# Patient Record
Sex: FEMALE | Race: WHITE
[De-identification: names, ages, dates, MRNs, and addresses within clinical notes are randomized per-mention and may not be internally consistent; named-entity substitution may affect disease eponyms.]

---

## 2016-12-29 NOTE — RADRPT
EXAM DATE/TIME:  12/29/2016 18:06 

 

[facility] COMPARISON:     

CHEST SINGLE AP, October 30, 2016, 9:58.

 

                     

INDICATIONS :     

General Weakness and Confusion.

                     

 

MEDICAL HISTORY :            

Cerebrovascular disease. Seizures. Cardiovascular disease.  Kidney cancer.   

 

SURGICAL HISTORY :        

Appendectomy. Cholecystectomy. Hysterectomy. right nephrectomy. gastrectomy

 

ENCOUNTER:     

Initial                                        

 

ACUITY:     

1 day      

 

PAIN SCORE:     

0/10

 

LOCATION:     

Bilateral chest 

 

FINDINGS:     

PA and lateral views of the chest demonstrate the lungs to be symmetrically aerated without evidence 
of mass, infiltrate or effusion.  The cardiomediastinal contours are unremarkable.  Osseous structure
s are intact.

 

CONCLUSION:     

No acute cardiopulmonary disease demonstrated.

 

 

 

 Prince Justice MD on December 29, 2016 at 18:22           

Board Certified Radiologist.

 This report was verified electronically.

## 2016-12-29 NOTE — PD
HPI


Chief Complaint:  GI Complaint


Time Seen by Provider:  17:37


Travel History


International Travel<30 days:  No


Contact w/Intl Traveler<30days:  No


Traveled to known affect area:  No





History of Present Illness


HPI


Patient is a 56-year-old female who has been here multiple times for abdominal 

pain who comes in saying she is confused.  Patient was here , , 

 for abdominal pain, asking for Dilaudid.  She was discharged home each time 

without a prescription for Dilaudid.  She had a CT scan performed on  that showed no acute abnormalities.  She had labs done on each visit that 

showed no acute abnormalities.  Patient keeps saying she is confused and does 

not provide any other history.  She does say Dilaudid a few times.





PFSH


Past Medical History


Hx Anticoagulant Therapy:  No


Asthma:  No


Blood Disorders:  No


Anxiety:  Yes


Heart Rhythm Problems:  No


Cancer:  Yes (kidney)


Cardiovascular Problems:  Yes (MI )


High Cholesterol:  No


Chemotherapy:  No


Chest Pain:  No


Congestive Heart Failure:  No


COPD:  No


Cerebrovascular Accident:  Yes


Diabetes:  No


Diminished Hearing:  No


Endocrine:  No


Gastrointestinal Disorders:  Yes


GERD:  Yes


Genitourinary:  Yes (RIGHT NEPHRECTOMY)


Headaches:  Yes


Hypertension:  No


Immune Disorder:  No


Implanted Vascular Access Dvce:  No


Musculoskeletal:  No


Neurologic:  No


Psychiatric:  No


Reproductive:  No


Respiratory:  No


Immunizations Current:  Yes


Myocardial Infarction:  Yes (2006)


Pneumonia:  Yes


Radiation Therapy:  No


Seizures:  Yes


Sleep Apnea:  No


Thyroid Disease:  No


Tetanus Vaccination:  < 5 Years


Influenza Vaccination:  Yes


Menopausal:  Yes


:  1


Para:  1


Miscarriage:  0


:  0


Ectopic Pregnancy:  No


Ovarian Cysts:  No


Dilation and Curettage (D&C):  Yes


Tubal Ligation:  Yes





Past Surgical History


Abdominal Surgery:  Yes (total gastrectomy w/pouch , hernia repair)


Appendectomy:  Yes


Cardiac Surgery:  Yes (pt states an occulator was placed in her heart )


Cholecystectomy:  Yes


Gynecologic Surgery:  Yes (hysterectomy)


Hysterectomy:  Yes


Tonsillectomy:  Yes


Other Surgery:  Yes (right kidney removed)





Social History


Alcohol Use:  No


Tobacco Use:  No


Substance Use:  No





Allergies-Medications


(Allergen,Severity, Reaction):  


Coded Allergies:  


     Compazine (Verified  Allergy, Severe, SOB, 16)


     Gadolinium Derivatives (Verified  Allergy, Severe, RESPIRATORY DISTRESS, )


     Lobster (Verified  Allergy, Severe, Anaphylaxis, 16)


     Morphine (Verified  Allergy, Severe, Hives, 16)


 PT HAVING HIVES AND ITCHING TO ARM ABOVE IV SITE AFTER


 ADMINISTRATION OF MORPHINE


     Phenergan (Verified  Allergy, Severe, SOB, 16)


     Reglan (Verified  Allergy, Severe, SOB, 16)


     Toradol (Verified  Allergy, Severe, Shortness of Breath, 16)


     Zofran (Verified  Allergy, Severe, SOB, 16)


     Penicillin (Verified  Allergy, Mild, RASH, 16)


     Sulfa (Verified  Allergy, Mild, RASH, 16)


     *MDRO Multi-Drug Resistant Organism (Verified  Adverse Reaction, Unknown, 

16)


 MDR-E.Coli (urine-16)


Reported Meds & Prescriptions





Reported Meds & Active Scripts


Active


Macrobid (Nitrofurantoin Monoh/Nitrofur Macro) 100 Mg Cap 100 Mg PO BID 7 Days


Reported


Buspirone (Buspirone HCl) 7.5 Mg Tab 7.5 Mg PO BID


Xifaxan (Rifaximin) 550 Mg Tab 550 Mg PO Q8HR


Valium (Diazepam) 10 Mg Tab 10 Mg PO TID


Fioricet (Butalbital-Acetaminophen-Caffeine) -40 Mg Cap 1 Cap PO Q4H PRN


Cipro (Ciprofloxacin HCl) 500 Mg Tab 500 Mg PO BID


Dilaudid (Hydromorphone HCl) 4 Mg Tab 4 Mg PO 5 TIMES A DAY


Fentanyl Patch 72 HR (Fentanyl) 25 Mcg/Hr Patch 25 Mcg T-DERMAL Q72H


Rizatriptan Odt (Rizatriptan Benzoate) 10 Mg Tab 10 Mg SL ONCE PRN


Zyprexa (Olanzapine) 5 Mg Tab 5 Mg PO BID


Cyanocobalamin Inj (Cyanocobalamin) 1,000 Mcg/Ml Inj 1,000 Mcg IM Q30D


Zolpidem (Zolpidem Tartrate) 10 Mg Tab 10 Mg PO HS PRN


Hydroxyzine HCl 50 Mg Tab 50 Mg PO Q8HR PRN


Bentyl (Dicyclomine HCl) 10 Mg Cap 10 Mg PO TID PRN


Imipramine HCL (Imipramine HCl) 25 Mg Tab 25 Mg PO HS








Review of Systems


ROS Limitations:  Clinical Condition, Altered Mental Status





Physical Exam


Narrative


GENERAL: Awake and alert, answers some questions.


SKIN: Warm and dry.


HEAD: Atraumatic. Normocephalic. 


EYES: Pupils equal and round. No scleral icterus.  Extraocular movements intact.


ENT: Mucous membranes pink and moist.


NECK: Trachea midline. No JVD. 


CARDIOVASCULAR: Regular rate and rhythm.  No murmur appreciated.


RESPIRATORY: No accessory muscle use. Clear to auscultation. Breath sounds 

equal bilaterally. 


GASTROINTESTINAL: Abdomen soft, nondistended.  Diffuse tenderness to palpation.


MUSCULOSKELETAL: No obvious deformities. No clubbing.  No cyanosis.  No edema. 


NEUROLOGICAL: Awake and alert. No obvious cranial nerve deficits.  Motor 

grossly within normal limits.  Speaking slowly, does not respond appropriately 

to questions.


PSYCHIATRIC: Appropriate mood and affect; insight and judgment normal.





Data


Data


Last Documented VS





Vital Signs








  Date Time  Temp Pulse Resp B/P Pulse Ox O2 Delivery O2 Flow Rate FiO2


 


16 19:41  110 16 159/103 94 Room Air  


 


16 17:14 98.8       








Orders





 Complete Blood Count With Diff (16 17:51)


Comprehensive Metabolic Panel (16 17:51)


Urinalysis - C+S If Indicated (16 17:51)


Lactic Acid (16 17:51)


Ammonia (16 17:51)


Electrocardiogram (16 )


Troponin I (16 17:51)


Cath For Specimen (16 17:51)


Ct Brain W/O Iv Contrast(Rout) (16 )


Chest, Pa & Lat (16 )


Sodium Chlor 0.9% 1000 Ml Inj (Ns 1000 M (16 18:00)


Aspirin Chew (Aspirin Chew) (16 19:45)


Heparin Infusion NATALIA.Q1H (16 19:41)


Heparin Inj (Heparin Inj) (16 19:45)


Heparin Inj (Heparin Inj) (16 01:45)


Heparin-D5w Inj (Heparin-D5w Inj) (16 19:45)


Act Partial Throm Time (Ptt) (16 19:41)


Prothrombin Time / Inr (Pt) (16 19:41)


Cbc No Diff, Includes Plts (17 06:00)


Act Partial Throm Time (Ptt) (16 02:41)


Occult Blood (Hemoccult) Stool (16 19:41)


Sodium Chlor 0.9% 1000 Ml Inj (Ns 1000 M (16 19:45)


Heparin Inj (Heparin Inj) (16 01:45)





Labs





 Laboratory Tests








Test 16





 19:00


 


White Blood Count 12.4 TH/MM3


 


Red Blood Count 4.82 MIL/MM3


 


Hemoglobin 13.7 GM/DL


 


Hematocrit 43.3 %


 


Mean Corpuscular Volume 89.8 FL


 


Mean Corpuscular Hemoglobin 28.4 PG


 


Mean Corpuscular Hemoglobin 31.6 %





Concent 


 


Red Cell Distribution Width 18.9 %


 


Platelet Count 164 TH/MM3


 


Mean Platelet Volume 9.2 FL


 


Neutrophils (%) (Auto) 85.2 %


 


Lymphocytes (%) (Auto) 8.1 %


 


Monocytes (%) (Auto) 6.6 %


 


Eosinophils (%) (Auto) 0.0 %


 


Basophils (%) (Auto) 0.1 %


 


Neutrophils # (Auto) 10.6 TH/MM3


 


Lymphocytes # (Auto) 1.0 TH/MM3


 


Monocytes # (Auto) 0.8 TH/MM3


 


Eosinophils # (Auto) 0.0 TH/MM3


 


Basophils # (Auto) 0.0 TH/MM3


 


CBC Comment DIFF FINAL 


 


Differential Comment  


 


Sodium Level 136 MEQ/L


 


Potassium Level 4.6 MEQ/L


 


Chloride Level 100 MEQ/L


 


Carbon Dioxide Level 26.5 MEQ/L


 


Anion Gap 10 MEQ/L


 


Blood Urea Nitrogen 15 MG/DL


 


Creatinine 1.21 MG/DL


 


Estimat Glomerular Filtration 46 ML/MIN





Rate 


 


Random Glucose 131 MG/DL


 


Lactic Acid Level 2.4 mmol/L


 


Calcium Level 8.8 MG/DL


 


Total Bilirubin 0.2 MG/DL


 


Aspartate Amino Transf 951 U/L





(AST/SGOT) 


 


Alanine Aminotransferase 507 U/L





(ALT/SGPT) 


 


Alkaline Phosphatase 120 U/L


 


Ammonia 16 MCMOL/L


 


Troponin I 0.91 NG/ML


 


Total Protein 7.5 GM/DL


 


Albumin 3.8 GM/DL











[facility]


Medical Decision Making


Medical Screen Exam Complete:  Yes


Emergency Medical Condition:  Yes


Medical Record Reviewed:  Yes


Interpretation(s)


ECG shows sinus tach at 109, left bundle branch block.


Differential Diagnosis


Sepsis versus ACS versus ICH versus drug-seeking behavior


Narrative Course


Patient is a 56-year-old female who says she is very confused.  Exam shows no 

gross motor abnormalities, she is speaking slowly.  IV established, labs sent.  

CT of the head performed shows no acute abnormalities.  Chest x-ray shows no 

acute abnormalities.  Patient given IV fluids.





Troponin is 0.91.  AST and ALT are elevated to 951 and 507.  These are very 

different then 5 days ago.  





Patient started on Aspirin and Heparin.  LBBB is old from October. 





Patient to be admitted for further management of NSTEMI and shock liver.





Diagnosis





 Primary Impression:  


 NSTEMI (non-ST elevated myocardial infarction)


 Additional Impression:  


 Shock liver





Admitting Information


Admitting Physician Requests:  Admit


Condition:  Stable








Christina Durham MD Dec 29, 2016 19:22

## 2016-12-29 NOTE — RADRPT
EXAM DATE/TIME:  12/29/2016 18:17 

 

[facility] COMPARISON:     

No previous studies available for comparison.

 

 

INDICATIONS :     

Generalized weakness with altered mental status today.

                      

 

RADIATION DOSE:     

32.39 CTDIvol (mGy) 

 

 

 

MEDICAL HISTORY :     

Cerebrovascular disease. Seizures. 

 

SURGICAL HISTORY :      

None. 

 

ENCOUNTER:      

Initial

 

ACUITY:      

1 day

 

PAIN SCALE:      

0/10

 

LOCATION:        

cranial 

 

TECHNIQUE:     

Multiple contiguous axial images were obtained of the head.  Using automated exposure control and adj
ustment of the mA and/or kV according to patient size, radiation dose was kept as low as reasonably a
chievable to obtain optimal diagnostic quality images. 

 

FINDINGS:     

 

CEREBRUM:     

The ventricles are normal for age.  No evidence of midline shift, mass lesion, hemorrhage or acute in
farction.  No extra-axial fluid collections are seen.

 

POSTERIOR FOSSA:     

The cerebellum and brainstem are intact.  The 4th ventricle is midline.  The cerebellopontine angle i
s unremarkable.

 

EXTRACRANIAL:     

The visualized portion of the orbits is intact.

 

SKULL:     

The calvaria is intact.  No evidence of skull fracture.

 

CONCLUSION:     

Negative noncontrast head CT.

 

 

 

 Prince Justice MD on December 29, 2016 at 18:28           

Board Certified Radiologist.

 This report was verified electronically.

## 2016-12-29 NOTE — PD
Data


Data


Last Documented VS





Vital Signs








  Date Time  Temp Pulse Resp B/P Pulse Ox O2 Delivery O2 Flow Rate FiO2


 


12/29/16 19:41  110 16 159/103 94 Room Air  


 


12/29/16 17:14 98.8       








Orders





 Complete Blood Count With Diff (12/29/16 17:51)


Comprehensive Metabolic Panel (12/29/16 17:51)


Urinalysis - C+S If Indicated (12/29/16 17:51)


Lactic Acid (12/29/16 17:51)


Ammonia (12/29/16 17:51)


Electrocardiogram (12/29/16 )


Troponin I (12/29/16 17:51)


Cath For Specimen (12/29/16 17:51)


Ct Brain W/O Iv Contrast(Rout) (12/29/16 )


Chest, Pa & Lat (12/29/16 )


Sodium Chlor 0.9% 1000 Ml Inj (Ns 1000 M (12/29/16 18:00)


Aspirin Chew (Aspirin Chew) (12/29/16 19:45)


Heparin Infusion NATALIA.Q1H (12/29/16 19:41)


Heparin Inj (Heparin Inj) (12/29/16 19:45)


Heparin Inj (Heparin Inj) (12/30/16 01:45)


Heparin-D5w Inj (Heparin-D5w Inj) (12/29/16 19:45)


Act Partial Throm Time (Ptt) (12/29/16 19:41)


Prothrombin Time / Inr (Pt) (12/29/16 19:41)


Cbc No Diff, Includes Plts (1/1/17 06:00)


Act Partial Throm Time (Ptt) (12/30/16 02:41)


Occult Blood (Hemoccult) Stool (12/29/16 19:41)


Sodium Chlor 0.9% 1000 Ml Inj (Ns 1000 M (12/29/16 19:45)


Heparin Inj (Heparin Inj) (12/30/16 01:45)


Ct Abd/Pel W Iv Contrast(Rout) (12/29/16 20:14)


Vancomycin Inj (Vancomycin Inj) (12/29/16 20:31)


Metronidazole 500 Mg Inj (Flagyl 500 Mg (12/29/16 20:31)


Aztreonam Inj (Azactam Inj) (12/29/16 20:45)


Iohexol 350 Inj (Omnipaque 350 Inj) (12/29/16 21:05)


Sodium Chlor 0.9% 1000 Ml Inj (Ns 1000 M (12/29/16 22:30)


Vital Signs (Adult) Q4H (12/29/16 22:35)


Activity Bed Rest With Brp (12/29/16 22:35)


^ Cardiac Monitor / Telemetry .CONTINUOUS (12/29/16 22:35)


Sodium Chlor 0.9% 1000 Ml Inj (Ns 1000 M (12/29/16 22:35)


Sodium Chloride 0.9% Flush (Ns Flush) (12/29/16 22:45)


Sodium Chloride 0.9% Flush (Ns Flush) (12/30/16 09:00)


Basic Metabolic Panel (Bmp) (12/30/16 06:00)


Complete Blood Count With Diff (12/30/16 06:00)


Creatine Kinase (Cpk) (12/30/16 01:00)


Creatine Kinase (Cpk) (12/30/16 07:00)


Troponin I (12/30/16 01:00)


Troponin I (12/30/16 07:00)


Hepatic Functional Panel (12/30/16 06:00)


Electrocardiogram (12/30/16 01:00)


Electrocardiogram (12/30/16 07:00)


Naloxone Inj (Narcan Inj) (12/29/16 22:45)


Diet Clear Liquid (12/30/16 Breakfast)


Aztreonam Inj (Azactam Inj) (12/30/16 10:00)


Consult Gastroenterology (12/29/16 )


Lipid Profile (12/30/16 06:00)


Hepatitis Profile (12/30/16 01:00)


Aspirin Ec (Ecotrin Ec) (12/30/16 09:00)


Pantoprazole Inj (Protonix Inj) (12/29/16 23:00)


Rifaximin (Xifaxan) (12/30/16 06:00)


Lactic Acid (12/30/16 06:00)


Metronidazole 500 Mg Inj (Flagyl 500 Mg (12/30/16 06:00)


Tylenol (Acetaminophen) (12/29/16 22:54)


Alcohol (Ethanol) (12/29/16 22:54)


Drug Screen, Random Urine (12/29/16 22:54)


(Hub Use Only)Inp Phy Cons/Ref (12/29/16 )


Admit Order (Ed Use Only) (12/29/16 23:00)





Labs





 Laboratory Tests








Test 12/29/16 12/29/16 12/29/16





 19:00 19:50 21:30


 


White Blood Count 12.4 TH/MM3  


 


Red Blood Count 4.82 MIL/MM3  


 


Hemoglobin 13.7 GM/DL  


 


Hematocrit 43.3 %  


 


Mean Corpuscular Volume 89.8 FL  


 


Mean Corpuscular Hemoglobin 28.4 PG  


 


Mean Corpuscular Hemoglobin 31.6 %  





Concent   


 


Red Cell Distribution Width 18.9 %  


 


Platelet Count 164 TH/MM3  


 


Mean Platelet Volume 9.2 FL  


 


Neutrophils (%) (Auto) 85.2 %  


 


Lymphocytes (%) (Auto) 8.1 %  


 


Monocytes (%) (Auto) 6.6 %  


 


Eosinophils (%) (Auto) 0.0 %  


 


Basophils (%) (Auto) 0.1 %  


 


Neutrophils # (Auto) 10.6 TH/MM3  


 


Lymphocytes # (Auto) 1.0 TH/MM3  


 


Monocytes # (Auto) 0.8 TH/MM3  


 


Eosinophils # (Auto) 0.0 TH/MM3  


 


Basophils # (Auto) 0.0 TH/MM3  


 


CBC Comment DIFF FINAL   


 


Differential Comment    


 


Sodium Level 136 MEQ/L  


 


Potassium Level 4.6 MEQ/L  


 


Chloride Level 100 MEQ/L  


 


Carbon Dioxide Level 26.5 MEQ/L  


 


Anion Gap 10 MEQ/L  


 


Blood Urea Nitrogen 15 MG/DL  


 


Creatinine 1.21 MG/DL  


 


Estimat Glomerular Filtration 46 ML/MIN  





Rate   


 


Random Glucose 131 MG/DL  


 


Lactic Acid Level 2.4 mmol/L  


 


Calcium Level 8.8 MG/DL  


 


Total Bilirubin 0.2 MG/DL  


 


Aspartate Amino Transf 951 U/L  





(AST/SGOT)   


 


Alanine Aminotransferase 507 U/L  





(ALT/SGPT)   


 


Alkaline Phosphatase 120 U/L  


 


Ammonia 16 MCMOL/L  


 


Troponin I 0.91 NG/ML  


 


Total Protein 7.5 GM/DL  


 


Albumin 3.8 GM/DL  


 


Prothrombin Time  11.8 SEC 


 


Prothromb Time International  1.1 RATIO 





Ratio   


 


Activated Partial  25.2 SEC 





Thromboplast Time   


 


Urine Color   YELLOW 


 


Urine Turbidity   CLEAR 


 


Urine pH   6.0 


 


Urine Specific Gravity   1.033 


 


Urine Protein   30 mg/dL


 


Urine Glucose (UA)   NEG mg/dL


 


Urine Ketones   40 mg/dL


 


Urine Occult Blood   MOD 


 


Urine Nitrite   NEG 


 


Urine Bilirubin   NEG 


 


Urine Urobilinogen   LESS THAN 2.0





   MG/DL


 


Urine Leukocyte Esterase   NEG 


 


Urine RBC   10 /hpf


 


Urine WBC   1 /hpf


 


Urine Squamous Epithelial   1 /hpf





Cells   


 


Urine Mucus   FEW /lpf


 


Microscopic Urinalysis Comment   CATH-CULT NOT





   IND


 


Urine Opiates Screen   POS 


 


Urine Barbiturates Screen   POS 


 


Urine Amphetamines Screen   NEG 


 


Urine Benzodiazepines Screen   POS 


 


Urine Cocaine Screen   NEG 


 


Urine Cannabinoids Screen   NEG 











MDM


Medical Record Reviewed:  Yes


Supervised Visit with DELLA:  No


Narrative Course


CBC & BMP Diagram


12/29/16 19:00








Neutrophils 85%


Toxicology positive for opiates barbiturates and benzodiazepines





ALT of 507


Alkaline phosphatase 120


Lactic acid 2.4


Troponin 0.19


Ammonia 16


EKG reveals a sinus tachycardia with Left bundle branch block pattern similar 

to prior with a rate of 109





Last 24 hours Impressions








Abdomen/Pelvis CT 12/29/16 2014 Signed





Impressions: 





 Service Date/Time:  Thursday, December 29, 2016 21:03 - CONCLUSION:  1. No 





 obstruction or acute inflammatory changes demonstrated. 2. Liver is diffusely 





 fatty infiltrated. 3. Previous right nephrectomy. No recurrent mass. No 





 lymphadenopathy. 4. Thickened gluteus minimus muscle belly, new. This is 





 nonspecific but appearance and short term interim development would support 





 likelihood of a strain. 5. Patchy consolidation of both lung bases worsening, 





 especially on the left.     Prince Justice MD 


 


Head CT 12/29/16 0000 Signed





Impressions: 





 Service Date/Time:  Thursday, December 29, 2016 18:17 - CONCLUSION:  Negative 





 noncontrast head CT.     Prince Justice MD 


 


Chest X-Ray 12/29/16 0000 Signed





Impressions: 





 Service Date/Time:  Thursday, December 29, 2016 18:06 - CONCLUSION:  No acute 





 cardiopulmonary disease demonstrated.     Prince Justice MD 








The case was discussed with the GI Dr Neri at regarding markedly elevated 

liver enzymes.  Consideration is given to shock liver.  The patient does have 

infiltrates at the bases on CT. Flagyl Vanco and aztreonam given in setting of 

liver disease with leukocytosis and abdominal pain.  Case discussed with 

Chelsie Simental of [facility].


Diagnosis





 Primary Impression:  


 NSTEMI (non-ST elevated myocardial infarction)


 Additional Impression:  


 Shock liver





Admitting Information


Admitting Physician Requests:  Admit


Condition:  Stable








Jefferson Rojas MD Dec 29, 2016 22:16

## 2016-12-29 NOTE — RADRPT
EXAM DATE/TIME:  12/29/2016 21:03 

 

[facility] COMPARISON:     

CT ABDOMEN & PELVIS W/O CONTRAST, December 24, 2016, 13:06.  CT ABDOMEN & PELVIS W CONTRAST, October 23, 2016, 11:14.

 

 

INDICATIONS :     

General weakness and abdominal pain. 

                      

 

IV CONTRAST:     

80 cc Omnipaque 300 (iohexol) IV 

 

 

ORAL CONTRAST:      

No oral contrast ingested.

                      

 

RADIATION DOSE:     

7.02 CTDIvol (mGy) 

 

 

MEDICAL HISTORY :       

Kidney cancer.

 

SURGICAL HISTORY :      

Appendectomy. Cholecystectomy.Hysterectomy.Gastrectomy with pouch. 

 

ENCOUNTER:      

Initial

 

ACUITY:      

3 weeks

 

PAIN SCALE:      

5/10

 

LOCATION:       

Bilateral lower quadrant 

 

TECHNIQUE:     

Volumetric scanning of the abdomen and pelvis was performed.  Using automated exposure control and ad
justment of the mA and/or kV according to patient size, radiation dose was kept as low as reasonably 
achievable to obtain optimal diagnostic quality images. 

 

FINDINGS:     

The liver is diffusely fatty infiltrated. Spleen, pancreas, adrenal glands and left kidney are within
 normal limits. Previous right nephrectomy. No mass demonstrated. No lymphadenopathy.

 

No obstruction or acute inflammatory changes are seen of the bowel loops. There is been previous volodymyr
l surgery. At least 2 anastomotic staple lines are seen in the left midabdomen.

 

Mildly heterogeneous but mainly low attenuation thickening has developed of the right gluteus medius 
muscle, for example series 2 image 66 and series 601 image 67. This is entirely new over the past 5 d
ays and presumably would be posttraumatic, such as a strain the visualized osseous structures are int
act.

 

Patchy consolidation seen of the lung bases, left more so than right and both sides modestly worse in
 the interim.

 

CONCLUSION:     

1. No obstruction or acute inflammatory changes demonstrated.

2. Liver is diffusely fatty infiltrated.

3. Previous right nephrectomy. No recurrent mass. No lymphadenopathy.

4. Thickened gluteus minimus muscle belly, new. This is nonspecific but appearance and short term int
erim development would support likelihood of a strain.

5. Patchy consolidation of both lung bases worsening, especially on the left.

 

 

 

 Prince Justice MD on December 29, 2016 at 21:21           

Board Certified Radiologist.

 This report was verified electronically.

## 2016-12-30 NOTE — MH
cc:

AMANDEEP CACERES M.D., AMMA

****

 

DATE OF ADMISSION:  12/29/2016

 

Primary care doctor, Dr. Melchor martini.

 

PRESENTING COMPLAINT

Abdominal pain.

 

HISTORY OF PRESENT ILLNESS

The patient is a 56-year-old female who has visited the emergency room multiple

times earlier in the week.  The patient was in the emergency department on

December 24th, 25th and 26th for abdominal pain, asking for Dilaudid.  She was

discharged each time without a prescription for Dilaudid.  Lab work was done on

her initial visit on December 24th and was found to have a normal WBC count,

LFTs were normal, only evidence of urinary tract infection was seen for which

the patient was prescribed Bactrim and was discharged to home.  Cultures later

grew Klebsiella which was resistant to nitrofurantoin.

 

Subsequently, the patient came into the ER on the 25th and 26th of December but

no lab work was done.  The patient came back to the emergency room on December 29, 2016 with worsening abdominal pain and complaining of disorientation and

confusion.  Work-up showed at this point abnormal LFTs, elevated troponin and

elevated creatinine.  Also, with evidence of some leukocytosis.  The patient

was admitted for further evaluation.

 

At this point the patient denies any vomiting or diarrhea.  She does report

having had dry heaves.  She is still complaining of abdominal pain,

periumbilical, is not able to differentiate or give any more details about the

nature or character of the pain.  The patient also says her chest feels

unusual, however, she cannot say it is pain.  This has been going on for the

last 24 hours.

 

She denies any documented fever but complains of feeling cold and chilly.  No

cough.  No burning in the urine.  No diarrhea.  The review of systems is

otherwise completely negative.

 

PAST MEDICAL HISTORY

1. Kidney cancer.

2. History of CVA, not on any anticoagulation at this point.

3. History of GI disorder, severe gastroesophageal reflux disease.

4. Chronic headaches.

5. History of myocardial infarction in 2006.

6. Pneumonias.

 

PAST SURGICAL HISTORY

1. Total gastrectomy with pouch in 2003.

2. Hernia repair.

3. Appendectomy.

4. Tubal ligation.

5. Tonsillectomy.

6. Right kidney removed, reports renal cell cancer.

7. The patient also had some kind of heart surgery in 2006, however, she is

   unable to specify any more details.

 

ALLERGIES

THE PATIENT HAS A LONG ALLERGY LIST INCLUDING BUT NOT LIMITED TO:

1. COMPAZINE.

2. GADOLINIUM.

3. LOBSTER.

4. MORPHINE.

5. PHENERGAN.

6. REGLAN.

7. TORADOL.

8. ZOFRAN.

9. PENICILLIN.

10. SULFA.

 

MEDICATIONS

Current medications at home include:

1. Valium 10 mg p.o. t.i.d.

2. Fioricet 300/40, one capsule p.o. q.4h. p.r.n.

3. Cipro 500 mg p.o. b.i.d.

4. Dilaudid 4 mg tablet p.o. five times a day.

5. Fentanyl patch 25 mcg per hour q.72 hours.

6. Rizatriptan 10 mg tablet sublingual once p.r.n.

7. Olanzapine 5 mg p.o. b.i.d.

8. Cyanocobalamin injections 1000 mcg every month.

9. Ambien 10 mg tablet p.o. q.h.s. p.r.n.

10. Hydroxyzine 50 mg p.o. q.8h. p.r.n. for nausea.

11. Bentyl 10 mg capsule p.o. t.i.d. p.r.n.

12. Imipramine 25 mg tablet p.o. q.h.s.

13. __________________, BuSpar and rifaximin, but the patient is not sure she

   takes those medications.

 

REVIEW OF SYSTEMS

The patient states she is disoriented, however, she is awake, alert and

oriented, was able to give complete details and specifics of her history.  14

systems were reviewed and otherwise negative except as mentioned in the HPI.

 

PHYSICAL EXAMINATION

VITAL SIGNS:  Temperature 98.5, pulse 91, respiratory rate 16, systolic blood

pressure 140, diastolic 81.  She is satting 95% on room air.

GENERAL:  The patient is a petite female lying in bed, does appear

malnourished.  She is awake, alert, oriented, answering all the questions.

HEENT:  Atraumatic, normocephalic.  Pupils are round and reactive.  No scleral

icterus.  No conjunctival injection.  Oral mucosa is moist.

NECK:  Supple, nontender.  No JVD.

HEART:  S1, S2, regular.  No gallop, murmur or rub.

LUNGS:  Bilateral air entry.  Clear to auscultation.  No rales, wheezes or

rhonchi.

ABDOMEN:  Positive bowel sounds.  Soft.  The patient has diffuse tenderness to

palpation, however, there is no rebound.

EXTREMITIES:  No obvious deformity.  No clubbing, cyanosis or edema.

NEUROLOGIC:  She is awake, alert, oriented x3.  No cranial deficits are seen.

No focal neurological deficits.  Speaking slowly but able to answer all

questions.

PSYCHIATRIC:  Appropriate.

 

LABORATORY DATA

Pertinent lab investigations show a WBC count of 11.6, hemoglobin 10.9,

hematocrit 33.8, platelet count 129.

 

Chemistries show sodium 139, potassium 3.6, chloride 103, bicarb 25.2,

creatinine 2.73, BUN 10, glucose 121, calcium 8.0.

 

AST 1088 which is elevated from admission of 951.  Admission , now up to

625.  Admission alk phos 120, now down to 98.

 

Total CK on admission 2017, now down to 1974.

 

Troponin 0.78 on admission down to 0.58.

 

Albumin is low at 3.1.

 

Triglycerides 62, cholesterol 97, LDL 33, HDL 51.8.

 

Toxicology is positive for urinary opioids, barbiturate and benzodiazepines.

 

Urine has some hematuria but otherwise negative.

 

Hepatitis serologies are pending.

 

IMAGING STUDIES

CT scan of the abdomen and pelvis done in the emergency room showed no acute

obstruction or inflammatory changes.  The liver is diffusely infiltrated with

fat.  Previous right nephrectomy, no recurrent mass.  No lymphadenopathy.

Possible strain of the gluteus minimus.

 

Chest x-ray was negative for any acute disease.

 

Head CT scan was negative.

 

DIAGNOSTIC IMPRESSION

1. Abdominal pain, etiology unknown.

2. Abnormal LFTs/acute hepatitis, question etiology.

3. Non-ST-elevation MI, question chest pain.

4. Chronic pain.

5. Leukocytosis with recent UTI.

6. History of gastroesophageal reflux disease.

7. Questionable history of CVA and coronary artery disease; the patient is not

   on any medications for that.

 

PLAN

1. The patient is admitted to the hospital under the care of Dr. Mendoza.

2. GI is consulted and has seen the patient.

3. Cardiology has also seen the patient.

4. Echocardiogram is pending.  The patient is currently on IV heparin.  We will

   continue to check the troponins.

5. The patient is asking for more Dilaudid.  Will give 0.5 mg IV x1, and then

   will verify her home medications, from the pain management clinic and her

   .

6. Monitor LFTs.

7. Hepatitis serologies are pending.

8. The patient is on broad-spectrum antibiotics including Azactam and Flagyl.

   Can possibly discontinue Azactam and switch to Levaquin or Cipro in a.m.

9. DVT prophylaxis, GI prophylaxis.

 

The plan of care has been discussed in detail with the patient and with the

nursing staff.

 

 

 

 

 

                               __________________________________

                           MD RONNA Reyes/SAGE

D:  12/30/2016/10:04 AM

T:  12/30/2016/10:23 AM

Visit #:  [id number]

Job #:  [id number]

## 2016-12-30 NOTE — PD.CONS
HPI


History of Present Illness


This is a 56 year old female  with past medical history of chronic use of 

Dilaudid, chronic abdominal pain, multiple abdominal surgeries, CAD, MI, who 

came to the ER for evaluation of confusion and was admitted for NSTEMI and 

possible shocked liver.   Patient had multiple visits to  the ER during this 

month and each time, she came with complaints of confusion and abdominal pain 

asking for Dilaudid, and each time she was discharged with out prescription for 

Dilaudid. She also had multiple ER visits/hospitalizations during this year and 

was evaluated by GI services for chronic abdominal pain.  She under went EGD/

Colonoscopy (6/17/16) and this revealed s/p gastric bypass, biopsy of gastric 

polyp; diverticulosis in sigmoid and descending colon, retroflexed views 

revealed small internal hemorrhoids, external hemorrhoids.  Pathology revealed 

mild chronic gastritis, negative for Helicobacter pylori.  She continue to have 

abdominal discomfort, but it was much improved.  She was seen  by Dr. Davila as 

outpatient and she was started on dicyclomine and a SBFT was ordered, but that 

wasn't done,  recommended surgical eval for lysis of adhesion and  

Tertiary referral.  GI services this admission consulted for possible shocked 

liver, she denies alcohol intake, toxicology was (+) for Opiates, barbiturates, 

and Benzo. AST 1088, ALt 625, ALP 98, bili normal. Hepatitis panel negative, no 

reported hypotension. Cardiology consulted. Patient is currently lethargic but 

answers all questions appropriately, continue to have diffused abdominal and 

back pain.  Denies nausea, vomiting, diarrhea, constipation or bleeding. She 

does report dry heaves. She  had a bladder infection 4 days ago and was placed 

on Bactrim  for this. Patient states she had a fall recently. Labs revealed 

mild leukocytosis and mild anemia, elevated troponin, elevated creat. kinase. 

CT failed to reveal acute findings, CT of the head negative, ammonia normal. 





 (Ian Angulo)





PFSH


Past Medical History


Pancreatitis once about 2 years ago


Kidney cancer


MI/CAD, undetected PFO


Double embolic CVA, coma 7 days


GERD


Aspiration pneumonia


Ileus


Past Surgical History


Fundoplication x 2


Subtotal gastrectomy


G tube placement


Incisional hernia repair x 2


EGD/Colonoscopy


Cardiac catheterization


Tonsillectomy


Appendectomy


Cholecystectomy


Tubal ligation  (Ian Angulo)


Coded Allergies:  


     Compazine (Verified  Allergy, Severe, SOB, 12/29/16)


     Gadolinium Derivatives (Verified  Allergy, Severe, RESPIRATORY DISTRESS, 12 /29/16)


     Lobster (Verified  Allergy, Severe, Anaphylaxis, 12/29/16)


     Morphine (Verified  Allergy, Severe, Hives, 12/29/16)


 PT HAVING HIVES AND ITCHING TO ARM ABOVE IV SITE AFTER


 ADMINISTRATION OF MORPHINE


     Phenergan (Verified  Allergy, Severe, SOB, 12/29/16)


     Reglan (Verified  Allergy, Severe, SOB, 12/29/16)


     Toradol (Verified  Allergy, Severe, Shortness of Breath, 12/29/16)


     Zofran (Verified  Allergy, Severe, SOB, 12/29/16)


     Penicillin (Verified  Allergy, Mild, RASH, 12/29/16)


     Sulfa (Verified  Allergy, Mild, RASH, 12/29/16)


     *MDRO Multi-Drug Resistant Organism (Verified  Adverse Reaction, Unknown, 

12/29/16)


 MDR-E.Coli (urine-11/27/16)


Medications





 Current Medications








 Medications


  (Trade)  Dose


 Ordered  Sig/Jermaine


 Route  Start Time


 Stop Time Status Last Admin


 


  (Heparin Inj)  5,000 units  UNSCH  PRN


 IV  12/30/16 01:45


     


 


 


 Heparin Sodium


  (Porcine) 2500


 units  2,500 units  UNSCH  PRN


 IV  12/30/16 01:45


     


 


 


 Heparin Sodium/


 Dextrose  250 ml @ 0


 mls/hr  TITRATE


 IV  12/29/16 19:45


    12/29/16 21:42


 


 


  (NS 1000 ml Inj)  1,000 ml @ 


 100 mls/hr  Q10H


 IV  12/29/16 22:35


    12/30/16 09:27


 


 


  (NS Flush)  2 ml  UNSCH  PRN


 FLUSH  12/29/16 22:45


     


 


 


  (NS Flush)  2 ml  BID


 FLUSH  12/30/16 09:00


     


 


 


 Naloxone HCl 0.4


 mg  0.4 mg  UNSCH  PRN


 IV  12/29/16 22:45


     


 


 


  (Azactam Inj/NS


 Inj)  100 ml @ 


 200 mls/hr  Q12H


 IV  12/30/16 10:00


    12/30/16 09:26


 


 


  (Ecotrin Ec)  325 mg  DAILY


 PO  12/30/16 09:00


     


 


 


  (Protonix Inj)  40 mg  Q24H


 IV PUSH  12/29/16 23:00


    12/30/16 00:11


 


 


 Rifaximin 550 mg  550 mg  Q8HR


 PO  12/30/16 06:00


    12/30/16 05:23


 


 


  (Flagyl 500 Mg


 Inj)  100 ml @ 


 100 mls/hr  Q8H


 IV  12/30/16 06:00


    12/30/16 05:23


 








Family History


Both parents had COPD, Mother also had MS.


Social History


No tobacco.


No ETOH


No illicit drug use. (Ian Angulo)





Review of Systems


Constitutional:  COMPLAINS OF: Fatigue, Chills,  DENIES: Fever


Endocrine:  DENIES: Polyuria


Eyes:  DENIES: Double Vision


Ears, nose, mouth, throat:  DENIES: Hoarseness


Respiratory:  DENIES: Shortness of breath


Cardiovascular:  DENIES: Lower Extremity Edema


Gastrointestinal:  COMPLAINS OF: Abdominal pain,  DENIES: Black stools, Bloody 

stools, Constipation, Diarrhea, Nausea, Vomiting, Difficulty Swallowing, 

Anorexia, Odynophagia, Swelling of Abdomen, Heartburn, Hematemesis


Genitourinary:  DENIES: Hematuria


Musculoskeletal:  COMPLAINS OF: Back pain


Integumentary:  DENIES: Jaundice


Hematologic/lymphatic:  DENIES: Bruising


Immunologic/allergic:  DENIES: Eczema


Neurologic:  DENIES: Abnormal gait


Psychiatric:  COMPLAINS OF: Confusion,  DENIES: Anxiety (Ian Angulo)





GI Exam


Vitals I&O





 Vital Signs








  Date Time  Temp Pulse Resp B/P Pulse Ox O2 Delivery O2 Flow Rate FiO2


 


12/30/16 08:00 98.5 91 16 140/87 95   


 


12/30/16 05:08 99.4 93 16 144/87 99   


 


12/30/16 04:19      Room Air  


 


12/30/16 02:20  93      


 


12/30/16 01:20 97.8 96 16 147/85 97   


 


12/30/16 00:59  96 18 156/95 97   


 


12/29/16 23:16  104 18 153/93 97 Room Air  


 


12/29/16 21:40  100 18 158/99 97 Room Air  


 


12/29/16 20:30  110 18 147/87 96 Room Air  


 


12/29/16 19:41  110 16 159/103 94 Room Air  


 


12/29/16 17:14 98.8 118 14 154/104 91   








 I/O








 12/29/16 12/29/16 12/29/16 12/30/16 12/30/16 12/30/16





 06:59 14:59 22:59 06:59 14:59 22:59


 


Intake Total    299 ml  


 


Output Total    300 ml  


 


Balance    -1 ml  


 


      


 


Intake Oral    240 ml  


 


IV Total    59 ml  


 


Output Urine Total    300 ml  


 


# Bowel Movements    0  








Imaging





Last Impressions








Abdomen/Pelvis CT 12/29/16 2014 Signed





Impressions: 





 Service Date/Time:  Thursday, December 29, 2016 21:03 - CONCLUSION:  1. No 





 obstruction or acute inflammatory changes demonstrated. 2. Liver is diffusely 





 fatty infiltrated. 3. Previous right nephrectomy. No recurrent mass. No 





 lymphadenopathy. 4. Thickened gluteus minimus muscle belly, new. This is 





 nonspecific but appearance and short term interim development would support 





 likelihood of a strain. 5. Patchy consolidation of both lung bases worsening, 





 especially on the left.     Prince Justice MD 


 


Head CT 12/29/16 0000 Signed





Impressions: 





 Service Date/Time:  Thursday, December 29, 2016 18:17 - CONCLUSION:  Negative 





 noncontrast head CT.     Prince Justice MD 


 


Chest X-Ray 12/29/16 0000 Signed





Impressions: 





 Service Date/Time:  Thursday, December 29, 2016 18:06 - CONCLUSION:  No acute 





 cardiopulmonary disease demonstrated.     Prince Justice MD 








Laboratory











Test 12/29/16 12/29/16 12/29/16 12/30/16





 19:00 19:50 21:30 02:12


 


White Blood Count 12.4 TH/MM3   


 


Red Blood Count 4.82 MIL/MM3   


 


Hemoglobin 13.7 GM/DL   


 


Hematocrit 43.3 %   


 


Mean Corpuscular Volume 89.8 FL   


 


Mean Corpuscular Hemoglobin 28.4 PG   


 


Mean Corpuscular Hemoglobin 31.6 %   





Concent    


 


Red Cell Distribution Width 18.9 %   


 


Platelet Count 164 TH/MM3   


 


Mean Platelet Volume 9.2 FL   


 


Neutrophils (%) (Auto) 85.2 %   


 


Lymphocytes (%) (Auto) 8.1 %   


 


Monocytes (%) (Auto) 6.6 %   


 


Eosinophils (%) (Auto) 0.0 %   


 


Basophils (%) (Auto) 0.1 %   


 


Neutrophils # (Auto) 10.6 TH/MM3   


 


Lymphocytes # (Auto) 1.0 TH/MM3   


 


Monocytes # (Auto) 0.8 TH/MM3   


 


Eosinophils # (Auto) 0.0 TH/MM3   


 


Basophils # (Auto) 0.0 TH/MM3   


 


CBC Comment DIFF FINAL    


 


Differential Comment     


 


Sodium Level 136 MEQ/L   


 


Potassium Level 4.6 MEQ/L   


 


Chloride Level 100 MEQ/L   


 


Carbon Dioxide Level 26.5 MEQ/L   


 


Anion Gap 10 MEQ/L   


 


Blood Urea Nitrogen 15 MG/DL   


 


Creatinine 1.21 MG/DL   


 


Estimat Glomerular Filtration 46 ML/MIN   





Rate    


 


Random Glucose 131 MG/DL   


 


Lactic Acid Level 2.4 mmol/L   


 


Calcium Level 8.8 MG/DL   


 


Total Bilirubin 0.2 MG/DL   


 


Aspartate Amino Transf 951 U/L   





(AST/SGOT)    


 


Alanine Aminotransferase 507 U/L   





(ALT/SGPT)    


 


Alkaline Phosphatase 120 U/L   


 


Ammonia 16 MCMOL/L   


 


Troponin I 0.91 NG/ML   


 


Total Protein 7.5 GM/DL   


 


Albumin 3.8 GM/DL   


 


Prothrombin Time  11.8 SEC  


 


Prothromb Time International  1.1 RATIO  





Ratio    


 


Activated Partial  25.2 SEC  42.5 SEC





Thromboplast Time    


 


Urine Color   YELLOW  


 


Urine Turbidity   CLEAR  


 


Urine pH   6.0  


 


Urine Specific Gravity   1.033  


 


Urine Protein   30 mg/dL 


 


Urine Glucose (UA)   NEG mg/dL 


 


Urine Ketones   40 mg/dL 


 


Urine Occult Blood   MOD  


 


Urine Nitrite   NEG  


 


Urine Bilirubin   NEG  


 


Urine Urobilinogen   LESS THAN 2.0 





   MG/DL 


 


Urine Leukocyte Esterase   NEG  


 


Urine RBC   10 /hpf 


 


Urine WBC   1 /hpf 


 


Urine Squamous Epithelial   1 /hpf 





Cells    


 


Urine Mucus   FEW /lpf 


 


Microscopic Urinalysis Comment   CATH-CULT NOT 





   IND 


 


Urine Opiates Screen   POS  


 


Urine Barbiturates Screen   POS  


 


Urine Amphetamines Screen   NEG  


 


Urine Benzodiazepines Screen   POS  


 


Urine Cocaine Screen   NEG  


 


Urine Cannabinoids Screen   NEG  


 


    





Test 12/30/16 12/30/16 12/30/16 





 03:17 05:32 07:03 


 


Total Creatine Kinase 2017 U/L  1974 U/L 


 


Creatine Kinase MB 5.7 NG/ML  5.3 NG/ML 


 


Creatine Kinase MB % 0.3 %  0.3 % 


 


Troponin I 0.78 NG/ML  0.58 NG/ML 


 


White Blood Count  11.6 TH/MM3  


 


Red Blood Count  3.84 MIL/MM3  


 


Hemoglobin  10.9 GM/DL  


 


Hematocrit  33.8 %  


 


Mean Corpuscular Volume  88.0 FL  


 


Mean Corpuscular Hemoglobin  28.5 PG  


 


Mean Corpuscular Hemoglobin  32.4 %  





Concent    


 


Red Cell Distribution Width  18.8 %  


 


Platelet Count  129 TH/MM3  


 


Mean Platelet Volume  9.7 FL  


 


Neutrophils (%) (Auto)  77.9 %  


 


Lymphocytes (%) (Auto)  16.5 %  


 


Monocytes (%) (Auto)  5.1 %  


 


Eosinophils (%) (Auto)  0.1 %  


 


Basophils (%) (Auto)  0.4 %  


 


Neutrophils # (Auto)  9.1 TH/MM3  


 


Lymphocytes # (Auto)  1.9 TH/MM3  


 


Monocytes # (Auto)  0.6 TH/MM3  


 


Eosinophils # (Auto)  0.0 TH/MM3  


 


Basophils # (Auto)  0.1 TH/MM3  


 


CBC Comment  DIFF FINAL   


 


Differential Comment     


 


Sodium Level  139 MEQ/L  


 


Potassium Level  3.6 MEQ/L  


 


Chloride Level  103 MEQ/L  


 


Carbon Dioxide Level  25.2 MEQ/L  


 


Anion Gap  11 MEQ/L  


 


Blood Urea Nitrogen  10 MG/DL  


 


Creatinine  0.73 MG/DL  


 


Estimat Glomerular Filtration  82 ML/MIN  





Rate    


 


Random Glucose  121 MG/DL  


 


Lactic Acid Level  1.1 mmol/L  


 


Calcium Level  8.0 MG/DL  


 


Total Bilirubin  0.3 MG/DL  


 


Direct Bilirubin  0.1 MG/DL  


 


Indirect Bilirubin  0.2 MG/DL  


 


Aspartate Amino Transf  1088 U/L  





(AST/SGOT)    


 


Alanine Aminotransferase  625 U/L  





(ALT/SGPT)    


 


Alkaline Phosphatase  98 U/L  


 


Total Protein  6.4 GM/DL  


 


Albumin  3.1 GM/DL  


 


Triglycerides Level   62 MG/DL 


 


Cholesterol Level   97 MG/DL 


 


LDL Cholesterol   33 MG/DL 


 


HDL Cholesterol   51.8 MG/DL 


 


Cholesterol/HDL Ratio   1.87 RATIO 


 


Acetaminophen Level   LESS THAN 2.0 





   MCG/ML 


 


Ethyl Alcohol Level   LESS THAN 3 





   MG/DL 








Physical Examination


HEENT: normocephalic; atraumatic; no jaundice.  Throat is clear.


NECK: Neck is supple, no JVD, no lymphadenopathy.


CHEST:  Chest is clear to auscultation and percussion.


CARDIAC:  Regular rate and rhythm with no murmur gallop or rubs.


ABDOMEN:  Soft, nondistended, diffused tenderness; no hepatosplenomegaly; bowel 

sounds are present in all four quadrants.


EXTREMITIES: No clubbing, cyanosis, or edema.


SKIN:  Normal; no rash; no jaundice.


CNS:  Lethargic ; alert and oriented times three. (Ian Angulo)





Assessment and Plan


Plan


- Shocked liver- patient was admitted for NSTEMI and confusion, cardiology 

consulted. 


  she denies alcohol intake, toxicology was (+) for Opiates, barbiturates, and 

Benzo. AST 1088, ALt 625, ALP 98, bili normal. 


  Hepatitis panel negative, no reported hypotension. Cardiology consulted. 

Patient is currently lethargic but answers   


  all questions appropriately, continue to have diffused abdominal and back 

pain.  Denies nausea, vomiting, diarrhea, 


  constipation or bleeding. States she had a bladder infection 4 days ago and 

was placed on Bactrim for this. 


  Patient states she had a fall recently. Labs revealed mild leukocytosis and 

mild anemia, elevated troponin. CT failed to reveal acute findings, CT of the 

head negative


  No hypotensive episodes reported, this could be due to rhabdomyolysis, 

shocked liver, or medication induced  


- Chronic abdominal pain- During her last hospitalization, she was evaluated 

with EGD/Colonoscopy (6/17/16) and this revealed s/p gastric bypass, 


  biopsy of gastric polyp; diverticulosis in sigmoid and descending colon, 

retroflexed views revealed small internal hemorrhoids, external hemorrhoids.  


  Pathology revealed mild chronic gastritis, negative for Helicobacter pylori.  

She continue to have abdominal discomfort, but it was much improved.  


  She was seen  by Dr. Davila as outpatient and she was started on dicyclomine 

and a SBFT was ordered, but that wasn't done,  


  recommended surgical eval for lysis of adhesion and  Tertiary referral.  


- ? rhabdomyolysis- creat kinase 2017, recent fall


- Confusion- ammonia normal, Ct of the head negative


- NSTEMI- cardiology on the case


- History of CVA 





Plan:


- clear liquids


- FER, ASMA, AMA, ceruloplasmin, alpha 1 antitrypsin deficiency, celiac, iron 

and ferritin 


- LFTs in the am


- Avoid hepatotoxic meds


- Supportive care


- Patient seen and examined by Dr. Neri and myself and this note is written 

on his behalf.  (Ian Angulo)


Physician Comments


Seen and examined, plan as above, likely shocked liver, will role out other 

causes for acute increase in liver enzymes ( transaminitis) (Niurka Neri MD)








Ian Angulo Dec 30, 2016 10:34


Niurka Neri MD Dec 30, 2016 15:19

## 2016-12-30 NOTE — EKG
Date Performed: 2016       Time Performed: 07:24:58

 

PTAGE:      56 years

 

EKG:      Sinus rhythm 

 

 POSSIBLE LEFT ATRIAL ENLARGEMENT MARKED LEFT AXIS DEVIATION LEFT BUNDLE BRANCH BLOCK ABNORMAL ECG Si
nce 

 

 PREVIOUS TRACING            , no significant change noted PREVIOUS TRACIN2016 02.12

 

DOCTOR:   Benito Serrato  Interpretating Date/Time  2016 13:34:31

## 2016-12-30 NOTE — EC
Study

 

Study Date:12/30/2016

 

 

 

STUDY CONCLUSIONS

 

SUMMARY

 

- Left ventricle: The cavity size was normal. Wall thickness was

normal. Systolic function was normal. The estimated ejection

fraction was in the range of 50% to 55%. Wall motion was normal;

there were no regional wall motion abnormalities.

- Aortic valve: Valve area: 2.84cm^2 (Vmax).

- Right atrium: Stable PFO closure

 

-------------------------------------------------------------------

If LV function is below 40, please consider prescribing an ACEI or

ARB or document rationale for non-use.

 

-------------------------------------------------------------------

PROCEDURE DATA

 

STUDY STATUS:

Elective. Procedure: Transthoracic echocardiography.

Image quality was good. Scanning was performed from the

parasternal, apical, and subcostal acoustic windows. Study

completion: The patient tolerated the procedure well.

Transthoracic echocardiography. M-mode, complete 2D, complete

spectral Doppler, and color Doppler. Height: Height: 62in. Weight:

Weight: 112.8lb. Body mass index: BMI: 20.7kg/m^2. Body surface

area:   BSA: 1.5m^2. Patient status: Inpatient.

 

-------------------------------------------------------------------

CARDIAC ANATOMY

 

LEFT VENTRICLE:

The cavity size was normal. Wall thickness was

normal. Systolic function was normal. The estimated ejection

fraction was in the range of 50% to 55%. Wall motion was normal;

there were no regional wall motion abnormalities.

 

AORTIC VALVE:

Trileaflet; normal thickness leaflets. Doppler:

Transvalvular velocity was within the normal range. There was no

stenosis. No regurgitation.  Valve area: 2.84cm^2 (Vmax). Indexed

valve area: 1.89cm^2/m^2 (Vmax).

 

AORTA:

Aortic root: The aortic root was normal in size.

 

MITRAL VALVE:

Structurally normal valve. Doppler: Transvalvular

velocity was within the normal range. There was no evidence for

stenosis. Trace regurgitation.

 

LEFT ATRIUM:

The atrium was normal in size.

 

RIGHT VENTRICLE:

The cavity size was normal. Wall thickness was

normal.

 

PULMONIC VALVE:

Doppler: Transvalvular velocity was within the

normal range. There was no evidence for stenosis. No regurgitation.

 

TRICUSPID VALVE:

Structurally normal valve. Doppler: Transvalvular

velocity was within the normal range. No regurgitation.

 

PULMONARY ARTERY:

The main pulmonary artery was normal-sized.

Systolic pressure was within the normal range.

 

RIGHT ATRIUM:

Stable PFO closure The atrium was normal in size.

 

PERICARDIUM:

There was no pericardial effusion.

 

SYSTEMIC VEINS:

Inferior vena cava: The vessel was normal in size.

 

-------------------------------------------------------------------

 

Patient weight: 112.8lb

_Ejection fraction:_ 65-75%

_Fractional shortening:_ 32%

up to 5Kg 5-11.5Kg 11.6-22.9Kg 23-45Kg 45-57Kg

Aortic Root 7-13      <17      13-22       17-27   17-27

LA diam     6-13      <23      24-38       33-47   37-40

RVID        10-17     7-15     7-15        7-18    8-17

LVIDd       12-22     <32      24-38       33-47   37-40

LVPW        2-4       3-6      5-7         6-8     7-8

IVS         2-4       3-6      5-7         6-8     7-8

 

-------------------------------------------------------------------

 

BASIC MEASUREMENTS                                     ADULT NORMAL

Left ventricle

LV internal dimension, ED, chordal      *40.9 mm       43-52

level, PLAX

LV internal dimension, ES, chordal       34.7 mm       23-38

level, PLAX

Fractional shortening, chordal level,     *15 %        >29

PLAX

LV posterior wall thickness, ED          6.72 mm       ------------

IVS/LVPW ratio, ED                      *1.58          <1.3

Ventricular septum

Septal thickness, ED                     10.6 mm       ------------

Aortic valve

Leaflet separation                         18 mm       15-26

Right ventricle

RV internal dimension, ED, PLAX          22.4 mm       19-38

 

BASIC MEASUREMENTS                                     ADULT NORMAL

Aortic valve

Leaflet separation                         18 mm       15-26

Aorta

Root diameter, ED                          26 mm       20-37

Left atrium

Anterior-posterior dimension, ES           24 mm       19-40

Anterior-posterior dimension index, ES    1.6 cm/m^2   <2.2

LA/aortic root ratio                     0.92          ------------

 

DOPPLER MEASUREMENTS                                   ADULT NORMAL

Main pulmonary artery

Pressure, S                                26 mm Hg    =30

Aortic valve

Peak velocity, S                          137 cm/s     ------------

Valve area, Vmax                         2.84 cm^2     ------------

Valve area index, Vmax                   1.89 cm^2/m^2 ------------

Mitral valve

Peak E-wave velocity                     44.9 cm/s     ------------

Peak A-wave velocity                     99.2 cm/s     ------------

Deceleration time                        *109 ms       150-230

Peak E/A ratio                            0.5          ------------

Maximal regurgitant velocity              279 cm/s     ------------

Tricuspid valve

Regurgitant peak velocity                 231 cm/s     ------------

Peak RV-RA gradient, S                     21 mm Hg    ------------

Maximal regurgitant velocity              231 cm/s     ------------

Systemic veins

Estimated CVP                              10 mm Hg    ------------

Right ventricle

RV pressure, S                            *31 mm Hg    <30

Pulmonic valve

Peak velocity, S                          117 cm/s     ------------

 

LEGEND:

Mean values are shown as u=mean value.

Asterisk (*) marks values outside specified normal range.

Prepared and signed by

 

Maliha Marte

[id number]

## 2016-12-30 NOTE — EKG
Date Performed: 12/30/2016       Time Performed: 02:12:32

 

PTAGE:      56 years

 

EKG:      Sinus rhythm 

 

 Left bundle branch block Since previous tracing, no significant change noted Abnormal ECG

 

PREVIOUS TRACING       : 12/29/2016 18.29

 

DOCTOR:   Benito Serrato  Interpretating Date/Time  12/30/2016 13:34:23

## 2016-12-30 NOTE — MB
cc:

JANET LIAO MD

****

 

 

DATE OF CONSULTATION:

12/30/2016

 

REASON FOR CONSULTATION:

Elevated troponin.

 

HISTORY OF PRESENT ILLNESS

Ms. Stout is a 56-year-old female who gives a reported history of multiple

embolic strokes with subsequent PFO closure.  She presented with abdominal pain

and confusion.  The patient reports that she is quite sick and has severe

epigastric pain.  She notes that one point at home she fell to her knees.

 

PAST MEDICAL HISTORY:

1. Reportedly includes;

2. Anxiety

3. Renal cancer

4. Myocardial infarction - she indicates this was at the same time as her

   stroke and she is not aware of any further work up.

5. She also has a history of a cerebrovascular accident, right nephrectomy,

   gastroesophageal reflux disease.

 

PAST SURGICAL HISTORY:

1. Past surgical history includes a gastrectomy.

2. hernia repair.

3. PFO closure

4. Hysterectomy

   Nephrectomy.

 

SOCIAL HISTORY

The patient does not drink or smoke.

 

ALLERGIES

COMPAZINE

GADOLINIUM

LOBSTER

MORPHINE

PHENERGAN

REGLAN

TORADOL

ZOFRAN

PENICILLIN

SULFA

 

OUTPATIENT MEDICATIONS

Reportedly included

1.   Buspirone

2.   Rifaximin

3.   Diazepam.

4. Fioricet.

5. Cipro.

6. Dilaudid

7. Fentanyl

8. Zyprexa

9. Rizatriptan

10. Zolpidem

11. Bentyl

12. Imipramine

 

REVIEW OF SYSTEMS

Except what is mentioned in the history of present illness all systems are

negative.

 

PHYSICAL EXAMINATION:

VITAL SIGNS: On physical examination vital signs 98.5, 91, 16 Blood pressure

140/87.

IN GENERAL:  She is a thin female who is in no apparent distress. NECK: Her

neck is free from jugular venous distention.

LUNGS:  The lungs are bilaterally clear to auscultation.

CARDIOVASCULAR SYSTEM: She has a normal S1, S2, There are no murmurs, rubs or

gallops.

ABDOMEN:  The abdomen is soft.

EXTREMITIES:  The extremities are free from edema.  Her right knee does have

some ecchymosis.

 

LABORATORY FINDINGS

Significant for a 99.21, her systems:  Serial troponin was 0.91 / 0.78 / 0.57.

The CPK is 2017 with an MB of 5.7 MB percent of 0.3.  Her creatinine 0.73.  The

AST is 1088 and .

 

IMPRESSION

1. Elevated troponin - this essentially indeterminate troponin is in the

   setting of a negative CK-MB percent with what appears to be mild rabdo,

   might be secondary to her fall. At this point it does appear to be most

   consistent with mild rhabdomyolysis.  I would not pursue any further cardiac

   workup beyond the echocardiogram to verify her ejection fraction and prior

   PFO closure.  The left bundle branch block on ECG is old.

2. History of CVA with PFO closure - the patient will follow up

   echocardiogram.

3. Elevated Liver function tests - this will be managed by the primary team and

   presumably Gastroenterology.

4. I will be available p.r.n. if echocardiogram is essentially normal.

 

 

 

 

 

                              _________________________________

                              Janet Liao M.D.

 

 

 

DON/berry

D:  12/30/2016/9:00 AM

T:  12/30/2016/9:12 AM

Visit #:  [id number]

Job #:  [id number]

## 2016-12-30 NOTE — EKG
Date Performed: 12/29/2016       Time Performed: 18:29:22

 

PTAGE:      56 years

 

EKG:      SINUS TACHYCARDIA LEFT BUNDLE BRANCH BLOCK ABNORMAL ECG Since 

 

 PREVIOUS TRACING            , no significant change noted PREVIOUS TRACING: 10/30/2016 11.45

 

DOCTOR:   Benito Serrato  Interpretating Date/Time  12/30/2016 13:34:16

## 2016-12-31 NOTE — HHI.GIFU
Subjective


Remarks


C/O RUQ and epigastric pain and generalized muscle aches, having US abdomen 

later today (Jailene Webster)





Objective


Vitals I&O





 Vital Signs








  Date Time  Temp Pulse Resp B/P Pulse Ox O2 Delivery O2 Flow Rate FiO2


 


12/31/16 12:00 98.4 95 18 154/96 98   


 


12/31/16 09:30   16     


 


12/31/16 08:05      Room Air  


 


12/31/16 08:05  82      


 


12/31/16 08:00 98.2 81 18 155/87 93   


 


12/31/16 04:00 98.6 86 16 162/88 94   


 


12/31/16 00:11 98.8 91 18 162/96 98   


 


12/30/16 20:00      Room Air  


 


12/30/16 20:00 98.3 89 18 150/92 96   


 


12/30/16 16:06     93 Room Air  


 


12/30/16 16:00 98.6 97 18 158/95 99   








 I/O








 12/30/16 12/30/16 12/30/16 12/31/16 12/31/16 12/31/16





 07:00 15:00 23:00 07:00 15:00 23:00


 


Intake Total 299 ml 480 ml 1720 ml 2732 ml  


 


Output Total 300 ml 700 ml 150 ml 200 ml  


 


Balance -1 ml -220 ml 1570 ml 2532 ml  


 


      


 


Intake Oral 240 ml 480 ml 720 ml 480 ml  


 


IV Total 59 ml  1000 ml 2252 ml  


 


Output Urine Total 300 ml 700 ml 150 ml 200 ml  


 


# Bowel Movements 0 0    








Laboratory





Laboratory Tests








Test 12/31/16





 06:15


 


White Blood Count 3.5 


 


Red Blood Count 3.55 


 


Hemoglobin 10.1 


 


Hematocrit 30.5 


 


Mean Corpuscular Volume 86.1 


 


Mean Corpuscular Hemoglobin 28.6 


 


Mean Corpuscular Hemoglobin 33.2 





Concent 


 


Red Cell Distribution Width 18.3 


 


Platelet Count 75 


 


Mean Platelet Volume 9.3 


 


Neutrophils (%) (Auto) 74.7 


 


Lymphocytes (%) (Auto) 18.4 


 


Monocytes (%) (Auto) 6.5 


 


Eosinophils (%) (Auto) 0.3 


 


Basophils (%) (Auto) 0.1 


 


Neutrophils # (Auto) 2.6 


 


Lymphocytes # (Auto) 0.6 


 


Monocytes # (Auto) 0.2 


 


Eosinophils # (Auto) 0.0 


 


Basophils # (Auto) 0.0 


 


CBC Comment AUTO DIFF 


 


Differential Comment AUTO DIFF





 CONFIRMED


 


Platelet Estimate LOW 


 


Platelet Morphology Comment NORMAL 


 


Ovalocytes 1+ 


 


Activated Partial 38.5 





Thromboplast Time 


 


Sodium Level 141 


 


Potassium Level 2.8 


 


Chloride Level 104 


 


Carbon Dioxide Level 26.0 


 


Anion Gap 11 


 


Blood Urea Nitrogen 6 


 


Creatinine 0.50 


 


Estimat Glomerular Filtration 128 





Rate 


 


Random Glucose 98 


 


Calcium Level 8.0 


 


Total Bilirubin 0.3 


 


Direct Bilirubin 0.1 


 


Indirect Bilirubin 0.2 


 


Aspartate Amino Transf 1329 





(AST/SGOT) 


 


Alanine Aminotransferase 1146 





(ALT/SGPT) 


 


Alkaline Phosphatase 77 


 


Troponin I 0.53 


 


Total Protein 5.2 


 


Albumin 2.6 








Imaging





Last 72 hours Impressions








Abdomen/Pelvis CT 12/29/16 2014 Signed





Impressions: 





 Service Date/Time:  Thursday, December 29, 2016 21:03 - CONCLUSION:  1. No 





 obstruction or acute inflammatory changes demonstrated. 2. Liver is diffusely 





 fatty infiltrated. 3. Previous right nephrectomy. No recurrent mass. No 





 lymphadenopathy. 4. Thickened gluteus minimus muscle belly, new. This is 





 nonspecific but appearance and short term interim development would support 





 likelihood of a strain. 5. Patchy consolidation of both lung bases worsening, 





 especially on the left.     Prince Justice MD 


 


Head CT 12/29/16 0000 Signed





Impressions: 





 Service Date/Time:  Thursday, December 29, 2016 18:17 - CONCLUSION:  Negative 





 noncontrast head CT.     Prince Justice MD 


 


Chest X-Ray 12/29/16 0000 Signed





Impressions: 





 Service Date/Time:  Thursday, December 29, 2016 18:06 - CONCLUSION:  No acute 





 cardiopulmonary disease demonstrated.     Prince Justice MD 








Physical Exam


HEENT: Pupils round and reactive to light; normocephalic; atraumatic; no 

jaundice.  Throat is clear.


NECK: Neck is supple, no JVD, no lymphadenopathy.


CHEST:  Chest is clear to auscultation and percussion.


CARDIAC:  Regular rate and rhythm with no murmur gallop or rubs.


ABDOMEN:  Soft, tender epigastric area; no hepatosplenomegaly; bowel sounds are 

present in all four quadrants.


EXTREMITIES: No clubbing, cyanosis, or edema.


SKIN:  Normal; no rash; no jaundice.


CNS:  No focal deficits; alert and oriented times three. (Jailene Webster)





Assessment and Plan


Plan


- Shocked liver- patient was admitted for NSTEMI and confusion, cardiology 

consulted. 


  she denies alcohol intake, toxicology was (+) for Opiates, barbiturates, and 

Benzo. AST 1088,  ALt 625, ALP 98, bili normal. 


  Hepatitis panel negative, no reported hypotension. Cardiology consulted. 

Patient is currently lethargic but answers   


  all questions appropriately, continue to have diffused abdominal and back 

pain.  Denies nausea, vomiting, diarrhea, 


  constipation or bleeding. States she had a bladder infection 4 days ago and 

was placed on Bactrim for this. 


  Patient states she had a fall recently. Labs revealed mild leukocytosis and 

mild anemia, elevated troponin. CT failed to reveal acute findings, CT of the 

head negative


  No hypotensive episodes reported, this could be due to rhabdomyolysis, 

shocked liver, or medication induced  


- Chronic abdominal pain- During her last hospitalization, she was evaluated 

with EGD/Colonoscopy (6/17/16) and this revealed s/p gastric bypass, 


  biopsy of gastric polyp; diverticulosis in sigmoid and descending colon, 

retroflexed views revealed small internal hemorrhoids, external hemorrhoids.  


  Pathology revealed mild chronic gastritis, negative for Helicobacter pylori.  

She continue to have abdominal discomfort, but it was much improved.  


  She was seen  by Dr. Davila as outpatient and she was started on dicyclomine 

and a SBFT was ordered, but that wasn't done,  


  recommended surgical eval for lysis of adhesion and  Tertiary referral.  


- ? Rhabdomyolysis- creat kinase 2017, recent fall


- Confusion- ammonia normal, Ct of the head negative


- NSTEMI- cardiology on the case


- History of CVA 





12-31-16- Liver enzymes are higher today with AST of 1329 up from 1088, ALT 

1146 up from 625, alk phos is 77. Still has generalized muscle aches, 

autoimmune work up is pending, continues to have epigastric pain and back pain, 

will have US abdomen today.





Plan:


- Continue clear liquids


- Check results of FER, ASMA, AMA, ceruloplasmin, alpha 1 antitrypsin deficiency

, celiac, iron and ferritin 


- LFTs in the am


- IV fluids


- CK level


- US abdomen today


- Avoid hepatotoxic meds


- Supportive care


- Patient seen and examined by Dr. Neri and myself and this note is written 

on his behalf.  (Jailene Webster)


Physician Comments


Seen and examined, plan as above, will follow up LFT and CLD workup. (Niurka Neri MD)








Jailene Webster Dec 31, 2016 14:32


Niurka Neri MD Jan 1, 2017 09:45

## 2016-12-31 NOTE — HHI.PR
Subjective


History of Present Illness


more alert


less confused 


I am miserable 


"I want my pain pills" I can't live without them"


No N/V 


ch abd pain


drinking fluids / eating little better 


No fever or chills '


No CP or SOB


offers no other c/o





Vitals/Results


Intake & Output











 12/30/16 12/30/16 12/31/16





 15:00 23:00 07:00


 


Intake Total 480 ml 1720 ml 2732 ml


 


Output Total 700 ml 150 ml 200 ml


 


Balance -220 ml 1570 ml 2532 ml


 


   


 


Intake Oral 480 ml 720 ml 480 ml


 


IV Total  1000 ml 2252 ml


 


Output Urine Total 700 ml 150 ml 200 ml


 


# Bowel Movements 0  








Vital Signs





 Vital Signs








  Date Time  Temp Pulse Resp B/P Pulse Ox O2 Delivery O2 Flow Rate FiO2


 


12/31/16 09:30   16     


 


12/31/16 08:05      Room Air  


 


12/31/16 08:05  82      


 


12/31/16 08:00 98.2 81 18 155/87 93   


 


12/31/16 04:00 98.6 86 16 162/88 94   


 


12/31/16 00:11 98.8 91 18 162/96 98   


 


12/30/16 20:00      Room Air  


 


12/30/16 20:00 98.3 89 18 150/92 96   


 


12/30/16 16:06     93 Room Air  


 


12/30/16 16:00 98.6 97 18 158/95 99   








CBC/BMP:  


12/31/16 0615                                                                  

              12/31/16 0615





Lab Results





Laboratory Tests








Test 12/31/16





 06:15


 


White Blood Count 3.5 TH/MM3


 


Red Blood Count 3.55 MIL/MM3


 


Hemoglobin 10.1 GM/DL


 


Hematocrit 30.5 %


 


Mean Corpuscular Volume 86.1 FL


 


Mean Corpuscular Hemoglobin 28.6 PG


 


Mean Corpuscular Hemoglobin 33.2 %





Concent 


 


Red Cell Distribution Width 18.3 %


 


Platelet Count 75 TH/MM3


 


Mean Platelet Volume 9.3 FL


 


Neutrophils (%) (Auto) 74.7 %


 


Lymphocytes (%) (Auto) 18.4 %


 


Monocytes (%) (Auto) 6.5 %


 


Eosinophils (%) (Auto) 0.3 %


 


Basophils (%) (Auto) 0.1 %


 


Neutrophils # (Auto) 2.6 TH/MM3


 


Lymphocytes # (Auto) 0.6 TH/MM3


 


Monocytes # (Auto) 0.2 TH/MM3


 


Eosinophils # (Auto) 0.0 TH/MM3


 


Basophils # (Auto) 0.0 TH/MM3


 


CBC Comment AUTO DIFF 


 


Differential Comment AUTO DIFF





 CONFIRMED


 


Platelet Estimate LOW 


 


Platelet Morphology Comment NORMAL 


 


Ovalocytes 1+ 


 


Activated Partial 38.5 SEC





Thromboplast Time 


 


Sodium Level 141 MEQ/L


 


Potassium Level 2.8 MEQ/L


 


Chloride Level 104 MEQ/L


 


Carbon Dioxide Level 26.0 MEQ/L


 


Anion Gap 11 MEQ/L


 


Blood Urea Nitrogen 6 MG/DL


 


Creatinine 0.50 MG/DL


 


Estimat Glomerular Filtration 128 ML/MIN





Rate 


 


Random Glucose 98 MG/DL


 


Calcium Level 8.0 MG/DL


 


Total Bilirubin 0.3 MG/DL


 


Direct Bilirubin 0.1 MG/DL


 


Indirect Bilirubin 0.2 MG/DL


 


Aspartate Amino Transf 1329 U/L





(AST/SGOT) 


 


Alanine Aminotransferase 1146 U/L





(ALT/SGPT) 


 


Alkaline Phosphatase 77 U/L


 


Troponin I 0.53 NG/ML


 


Total Protein 5.2 GM/DL


 


Albumin 2.6 GM/DL











Physical Exam


General


General Appearance:  No Acute Distress, Anxious





Eyes


Eye Exam:  Pupils Equal, Sclera White





Ears & Nose


Ears & Nose Exam:  Nasal Mucosa Pink





Throat


Throat Exam:  Oral Mucosa Pink & Moist





Neck


Neck Exam:  Neck Supple, Trachea Midline





Pulmonary


Resp Exam:  Clear Bilaterally, Breath Sounds Equal





Cardiology


CV Exam:  Regular, Normal Sinus Rhythm





Gastrointestinal/Abdomen


GI Exam:  Soft, Bowel Sounds Present


GI Remarks


+ve tenderness . +ve voluntary guarding





Integumentary


Skin Exam:  Warm, Dry





Extremeties


Extremities Exam:  No Edema, Pedal Pulses Palpable





Neurologic


Neuro Exam:  Alert, Awake, Oriented, Speech Clear, Moving All Extremities





Psychiatric


Psych Exam:  Appropriate Responses





PUD Prophylasis


PUD Prophylaxis:  Protonix





Assessment/Plan


Assessment/Plan











DIAGNOSTIC IMPRESSION


. ch recurring Abdominal pain, etiology unknown hx multiple abd surgeries 


. Acute hepatitis, question etiology.


. ?? Non-ST-elevation MI, 


. s/p fall / Rhabdomyalysis.


. Chronic pain.high dose narcotic depndence


. Leukocytosis with recent UTI.


. History of gastroesophageal reflux disease.


. Questionable history of CVA and coronary artery disease; 








PLAN





Acute hepatiitis  ?? Liver failure , could be due to medication toxicity  vs 

rhabdomyolysis,??  shocked liver, 


 denies alcohol intake,


 Urine  toxicology was (+) for Opiates, barbiturates, and Benzo. 


LFTs rising ,  bili normal. 


 Hepatitis panel negative,


 no reported hypotension. 


 reportedly treated for  bladder infection 4 days ago w Bactrim. 


 CT Abd  Neg for  acute findings, 


 serology for auto immune disorders [p]


  Minimize narcotics





- Chronic abdominal pain- 


  s/p  EGD/Colonoscopy (6/17/16) and this revealed s/p gastric bypass, 


  biopsy of gastric polyp; diverticulosis in sigmoid and descending colon,  

small internal hemorrhoids, external hemorrhoids.  


  Pathology revealed mild chronic gastritis, negative for Helicobacter pylori. 


 was started on dicyclomine and a SBFT was recommended but not done yet


may need Surgical eval for lysis of adhesion  ???  Tertiary referral.  


 Minimize narcotics





- possible rhabdomyolysis d/t fall - 


   creat kinase 2017,  


   IV hydrate , f/u CPK 





- Confusion-


  metabolic encephalopathy 


  ammonia normal,


  Ct of the head negative


  minimize narcotics





-Elevated trop 


 card input appreciated , Dr jc is not impressed 


 2DEcho [p]


 No further intervention rec 





HYpokalemia 


replace kcl


IVF 


f/u CPK 


dw PT in detail /


will f/u








Paulina Mendoza MD Dec 31, 2016 12:43

## 2017-01-01 NOTE — HHI.PR
Subjective


History of Present Illness





asking for valium 


I have lot of anxiety 


still having abd  pain / pain med are helping some


No N/V 


ch abd pain


eating better  


No fever or chills '


No CP or SOB


offers no other c/o





Vitals/Results


Intake & Output











 12/31/16 12/31/16 1/1/17





 15:00 23:00 07:00


 


Intake Total 240 ml 220 ml 294 ml


 


Balance 240 ml 220 ml 294 ml


 


   


 


Intake Oral 240 ml 220 ml 0 ml


 


IV Total   294 ml


 


# Voids 6 2 3


 


# Bowel Movements  0 1








Vital Signs





 Vital Signs








  Date Time  Temp Pulse Resp B/P Pulse Ox O2 Delivery O2 Flow Rate FiO2


 


1/1/17 12:04 99.1 82 18 158/102 100   


 


1/1/17 08:04 98.6 69 18 148/95 97   


 


1/1/17 04:00 98.3 89 17 159/94 96   


 


1/1/17 00:00 98.0 68 17 150/86 96   


 


12/31/16 21:00 98.5 107 19 134/92 94   


 


12/31/16 20:00      Room Air  


 


12/31/16 20:00  71      








CBC/BMP:  


1/1/17 0408                                                                    

            1/1/17 0408





Lab Results





Laboratory Tests








Test 12/31/16 1/1/17





 16:58 04:08


 


Activated Partial 29.0 SEC 





Thromboplast Time  


 


White Blood Count  3.7 TH/MM3


 


Red Blood Count  3.65 MIL/MM3


 


Hemoglobin  10.4 GM/DL


 


Hematocrit  31.2 %


 


Mean Corpuscular Volume  85.4 FL


 


Mean Corpuscular Hemoglobin  28.4 PG


 


Mean Corpuscular Hemoglobin  33.3 %





Concent  


 


Red Cell Distribution Width  17.9 %


 


Platelet Count  103 TH/MM3


 


Mean Platelet Volume  9.7 FL


 


Potassium Level  3.4 MEQ/L


 


Total Bilirubin  0.4 MG/DL


 


Direct Bilirubin  0.1 MG/DL


 


Indirect Bilirubin  0.3 MG/DL


 


Aspartate Amino Transf  644 U/L





(AST/SGOT)  


 


Alanine Aminotransferase  979 U/L





(ALT/SGPT)  


 


Alkaline Phosphatase  74 U/L


 


Total Creatine Kinase  630 U/L


 


Creatine Kinase MB  1.3 NG/ML


 


Creatine Kinase MB %  0.2 %


 


Total Protein  5.1 GM/DL


 


Albumin  2.5 GM/DL











Physical Exam


General


General Appearance:  No Acute Distress, Anxious





Eyes


Eye Exam:  Pupils Equal, Sclera White





Ears & Nose


Ears & Nose Exam:  Nasal Mucosa Pink





Throat


Throat Exam:  Oral Mucosa Pink & Moist





Neck


Neck Exam:  Neck Supple, Trachea Midline





Pulmonary


Resp Exam:  Clear Bilaterally, Breath Sounds Equal





Cardiology


CV Exam:  Regular, Normal Sinus Rhythm





Gastrointestinal/Abdomen


GI Exam:  Soft, Bowel Sounds Present


GI Remarks


+ve tenderness . +ve voluntary guarding





Integumentary


Skin Exam:  Warm, Dry





Extremeties


Extremities Exam:  No Edema, Pedal Pulses Palpable





Neurologic


Neuro Exam:  Alert, Awake, Oriented, Speech Clear, Moving All Extremities





Psychiatric


Psych Exam:  Appropriate Responses





PUD Prophylasis


PUD Prophylaxis:  Protonix





Assessment/Plan


Assessment/Plan











DIAGNOSTIC IMPRESSION


. ch recurring Abdominal pain, etiology unknown hx multiple abd surgeries 


. Acute hepatitis, question etiology.


. ?? Non-ST-elevation MI, 


. s/p fall / Rhabdomyalysis.


. Chronic pain.high dose narcotic depndence


. Leukocytosis with recent UTI.


. History of gastroesophageal reflux disease.


. Questionable history of CVA and coronary artery disease; 








PLAN





Acute hepatiitis  ?? Liver failure , could be due to medication toxicity  vs 

rhabdomyolysis,??  shocked liver, 


 denies alcohol intake,


 Urine  toxicology was (+) for Opiates, barbiturates, and Benzo. 


LFTs IMPROVING


 Hepatitis panel negative,


 no reported hypotension. 


 reportedly treated for  bladder infection 4 days ago w Bactrim. 


 CT Abd  Neg for  acute findings, 


 serology for auto immune disorders [p]


 US doppler LIVER  NOTED 


  Minimize narcotics





- Chronic abdominal pain- 


  s/p  EGD/Colonoscopy (6/17/16) and this revealed s/p gastric bypass, 


  biopsy of gastric polyp; diverticulosis in sigmoid and descending colon,  

small internal hemorrhoids, external hemorrhoids.  


  Pathology revealed mild chronic gastritis, negative for Helicobacter pylori. 


 was started on dicyclomine and a SBFT was recommended but not done yet


 may need Surgical eval for lysis of adhesion  ???  Tertiary referral.  


 Minimize narcotics





- possible rhabdomyolysis d/t fall - 


   creat kinase BETTER TODAY


   IV hydrate , f/u CPK 





- Confusion- RESOLVED


  metabolic encephalopathy 


  ammonia normal,


  Ct of the head negative


  minimize narcotics





-Elevated trop 


 card input appreciated , Dr jc is not impressed 


 2DEcho 


 PRESERVED LF EG 50 TO 55%


 No further intervention rec 





HYpokalemia 


replace kcl


IVF 


f/u CPK 


dw PT in detail /


will f/u








Paulina Mendoza MD Jan 1, 2017 16:21

## 2017-01-01 NOTE — HHI.GIFU
Subjective


Remarks


Feeling better, feels that she is mentally more clear today. Still having 

epigastric pain, muscle aches improving, CK trending down. LF's have are 

trending down.Wants to try a regular diet (Jailene Webster)





Objective


Vitals I&O





 Vital Signs








  Date Time  Temp Pulse Resp B/P Pulse Ox O2 Delivery O2 Flow Rate FiO2


 


1/1/17 12:04 99.1 82 18 158/102 100   


 


1/1/17 08:04 98.6 69 18 148/95 97   


 


1/1/17 04:00 98.3 89 17 159/94 96   


 


1/1/17 00:00 98.0 68 17 150/86 96   


 


12/31/16 21:00 98.5 107 19 134/92 94   


 


12/31/16 20:00      Room Air  


 


12/31/16 20:00  71      


 


12/31/16 16:08 98.9 87 18 163/98 97   








 I/O








 12/31/16 12/31/16 12/31/16 1/1/17 1/1/17 1/1/17





 07:00 15:00 23:00 07:00 15:00 23:00


 


Intake Total 2732 ml 240 ml 220 ml 294 ml  


 


Output Total 200 ml     


 


Balance 2532 ml 240 ml 220 ml 294 ml  


 


      


 


Intake Oral 480 ml 240 ml 220 ml 0 ml  


 


IV Total 2252 ml   294 ml  


 


Output Urine Total 200 ml     


 


# Voids  6 2 3  


 


# Bowel Movements   0 1  








Laboratory





Laboratory Tests








Test 12/31/16 1/1/17





 16:58 04:08


 


Activated Partial 29.0  





Thromboplast Time  


 


White Blood Count  3.7 


 


Red Blood Count  3.65 


 


Hemoglobin  10.4 


 


Hematocrit  31.2 


 


Mean Corpuscular Volume  85.4 


 


Mean Corpuscular Hemoglobin  28.4 


 


Mean Corpuscular Hemoglobin  33.3 





Concent  


 


Red Cell Distribution Width  17.9 


 


Platelet Count  103 


 


Mean Platelet Volume  9.7 


 


Potassium Level  3.4 


 


Total Bilirubin  0.4 


 


Direct Bilirubin  0.1 


 


Indirect Bilirubin  0.3 


 


Aspartate Amino Transf  644 





(AST/SGOT)  


 


Alanine Aminotransferase  979 





(ALT/SGPT)  


 


Alkaline Phosphatase  74 


 


Total Creatine Kinase  630 


 


Creatine Kinase MB  1.3 


 


Creatine Kinase MB %  0.2 


 


Total Protein  5.1 


 


Albumin  2.5 








Imaging





Last 48 hours Impressions








Liver Ultrasound 1/1/17 0000 Signed





Impressions: 





 Service Date/Time:  Sunday, January 1, 2017 10:26 - CONCLUSION:  1. The main 





 portal vein is patent with hepatopedal flow. 2. Left pleural effusion. 3. Mild 





 echogenic liver parenchyma.     Avni Brewer MD 








Physical Exam


HEENT: Pupils round and reactive to light; normocephalic; atraumatic; no 

jaundice.  Throat is clear.


NECK: Neck is supple, no JVD, no lymphadenopathy.


CHEST:  Chest is clear to auscultation and percussion.


CARDIAC:  Regular rate and rhythm with no murmur gallop or rubs.


ABDOMEN:  Soft, tender epigastric area; no hepatosplenomegaly; bowel sounds are 

present in all four quadrants.


EXTREMITIES: No clubbing, cyanosis, or edema.


SKIN:  Normal; no rash; no jaundice.


CNS:  No focal deficits; alert and oriented times three. (Jailene Webster)





Assessment and Plan


Plan


- Shocked liver- patient was admitted for NSTEMI and confusion, cardiology 

consulted. 


  she denies alcohol intake, toxicology was (+) for Opiates, barbiturates, and 

Benzo. AST 1088,  ALt 625, ALP 98, bili normal. 


  Hepatitis panel negative, no reported hypotension. Cardiology consulted. 

Patient is currently lethargic but answers   


  all questions appropriately, continue to have diffused abdominal and back 

pain.  Denies nausea, vomiting, diarrhea, 


  constipation or bleeding. States she had a bladder infection 4 days ago and 

was placed on Bactrim for this. 


  Patient states she had a fall recently. Labs revealed mild leukocytosis and 

mild anemia, elevated troponin. CT failed to reveal acute findings, CT of the 

head negative


  No hypotensive episodes reported, this could be due to rhabdomyolysis, 

shocked liver, or medication induced  


- Chronic abdominal pain- During her last hospitalization, she was evaluated 

with EGD/Colonoscopy (6/17/16) and this revealed s/p gastric bypass, 


  biopsy of gastric polyp; diverticulosis in sigmoid and descending colon, 

retroflexed views revealed small internal hemorrhoids, external hemorrhoids.  


  Pathology revealed mild chronic gastritis, negative for Helicobacter pylori.  

She continue to have abdominal discomfort, but it was much improved.  


  She was seen  by Dr. Davila as outpatient and she was started on dicyclomine 

and a SBFT was ordered, but that wasn't done,  


  recommended surgical eval for lysis of adhesion and  Tertiary referral.  


- ? Rhabdomyolysis- creat kinase 2017, recent fall


- Confusion- ammonia normal, Ct of the head negative


- NSTEMI- cardiology on the case


- History of CVA 





Liver Ultrasound 1/1/17 >- CONCLUSION:  1. The main  portal vein is patent with 

hepatopedal flow. 2. Left pleural effusion. 3. Mild  echogenic liver parenchyma.





12-31-16- Liver enzymes are higher today with AST of 1329 up from 1088, ALT 

1146 up from 625, alk phos is 77. Still has generalized muscle aches, 

autoimmune work up is pending, continues to have epigastric pain and back pain, 

will have US abdomen today.





1/1/17-- Liver enzymes and liver trending down, still has epigastric pain, 

wants to advance diet. AST is 664, , CK down to 630. Ceruloplasmin  and 

Alpha 1 antitrypsin  levels are normal.





Plan:


- Advance to low fat diet


- Check results of FER, ASMA, AMA, celiac, iron and ferritin 


- LFTs in the am


- IV fluids


- CK level


- Avoid hepatotoxic meds


- Supportive care


- Patient seen and examined by Dr. Neri and myself and this note is written 

on his behalf.  (Jailene Webster)


Physician Comments


Seen and examined, plan as above, LFT's peaked yesterday and coming down now, 

will check workup labs for etiology otherwise as above. (Niurka Neri MD)








Jailene Webster Jan 1, 2017 15:19


Niurka Neri MD Jan 1, 2017 16:18

## 2017-01-02 NOTE — HHI.PR
Subjective


History of Present Illness





not happy w Po dilaudid , wants IV dilaudid 


still in lot of pain/ pain med are helping some


No N/V 


eating better


eating better  


No fever or chills '


No CP or SOB


offers no other c/o





Vitals/Results


Intake & Output











 1/1/17 1/1/17 1/2/17





 15:00 23:00 07:00


 


Intake Total 0 ml  1010 ml


 


Balance 0 ml  1010 ml


 


   


 


Intake Oral 0 ml  


 


IV Total   1010 ml


 


# Voids 3 3 


 


# Bowel Movements 6 0 








Vital Signs





 Vital Signs








  Date Time  Temp Pulse Resp B/P Pulse Ox O2 Delivery O2 Flow Rate FiO2


 


1/2/17 05:06 98.3 70 18 156/89 99   


 


1/2/17 00:31 97.0 64 18 150/81 97   


 


1/1/17 20:49 98.6 81 18 147/88 97   


 


1/1/17 20:05  81      


 


1/1/17 16:04 98.8 64 18 164/99 98   


 


1/1/17 12:04 99.1 82 18 158/102 100   








CBC/BMP:  


1/1/17 0408                                                                    

            1/1/17 0408





Lab Results





Laboratory Tests








Test 1/2/17





 08:53


 


Total Bilirubin 0.4 MG/DL


 


Direct Bilirubin 0.2 MG/DL


 


Indirect Bilirubin 0.2 MG/DL


 


Aspartate Amino Transf 176 U/L





(AST/SGOT) 


 


Alanine Aminotransferase 647 U/L





(ALT/SGPT) 


 


Alkaline Phosphatase 86 U/L


 


Total Creatine Kinase 337 U/L


 


Creatine Kinase MB 1.5 NG/ML


 


Creatine Kinase MB % 0.4 %


 


Total Protein 5.2 GM/DL


 


Albumin 2.7 GM/DL











Physical Exam


General


General Appearance:  No Acute Distress, Anxious





Eyes


Eye Exam:  Pupils Equal, Sclera White





Ears & Nose


Ears & Nose Exam:  Nasal Mucosa Pink





Throat


Throat Exam:  Oral Mucosa Pink & Moist





Neck


Neck Exam:  Neck Supple, Trachea Midline





Pulmonary


Resp Exam:  Clear Bilaterally, Breath Sounds Equal





Cardiology


CV Exam:  Regular, Normal Sinus Rhythm





Gastrointestinal/Abdomen


GI Exam:  Soft, Bowel Sounds Present


GI Remarks


+ve tenderness . +ve voluntary guarding





Integumentary


Skin Exam:  Warm, Dry





Extremeties


Extremities Exam:  No Edema, Pedal Pulses Palpable





Neurologic


Neuro Exam:  Alert, Awake, Oriented, Speech Clear, Moving All Extremities





Psychiatric


Psych Exam:  Appropriate Responses





PUD Prophylasis


PUD Prophylaxis:  Protonix





Assessment/Plan


Assessment/Plan











DIAGNOSTIC IMPRESSION


. ch recurring Abdominal pain, etiology unknown hx multiple abd surgeries 


. Acute hepatitis, question etiology.


. ?? Non-ST-elevation MI, 


. s/p fall / Rhabdomyalysis.


. Chronic pain.high dose narcotic depndence


. Leukocytosis with recent UTI.


. History of gastroesophageal reflux disease.


. Questionable history of CVA and coronary artery disease; 








PLAN





Acute hepatiitis  ?? Liver failure , could be due to medication toxicity  vs 

rhabdomyolysis,??  shocked liver, 


 denies alcohol intake,


 Urine  toxicology was (+) for Opiates, barbiturates, and Benzo. 


LFTs  much better


 Hepatitis panel negative,


 no reported hypotension. 


 reportedly treated for  bladder infection 4 days ago w Bactrim. 


 CT Abd  Neg for  acute findings, 


 serology for auto immune disorders is still [p]


 US doppler LIVER  NOTED 


  Minimize narcotics





- Chronic abdominal pain- 


  s/p  EGD/Colonoscopy (6/17/16) and this revealed s/p gastric bypass, 


  biopsy of gastric polyp; diverticulosis in sigmoid and descending colon,  

small internal hemorrhoids, external hemorrhoids.  


  Pathology revealed mild chronic gastritis, negative for Helicobacter pylori. 


  may need Surgical eval for lysis of adhesion  ???  Tertiary referral.  


  cont po dilaudid 2 mg q 6 hr


  will  give 1 dose IV dilaudid


  start lidocaine patch


  


 Minimize narcotics





- possible rhabdomyolysis d/t fall - 


   creat kinase BETTER TODAY


   IV hydrate , f/u CPK  better





- Confusion- RESOLVED


  metabolic encephalopathy 


  ammonia normal,


  Ct of the head negative


  minimize narcotics





-Elevated trop 


 card input appreciated , Dr jc is not impressed 


 2DEcho 


 PRESERVED LF EG 50 TO 55%


 No further intervention rec 





HYpokalemia 


replace kcl


d/c IVF 


f/u CPK  better


dw PT in detail //possible d/c home in am


 PT eval 


Dr Estrella will f/u








Paulina Mendoza MD Jan 2, 2017 11:10

## 2017-01-03 NOTE — HHI.DCPOC
Discharge Care Plan


Diagnosis:  


(1) NSTEMI (non-ST elevated myocardial infarction)


(2) Shock liver


(3) Abdominal pain


Your Health Problems Are:     Anxiety


         Chest Pain


         Shortness of Breath


Goals to Promote Your Health


* To prevent worsening of your condition and complications


* To maintain your health at the optimal level


Directions to Meet Your Goals


*** Take your medications as prescribed


*** Follow your dietary instruction


*** Follow activity as directed








*** Keep your appointments as scheduled


*** Take your immunizations and boosters as scheduled


*** If your symptoms worsen call your PCP, if no PCP go to Urgent Care Center 

or Emergency Room***


*** Smoking is Dangerous to Your Health. Avoid second hand smoke***


***Call the 24-hour hour crisis hotline for domestic abuse at 1-999.678.7789***








Nikia Simental Elfego 3, 2017 17:49

## 2017-01-03 NOTE — HHI.PR
Subjective


Subjective Remarks


abd. tenderness


having diarrhea "multiple episodes" not watery


per nursing, formed stools noted


no fever


pt. states back pain better with Lidocaine patch


c/o dysuria


no cp


no sob


overall improved but stills feels "terrible"





Review of Systems


Constitutional


Constitutional Remarks


12 point ROS completed, negative except as noted above





Vitals/Results


Intake & Output











 1/2/17 1/2/17 1/3/17





 15:00 23:00 07:00


 


Intake Total 480 ml 1584 ml 240 ml


 


Output Total 700 ml  750 ml


 


Balance -220 ml 1584 ml -510 ml


 


   


 


Intake Oral 480 ml 720 ml 240 ml


 


IV Total  864 ml 


 


Output Urine Total 700 ml  750 ml


 


# Voids  10 


 


# Bowel Movements 1 3 0








Vital Signs





 Vital Signs








  Date Time  Temp Pulse Resp B/P Pulse Ox O2 Delivery O2 Flow Rate FiO2


 


1/3/17 11:00     99 Room Air  


 


1/3/17 08:00 98.5 62 16 159/77 99   


 


1/3/17 07:45   18     


 


1/3/17 05:38 97.9 65 16 156/82 97   


 


1/3/17 01:23 98.0 68 16 140/80 100   


 


1/2/17 21:07 98.3 71 16 141/89 98   


 


1/2/17 20:22  72      


 


1/2/17 20:22      Room Air  


 


1/2/17 16:00 98.5 60 20 146/84 99   


 


1/2/17 13:30   20     


 


1/2/17 12:00 98.1 57 20 174/84 99   








CBC/BMP:  


1/1/17 0408                                                                    

            1/1/17 0408





Lab Results





Laboratory Tests








Test 1/3/17





 07:14


 


Total Bilirubin 0.3 MG/DL


 


Direct Bilirubin 0.1 MG/DL


 


Indirect Bilirubin 0.2 MG/DL


 


Aspartate Amino Transf 113 U/L





(AST/SGOT) 


 


Alanine Aminotransferase 447 U/L





(ALT/SGPT) 


 


Alkaline Phosphatase 82 U/L


 


Total Protein 5.4 GM/DL


 


Albumin 2.5 GM/DL











Physical Exam


General


General Appearance:  Well Developed, No Acute Distress, Anxious





Eyes


Eye Exam:  Pupils Equal, Pupils Reactive





Ears & Nose


Ears & Nose Exam:  Nasal Mucosa Pink





Throat


Throat Exam:  Oral Mucosa Pink & Moist





Neck


Neck Exam:  Neck Supple, Trachea Midline





Pulmonary


Resp Exam:  Clear Bilaterally, Breath Sounds Equal





Cardiology


CV Exam:  Regular, Normal Sinus Rhythm





Gastrointestinal/Abdomen


GI Exam:  Soft, Bowel Sounds Present, Positive Bowel Movement


GI Remarks


diffuse tenderness





Musculoskeletal


MS Exam:  Joints Intact





Integumentary


Skin Exam:  Warm, Dry





Extremeties


Extremities Exam:  No Edema, Pedal Pulses Palpable





Neurologic


Neuro Exam:  Alert, Awake, Oriented, Speech Clear, Moving All Extremities





Psychiatric


Psych Exam:  Appropriate Responses





VTE Prophylaxis


VTE Prophylaxis Device:  SCDs





PUD Prophylasis


PUD Prophylaxis:  Protonix





Assessment/Plan


Assessment/Plan


DIAGNOSTIC IMPRESSION


. ch recurring Abdominal pain, etiology unknown hx multiple abd surgeries 


. Acute hepatitis, question etiology.


. ?? Non-ST-elevation MI, 


. s/p fall / Rhabdomyalysis.


. Chronic pain.high dose narcotic depndence


. Leukocytosis with recent UTI.


. History of gastroesophageal reflux disease.


. Questionable history of CVA and coronary artery disease; 


.  Thrombocytopenia, sec. shocked liver 








PLAN


-Acute hepatiitis  ?? Liver failure , could be due to medication toxicity  vs 

rhabdomyolysis,??  shocked liver. Denies alcohol intake,


Urine  toxicology was (+) for Opiates, barbiturates, and Benzo. 


reportedly treated for  bladder infection 4 days ago w Bactrim. 


CT Abd  Neg for  acute findings, 


LFTs  improving, trending down


Hepatitis panel negative,


serology for auto immune disorders is still [p]


appreciate GI input, work up pending


no reported hypotension. 


Liver US done, results noted 





- Chronic abdominal pain- 


  s/p  EGD/Colonoscopy (6/17/16) and this revealed s/p gastric bypass, 


  biopsy of gastric polyp; diverticulosis in sigmoid and descending colon,  

small internal hemorrhoids, external hemorrhoids.  


  Pathology revealed mild chronic gastritis, negative for Helicobacter pylori. 


  may need Surgical eval for lysis of adhesion  ???  Tertiary referral.  


  cont po dilaudid 2 mg q 6 hr


Continue  lidocaine patch


  Minimize narcotics





- possible rhabdomyolysis d/t fall - 


   creat kinase BETTER TODAY


   IV hydrate , f/u CPK  better





- Confusion- RESOLVED


  metabolic encephalopathy 


  ammonia normal,


  Ct of the head negative


  minimize narcotics





-Elevated trop 


 card input appreciated , Dr jc is not impressed 


 2DEcho - PRESERVED LF EG 50 TO 55%


 No further intervention rec 


continue ASA 





C/O diarrhea, stools formed


C/O dysuria, will check UA/CS


poss. stop abx today


Repeat LFTs in am


Plan to discharge tomorrow





D/W RN


D/W Dr. Estrella


D/W pt.


This patient was seen by myself and Dr. Estrella, this note is written on his 

behalf.








Nikia Simental Elfego 3, 2017 11:17

## 2017-01-04 NOTE — HHI.FF
Face to Face Verification


Diagnosis:  


(1) Debility


(2) Abdominal pain


(3) Shock liver


(4) Nausea & vomiting


Physical Therapy


Order:  Evaluate and Treat





Home Health Nursing


Order:     Medical education


      Medication education-adverse effect


      Nursing assessment with vital signs








Order: To Evaluate:  Support services


Order: To Provide:  Community services








I have seen patient Bridget Stout on 1/4/17. My clinical findings support the 

need for the requested home health care services because:


Deconditioned w/ increased weakness


Need for psychosocial assistance


High risk of falls








I certify that my clinical findings support that this patient is homebound 

because:


Unsafe to leave home unassisted


Need for psychosocial assistance








Nikia Simental [facility] Jan 4, 2017 12:59

## 2017-01-04 NOTE — HHI.PR
Subjective


Subjective Remarks


abd. tenderness, improved


no diarrhea


no dysuria, UA negative for infection


"I don't feel safe to go home"


feels poorly but doesn't want to get up and move around much


states  works and doesn't get out until 6pm, he works every day 


Doesn't want rehab but wants to stay in hospital to do PT


explained that we can set up HHC with PT and SNF





Review of Systems


Constitutional


Constitutional Remarks


12 point ROS completed, negative except as noted above





Vitals/Results


Intake & Output











 1/3/17 1/3/17 1/4/17





 14:59 22:59 06:59


 


Intake Total 480 ml 360 ml 350 ml


 


Balance 480 ml 360 ml 350 ml


 


   


 


Intake Oral 480 ml 360 ml 


 


IV Total   350 ml


 


# Voids 2 2 3


 


# Bowel Movements 0 0 0








Vital Signs





 Vital Signs








  Date Time  Temp Pulse Resp B/P Pulse Ox O2 Delivery O2 Flow Rate FiO2


 


1/4/17 08:10      Room Air  


 


1/4/17 08:00 98.2 57 16 158/86 96   


 


1/4/17 04:00 97.5 68 16 153/80 99   


 


1/4/17 00:00 97.9 60 16 149/76 100   


 


1/3/17 20:00  70      


 


1/3/17 20:00      Room Air  


 


1/3/17 20:00 98.5 57 16 137/77 98   


 


1/3/17 16:00 98.4 56 16 162/79 100   


 


1/3/17 14:28   20     


 


1/3/17 12:00 98.4 59 16 164/86 98   


 


1/3/17 11:00     99 Room Air  








CBC/BMP:  


1/4/17 0536                                                                    

            1/1/17 0408





Lab Results





Laboratory Tests








Test 1/3/17 1/4/17





 11:30 05:36


 


Urine Color YELLOW  


 


Urine Turbidity CLEAR  


 


Urine pH 7.5  


 


Urine Specific Gravity 1.009  


 


Urine Protein NEG mg/dL 


 


Urine Glucose (UA) NEG mg/dL 


 


Urine Ketones NEG mg/dL 


 


Urine Occult Blood NEG  


 


Urine Nitrite NEG  


 


Urine Bilirubin NEG  


 


Urine Urobilinogen LESS THAN 2.0 





 MG/DL 


 


Urine Leukocyte Esterase NEG  


 


Urine WBC LESS THAN 1 





 /hpf 


 


Urine Mucus FEW /lpf 


 


Microscopic Urinalysis Comment CULT NOT 





 INDICATED 


 


White Blood Count  3.6 TH/MM3


 


Red Blood Count  3.59 MIL/MM3


 


Hemoglobin  10.4 GM/DL


 


Hematocrit  30.7 %


 


Mean Corpuscular Volume  85.6 FL


 


Mean Corpuscular Hemoglobin  28.9 PG


 


Mean Corpuscular Hemoglobin  33.8 %





Concent  


 


Red Cell Distribution Width  17.9 %


 


Platelet Count  130 TH/MM3


 


Mean Platelet Volume  9.6 FL


 


Total Bilirubin  0.3 MG/DL


 


Direct Bilirubin  0.1 MG/DL


 


Indirect Bilirubin  0.2 MG/DL


 


Aspartate Amino Transf  62 U/L





(AST/SGOT)  


 


Alanine Aminotransferase  341 U/L





(ALT/SGPT)  


 


Alkaline Phosphatase  75 U/L


 


Total Protein  5.4 GM/DL


 


Albumin  2.6 GM/DL











Physical Exam


General


General Appearance:  Well Developed, No Acute Distress, Anxious





Eyes


Eye Exam:  Pupils Equal, Pupils Reactive





Ears & Nose


Ears & Nose Exam:  Nasal Mucosa Pink





Throat


Throat Exam:  Oral Mucosa Pink & Moist





Neck


Neck Exam:  Neck Supple, Trachea Midline





Pulmonary


Resp Exam:  Clear Bilaterally, Breath Sounds Equal





Cardiology


CV Exam:  Regular, Normal Sinus Rhythm





Gastrointestinal/Abdomen


GI Exam:  Soft, Bowel Sounds Present, Positive Bowel Movement


GI Remarks


diffuse tenderness





Musculoskeletal


MS Exam:  Joints Intact





Integumentary


Skin Exam:  Warm, Dry





Extremeties


Extremities Exam:  No Edema, Pedal Pulses Palpable





Neurologic


Neuro Exam:  Alert, Awake, Oriented, Speech Clear, Moving All Extremities





Psychiatric


Psych Exam:  Appropriate Responses





VTE Prophylaxis


VTE Prophylaxis Device:  SCDs





PUD Prophylasis


PUD Prophylaxis:  Protonix





Assessment/Plan


Assessment/Plan


DIAGNOSTIC IMPRESSION


. ch recurring Abdominal pain, etiology unknown hx multiple abd surgeries 


. Acute hepatitis, question etiology.


. ?? Non-ST-elevation MI, 


. s/p fall / Rhabdomyalysis.


. Chronic pain.high dose narcotic depndence


. Leukocytosis with recent UTI.


. History of gastroesophageal reflux disease.


. Questionable history of CVA and coronary artery disease; 


.  Thrombocytopenia, sec. shocked liver 








PLAN


-Acute hepatiitis  ?? Liver failure , could be due to medication toxicity  vs 

rhabdomyolysis,??  shocked liver. Denies alcohol intake,


Urine  toxicology was (+) for Opiates, barbiturates, and Benzo. 


reportedly treated for  bladder infection 4 days ago w Bactrim. 


CT Abd  Neg for  acute findings, 


LFTs  improving, trending down


Hepatitis panel negative,


serology for auto immune disorders is still [p]


appreciate GI input, work up pending


no reported hypotension. 


Liver US done, results noted 





- Chronic abdominal pain- 


  s/p  EGD/Colonoscopy (6/17/16) and this revealed s/p gastric bypass, 


  biopsy of gastric polyp; diverticulosis in sigmoid and descending colon,  

small internal hemorrhoids, external hemorrhoids.  


  Pathology revealed mild chronic gastritis, negative for Helicobacter pylori. 


  may need Surgical eval for lysis of adhesion  ???  Tertiary referral.  


  cont po dilaudid 2 mg q 6 hr


  Continue  lidocaine patch


  Minimize narcotics





- possible rhabdomyolysis d/t fall - 


   CK better


    resolved, off IVF 





- Confusion- RESOLVED


  metabolic encephalopathy 


  ammonia normal,


  Ct of the head negative


  minimize narcotics


   overall improved.





-Elevated trop 


 card input appreciated , Dr jc is not impressed 


 2DEcho - PRESERVED LF EG 50 TO 55%


 No further intervention rec 


continue ASA 





UA neg for UTI


labs reviewed, improved


pt. clinically improved


PT evaluated, rehab recommended, pt. doesn't want rehab but afraid of going home


CM to set up HHC/PT and SNF


Plan to discharge in am 





D/W RN


D/W Dr. Estrella


D/W pt.


D/W CM 


This patient was seen by myself and Dr. Estrella, this note is written on his 

behalf.








Nikia Simental Jan 4, 2017 10:18

## 2017-01-05 NOTE — HHI.DS
Discharge Summary


Admission Date


Dec 29, 2016 at 23:02


Discharge Date:  Jan 5, 2017


Admitting Diagnosis


NSTEMI, Shock Liver





(1) NSTEMI (non-ST elevated myocardial infarction)


(2) Shock liver


(3) Chronic narcotic dependence


(4) Chronic pain


(5) Abdominal pain


(6) Debility


(7) Nausea & vomiting


(8) History of fundoplication


(9) CAD (coronary artery disease)


(10) GERD (gastroesophageal reflux disease)


(11) poss. ileus vs bowel obstruction


(12) Epigastric pain


(13) History of gastrectomy


(14) History of CVA (cerebrovascular accident)


(15) Hx of hiatal hernia


(16) Hx of renal cell cancer


(17) hx right nephrectomy 


CBC/BMP:  


1/4/17 0536                                                                    

            1/1/17 0408





Significant Findings





Laboratory Tests








Test 1/3/17 1/3/17 1/4/17





 07:14 11:30 05:36


 


Aspartate Amino Transf 113 U/L (15-37)  62 U/L (15-37)





(AST/SGOT)   


 


Alanine Aminotransferase 447 U/L (10-53)  341 U/L (10-53)





(ALT/SGPT)   


 


Total Protein 5.4 GM/DL  5.4 GM/DL





 (6.4-8.2)  (6.4-8.2)


 


Albumin 2.5 GM/DL  2.6 GM/DL





 (3.4-5.0)  (3.4-5.0)


 


Urine Mucus  FEW /lpf (OCC) 


 


White Blood Count   3.6 TH/MM3





   (4.0-11.0)


 


Red Blood Count   3.59 MIL/MM3





   (4.00-5.30)


 


Hemoglobin   10.4 GM/DL





   (11.6-15.3)


 


Hematocrit   30.7 %





   (35.0-46.0)


 


Red Cell Distribution Width   17.9 %





   (11.6-17.2)


 


Platelet Count   130 TH/MM3





   (150-450)








Imaging





Last Impressions








Liver Ultrasound 1/1/17 0000 Signed





Impressions: 





 Service Date/Time:  Sunday, January 1, 2017 10:26 - CONCLUSION:  1. The main 





 portal vein is patent with hepatopedal flow. 2. Left pleural effusion. 3. Mild 





 echogenic liver parenchyma.     Avni Brewer MD 


 


Abdomen/Pelvis CT 12/29/16 2014 Signed





Impressions: 





 Service Date/Time:  Thursday, December 29, 2016 21:03 - CONCLUSION:  1. No 





 obstruction or acute inflammatory changes demonstrated. 2. Liver is diffusely 





 fatty infiltrated. 3. Previous right nephrectomy. No recurrent mass. No 





 lymphadenopathy. 4. Thickened gluteus minimus muscle belly, new. This is 





 nonspecific but appearance and short term interim development would support 





 likelihood of a strain. 5. Patchy consolidation of both lung bases worsening, 





 especially on the left.     Prince Justice MD 


 


Head CT 12/29/16 0000 Signed





Impressions: 





 Service Date/Time:  Thursday, December 29, 2016 18:17 - CONCLUSION:  Negative 





 noncontrast head CT.     Prince Justice MD 


 


Chest X-Ray 12/29/16 0000 Signed





Impressions: 





 Service Date/Time:  Thursday, December 29, 2016 18:06 - CONCLUSION:  No acute 





 cardiopulmonary disease demonstrated.     Prince Justice MD 








Pt Condition on Discharge:  Stable


Discharge Disposition:  Disch w/ Home Health Serv


Discharge Instructions


DIET: Follow Instructions for:  Heart Healthy Diet


Activities you can perform:  Weight Bearing as Nu


Follow up Referrals:  


Gastroenterology - 2 Weeks with Tameka Olivera MD





New Medications:  


Walker with Front Wheels (Walker with Front Wheels) 1 Mis Mis


1 EA .ROUTE AS DIRECTED #1 Ref 0 EA


Lidocaine Patch 12 HR (Lidoderm Patch 12 HR) 5% Patch


1 PATCH TD DAILY back pain #7 Ref 0 PATCH


 


Continued Medications:  


Buspirone (Buspirone) 7.5 Mg Tab


7.5 MG PO BID Anxiety Ref 0 TAB


Cyanocobalamin Inj (Cyanocobalamin Inj) 1,000 Mcg/Ml Inj


1000 MCG IM Q30D #1 Ref 0 VIAL


Diazepam (Valium) 10 Mg Tab


10 MG PO TID Ref 0 TAB


Dicyclomine (Bentyl) 10 Mg Cap


10 MG PO TID PRN CRAMPS Ref 0 CAP


Fentanyl Patch 72 HR (Fentanyl Patch 72 HR) 25 Mcg/Hr Patch


25 MCG T-DERMAL Q72H Pain Management #10 Ref 0 PATCH


Hydromorphone (Dilaudid) 4 Mg Tab


4 MG PO 5 TIMES A DAY Pain Management Ref 0 TAB


Hydroxyzine HCl (Hydroxyzine HCl) 50 Mg Tab


50 MG PO Q8HR PRN ITCHING Ref 0 TAB


Imipramine HCL (Imipramine HCL) 25 Mg Tab


25 MG PO HS Control Depression Ref 0 TAB


Olanzapine (Zyprexa) 5 Mg Tab


5 MG PO BID #60 Ref 0 TAB


Rifaximin (Xifaxan) 550 Mg Tab


550 MG PO Q8HR IBS w/Diarrhea #42 Ref 0 TAB


 


Discontinued Medications:  


Butalbital-Acetaminophen-Caffeine (Fioricet) -40 Mg Cap


1 CAP PO Q4H PRN HEADACHE Ref 0 CAP


Ciprofloxacin (Cipro) 500 Mg Tab


500 MG PO BID Infection Ref 0 TAB


Nitrofurantoin Monohydrate Macrocrystals (Macrobid) 100 Mg Cap


100 MG PO BID Infection Days 7 CAP


Rizatriptan Odt (Rizatriptan Odt) 10 Mg Tab


10 MG SL ONCE PRN MIGRAINE HEADACHE


Zolpidem (Zolpidem) 10 Mg Tab


10 MG PO HS PRN INSOMNIA Ref 0 TAB











Nikia Simental Jan 5, 2017 10:04

## 2017-01-05 NOTE — HHI.DS
Discharge Summary


Admission Date


Dec 29, 2016 at 23:02


Admitting Diagnosis


NSTEMI, Shock Liver





(1) NSTEMI (non-ST elevated myocardial infarction)


(2) Shock liver


Diagnosis:  Principal





(3) Chronic narcotic dependence


Diagnosis:  Principal





(4) Chronic pain


Diagnosis:  Principal





(5) Abdominal pain


Diagnosis:  Principal





(6) Debility


Diagnosis:  Principal





(7) Nausea & vomiting


Diagnosis:  Principal





(8) History of fundoplication


Diagnosis:  Principal





(9) CAD (coronary artery disease)


Diagnosis:  Principal





(10) GERD (gastroesophageal reflux disease)


Diagnosis:  Principal





(11) poss. ileus vs bowel obstruction


Diagnosis:  Principal





(12) Epigastric pain


Diagnosis:  Principal





(13) History of gastrectomy


Diagnosis:  Principal





(14) History of CVA (cerebrovascular accident)


Diagnosis:  Principal





(15) Hx of hiatal hernia


Diagnosis:  Principal





(16) Hx of renal cell cancer


Diagnosis:  Principal





(17) hx right nephrectomy 


Diagnosis:  Principal





Brief History


Patient was admitted because of the abdominal pain nausea vomiting.  Patient 

followed to have increasing LFTs.  Also has increase in troponin.  Patient was 

seen by cardiologist.  As per cardiology and is not MI.  It is secondary to 

mild rhabdomyolysis with increased troponin.  Patient also seen and followed by 

gastroenterologist.  Her LFTs were followed.  Now it is significantly improved.

  Additional pain is significantly improved.  She has no more nausea vomiting.  

She is overall stable.  Plan to discharge her home to be followed by primary 

care doctor as outpatient.  And also to follow GI as outpatient.  She 

understood very well.


CBC/BMP:  


1/4/17 0536                                                                    

            1/1/17 0408





Significant Findings





Laboratory Tests








Test 1/3/17 1/3/17 1/4/17





 07:14 11:30 05:36


 


Aspartate Amino Transf 113 U/L (15-37)  62 U/L (15-37)





(AST/SGOT)   


 


Alanine Aminotransferase 447 U/L (10-53)  341 U/L (10-53)





(ALT/SGPT)   


 


Total Protein 5.4 GM/DL  5.4 GM/DL





 (6.4-8.2)  (6.4-8.2)


 


Albumin 2.5 GM/DL  2.6 GM/DL





 (3.4-5.0)  (3.4-5.0)


 


Urine Mucus  FEW /lpf (OCC) 


 


White Blood Count   3.6 TH/MM3





   (4.0-11.0)


 


Red Blood Count   3.59 MIL/MM3





   (4.00-5.30)


 


Hemoglobin   10.4 GM/DL





   (11.6-15.3)


 


Hematocrit   30.7 %





   (35.0-46.0)


 


Red Cell Distribution Width   17.9 %





   (11.6-17.2)


 


Platelet Count   130 TH/MM3





   (150-450)








Pt Condition on Discharge:  Stable


Discharge Disposition:  Disch w/ Home Health Serv


Discharge Instructions


DIET: Follow Instructions for:  Heart Healthy Diet


Activities you can perform:  Weight Bearing as Nu


Follow up Referrals:  


Gastroenterology - 2 Weeks with Tameka Olivera MD





New Medications:  


Walker with Front Wheels (Walker with Front Wheels) 1 Mis Mis


1 EA .ROUTE AS DIRECTED #1 Ref 0 EA


Lidocaine Patch 12 HR (Lidoderm Patch 12 HR) 5% Patch


1 PATCH TD DAILY back pain #7 Ref 0 PATCH


 


Continued Medications:  


Buspirone (Buspirone) 7.5 Mg Tab


7.5 MG PO BID Anxiety Ref 0 TAB


Cyanocobalamin Inj (Cyanocobalamin Inj) 1,000 Mcg/Ml Inj


1000 MCG IM Q30D #1 Ref 0 VIAL


Diazepam (Valium) 10 Mg Tab


10 MG PO TID Ref 0 TAB


Dicyclomine (Bentyl) 10 Mg Cap


10 MG PO TID PRN CRAMPS Ref 0 CAP


Fentanyl Patch 72 HR (Fentanyl Patch 72 HR) 25 Mcg/Hr Patch


25 MCG T-DERMAL Q72H Pain Management #10 Ref 0 PATCH


Hydromorphone (Dilaudid) 4 Mg Tab


4 MG PO 5 TIMES A DAY Pain Management Ref 0 TAB


Hydroxyzine HCl (Hydroxyzine HCl) 50 Mg Tab


50 MG PO Q8HR PRN ITCHING Ref 0 TAB


Imipramine HCL (Imipramine HCL) 25 Mg Tab


25 MG PO HS Control Depression Ref 0 TAB


Olanzapine (Zyprexa) 5 Mg Tab


5 MG PO BID #60 Ref 0 TAB


Rifaximin (Xifaxan) 550 Mg Tab


550 MG PO Q8HR IBS w/Diarrhea #42 Ref 0 TAB


 


Discontinued Medications:  


Butalbital-Acetaminophen-Caffeine (Fioricet) -40 Mg Cap


1 CAP PO Q4H PRN HEADACHE Ref 0 CAP


Ciprofloxacin (Cipro) 500 Mg Tab


500 MG PO BID Infection Ref 0 TAB


Nitrofurantoin Monohydrate Macrocrystals (Macrobid) 100 Mg Cap


100 MG PO BID Infection Days 7 CAP


Rizatriptan Odt (Rizatriptan Odt) 10 Mg Tab


10 MG SL ONCE PRN MIGRAINE HEADACHE


Zolpidem (Zolpidem) 10 Mg Tab


10 MG PO HS PRN INSOMNIA Ref 0 TAB











Delisa Estrella MD Jan 5, 2017 13:46

## 2017-01-05 NOTE — HHI.PR
Subjective


Subjective Remarks


feels better


abd. less tender


no fever


no cp


no sob


back pain better with Lidocaine patch


wants to go home with [facility]





Review of Systems


Constitutional


Constitutional Remarks


12 point ROS completed, negative except as noted above





Vitals/Results


Intake & Output











 1/4/17 1/4/17 1/5/17





 15:00 23:00 07:00


 


Intake Total 722 ml 480 ml 240 ml


 


Output Total  250 ml 


 


Balance 722 ml 230 ml 240 ml


 


   


 


Intake Oral 720 ml 480 ml 240 ml


 


IV Total 2 ml  


 


Output Urine Total  250 ml 


 


# Voids 2  4


 


# Bowel Movements 1 0 0








Vital Signs





 Vital Signs








  Date Time  Temp Pulse Resp B/P Pulse Ox O2 Delivery O2 Flow Rate FiO2


 


1/5/17 08:00 98.3 63 18 129/71 99   


 


1/5/17 04:25 97.9 64 18 148/88 100   


 


1/4/17 23:48 97.9 66 18 157/81 99   


 


1/4/17 20:13 98.1 59 18 131/78 100   


 


1/4/17 20:00      Room Air  


 


1/4/17 20:00  59      


 


1/4/17 16:00 98.2 60 16 146/71 99   


 


1/4/17 16:00      Room Air  


 


1/4/17 12:00 98.9 89 16 139/75 97   


 


1/4/17 12:00      Room Air  








CBC/BMP:  


1/4/17 0536                                                                    

            1/1/17 0408








Physical Exam


General


General Appearance:  Well Developed, No Acute Distress, Comfortable





Eyes


Eye Exam:  Pupils Equal, Pupils Reactive





Ears & Nose


Ears & Nose Exam:  Nasal Mucosa Pink





Throat


Throat Exam:  Oral Mucosa Pink & Moist





Neck


Neck Exam:  Neck Supple, Trachea Midline





Pulmonary


Resp Exam:  Clear Bilaterally, Breath Sounds Equal





Cardiology


CV Exam:  Regular, Normal Sinus Rhythm





Gastrointestinal/Abdomen


GI Exam:  Soft, Bowel Sounds Present, Positive Bowel Movement


GI Remarks


diffuse tenderness





Musculoskeletal


MS Exam:  Joints Intact





Integumentary


Skin Exam:  Warm, Dry





Extremeties


Extremities Exam:  No Edema, Pedal Pulses Palpable





Neurologic


Neuro Exam:  Alert, Awake, Oriented, Speech Clear, Moving All Extremities





Psychiatric


Psych Exam:  Appropriate Responses





VTE Prophylaxis


VTE Prophylaxis Device:  SCDs





PUD Prophylasis


PUD Prophylaxis:  Protonix





Assessment/Plan


Assessment/Plan


DIAGNOSTIC IMPRESSION


. ch recurring Abdominal pain, etiology unknown hx multiple abd surgeries 


. Acute hepatitis, question etiology.


. ?? Non-ST-elevation MI, 


. s/p fall / Rhabdomyalysis.


. Chronic pain.high dose narcotic depndence


. Leukocytosis with recent UTI.


. History of gastroesophageal reflux disease.


. Questionable history of CVA and coronary artery disease; 


.  Thrombocytopenia, sec. shocked liver 








PLAN


-Acute hepatiitis  ?? Liver failure , could be due to medication toxicity  vs 

rhabdomyolysis,??  shocked liver. Denies alcohol intake,


Urine  toxicology was (+) for Opiates, barbiturates, and Benzo. 


reportedly treated for  bladder infection 4 days ago w Bactrim. 


CT Abd  Neg for  acute findings, 


LFTs  improving, trending down


Hepatitis panel negative,


serology for auto immune disorders is still [p]


appreciate GI input, work up pending


no reported hypotension. 


Liver US done, results noted 





- Chronic abdominal pain- 


  s/p  EGD/Colonoscopy (6/17/16) and this revealed s/p gastric bypass, 


  biopsy of gastric polyp; diverticulosis in sigmoid and descending colon,  

small internal hemorrhoids, external hemorrhoids.  


  Pathology revealed mild chronic gastritis, negative for Helicobacter pylori. 


  may need Surgical eval for lysis of adhesion  ???  Tertiary referral.  


  cont po dilaudid 2 mg q 6 hr


  Continue  lidocaine patch


  Minimize narcotics





- possible rhabdomyolysis d/t fall - 


   CK better


    resolved, off IVF 





- Confusion- RESOLVED


  metabolic encephalopathy 


  ammonia normal,


  Ct of the head negative


  minimize narcotics


   overall improved.





-Elevated trop 


 card input appreciated , Dr jc is not impressed 


 2DEcho - PRESERVED LF EG 50 TO 55%


 No further intervention rec 


continue ASA 








labs reviewed, improved


stable for discharge 


Discharge today with HHC/PT


F/U GI 7-10 days


F/U PCP


Avoid Tylenol products 


Diet-heart healthy


Activity as tolerated 





D/W RN


D/W Dr. Estrella


D/W pt.


D/W CM 


This patient was seen by myself and Dr. Estrella, this note is written on his 

behalf.


Discharge Minutes:  45








Nikia Simental Jan 5, 2017 10:03

## 2017-01-14 ENCOUNTER — HOSPITAL ENCOUNTER (EMERGENCY)
Dept: HOSPITAL 17 - NEPC | Age: 57
Discharge: HOME | End: 2017-01-14
Payer: COMMERCIAL

## 2017-01-14 VITALS
HEART RATE: 65 BPM | RESPIRATION RATE: 18 BRPM | OXYGEN SATURATION: 98 % | SYSTOLIC BLOOD PRESSURE: 160 MMHG | DIASTOLIC BLOOD PRESSURE: 90 MMHG

## 2017-01-14 VITALS
HEART RATE: 68 BPM | SYSTOLIC BLOOD PRESSURE: 158 MMHG | TEMPERATURE: 98.2 F | OXYGEN SATURATION: 100 % | RESPIRATION RATE: 14 BRPM | DIASTOLIC BLOOD PRESSURE: 97 MMHG

## 2017-01-14 VITALS — WEIGHT: 110.23 LBS | HEIGHT: 62 IN | BODY MASS INDEX: 20.28 KG/M2

## 2017-01-14 DIAGNOSIS — G89.29: ICD-10-CM

## 2017-01-14 DIAGNOSIS — R10.9: Primary | ICD-10-CM

## 2017-01-14 LAB
ALP SERPL-CCNC: 90 U/L (ref 45–117)
ALT SERPL-CCNC: 58 U/L (ref 10–53)
ANION GAP SERPL CALC-SCNC: 8 MEQ/L (ref 5–15)
AST SERPL-CCNC: 15 U/L (ref 15–37)
BACTERIA #/AREA URNS HPF: (no result) /HPF
BASOPHILS # BLD AUTO: 0 TH/MM3 (ref 0–0.2)
BASOPHILS NFR BLD: 0.1 % (ref 0–2)
BILIRUB SERPL-MCNC: 0.3 MG/DL (ref 0.2–1)
BUN SERPL-MCNC: 9 MG/DL (ref 7–18)
CHLORIDE SERPL-SCNC: 105 MEQ/L (ref 98–107)
COLOR UR: YELLOW
COMMENT (UR): (no result)
CULTURE IF INDICATED: (no result)
EOSINOPHIL # BLD: 0 TH/MM3 (ref 0–0.4)
EOSINOPHIL NFR BLD: 0.1 % (ref 0–4)
ERYTHROCYTE [DISTWIDTH] IN BLOOD BY AUTOMATED COUNT: 17.1 % (ref 11.6–17.2)
GFR SERPLBLD BASED ON 1.73 SQ M-ARVRAT: 93 ML/MIN (ref 89–?)
GLUCOSE UR STRIP-MCNC: (no result) MG/DL
HCO3 BLD-SCNC: 25.9 MEQ/L (ref 21–32)
HCT VFR BLD CALC: 36.9 % (ref 35–46)
HEMO FLAGS: (no result)
HGB UR QL STRIP: (no result)
KETONES UR STRIP-MCNC: 10 MG/DL
LYMPHOCYTES # BLD AUTO: 0.7 TH/MM3 (ref 1–4.8)
LYMPHOCYTES NFR BLD AUTO: 7.2 % (ref 9–44)
MCH RBC QN AUTO: 28.2 PG (ref 27–34)
MCHC RBC AUTO-ENTMCNC: 32.8 % (ref 32–36)
MCV RBC AUTO: 86 FL (ref 80–100)
MONOCYTES NFR BLD: 3.4 % (ref 0–8)
MUCOUS THREADS #/AREA URNS LPF: (no result) /LPF
NEUTROPHILS # BLD AUTO: 8.7 TH/MM3 (ref 1.8–7.7)
NEUTROPHILS NFR BLD AUTO: 89.2 % (ref 16–70)
NITRITE UR QL STRIP: (no result)
PLATELET # BLD: 215 TH/MM3 (ref 150–450)
POTASSIUM SERPL-SCNC: 3.6 MEQ/L (ref 3.5–5.1)
RBC # BLD AUTO: 4.29 MIL/MM3 (ref 4–5.3)
SODIUM SERPL-SCNC: 139 MEQ/L (ref 136–145)
SP GR UR STRIP: 1.02 (ref 1–1.03)
SQUAMOUS #/AREA URNS HPF: 2 /HPF (ref 0–5)
WBC # BLD AUTO: 9.7 TH/MM3 (ref 4–11)

## 2017-01-14 PROCEDURE — 80053 COMPREHEN METABOLIC PANEL: CPT

## 2017-01-14 PROCEDURE — 81001 URINALYSIS AUTO W/SCOPE: CPT

## 2017-01-14 PROCEDURE — 96360 HYDRATION IV INFUSION INIT: CPT

## 2017-01-14 PROCEDURE — 99284 EMERGENCY DEPT VISIT MOD MDM: CPT

## 2017-01-14 PROCEDURE — 85025 COMPLETE CBC W/AUTO DIFF WBC: CPT

## 2017-01-14 PROCEDURE — 83690 ASSAY OF LIPASE: CPT

## 2017-01-14 NOTE — PD
HPI


Chief Complaint:  Flank/Kidney Pain


Time Seen by Provider:  08:33


Travel History


International Travel<30 days:  No


Contact w/Intl Traveler<30days:  No


Traveled to known affect area:  No





History of Present Illness


HPI


56-year-old female came to the emergency room with history of left lower 

quadrant pain.  Patient has been in the ER multiple times for chronic abdominal 

pain.  She was admitted and discharged 10 days ago for abdominal pain.  She was 

told that her liver was the problem.  No history of vomiting or diarrhea.  She 

has history of UTI in the past and patient says that she's been having trouble 

urinating.





[facility]


Past Medical History


*** Narrative Medical


List of her past medical history is reviewed from the nursing note.


Hx Anticoagulant Therapy:  No


Asthma:  No


Blood Disorders:  No


Anxiety:  Yes


Heart Rhythm Problems:  No


Cancer:  Yes (kidney)


Cardiovascular Problems:  Yes (MI  )


High Cholesterol:  No


Chemotherapy:  No


Chest Pain:  No


Congestive Heart Failure:  No


COPD:  No


Cerebrovascular Accident:  Yes ()


Diabetes:  No


Diminished Hearing:  No


Endocrine:  No


Gastrointestinal Disorders:  Yes


GERD:  No


Genitourinary:  Yes (RIGHT NEPHRECTOMY)


Headaches:  Yes


Hiatal Hernia:  Yes


Hypertension:  No


Immune Disorder:  No


Implanted Vascular Access Dvce:  No


Kidney Stones:  No


Musculoskeletal:  No


Neurologic:  No


Psychiatric:  No


Reproductive:  No


Respiratory:  No


Immunizations Current:  Yes


Migraines:  Yes


Myocardial Infarction:  Yes ()


Pneumonia:  Yes


Radiation Therapy:  No


Renal Failure:  No


Seizures:  Yes


Sleep Apnea:  No


Thyroid Disease:  No


Ulcer:  No


Menopausal:  Yes


:  1


Para:  1


Miscarriage:  0


:  0


Ectopic Pregnancy:  No


Ovarian Cysts:  No


Dilation and Curettage (D&C):  Yes


Tubal Ligation:  Yes





Past Surgical History


Abdominal Surgery:  Yes (total gastrectomy w/pouch , hernia repair)


Appendectomy:  Yes


Cardiac Surgery:  Yes (pt states an occulator was placed in her heart )


Cholecystectomy:  Yes


Gynecologic Surgery:  Yes (hysterectomy)


Hysterectomy:  Yes


Thoracic Surgery:  No


Tonsillectomy:  Yes


Other Surgery:  Yes (right kidney removed)





Social History


Alcohol Use:  No


Tobacco Use:  No


Substance Use:  No





Allergies-Medications


(Allergen,Severity, Reaction):  


Coded Allergies:  


     Compazine (Verified  Allergy, Severe, SOB, 17)


     Gadolinium Derivatives (Verified  Allergy, Severe, RESPIRATORY DISTRESS, )


     Lobster (Verified  Allergy, Severe, Anaphylaxis, 17)


     Morphine (Verified  Allergy, Severe, Hives, 17)


 PT HAVING HIVES AND ITCHING TO ARM ABOVE IV SITE AFTER


 ADMINISTRATION OF MORPHINE


     Phenergan (Verified  Allergy, Severe, SOB, 17)


     Reglan (Verified  Allergy, Severe, SOB, 17)


     Toradol (Verified  Allergy, Severe, Shortness of Breath, 17)


     Zofran (Verified  Allergy, Severe, SOB, 17)


     Penicillin (Verified  Allergy, Mild, RASH, 17)


     Sulfa (Verified  Allergy, Mild, RASH, 17)


     *MDRO Multi-Drug Resistant Organism (Verified  Adverse Reaction, Unknown, )


 MDR-E.Coli (urine-16)


Comments


List of her allergies reviewed from the nursing note.


Reported Meds & Prescriptions





Reported Meds & Active Scripts


Active


Lidoderm Patch 12 HR (Lidocaine) 5% Patch 1 Patch TD DAILY


Walker with Front Wheels (Device) 1 Mis Mis 1 Ea .ROUTE AS DIRECTED


Reported


Buspirone (Buspirone HCl) 7.5 Mg Tab 7.5 Mg PO BID


Xifaxan (Rifaximin) 550 Mg Tab 550 Mg PO Q8HR


Valium (Diazepam) 10 Mg Tab 10 Mg PO TID


Dilaudid (Hydromorphone HCl) 4 Mg Tab 4 Mg PO 5 TIMES A DAY


Fentanyl Patch 72 HR (Fentanyl) 25 Mcg/Hr Patch 25 Mcg T-DERMAL Q72H


Zyprexa (Olanzapine) 5 Mg Tab 5 Mg PO BID


Cyanocobalamin Inj (Cyanocobalamin) 1,000 Mcg/Ml Inj 1,000 Mcg IM Q30D


Hydroxyzine HCl 50 Mg Tab 50 Mg PO Q8HR PRN


Bentyl (Dicyclomine HCl) 10 Mg Cap 10 Mg PO TID PRN


Imipramine HCL (Imipramine HCl) 25 Mg Tab 25 Mg PO HS





Narrative Medication


List of her home medications reviewed from the nursing note.





Review of Systems


Except as stated in HPI:  all other systems reviewed are Neg





Physical Exam


Narrative


GENERAL: Awake, alert, moderate distress, anxious


SKIN: Warm and dry.


HEAD: Atraumatic. Normocephalic. 


EYES: Pupils equal and round. No scleral icterus. No injection or drainage. 


ENT: No nasal bleeding or discharge.  Mucous membranes pink and moist.


NECK: Trachea midline. No JVD. 


CARDIOVASCULAR: Regular rate and rhythm.  No murmur appreciated.


RESPIRATORY: No accessory muscle use. Clear to auscultation. Breath sounds 

equal bilaterally. 


GASTROINTESTINAL: Abdomen soft, non-tender, nondistended. Hepatic and splenic 

margins not palpable. 


MUSCULOSKELETAL: No obvious deformities. No clubbing.  No cyanosis.  No edema. 


NEUROLOGICAL: Awake and alert. No obvious cranial nerve deficits.  Motor 

grossly within normal limits. Normal speech.


PSYCHIATRIC: Appropriate mood and affect; insight and judgment normal.





Data


Data


Last Documented VS





Vital Signs








  Date Time  Temp Pulse Resp B/P Pulse Ox O2 Delivery O2 Flow Rate FiO2


 


17 12:21  65 18 160/90 98   


 


17 08:20 98.2       








Orders





 Complete Blood Count With Diff (17 08:39)


Comprehensive Metabolic Panel (17 08:39)


Lipase (17 08:39)


Urinalysis - C+S If Indicated (17 08:39)


Iv Access Insert/Monitor (17 08:39)


Ecg Monitoring (17 08:39)


Oximetry (17 08:39)


Sodium Chlor 0.9% 1000 Ml Inj (Ns 1000 M (17 08:39)


Sodium Chloride 0.9% Flush (Ns Flush) (17 08:45)


Acetamin-Hydrocod 325-5 Mg (Norco  5-325 (17 08:45)





Labs





 Laboratory Tests








Test 17





 09:25 11:00


 


Urine Color YELLOW  


 


Urine Turbidity HAZY  


 


Urine pH 6.5  


 


Urine Specific Gravity 1.018  


 


Urine Protein TRACE mg/dL 


 


Urine Glucose (UA) NEG mg/dL 


 


Urine Ketones 10 mg/dL 


 


Urine Occult Blood TRACE  


 


Urine Nitrite NEG  


 


Urine Bilirubin NEG  


 


Urine Urobilinogen LESS THAN 2.0 





 MG/DL 


 


Urine Leukocyte Esterase NEG  


 


Urine RBC 7 /hpf 


 


Urine WBC 3 /hpf 


 


Urine Squamous Epithelial 2 /hpf 





Cells  


 


Urine Amorphous Sediment RARE  


 


Urine Bacteria RARE /hpf 


 


Urine Mucus FEW /lpf 


 


Microscopic Urinalysis Comment CULT NOT 





 INDICATED 


 


White Blood Count  9.7 TH/MM3


 


Red Blood Count  4.29 MIL/MM3


 


Hemoglobin  12.1 GM/DL


 


Hematocrit  36.9 %


 


Mean Corpuscular Volume  86.0 FL


 


Mean Corpuscular Hemoglobin  28.2 PG


 


Mean Corpuscular Hemoglobin  32.8 %





Concent  


 


Red Cell Distribution Width  17.1 %


 


Platelet Count  215 TH/MM3


 


Mean Platelet Volume  9.4 FL


 


Neutrophils (%) (Auto)  89.2 %


 


Lymphocytes (%) (Auto)  7.2 %


 


Monocytes (%) (Auto)  3.4 %


 


Eosinophils (%) (Auto)  0.1 %


 


Basophils (%) (Auto)  0.1 %


 


Neutrophils # (Auto)  8.7 TH/MM3


 


Lymphocytes # (Auto)  0.7 TH/MM3


 


Monocytes # (Auto)  0.3 TH/MM3


 


Eosinophils # (Auto)  0.0 TH/MM3


 


Basophils # (Auto)  0.0 TH/MM3


 


CBC Comment  DIFF FINAL 


 


Differential Comment   


 


Sodium Level  139 MEQ/L


 


Potassium Level  3.6 MEQ/L


 


Chloride Level  105 MEQ/L


 


Carbon Dioxide Level  25.9 MEQ/L


 


Anion Gap  8 MEQ/L


 


Blood Urea Nitrogen  9 MG/DL


 


Creatinine  0.66 MG/DL


 


Estimat Glomerular Filtration  93 ML/MIN





Rate  


 


Random Glucose  107 MG/DL


 


Calcium Level  8.9 MG/DL


 


Total Bilirubin  0.3 MG/DL


 


Aspartate Amino Transf  15 U/L





(AST/SGOT)  


 


Alanine Aminotransferase  58 U/L





(ALT/SGPT)  


 


Alkaline Phosphatase  90 U/L


 


Total Protein  7.1 GM/DL


 


Albumin  3.7 GM/DL


 


Lipase  491 U/L











[facility]


Medical Decision Making


Medical Screen Exam Complete:  Yes


Emergency Medical Condition:  Yes


Medical Record Reviewed:  Yes


Differential Diagnosis


Acute on chronic abdominal pain, UTI, diverticulitis


Narrative Course


8:44 AM patient had a CAT scan done 10 days ago which showed fatty liver but 

otherwise negative.  I've ordered blood test and UA.  If they're within normal 

limit patient will be discharged home.  I've ordered a liter of IV fluid bolus 

and by mouth pain meds.  Patient is on significant amount of pain medications 

at home including 4 mg of by mouth Dilaudid 5 times a day and fentanyl patch.





11:24 AM blood test results of back and within normal limit patient will be 

discharged home.





Procedures


EKG Prior to Arrival:  No





Diagnosis





 Primary Impression:  


 acute on chronic abdominal pain


Referrals:  


Primary Care Physician





***Additional Instructions:


Follow-up with your primary care.


***Med/Other Pt SpecificInfo:  No Change to Meds


Disposition:  01 DISCHARGE HOME


Condition:  Stable








Gauri Rosenbaum MD 2017 08:34

## 2017-01-16 ENCOUNTER — HOSPITAL ENCOUNTER (OUTPATIENT)
Dept: HOSPITAL 17 - NEPE | Age: 57
Setting detail: OBSERVATION
LOS: 1 days | Discharge: HOME | End: 2017-01-17
Attending: SPECIALIST | Admitting: SPECIALIST
Payer: COMMERCIAL

## 2017-01-16 VITALS — RESPIRATION RATE: 16 BRPM | OXYGEN SATURATION: 99 %

## 2017-01-16 VITALS
OXYGEN SATURATION: 100 % | DIASTOLIC BLOOD PRESSURE: 91 MMHG | HEART RATE: 63 BPM | TEMPERATURE: 97.6 F | SYSTOLIC BLOOD PRESSURE: 151 MMHG | RESPIRATION RATE: 18 BRPM

## 2017-01-16 VITALS — HEIGHT: 62 IN | BODY MASS INDEX: 20.28 KG/M2 | WEIGHT: 110.23 LBS

## 2017-01-16 DIAGNOSIS — Z87.74: ICD-10-CM

## 2017-01-16 DIAGNOSIS — Z86.73: ICD-10-CM

## 2017-01-16 DIAGNOSIS — B96.1: ICD-10-CM

## 2017-01-16 DIAGNOSIS — N39.0: Primary | ICD-10-CM

## 2017-01-16 DIAGNOSIS — R74.8: ICD-10-CM

## 2017-01-16 DIAGNOSIS — F41.9: ICD-10-CM

## 2017-01-16 DIAGNOSIS — I25.10: ICD-10-CM

## 2017-01-16 DIAGNOSIS — I25.2: ICD-10-CM

## 2017-01-16 DIAGNOSIS — Q21.1: ICD-10-CM

## 2017-01-16 DIAGNOSIS — Z98.84: ICD-10-CM

## 2017-01-16 DIAGNOSIS — D64.9: ICD-10-CM

## 2017-01-16 DIAGNOSIS — Z90.5: ICD-10-CM

## 2017-01-16 DIAGNOSIS — G89.29: ICD-10-CM

## 2017-01-16 DIAGNOSIS — Z85.528: ICD-10-CM

## 2017-01-16 LAB
BASOPHILS # BLD AUTO: 0 TH/MM3 (ref 0–0.2)
BASOPHILS NFR BLD: 0.1 % (ref 0–2)
EOSINOPHIL # BLD: 0 TH/MM3 (ref 0–0.4)
EOSINOPHIL NFR BLD: 1.4 % (ref 0–4)
ERYTHROCYTE [DISTWIDTH] IN BLOOD BY AUTOMATED COUNT: 16.9 % (ref 11.6–17.2)
HCT VFR BLD CALC: 34.8 % (ref 35–46)
HEMO FLAGS: (no result)
LYMPHOCYTES # BLD AUTO: 1.5 TH/MM3 (ref 1–4.8)
LYMPHOCYTES NFR BLD AUTO: 45.6 % (ref 9–44)
MCH RBC QN AUTO: 27.9 PG (ref 27–34)
MCHC RBC AUTO-ENTMCNC: 32.7 % (ref 32–36)
MCV RBC AUTO: 85.1 FL (ref 80–100)
MONOCYTES NFR BLD: 8.4 % (ref 0–8)
NEUTROPHILS # BLD AUTO: 1.5 TH/MM3 (ref 1.8–7.7)
NEUTROPHILS NFR BLD AUTO: 44.5 % (ref 16–70)
PLATELET # BLD: 193 TH/MM3 (ref 150–450)
RBC # BLD AUTO: 4.09 MIL/MM3 (ref 4–5.3)
WBC # BLD AUTO: 3.4 TH/MM3 (ref 4–11)

## 2017-01-16 PROCEDURE — 83690 ASSAY OF LIPASE: CPT

## 2017-01-16 PROCEDURE — 87186 SC STD MICRODIL/AGAR DIL: CPT

## 2017-01-16 PROCEDURE — 96375 TX/PRO/DX INJ NEW DRUG ADDON: CPT

## 2017-01-16 PROCEDURE — 87077 CULTURE AEROBIC IDENTIFY: CPT

## 2017-01-16 PROCEDURE — 96361 HYDRATE IV INFUSION ADD-ON: CPT

## 2017-01-16 PROCEDURE — G0378 HOSPITAL OBSERVATION PER HR: HCPCS

## 2017-01-16 PROCEDURE — 74181 MRI ABDOMEN W/O CONTRAST: CPT

## 2017-01-16 PROCEDURE — 85025 COMPLETE CBC W/AUTO DIFF WBC: CPT

## 2017-01-16 PROCEDURE — 93005 ELECTROCARDIOGRAM TRACING: CPT

## 2017-01-16 PROCEDURE — 81001 URINALYSIS AUTO W/SCOPE: CPT

## 2017-01-16 PROCEDURE — A9541 TC99M SULFUR COLLOID: HCPCS

## 2017-01-16 PROCEDURE — 78264 GASTRIC EMPTYING IMG STUDY: CPT

## 2017-01-16 PROCEDURE — 70450 CT HEAD/BRAIN W/O DYE: CPT

## 2017-01-16 PROCEDURE — 87086 URINE CULTURE/COLONY COUNT: CPT

## 2017-01-16 PROCEDURE — 99285 EMERGENCY DEPT VISIT HI MDM: CPT

## 2017-01-16 PROCEDURE — 96374 THER/PROPH/DIAG INJ IV PUSH: CPT

## 2017-01-16 PROCEDURE — 80053 COMPREHEN METABOLIC PANEL: CPT

## 2017-01-17 VITALS
DIASTOLIC BLOOD PRESSURE: 75 MMHG | HEART RATE: 83 BPM | OXYGEN SATURATION: 98 % | RESPIRATION RATE: 18 BRPM | SYSTOLIC BLOOD PRESSURE: 148 MMHG

## 2017-01-17 VITALS
RESPIRATION RATE: 16 BRPM | SYSTOLIC BLOOD PRESSURE: 162 MMHG | DIASTOLIC BLOOD PRESSURE: 85 MMHG | HEART RATE: 61 BPM | OXYGEN SATURATION: 100 %

## 2017-01-17 VITALS
OXYGEN SATURATION: 100 % | RESPIRATION RATE: 18 BRPM | DIASTOLIC BLOOD PRESSURE: 86 MMHG | TEMPERATURE: 98.1 F | SYSTOLIC BLOOD PRESSURE: 151 MMHG | HEART RATE: 63 BPM

## 2017-01-17 VITALS
OXYGEN SATURATION: 100 % | DIASTOLIC BLOOD PRESSURE: 92 MMHG | RESPIRATION RATE: 16 BRPM | TEMPERATURE: 98.1 F | HEART RATE: 65 BPM | SYSTOLIC BLOOD PRESSURE: 155 MMHG

## 2017-01-17 LAB
ALP SERPL-CCNC: 95 U/L (ref 45–117)
ALT SERPL-CCNC: 46 U/L (ref 10–53)
ANION GAP SERPL CALC-SCNC: 11 MEQ/L (ref 5–15)
AST SERPL-CCNC: 19 U/L (ref 15–37)
BACTERIA #/AREA URNS HPF: (no result) /HPF
BILIRUB SERPL-MCNC: 0.2 MG/DL (ref 0.2–1)
BUN SERPL-MCNC: 9 MG/DL (ref 7–18)
CHLORIDE SERPL-SCNC: 106 MEQ/L (ref 98–107)
COLOR UR: YELLOW
COMMENT (UR): (no result)
CULTURE IF INDICATED: (no result)
GFR SERPLBLD BASED ON 1.73 SQ M-ARVRAT: 59 ML/MIN (ref 89–?)
GLUCOSE UR STRIP-MCNC: (no result) MG/DL
HCO3 BLD-SCNC: 24.4 MEQ/L (ref 21–32)
HGB UR QL STRIP: (no result)
KETONES UR STRIP-MCNC: (no result) MG/DL
MUCOUS THREADS #/AREA URNS LPF: (no result) /LPF
NITRITE UR QL STRIP: (no result)
POTASSIUM SERPL-SCNC: 3 MEQ/L (ref 3.5–5.1)
SODIUM SERPL-SCNC: 141 MEQ/L (ref 136–145)
SP GR UR STRIP: 1.01 (ref 1–1.03)
SQUAMOUS #/AREA URNS HPF: 3 /HPF (ref 0–5)

## 2017-01-17 RX ADMIN — HYDROMORPHONE HYDROCHLORIDE PRN MG: 1 INJECTION, SOLUTION INTRAMUSCULAR; INTRAVENOUS; SUBCUTANEOUS at 03:19

## 2017-01-17 RX ADMIN — HYDROMORPHONE HYDROCHLORIDE PRN MG: 1 INJECTION, SOLUTION INTRAMUSCULAR; INTRAVENOUS; SUBCUTANEOUS at 09:10

## 2017-01-17 RX ADMIN — PHENYTOIN SODIUM SCH MLS/HR: 50 INJECTION INTRAMUSCULAR; INTRAVENOUS at 09:13

## 2017-01-17 RX ADMIN — PHENYTOIN SODIUM SCH MLS/HR: 50 INJECTION INTRAMUSCULAR; INTRAVENOUS at 04:39

## 2017-01-17 RX ADMIN — HYDROMORPHONE HYDROCHLORIDE PRN MG: 1 INJECTION, SOLUTION INTRAMUSCULAR; INTRAVENOUS; SUBCUTANEOUS at 06:12

## 2017-01-17 NOTE — RADRPT
EXAM DATE/TIME:  01/17/2017 01:07 

 

[facility] COMPARISON:     

CT BRAIN W/O CONTRAST, December 29, 2016, 18:17.

 

 

INDICATIONS :     

Dizzy with trouble walking. 

                      

 

RADIATION DOSE:     

56.35 CTDIvol (mGy) 

 

 

 

MEDICAL HISTORY :     

Cardiovascular disease. Seizures. renal cancer, cva

 

SURGICAL HISTORY :      

Hysterectomy. Tonsillectomy.

 

ENCOUNTER:      

Initial

 

ACUITY:      

1 day

 

PAIN SCALE:      

0/10

 

LOCATION:        

cranial 

 

TECHNIQUE:     

Multiple contiguous axial images were obtained of the head.  Using automated exposure control and adj
ustment of the mA and/or kV according to patient size, radiation dose was kept as low as reasonably a
chievable to obtain optimal diagnostic quality images. 

 

FINDINGS:     

 

CEREBRUM:     

The ventricles are normal for age.  No evidence of midline shift, mass lesion, hemorrhage or acute in
farction.  No extra-axial fluid collections are seen.

 

POSTERIOR FOSSA:     

The cerebellum and brainstem are intact.  The 4th ventricle is midline.  The cerebellopontine angle i
s unremarkable.

 

EXTRACRANIAL:     

The visualized portion of the orbits is intact.

 

SKULL:     

The calvaria is intact.  No evidence of skull fracture.

 

CONCLUSION:     

Stable brain appearance. No acute intracranial findings.

 

 

 

 Prince Rich MD on January 17, 2017 at 1:22           

Board Certified Radiologist.

 This report was verified electronically.

## 2017-01-17 NOTE — RADRPT
EXAM DATE/TIME:  2017 11:31 

 

[facility] COMPARISON:     

CT ABDOMEN & PELVIS W CONTRAST, 2016, 21:03.

 

INDICATIONS :     *** Abdominal pain with nausea, vomiting and diarrhea.

                       

DOSE:      1.0 mCi Tc99m Sulfur Colloid Labeled Whole egg PO 

                       

MEDICATONS:      125 mg Erythromycin IV at 90 minutes

                       

IMAGIN hr 35 min

                             

MEDICAL HISTORY :     Gastroesophageal reflux disease. Myocardial infarction. Pancreatitis. Coronary 
artery disease.

SURGICAL HISTORY :      Nephrectomy, right.  Tonsillectomy. Appendectomy. Cholecystectomy, tubal liga
tion, fundoplication and gastrectomy.

ENCOUNTER:     Initial

ACUITY:     1 day

PAIN SCALE:     6/10

LOCATION:      Abdomen.

 

TECHNIQUE:     Following the oral ingestion of radiotracer-labeled meal, dynamic sequential images in
 the [place] projection were acquired with simultaneous computer acquisition.  The data set was decay-cor
rected.

 

FINDINGS:     The patient was given sulfur colloid with an egg.  On the initial image there is isotop
e in what is believed to be the midline then there is a C-shaped segment of bowel extending towards t
he right.  On the patient's prior CT examination it appears that the patient is status post at least 
partial gastrectomy.  The configuration of the bowel appears relatively unchanged over the next hour.
  The patient was given 125 mg of intravenous erythromycin at 90 minutes. At 90 minutes there was pro
mpt emptying of this segment of bowel.   The patient refused to be imaged beyond 95 minutes.  

 

CONCLUSION:     Stasis of the isotope within the upper abdominal gastrointestinal tract.  The patient
 has had some postoperative change.  A normal appearing stomach is not present.  There is very little
 emptying of this segment of bowel until the intervention with erythromycin.  

 

 

 Prince Ellison MD on 2017 at 14:43                

Board Certified Radiologist.

 This report was verified electronically.

## 2017-01-17 NOTE — PD
HPI


Chief Complaint:  Dizziness


Time Seen by Provider:  23:02


Travel History


International Travel<30 days:  No


Contact w/Intl Traveler<30days:  No


Traveled to known affect area:  No





History of Present Illness


HPI


The patient's 56 years old.  She returns here for the fifth time in 30 days.  

She complains of diarrhea, nonbloody, clear, about 2 times per hour.  She 

complains of dry heaving and nausea.  Her chronic abdominal and back pain is 

unchanged with no significant relief with 4 mg oral Dilaudid 5 times daily in 

addition to Fentanyl patch 75mg. Valium 10mg TID has not conferred benefit 

either.  She denies any recent changes in medication or changes in dose. She 

also complains of dizziness and difficulty with ambulation. No LOC. No 

weakness. No CP/SOB.





[facility]


Past Medical History


Hx Anticoagulant Therapy:  No


Asthma:  No


Blood Disorders:  No


Anxiety:  Yes


Heart Rhythm Problems:  No


Cancer:  Yes (kidney)


Cardiovascular Problems:  Yes (MI )


High Cholesterol:  No


Chest Pain:  No


Congestive Heart Failure:  No


COPD:  No


Cerebrovascular Accident:  Yes ()


Diabetes:  No


Diminished Hearing:  No


Endocrine:  No


Gastrointestinal Disorders:  Yes


GERD:  No


Genitourinary:  Yes (RIGHT NEPHRECTOMY)


Headaches:  Yes


Hiatal Hernia:  Yes


Hypertension:  No


Immune Disorder:  No


Implanted Vascular Access Dvce:  No


Kidney Stones:  No


Musculoskeletal:  No


Neurologic:  No


Psychiatric:  No


Reproductive:  No


Respiratory:  No


Immunizations Current:  Yes


Migraines:  Yes


Myocardial Infarction:  Yes ()


Pneumonia:  Yes


Radiation Therapy:  No


Renal Failure:  No


Seizures:  Yes


Sleep Apnea:  No


Thyroid Disease:  No


Ulcer:  No


Tetanus Vaccination:  < 5 Years


Influenza Vaccination:  Yes


Pregnant?:  Not Pregnant


LMP:  TUBAL


Menopausal:  Yes


:  1


Para:  1


Miscarriage:  0


:  0


Ectopic Pregnancy:  No


Ovarian Cysts:  No


Dilation and Curettage (D&C):  Yes


Tubal Ligation:  Yes





Past Surgical History


Abdominal Surgery:  Yes (total gastrectomy w/pouch , hernia repair)


Appendectomy:  Yes


Cardiac Surgery:  Yes (pt states an occulator was placed in her heart )


Cholecystectomy:  Yes


Gynecologic Surgery:  Yes (hysterectomy)


Hysterectomy:  Yes


Thoracic Surgery:  No


Tonsillectomy:  Yes


Other Surgery:  Yes (right kidney removed)





Social History


Alcohol Use:  No


Tobacco Use:  No


Substance Use:  No





Allergies-Medications


(Allergen,Severity, Reaction):  


Coded Allergies:  


     Compazine (Verified  Allergy, Severe, SOB, 17)


     Gadolinium Derivatives (Verified  Allergy, Severe, RESPIRATORY DISTRESS, )


     Lobster (Verified  Allergy, Severe, Anaphylaxis, 17)


     Morphine (Verified  Allergy, Severe, Hives, 17)


 PT HAVING HIVES AND ITCHING TO ARM ABOVE IV SITE AFTER


 ADMINISTRATION OF MORPHINE


     Phenergan (Verified  Allergy, Severe, SOB, 17)


     Reglan (Verified  Allergy, Severe, SOB, 17)


     Toradol (Verified  Allergy, Severe, Shortness of Breath, 17)


     Zofran (Verified  Allergy, Severe, SOB, 17)


     Penicillin (Verified  Allergy, Mild, RASH, 17)


     Sulfa (Verified  Allergy, Mild, RASH, 17)


     *MDRO Multi-Drug Resistant Organism (Verified  Adverse Reaction, Unknown, )


 MDR-E.Coli (urine-16)


Reported Meds & Prescriptions





Reported Meds & Active Scripts


Active


Lidoderm Patch 12 HR (Lidocaine) 5% Patch 1 Patch TD DAILY


Walker with Front Wheels (Device) 1 Mis Mis 1 Ea .ROUTE AS DIRECTED


Reported


Buspirone (Buspirone HCl) 7.5 Mg Tab 7.5 Mg PO BID


Xifaxan (Rifaximin) 550 Mg Tab 550 Mg PO Q8HR


Valium (Diazepam) 10 Mg Tab 10 Mg PO TID


Dilaudid (Hydromorphone HCl) 4 Mg Tab 4 Mg PO 5 TIMES A DAY


Fentanyl Patch 72 HR (Fentanyl) 25 Mcg/Hr Patch 25 Mcg T-DERMAL Q72H


Zyprexa (Olanzapine) 5 Mg Tab 5 Mg PO BID


Cyanocobalamin Inj (Cyanocobalamin) 1,000 Mcg/Ml Inj 1,000 Mcg IM Q30D


Hydroxyzine HCl 50 Mg Tab 50 Mg PO Q8HR PRN


Bentyl (Dicyclomine HCl) 10 Mg Cap 10 Mg PO TID PRN


Imipramine HCL (Imipramine HCl) 25 Mg Tab 25 Mg PO HS








Review of Systems


Except as stated in HPI:  all other systems reviewed are Neg





Physical Exam


Narrative


GENERAL: 56-year-old female well-nourished well-developed


SKIN: Warm and dry.


HEAD: Atraumatic. Normocephalic. 


EYES: Pupils equal and round. No scleral icterus. No injection or drainage. 


ENT: No nasal bleeding or discharge.  Mucous membranes pink and moist.


NECK: Trachea midline. No JVD. 


CARDIOVASCULAR: Regular rate and rhythm.  No murmur appreciated.


RESPIRATORY: No accessory muscle use. Clear to auscultation. Breath sounds 

equal bilaterally. 


GASTROINTESTINAL: Soft.  Diffuse nonspecific tenderness.


MUSCULOSKELETAL: No obvious deformities. No clubbing.  No cyanosis.  No edema. 


NEUROLOGICAL: Awake and alert. No obvious cranial nerve deficits.  Motor 

grossly within normal limits. Normal speech.


PSYCHIATRIC: Appropriate mood and affect; insight and judgment normal.





Data


Data


Last Documented VS





Vital Signs








  Date Time  Temp Pulse Resp B/P Pulse Ox O2 Delivery O2 Flow Rate FiO2


 


17 23:56   16  98 Room Air  


 


17 22:42 97.6 63  151/91    





VS reviewed


Orders





 Complete Blood Count With Diff (17 23:14)


Comprehensive Metabolic Panel (17 23:14)


Lipase (17 23:14)


Urinalysis - C+S If Indicated (17 23:14)


Iv Access Insert/Monitor (17 23:14)


Ecg Monitoring (17 23:14)


Oximetry (17 23:14)


Sodium Chlor 0.9% 1000 Ml Inj (Ns 1000 M (17 23:14)


Sodium Chloride 0.9% Flush (Ns Flush) (17 23:15)


Electrocardiogram (17 23:14)


Potassium Chloride (Kcl) (17 00:15)


Potassium Chlor 20 Meq Premix (Kcl 20 Me (17 00:45)


Hydromorphone Pf Inj (Dilaudid Pf Inj) (17 00:45)


Ct Brain W/O Iv Contrast(Rout) (17 00:44)


Admit Order (Ed Use Only) (17 01:03)





Labs





 Laboratory Tests








Test 17





 23:45 00:40


 


White Blood Count 3.4 TH/MM3 


 


Red Blood Count 4.09 MIL/MM3 


 


Hemoglobin 11.4 GM/DL 


 


Hematocrit 34.8 % 


 


Mean Corpuscular Volume 85.1 FL 


 


Mean Corpuscular Hemoglobin 27.9 PG 


 


Mean Corpuscular Hemoglobin 32.7 % 





Concent  


 


Red Cell Distribution Width 16.9 % 


 


Platelet Count 193 TH/MM3 


 


Mean Platelet Volume 9.7 FL 


 


Neutrophils (%) (Auto) 44.5 % 


 


Lymphocytes (%) (Auto) 45.6 % 


 


Monocytes (%) (Auto) 8.4 % 


 


Eosinophils (%) (Auto) 1.4 % 


 


Basophils (%) (Auto) 0.1 % 


 


Neutrophils # (Auto) 1.5 TH/MM3 


 


Lymphocytes # (Auto) 1.5 TH/MM3 


 


Monocytes # (Auto) 0.3 TH/MM3 


 


Eosinophils # (Auto) 0.0 TH/MM3 


 


Basophils # (Auto) 0.0 TH/MM3 


 


CBC Comment DIFF FINAL  


 


Differential Comment   


 


Sodium Level 141 MEQ/L 


 


Potassium Level 3.0 MEQ/L 


 


Chloride Level 106 MEQ/L 


 


Carbon Dioxide Level 24.4 MEQ/L 


 


Anion Gap 11 MEQ/L 


 


Blood Urea Nitrogen 9 MG/DL 


 


Creatinine 0.98 MG/DL 


 


Estimat Glomerular Filtration 59 ML/MIN 





Rate  


 


Random Glucose 83 MG/DL 


 


Calcium Level 8.8 MG/DL 


 


Total Bilirubin 0.2 MG/DL 


 


Aspartate Amino Transf 19 U/L 





(AST/SGOT)  


 


Alanine Aminotransferase 46 U/L 





(ALT/SGPT)  


 


Alkaline Phosphatase 95 U/L 


 


Total Protein 7.1 GM/DL 


 


Albumin 3.6 GM/DL 


 


Lipase 694 U/L 


 


Urine Color  YELLOW 


 


Urine Turbidity  CLEAR 


 


Urine pH  6.5 


 


Urine Specific Gravity  1.010 


 


Urine Protein  NEG mg/dL


 


Urine Glucose (UA)  NEG mg/dL


 


Urine Ketones  NEG mg/dL


 


Urine Occult Blood  NEG 


 


Urine Nitrite  NEG 


 


Urine Bilirubin  NEG 


 


Urine Urobilinogen  LESS THAN 2.0





  MG/DL


 


Urine Leukocyte Esterase  MOD 


 


Urine RBC  LESS THAN 1





  /hpf


 


Urine WBC  3 /hpf


 


Urine Squamous Epithelial  3 /hpf





Cells  


 


Urine Bacteria  MOD /hpf


 


Urine Mucus  FEW /lpf


 


Microscopic Urinalysis Comment  CULTURE





  INDICATED











MDM


Medical Decision Making


Medical Screen Exam Complete:  Yes


Emergency Medical Condition:  Yes


Medical Record Reviewed:  Yes


Differential Diagnosis


Constipation, Gastritis, Acute Cholecystitis, Biliary Colic, Pancreatitis, PIZARRO

, Hepatitis, Bowel Obstruction, Cystitis, Mesenteric Ischemia, AAA, Appendicitis

, Renal Stone/Hydronephrosis, GERD, perforated viscous


Narrative Course


CBC & BMP Diagram


17 23:45








LFTs normal


Lipase 694


UA: UTI present





Pt has pancreatitis and will kept as obs for management of same. Prior GI 

documentation notes elevated lipase however was considered non-specific. Pt 

also has UTI with multiple U culture isolates sensitive to Rocephin on record. 

One dose Rocephin ordered here. IV KCl ordered. Pt had good response to 0.5mg 

IV hydromorphone. Case d/w YUNIEL Mcnair.





Diagnosis





 Primary Impression:  


 Pancreatitis


 Qualified Code:  K85.90 - Acute pancreatitis, unspecified complication status, 

unspecified pancreatitis type


 Additional Impression:  


 Cystitis








Jefferson Rojas MD 2017 00:15

## 2017-01-17 NOTE — MH
cc:

LAURA MENDOZA MD

****

 

DATE OF ADMISSION

1/17/2017

 

CHIEF COMPLAINT

Abdominal pain

 

HISTORY OF PRESENT ILLNESS

This is a 56-year-old female patient who has chronic abdominal pain from

multiple previous surgeries.  She is on a Fentanyl patch as well as oral

Dilaudid on a regular basis.  She has multiple allergies to most of the

antiemetics.  She also has chronic nausea and dry heaves.  Her doctors have

prescribed Ativan and Valium to help with this.  The Valium does seem to help

decrease some of the dry heaves, however over the past 24 hours or so, the dry

heaves have gotten a lot worse.  Her pain has become more excruciating.  She

has been able to take very little oral intake.  She has noticed that she has

had some chills, but no fever.  Her heart rate was higher than normal today.

Her urine output was down.  She became very weak and had difficulty ambulating.

Because of these symptoms, she ended up coming to the emergency room today.

She was found to have an elevated lipase and is being admitted for

pancreatitis.  She does not have a history of pancreatitis.  She denies any

recent alcohol intake or any other significant changes in her normal regimen.

She was admitted to hospital on December 29th with a shock liver and a

non-ST-elevation MI.

 

MEDICATIONS

On admission, please see the chart.

 

ALLERGIES

COMPAZINE, GADOLINIUM, LOBSTER, MORPHINE, PENICILLIN, PHENERGAN, REGLAN, SULFA,

TORADOL AND ZOFRAN.

 

PAST MEDICAL HISTORY

Significant for:

1.  An MI

2.  CVA

3.  Anxiety

4.  Migraines

5.  Pneumonia

6.  Seizures

7.  Fatty liver

8.  Non-ST elevation MI

9.  PFO which lead to the embolic strokes

10. Renal cancer

 

PAST SURGICAL HISTORY

1.  Right nephrectomy

2.  Tubal ligation

3.  Gastrectomy with pouch

4.  Hernia repair

5.  Appendectomy

6.  Cholecystectomy

7.  Hysterectomy

8.  Tonsillectomy

9.  Fundoplication

10. PFO closure with 

 

SOCIAL HISTORY

No tobacco, alcohol or drugs.

 

FAMILY HISTORY

Mother had MS and COPD.  Father had COPD.

 

REVIEW OF SYSTEMS

A 12-point review of systems gone over with the patient.  She states she has

lost over 15 pounds in the past three weeks.  She lives in chronic pain usually

in the area of where her stomach is.  The current pain is much more intense.

She states she has an appointment at the gastroenterologists office next week

to go over some of the tests that were ordered that were not resulted during

her last hospital stay.  No other pertinent findings.

 

PHYSICAL EXAMINATION

VITAL SIGNS:  Afebrile, heart rate 63, respirations 18, blood pressure 151/91.

O2 sats 100% on room air.

GENERAL:  This is a 56 year-old female sitting in bed.  She appears to be in

mild discomfort, but in no respiratory distress. HEAD, EYES, EARS, NOSE, AND

THROAT:  Mucous membranes are moist.

NECK:  Supple.

CARDIOVASCULAR:  Regular rate and rhythm.

RESPIRATORY: Lungs are clear.

GASTROINTESTINAL:  Bowel sounds are present.  Diffusely tender mainly in the

upper abdomen and epigastric area to even very light palpation.  Deep palpation

is not undertaken at the patient's request.  The abdomen does not appear to be

distended.

:  Mild bilateral CVA tenderness.

MUSCULOSKELETAL:  No edema.  William's is negative.  Distal pulses are palpable.

NEUROLOGIC:  The patient is awake, alert and oriented.  Speech is clear and

fluent.  Strength appears symmetrical in the upper and lower extremities.

Pupils are dilated and sluggish.  Tongue is midline.

 

INVESTIGATIONS

CT of the brain shows no acute findings.

 

White count is 3.4, hemoglobin 11.4, platelets are 193.

 

Sodium 141, potassium 3, LFT's are normal.  Creatinine is 0.98, lipase was

694.

 

Urinalysis shows moderate leukocyte esterase, moderate bacteria, culture is

pending.

 

EKG shows sinus rhythm, possible left atrial enlargement.

 

IMPRESSION

1.  Pancreatitis

2.  Urinary tract infection

3.  Chronic pain

4.  Recent non-ST elevation MI with shock liver.

5.  Anemia

6.  Leukopenia

7.  Hypokalemia on admission

 

DISCUSSION

The patient has been placed on Dr. Mendoza's service.  The patient is receiving

IV fluids and electrolytes.  Potassium has already been replaced.  Labs will be

followed up in the morning.  DVT prophylaxis will be provided. Antibiotics have

been started for the urinary tract infection.  GI consultation will be

requested for the pancreatitis.  We will follow up lipase level in the morning.

We will attempt symptom control with analgesics.  Cannot use traditional

antiemetics because of her allergies.  We will use a benzodiazepine to try to

alleviate some of the nausea and dry heaves and further recommendations will be

made as her case progresses.

     Dictated by: Prince Mcnair PA-C

 

 

                              __________________________________

                          KennyhamMD MADELINE Mortensen/JULES

D:  1/17/2017/2:34 AM

T:  1/17/2017/10:34 AM

Visit #:  [id number]

Job #:  [id number]









PT was seen & examined

d/w PT 

d/w Prince 

d/w Dr sexton 

etiology of pain not clear , doubt pancreatitis 

chronic pain / drug seeking attitude

supportive care

will f/u
MTDD

## 2017-01-17 NOTE — RADRPT
EXAM DATE/TIME:  01/17/2017 11:19 

 

[facility] COMPARISON:     

CT ABDOMEN & PELVIS W CONTRAST, December 29, 2016, 21:03.

       

 

 

INDICATIONS:     

***Pain.

                     

 

MEDICAL HISTORY:     

Renal ca

 

SURGICAL HISTORY:     

Cholecystectomy. Appendectomy. Tonsillectomy. Stomach/right kidney removed.

 

ENCOUNTER:     

Subsequent

 

ACUITY:     

3 day

 

PAIN SCORE:     

4/10

 

LOCATION:     

Abdomen.

 

TECHNIQUE:     

Multiplanar, multisequence magnetic resonance imaging of the abdomen was performed without contrast.

 

FINDINGS:     

The patient is status post right nephrectomy.  There is some susceptibility artifact in the right janina
al fossa.  There is also some susceptibility artifact from clips seen at the expected location of the
 GE junction.  Apparently the patient is status post gastrectomy.  There is fluid filled bowel seen i
n the right renal fossa region likely related to the duodenum.  Patient is status post cholecystectom
y.  Significant biliary dilatation is not clearly seen.  The liver and spleen appear grossly normal. 
 The left kidney is normal.  The pancreas and left adrenal gland are normal.  The susceptibility chivo
fact limits visualization of the right adrenal gland but this appeared normal on the prior CT examina
tion.  The aorta and IVC are normal.  There is some mild increased signal seen in the posterior lung 
bases bilaterally.  

 

CONCLUSION:     

1.  Status post right nephrectomy and gastrectomy and cholecystectomy.

2.  No acute intra-abdominal abnormalities seen.

3.  Increased signal seen at the posterior lung bases likely representing some degree of atelectasis 
or consolidation. 

 

 

 Prince Ellison MD on January 17, 2017 at 12:10                

Board Certified Radiologist.

 This report was verified electronically.

## 2017-01-17 NOTE — PD.CONS
HPI


History of Present Illness


This is a 56 year old female patient who came to the ER for evaluation of nausea

, vomiting, or abdominal pain.  She reports that she has chronic abdominal pain 

from multiple abdominal surgeries and sees pain management for this.  She is 

taking Fentanyl 25mcg q72 hours and Dilaudid 4mg po q4h for pain.  She has 

tried multiple pain meds and states that the dilaudid is the only thing that 

helps.  She states that she is usually able to control her pain with this.  She 

started having worsening of symptoms with dry heaving, abdominal pain, and 

diarrhea yesterday afternoon.  Her pain is a severe constant pain in her 

epigastric area that radiates to her back.  Her regular pain meds did not help 

with this.  She has associated chills, nausea, and dry heaving.  She started 

having diarrhea, consisting of about 20 clearish liquid bowel movements with no 

blood or mucous yesterday.  She did try Imodium  She has lost about 14 lbs over 

the past month secondary to decreased appetite.  Her symptoms are aggravated by 

po intake.  She denies any recent travel, suspicious food, or recent antibiotic 

use.  She denies any ETOH use or new medications.  She reports that she did 

have an episode of pancreatitis a few years ago.  She does not know the cause 

of this.  She does state that this pain is very similar to that episode.  Of 

note, she states that she takes Valium TID and this really helps with both her 

abdominal pain and nausea and she is requesting this now.  EGD/Colonoscopy (6/17 /16) and this revealed s/p gastric bypass, biopsy of gastric polyp; 

diverticulosis in sigmoid and descending colon, retroflexed views revealed 

small internal hemorrhoids, external hemorrhoids.  Pathology revealed mild 

chronic gastritis, negative for helicobacter pylori.  She was given a trial of 

dicyclomine, but stated that this made her symptoms worse.  It was also 

recommended that SBFT, but she has still not had this done and states that she 

cannot undergo this while she is sick and that she will have it done as 

outpatient.  She underwent 2 fundoplications (2001, 2001), but she reports that 

both of them failed and therefore she ended up having a subtotal gastrectomy. 

No hx of PUD.  She has had multiple bowel obstructions.  She states that she 

has never required surgeries for these.  Of note, she had celiac testing in the 

past which was negative.   (Abbi Walsh)





PFSH


Past Medical History


? Pancreatitis once about 2 years ago


Kidney cancer, S/P Right nephrectomy


MI/CAD, undetected PFO


Double embolic CVA, coma 7 days


GERD


Aspiration pneumonia


Ileus


Gastric polyp


Chronic abdominal pain


Past Surgical History


Fundoplication x 2


Subtotal gastrectomy


G tube placement


Incisional hernia repair x 2


EGD/Colonoscopy


Cardiac catheterization


Tonsillectomy


Appendectomy


Cholecystectomy


Tubal ligation (Abbi Walsh)


Coded Allergies:  


     Compazine (Verified  Allergy, Severe, SOB, 1/16/17)


     Gadolinium Derivatives (Verified  Allergy, Severe, RESPIRATORY DISTRESS, 1/ 16/17)


     Lobster (Verified  Allergy, Severe, Anaphylaxis, 1/16/17)


     Morphine (Verified  Allergy, Severe, Hives, 1/16/17)


 PT HAVING HIVES AND ITCHING TO ARM ABOVE IV SITE AFTER


 ADMINISTRATION OF MORPHINE


     Phenergan (Verified  Allergy, Severe, SOB, 1/16/17)


     Reglan (Verified  Allergy, Severe, SOB, 1/16/17)


     Toradol (Verified  Allergy, Severe, Shortness of Breath, 1/16/17)


     Zofran (Verified  Allergy, Severe, SOB, 1/16/17)


     Penicillin (Verified  Allergy, Mild, RASH, 1/16/17)


     Sulfa (Verified  Allergy, Mild, RASH, 1/16/17)


     *MDRO Multi-Drug Resistant Organism (Verified  Adverse Reaction, Unknown, 1 /16/17)


 MDR-E.Coli (urine-11/27/16)


Uncoded Allergies:  


     GADOLINIUM (Adverse Reaction, Severe, PT STOPPED BREATHING WITH GADO, 1/17/ 17)


 PT STATES SHE STOPPED BREATHING AFTER HAVING GADO AT ANOTHER


 FACILITY. 1/17/17


 [facility]


Medications





 Allergies








Coded Allergies Type Severity Reaction Last Updated Verified


 


  Compazine Allergy Severe SOB 1/16/17 Yes


 


  Gadolinium Derivatives Allergy Severe RESPIRATORY DISTRESS 1/16/17 Yes


 


  Lobster Allergy Severe Anaphylaxis 1/16/17 Yes


 


  Morphine Allergy Severe Hives 1/16/17 Yes


 


  Phenergan Allergy Severe SOB 1/16/17 Yes


 


  Reglan Allergy Severe SOB 1/16/17 Yes


 


  Toradol Allergy Severe Shortness of Breath 1/16/17 Yes


 


  Zofran Allergy Severe SOB 1/16/17 Yes


 


  Penicillin Allergy Mild RASH 1/16/17 Yes


 


  Sulfa Allergy Mild RASH 1/16/17 Yes


 


  *MDRO Multi-Drug Resistant Organism Adverse Reaction Unknown  1/16/17 Yes








 Active Scripts








 Medications  Dose


 Route/Sig Days Date Category


 


 Lidoderm Patch 12


 HR (Lidocaine) 5%


 Patch  1 Patch


 TD DAILY   1/5/17 Rx


 


 Walker with Front


 Wheels (Device) 1


 Mis Mis  1 Ea


 .ROUTE AS DIRECTED   1/4/17 Rx


 


 Buspirone


  (Buspirone HCl)


 7.5 Mg Tab  7.5 Mg


 PO BID   12/29/16 Reported


 


 Valium (Diazepam)


 10 Mg Tab  10 Mg


 PO TID   12/29/16 Reported


 


 Dilaudid


  (Hydromorphone


 HCl) 4 Mg Tab  4 Mg


 PO Q6HR   12/29/16 Reported


 


 Fentanyl Patch 72


 HR (Fentanyl) 25


 Mcg/Hr Patch  25 Mcg


 T-DERMAL Q72H   12/29/16 Reported


 


 Zyprexa


  (Olanzapine) 5 Mg


 Tab  5 Mg


 PO BID   10/30/16 Reported


 


 Cyanocobalamin


 Inj


  (Cyanocobalamin)


 1,000 Mcg/Ml Inj  1,000 Mcg


 IM Q30D   10/30/16 Reported


 


 Hydroxyzine HCl


 50 Mg Tab  50 Mg


 PO Q8HR PRN   10/30/16 Reported


 


 Imipramine HCL


  (Imipramine HCl)


 25 Mg Tab  25 Mg


 PO HS   10/30/16 Reported








Family History


Both parents had COPD, Mother also had MS.


Social History


No tobacco.


No ETOH


No illicit drug use (Abbi Walsh)





Review of Systems


Constitutional:  COMPLAINS OF: Fatigue, Weight loss, Chills,  DENIES: Fever


Respiratory:  DENIES: Cough, Shortness of breath


Cardiovascular:  COMPLAINS OF: Palpitations (anxiety),  DENIES: Chest pain


Gastrointestinal:  COMPLAINS OF: Abdominal pain, Diarrhea, Nausea, Anorexia,  

DENIES: Black stools, Bloody stools, Constipation, Swelling of Abdomen, 

Heartburn, Hematemesis


Genitourinary:  DENIES: Urgency, Hematuria


Musculoskeletal:  COMPLAINS OF: Back pain


Integumentary:  DENIES: Rash


Hematologic/lymphatic:  DENIES: Bruising


Neurologic:  COMPLAINS OF: Headache


Psychiatric:  COMPLAINS OF: Anxiety (Abbi Walsh)





GI Exam


Vitals I&O





 Vital Signs








  Date Time  Temp Pulse Resp B/P Pulse Ox O2 Delivery O2 Flow Rate FiO2


 


1/17/17 08:18  83 18 148/75 98 Room Air  


 


1/17/17 04:37   20     


 


1/17/17 01:29   20     


 


1/16/17 23:56   16  98 Room Air  


 


1/16/17 23:55   16  99 Room Air  


 


1/16/17 22:42 97.6 63 18 151/91 100   








 I/O








 1/16/17 1/16/17 1/16/17 1/17/17 1/17/17 1/17/17





 07:00 15:00 23:00 07:00 15:00 23:00


 


Output Total    500 ml  


 


Balance    -500 ml  


 


      


 


Output Urine Total    500 ml  


 


# Voids    1  








Imaging





Last Impressions








Head CT 1/17/17 0044 Signed





Impressions: 





 Service Date/Time:  Tuesday, January 17, 2017 01:07 - CONCLUSION:  Stable 

brain 





 appearance. No acute intracranial findings.     Prince Rich MD 








Laboratory











Test 1/16/17 1/17/17





 23:45 00:40


 


White Blood Count 3.4 TH/MM3 


 


Red Blood Count 4.09 MIL/MM3 


 


Hemoglobin 11.4 GM/DL 


 


Hematocrit 34.8 % 


 


Mean Corpuscular Volume 85.1 FL 


 


Mean Corpuscular Hemoglobin 27.9 PG 


 


Mean Corpuscular Hemoglobin 32.7 % 





Concent  


 


Red Cell Distribution Width 16.9 % 


 


Platelet Count 193 TH/MM3 


 


Mean Platelet Volume 9.7 FL 


 


Neutrophils (%) (Auto) 44.5 % 


 


Lymphocytes (%) (Auto) 45.6 % 


 


Monocytes (%) (Auto) 8.4 % 


 


Eosinophils (%) (Auto) 1.4 % 


 


Basophils (%) (Auto) 0.1 % 


 


Neutrophils # (Auto) 1.5 TH/MM3 


 


Lymphocytes # (Auto) 1.5 TH/MM3 


 


Monocytes # (Auto) 0.3 TH/MM3 


 


Eosinophils # (Auto) 0.0 TH/MM3 


 


Basophils # (Auto) 0.0 TH/MM3 


 


CBC Comment DIFF FINAL  


 


Differential Comment   


 


Sodium Level 141 MEQ/L 


 


Potassium Level 3.0 MEQ/L 


 


Chloride Level 106 MEQ/L 


 


Carbon Dioxide Level 24.4 MEQ/L 


 


Anion Gap 11 MEQ/L 


 


Blood Urea Nitrogen 9 MG/DL 


 


Creatinine 0.98 MG/DL 


 


Estimat Glomerular Filtration 59 ML/MIN 





Rate  


 


Random Glucose 83 MG/DL 


 


Calcium Level 8.8 MG/DL 


 


Total Bilirubin 0.2 MG/DL 


 


Aspartate Amino Transf 19 U/L 





(AST/SGOT)  


 


Alanine Aminotransferase 46 U/L 





(ALT/SGPT)  


 


Alkaline Phosphatase 95 U/L 


 


Total Protein 7.1 GM/DL 


 


Albumin 3.6 GM/DL 


 


Lipase 694 U/L 


 


Urine Color  YELLOW 


 


Urine Turbidity  CLEAR 


 


Urine pH  6.5 


 


Urine Specific Gravity  1.010 


 


Urine Protein  NEG mg/dL


 


Urine Glucose (UA)  NEG mg/dL


 


Urine Ketones  NEG mg/dL


 


Urine Occult Blood  NEG 


 


Urine Nitrite  NEG 


 


Urine Bilirubin  NEG 


 


Urine Urobilinogen  LESS THAN 2.0





  MG/DL


 


Urine Leukocyte Esterase  MOD 


 


Urine RBC  LESS THAN 1





  /hpf


 


Urine WBC  3 /hpf


 


Urine Squamous Epithelial  3 /hpf





Cells  


 


Urine Bacteria  MOD /hpf


 


Urine Mucus  FEW /lpf


 


Microscopic Urinalysis Comment  CULTURE





  INDICATED














 Date/Time Procedure Status





Source Growth 


 


 1/17/17 00:40 Urine Culture Received





Urine Random Urine Pending 








Physical Examination


HEENT: Normocephalic; atraumatic; no jaundice. 


CHEST:  CTA, diminished


CARDIAC:  RRR


ABDOMEN:  Soft, nondistended, mild diffuse tenderness; no hepatosplenomegaly; 

bowel sounds are present in all four quadrants.


EXTREMITIES: No clubbing, cyanosis, or edema.


SKIN:  Normal; no rash; no jaundice.


CNS:  No focal deficits; alert and oriented times three. (Abbi Walsh)





Assessment and Plan


Plan


ASSESSMENT:


- Acute on chronic abdominal pain with nausea, dry heaving.  No imaging during 

this hospitalization, did have CT 12/29.  She has a hx of multiple


   abdominal surgeries and states she has chronic pain from these.  She is 

followed by pain management and takes Fentanyl 25mcg q72 hours, 


   Dilaudid 4mg po q4h for pain, Valium po TID for this at home.  She states 

she had worsening symptoms yesterday with no relief and came to 


   the ER for further evaluation.  EGD/Colonoscopy (6/17/16) and this revealed s

/p gastric bypass, biopsy of gastric polyp; diverticulosis in sigmoid 


   and descending colon, retroflexed views revealed small internal hemorrhoids, 

external hemorrhoids.  Pathology revealed mild chronic gastritis, 


   negative for helicobacter pylori.  She was given a trial of dicyclomine, but 

stated that this made her symptoms worse.  It was also recommended


   that SBFT, but she has still not had this done and states that she cannot 

undergo this while she is sick and that she will have it done as outpatient. 


   WBC unremarkable.  Lipase nonspecifically elevated.  No imaging.  Will cont. 

clears, get MRI of the abdomen without contrast, GES, stool studies.


   PPI.  Suspect that her medications may be contributing to some of her 

symptoms.  


- Diarrhea.  No risk factors for infectious etiology. Previous celiac testing 

negative.  Refusing SBFT- states will have done as outpatient.  Will get stool 


   studies.


- Elevated lipase, unclear significance.  States she had one prior history of 

pancreatitis about 2 years ago and that the cause was not identified.  Her


   lipase is mildly elevated, but there is no imaging.  Triglycerides in 

December were 62.  No new meds.  S/P cholecystectomy.  


- Anemia.  11.4/34.8.





PLAN:


- Clear liquids


- MRI Abdomen without contrast


- GES


- Cont. PPI


- Zofran prn


- Stool studies 


- Supportive care


- Further recommendations to follow based on results of above


- Pt seen and examined by Dr. Olivera and myself and this note is written on 

his behalf (Abbi Walsh)


Physician Comments


Seen and examined with Ms. Jillian NUNEZ, MRI done today -ve for source of pain. 

GES-P. Wants to do sbft as outpt. Has appointment with surgery as outpt. 

REcommend evaluation at [facility] as outpt. Discussed with Dr. Fernandes.  Thank 

you (Tameka Olivera MD)








Abib Walsh Jan 17, 2017 08:42


Tameka Olivera MD Jan 17, 2017 15:08

## 2017-01-17 NOTE — EKG
Date Performed: 2017       Time Performed: 00:22:01

 

PTAGE:      56 years

 

EKG:      Sinus rhythm 

 

 POSSIBLE LEFT ATRIAL ENLARGEMENT BORDERLINE ECG Compared to the 

 

 PREVIOUS TRACING            ,  LBBB is no longer present PREVIOUS TRACIN2016 07.24

 

DOCTOR:   Zev Jaimes  Interpretating Date/Time  2017 10:16:32

## 2017-02-03 ENCOUNTER — HOSPITAL ENCOUNTER (EMERGENCY)
Dept: HOSPITAL 17 - NEPE | Age: 57
Discharge: HOME | End: 2017-02-03
Payer: COMMERCIAL

## 2017-02-03 VITALS
HEART RATE: 90 BPM | RESPIRATION RATE: 18 BRPM | SYSTOLIC BLOOD PRESSURE: 153 MMHG | OXYGEN SATURATION: 99 % | DIASTOLIC BLOOD PRESSURE: 91 MMHG

## 2017-02-03 VITALS — DIASTOLIC BLOOD PRESSURE: 86 MMHG | SYSTOLIC BLOOD PRESSURE: 153 MMHG

## 2017-02-03 VITALS
RESPIRATION RATE: 20 BRPM | HEART RATE: 110 BPM | DIASTOLIC BLOOD PRESSURE: 102 MMHG | SYSTOLIC BLOOD PRESSURE: 193 MMHG | OXYGEN SATURATION: 97 % | TEMPERATURE: 99 F

## 2017-02-03 VITALS — WEIGHT: 110.23 LBS | BODY MASS INDEX: 20.28 KG/M2 | HEIGHT: 62 IN

## 2017-02-03 VITALS — OXYGEN SATURATION: 97 %

## 2017-02-03 VITALS
RESPIRATION RATE: 18 BRPM | HEART RATE: 76 BPM | SYSTOLIC BLOOD PRESSURE: 171 MMHG | OXYGEN SATURATION: 97 % | DIASTOLIC BLOOD PRESSURE: 103 MMHG

## 2017-02-03 DIAGNOSIS — Z85.528: ICD-10-CM

## 2017-02-03 DIAGNOSIS — N30.90: ICD-10-CM

## 2017-02-03 DIAGNOSIS — Z87.01: ICD-10-CM

## 2017-02-03 DIAGNOSIS — Z86.73: ICD-10-CM

## 2017-02-03 DIAGNOSIS — I25.2: ICD-10-CM

## 2017-02-03 DIAGNOSIS — N39.0: ICD-10-CM

## 2017-02-03 DIAGNOSIS — R11.2: ICD-10-CM

## 2017-02-03 DIAGNOSIS — Z86.59: ICD-10-CM

## 2017-02-03 DIAGNOSIS — Z87.448: ICD-10-CM

## 2017-02-03 DIAGNOSIS — R10.30: Primary | ICD-10-CM

## 2017-02-03 DIAGNOSIS — Z87.19: ICD-10-CM

## 2017-02-03 DIAGNOSIS — Z86.69: ICD-10-CM

## 2017-02-03 DIAGNOSIS — Z86.79: ICD-10-CM

## 2017-02-03 LAB
ALP SERPL-CCNC: 83 U/L (ref 45–117)
ALT SERPL-CCNC: 19 U/L (ref 10–53)
ANION GAP SERPL CALC-SCNC: 6 MEQ/L (ref 5–15)
AST SERPL-CCNC: 14 U/L (ref 15–37)
BACTERIA #/AREA URNS HPF: (no result) /HPF
BASOPHILS # BLD AUTO: 0 TH/MM3 (ref 0–0.2)
BASOPHILS NFR BLD: 0 % (ref 0–2)
BILIRUB SERPL-MCNC: 0.4 MG/DL (ref 0.2–1)
BUN SERPL-MCNC: 14 MG/DL (ref 7–18)
CHLORIDE SERPL-SCNC: 107 MEQ/L (ref 98–107)
COLOR UR: YELLOW
COMMENT (UR): (no result)
CULTURE IF INDICATED: (no result)
EOSINOPHIL # BLD: 0 TH/MM3 (ref 0–0.4)
EOSINOPHIL NFR BLD: 0.1 % (ref 0–4)
ERYTHROCYTE [DISTWIDTH] IN BLOOD BY AUTOMATED COUNT: 17.1 % (ref 11.6–17.2)
GFR SERPLBLD BASED ON 1.73 SQ M-ARVRAT: 81 ML/MIN (ref 89–?)
GLUCOSE UR STRIP-MCNC: (no result) MG/DL
HCO3 BLD-SCNC: 26.9 MEQ/L (ref 21–32)
HCT VFR BLD CALC: 34.1 % (ref 35–46)
HEMO FLAGS: (no result)
HGB UR QL STRIP: (no result)
KETONES UR STRIP-MCNC: 10 MG/DL
LYMPHOCYTES # BLD AUTO: 0.8 TH/MM3 (ref 1–4.8)
LYMPHOCYTES NFR BLD AUTO: 17.5 % (ref 9–44)
MCH RBC QN AUTO: 27.6 PG (ref 27–34)
MCHC RBC AUTO-ENTMCNC: 32.6 % (ref 32–36)
MCV RBC AUTO: 84.6 FL (ref 80–100)
MONOCYTES NFR BLD: 4.9 % (ref 0–8)
MUCOUS THREADS #/AREA URNS LPF: (no result) /LPF
NEUTROPHILS # BLD AUTO: 3.5 TH/MM3 (ref 1.8–7.7)
NEUTROPHILS NFR BLD AUTO: 77.5 % (ref 16–70)
NITRITE UR QL STRIP: (no result)
PLATELET # BLD: 195 TH/MM3 (ref 150–450)
POTASSIUM SERPL-SCNC: 3.9 MEQ/L (ref 3.5–5.1)
RBC # BLD AUTO: 4.03 MIL/MM3 (ref 4–5.3)
SODIUM SERPL-SCNC: 140 MEQ/L (ref 136–145)
SP GR UR STRIP: 1.02 (ref 1–1.03)
SQUAMOUS #/AREA URNS HPF: 17 /HPF (ref 0–5)
WBC # BLD AUTO: 4.5 TH/MM3 (ref 4–11)

## 2017-02-03 PROCEDURE — 99284 EMERGENCY DEPT VISIT MOD MDM: CPT

## 2017-02-03 PROCEDURE — 81001 URINALYSIS AUTO W/SCOPE: CPT

## 2017-02-03 PROCEDURE — 87086 URINE CULTURE/COLONY COUNT: CPT

## 2017-02-03 PROCEDURE — 96376 TX/PRO/DX INJ SAME DRUG ADON: CPT

## 2017-02-03 PROCEDURE — 83690 ASSAY OF LIPASE: CPT

## 2017-02-03 PROCEDURE — 96361 HYDRATE IV INFUSION ADD-ON: CPT

## 2017-02-03 PROCEDURE — 96375 TX/PRO/DX INJ NEW DRUG ADDON: CPT

## 2017-02-03 PROCEDURE — 80053 COMPREHEN METABOLIC PANEL: CPT

## 2017-02-03 PROCEDURE — 96374 THER/PROPH/DIAG INJ IV PUSH: CPT

## 2017-02-03 PROCEDURE — 85025 COMPLETE CBC W/AUTO DIFF WBC: CPT

## 2017-02-03 NOTE — PD
HPI


Chief Complaint:   Complaint


Time Seen by Provider:  10:55


Travel History


International Travel<30 days:  No


Contact w/Intl Traveler<30days:  No


Traveled to known affect area:  No





History of Present Illness


HPI


The patient is a 56-year-old  female who presents emergency department 

for nausea, vomiting, dry heaves, and dysuria.  The patient has had multiple 

visits to the emergency department for chronic abdominal pain with a history of 

gastroparesis and multiple previous abdominal surgeries.  The patient states 

she is on chronic pain medications including fentanyl patch 25 isadora's every 72 

hours and Dilaudid orally.  The patient currently has a fentanyl patch on and 

took her last dose of Dilaudid this morning.  The patient states she developed 

dysuria, frequency, urgency, suprapubic discomfort last night.  She also 

complains of dry heaves this morning with nausea and vomiting.  The patient 

denies any fever, chills, or sweats.  The patient was followed by her primary 

physician, Dr. Ruvalcaba as well as a gastroenterologist.  The patient was recently 

admitted to the hospital in January for pancreatitis and underwent gastric 

emptying study as well as MRI of the abdomen.  Patient's symptoms are moderate, 

possibly exacerbated by history of chronic abdominal pain and possible 

underlying urinary tract infection.  She denies any alleviation of her symptoms 

with a fentanyl patch and oral Dilaudid.





PFSH


Past Medical History


Hx Anticoagulant Therapy:  No


Asthma:  No


Blood Disorders:  No


Anxiety:  Yes


Depression:  No


Heart Rhythm Problems:  No


Cancer:  Yes (kidney)


Cardiovascular Problems:  Yes ( MI )


High Cholesterol:  No


Chemotherapy:  Yes (KIDNEY CA)


Chest Pain:  No


Congestive Heart Failure:  No


COPD:  No


Cerebrovascular Accident:  Yes ()


Diabetes:  No


Diminished Hearing:  No


Endocrine:  No


Gastrointestinal Disorders:  Yes


GERD:  No


Genitourinary:  Yes (RIGHT NEPHRECTOMY)


Headaches:  Yes


Hiatal Hernia:  Yes


Hypertension:  No


Immune Disorder:  No


Implanted Vascular Access Dvce:  No


Kidney Stones:  No


Musculoskeletal:  No


Neurologic:  No


Psychiatric:  No


Reproductive:  No


Respiratory:  No


Immunizations Current:  Yes


Migraines:  Yes


Myocardial Infarction:  Yes ()


Pneumonia:  Yes


Radiation Therapy:  No


Renal Failure:  No


Seizures:  Yes


Sleep Apnea:  No


Thyroid Disease:  No


Ulcer:  No


Menopausal:  Yes


:  1


Para:  1


Miscarriage:  0


:  0


Ectopic Pregnancy:  No


Ovarian Cysts:  No


Dilation and Curettage (D&C):  Yes


Tubal Ligation:  Yes





Past Surgical History


Abdominal Surgery:  Yes (total gastrectomy w/pouch , hernia repair)


Appendectomy:  Yes


Cardiac Surgery:  Yes (pt states an occulator was placed in her heart )


Cholecystectomy:  Yes


Gynecologic Surgery:  Yes (hysterectomy)


Hysterectomy:  Yes


Thoracic Surgery:  No


Tonsillectomy:  Yes


Other Surgery:  Yes (right kidney removed)





Social History


Alcohol Use:  No


Tobacco Use:  No


Substance Use:  No





Allergies-Medications


(Allergen,Severity, Reaction):  


Coded Allergies:  


     Compazine (Verified  Allergy, Severe, SOB, 2/3/17)


     Gadolinium Derivatives (Verified  Allergy, Severe, RESPIRATORY DISTRESS, 2/

3/17)


     Lobster (Verified  Allergy, Severe, Anaphylaxis, 2/3/17)


     Morphine (Verified  Allergy, Severe, Hives, 2/3/17)


 PT HAVING HIVES AND ITCHING TO ARM ABOVE IV SITE AFTER


 ADMINISTRATION OF MORPHINE


     Phenergan (Verified  Allergy, Severe, SOB, 2/3/17)


     Reglan (Verified  Allergy, Severe, SOB, 2/3/17)


     Toradol (Verified  Allergy, Severe, Shortness of Breath, 2/3/17)


     Zofran (Verified  Allergy, Severe, SOB, 2/3/17)


     Penicillin (Verified  Allergy, Mild, RASH, 2/3/17)


     Sulfa (Verified  Allergy, Mild, RASH, 2/3/17)


     *MDRO Multi-Drug Resistant Organism (Verified  Adverse Reaction, Unknown, 2

/3/17)


 MDR-E.Coli (urine-16)


Uncoded Allergies:  


     GADOLINIUM (Adverse Reaction, Severe, PT STOPPED BREATHING WITH GADO, )


 PT STATES SHE STOPPED BREATHING AFTER HAVING GADO AT ANOTHER


 FACILITY. 17


 [facility]


Reported Meds & Prescriptions





Reported Meds & Active Scripts


Active


Lidoderm Patch 12 HR (Lidocaine) 5% Patch 1 Patch TD DAILY


Reported


Buspirone (Buspirone HCl) 7.5 Mg Tab 7.5 Mg PO BID


Valium (Diazepam) 10 Mg Tab 10 Mg PO TID


Dilaudid (Hydromorphone HCl) 4 Mg Tab 4 Mg PO Q6HR


Fentanyl Patch 72 HR (Fentanyl) 25 Mcg/Hr Patch 25 Mcg T-DERMAL Q72H


Zyprexa (Olanzapine) 5 Mg Tab 5 Mg PO BID


Cyanocobalamin Inj (Cyanocobalamin) 1,000 Mcg/Ml Inj 1,000 Mcg IM Q30D


Hydroxyzine HCl 50 Mg Tab 50 Mg PO Q8HR PRN


Imipramine HCL (Imipramine HCl) 25 Mg Tab 25 Mg PO HS








Review of Systems


Except as stated in HPI:  all other systems reviewed are Neg


Cardiovascular:  No: Chest Pain or Discomfort


Respiratory:  No: Shortness of Breath


Gastrointestinal:  Positive: Nausea, Vomiting, Abdominal Pain


Genitourinary:  Positive: Urgency, Frequency, Dysuria


Musculoskeletal:  No: Myalgias


Skin:  No Rash





Physical Exam


Narrative


GENERAL: Awake, alert, nontoxic-appearing 56-year-old female who appears her 

stated age and is in no acute respiratory distress.


SKIN: Warm and dry.


HEAD: Atraumatic. Normocephalic. 


EYES: Pupils equal and round.  Pupils are slightly dilated bilateral 5 mm but 

reactive.


ENT: No nasal bleeding or discharge.  Mucous membranes pink and moist.


NECK: Trachea midline. No JVD. 


CARDIOVASCULAR: Regular, tachycardic with a heart rate 105.


RESPIRATORY: No accessory muscle use. Clear to auscultation. Breath sounds 

equal bilaterally. 


GASTROINTESTINAL: Abdomen soft, suprapubic discomfort and mild epigastric 

tenderness, but no rebound tenderness.  Well-healed surgical scar.


MUSCULOSKELETAL: No obvious deformities. No clubbing.  No cyanosis.  No edema. 


NEUROLOGICAL: Awake and alert. No obvious cranial nerve deficits.  Motor 

grossly within normal limits. Normal speech.


PSYCHIATRIC: Appropriate mood and affect; insight and judgment normal.





Data


Data


Last Documented VS





Vital Signs








  Date Time  Temp Pulse Resp B/P Pulse Ox O2 Delivery O2 Flow Rate FiO2


 


2/3/17 12:35  90 18 153/91 99 Room Air  


 


2/3/17 10:14 99.0       








Orders





 Complete Blood Count With Diff (2/3/17 10:55)


Comprehensive Metabolic Panel (2/3/17 10:55)


Lipase (2/3/17 10:55)


Urinalysis - C+S If Indicated (2/3/17 10:55)


Iv Access Insert/Monitor (2/3/17 10:55)


Ecg Monitoring (2/3/17 10:55)


Oximetry (2/3/17 10:55)


Sodium Chlor 0.9% 1000 Ml Inj (Ns 1000 M (2/3/17 10:55)


Sodium Chloride 0.9% Flush (Ns Flush) (2/3/17 11:00)


Hydromorphone Pf Inj (Dilaudid Pf Inj) (2/3/17 11:00)


Lorazepam Inj (Ativan Inj) (2/3/17 11:00)


Urine Culture (2/3/17 11:25)


Hydromorphone Pf Inj (Dilaudid Pf Inj) (2/3/17 13:30)


Lorazepam Inj (Ativan Inj) (2/3/17 13:30)





Labs








 Laboratory Tests








Test 2/3/17 2/3/17





 11:25 11:36


 


Urine Color YELLOW  


 


Urine Turbidity HAZY  


 


Urine pH 6.0  


 


Urine Specific Gravity 1.024  


 


Urine Protein 100 mg/dL 


 


Urine Glucose (UA) NEG mg/dL 


 


Urine Ketones 10 mg/dL 


 


Urine Occult Blood SMALL  


 


Urine Nitrite NEG  


 


Urine Bilirubin NEG  


 


Urine Urobilinogen LESS THAN 2.0 





 MG/DL 


 


Urine Leukocyte Esterase SMALL  


 


Urine RBC 2 /hpf 


 


Urine WBC 5 /hpf 


 


Urine Squamous Epithelial 17 /hpf 





Cells  


 


Urine Bacteria MOD /hpf 


 


Urine Mucus MANY /lpf 


 


Microscopic Urinalysis Comment CULTURE 





 INDICATED 


 


White Blood Count  4.5 TH/MM3


 


Red Blood Count  4.03 MIL/MM3


 


Hemoglobin  11.1 GM/DL


 


Hematocrit  34.1 %


 


Mean Corpuscular Volume  84.6 FL


 


Mean Corpuscular Hemoglobin  27.6 PG


 


Mean Corpuscular Hemoglobin  32.6 %





Concent  


 


Red Cell Distribution Width  17.1 %


 


Platelet Count  195 TH/MM3


 


Mean Platelet Volume  9.2 FL


 


Neutrophils (%) (Auto)  77.5 %


 


Lymphocytes (%) (Auto)  17.5 %


 


Monocytes (%) (Auto)  4.9 %


 


Eosinophils (%) (Auto)  0.1 %


 


Basophils (%) (Auto)  0.0 %


 


Neutrophils # (Auto)  3.5 TH/MM3


 


Lymphocytes # (Auto)  0.8 TH/MM3


 


Monocytes # (Auto)  0.2 TH/MM3


 


Eosinophils # (Auto)  0.0 TH/MM3


 


Basophils # (Auto)  0.0 TH/MM3


 


CBC Comment  DIFF FINAL 


 


Differential Comment   


 


Sodium Level  140 MEQ/L


 


Potassium Level  3.9 MEQ/L


 


Chloride Level  107 MEQ/L


 


Carbon Dioxide Level  26.9 MEQ/L


 


Anion Gap  6 MEQ/L


 


Blood Urea Nitrogen  14 MG/DL


 


Creatinine  0.74 MG/DL


 


Estimat Glomerular Filtration  81 ML/MIN





Rate  


 


Random Glucose  105 MG/DL


 


Calcium Level  9.3 MG/DL


 


Total Bilirubin  0.4 MG/DL


 


Aspartate Amino Transf  14 U/L





(AST/SGOT)  


 


Alanine Aminotransferase  19 U/L





(ALT/SGPT)  


 


Alkaline Phosphatase  83 U/L


 


Total Protein  6.8 GM/DL


 


Albumin  3.6 GM/DL


 


Lipase  241 U/L














MDM


Medical Decision Making


Medical Screen Exam Complete:  Yes


Emergency Medical Condition:  Yes


Medical Record Reviewed:  Yes


Interpretation(s)





 Laboratory Tests








Test 2/3/17 2/3/17





 11:25 11:36


 


Urine Color YELLOW  


 


Urine Turbidity HAZY  


 


Urine pH 6.0  


 


Urine Specific Gravity 1.024  


 


Urine Protein 100 mg/dL 


 


Urine Glucose (UA) NEG mg/dL 


 


Urine Ketones 10 mg/dL 


 


Urine Occult Blood SMALL  


 


Urine Nitrite NEG  


 


Urine Bilirubin NEG  


 


Urine Urobilinogen LESS THAN 2.0 





 MG/DL 


 


Urine Leukocyte Esterase SMALL  


 


Urine RBC 2 /hpf 


 


Urine WBC 5 /hpf 


 


Urine Squamous Epithelial 17 /hpf 





Cells  


 


Urine Bacteria MOD /hpf 


 


Urine Mucus MANY /lpf 


 


Microscopic Urinalysis Comment CULTURE 





 INDICATED 


 


White Blood Count  4.5 TH/MM3


 


Red Blood Count  4.03 MIL/MM3


 


Hemoglobin  11.1 GM/DL


 


Hematocrit  34.1 %


 


Mean Corpuscular Volume  84.6 FL


 


Mean Corpuscular Hemoglobin  27.6 PG


 


Mean Corpuscular Hemoglobin  32.6 %





Concent  


 


Red Cell Distribution Width  17.1 %


 


Platelet Count  195 TH/MM3


 


Mean Platelet Volume  9.2 FL


 


Neutrophils (%) (Auto)  77.5 %


 


Lymphocytes (%) (Auto)  17.5 %


 


Monocytes (%) (Auto)  4.9 %


 


Eosinophils (%) (Auto)  0.1 %


 


Basophils (%) (Auto)  0.0 %


 


Neutrophils # (Auto)  3.5 TH/MM3


 


Lymphocytes # (Auto)  0.8 TH/MM3


 


Monocytes # (Auto)  0.2 TH/MM3


 


Eosinophils # (Auto)  0.0 TH/MM3


 


Basophils # (Auto)  0.0 TH/MM3


 


CBC Comment  DIFF FINAL 


 


Differential Comment   


 


Sodium Level  140 MEQ/L


 


Potassium Level  3.9 MEQ/L


 


Chloride Level  107 MEQ/L


 


Carbon Dioxide Level  26.9 MEQ/L


 


Anion Gap  6 MEQ/L


 


Blood Urea Nitrogen  14 MG/DL


 


Creatinine  0.74 MG/DL


 


Estimat Glomerular Filtration  81 ML/MIN





Rate  


 


Random Glucose  105 MG/DL


 


Calcium Level  9.3 MG/DL


 


Total Bilirubin  0.4 MG/DL


 


Aspartate Amino Transf  14 U/L





(AST/SGOT)  


 


Alanine Aminotransferase  19 U/L





(ALT/SGPT)  


 


Alkaline Phosphatase  83 U/L


 


Total Protein  6.8 GM/DL


 


Albumin  3.6 GM/DL


 


Lipase  241 U/L








Differential Diagnosis


Differential diagnosis includes gastroparesis, gastritis, peptic ulcer disease, 

pancreatitis, chronic abdominal pain, abdominal migraine, UTI, malingering, drug

-seeking behavior.


Narrative Course


IV was established, labs are drawn and sent, and the patient was placed on 

cardiac telemetry monitoring and continuous pulse oximetry monitoring.  The 

patient was administered Dilaudid and IV fluids.  The patient is allergic to 

Compazine, Phenergan, Reglan, and Zofran.  The patient states she requires 

Ativan for her nausea.  Therefore, the patient was administered Ativan 

intravenously for nausea.  CBC reveals white count is normal, lipase is return 

to normal limits.  UA reveals bacteria, but no significant white blood cells.  

However, patient has symptoms consistent with cystitis with pyuria on UA, 

therefore, will be treated with Macrobid and Pyridium.  Patient requested a 

second dose of pain medication and nausea medication, therefore, was 

administered one more dose.  The patient will be discharged home with a copy of 

her labs, is advised to follow-up with her primary physician and/or 

gastroenterologist.





Diagnosis





 Primary Impression:  


 Abdominal pain


 Qualified Code:  R10.30 - Lower abdominal pain


 Additional Impressions:  


 Cystitis


 Pyuria


Patient Instructions:  General Instructions, Narcotic given in the ED





***Additional Instructions:


Follow-up with your primary physician.  Return if symptoms worsen or progress.  

Please provide the patient a copy of her labs at discharge.  Ride home as you 

were administered benzodiazepines and narcotics.


***Med/Other Pt SpecificInfo:  Prescription(s) given


Scripts


Phenazopyridine (Pyridium)200 Mg [id number] Mg PO Q8H PRN (DYSURIA) 2 Days  Ref 0


   Prov:Nathan Kohler MD         2/3/17 


Nitrofurantoin Monohydrate Macrocrystals (Macrobid)100 Mg Umr667 Mg PO BID  10 

Days  Ref 0


   Prov:Nathan Kohler MD         2/3/17


Disposition:  01 DISCHARGE HOME


Condition:  Stable








Nathan Kohler MD Feb 3, 2017 11:09

## 2017-02-04 ENCOUNTER — HOSPITAL ENCOUNTER (OUTPATIENT)
Dept: HOSPITAL 17 - NEPC | Age: 57
Setting detail: OBSERVATION
LOS: 1 days | Discharge: HOME | End: 2017-02-05
Attending: SPECIALIST | Admitting: SPECIALIST
Payer: COMMERCIAL

## 2017-02-04 VITALS
OXYGEN SATURATION: 98 % | HEART RATE: 87 BPM | RESPIRATION RATE: 14 BRPM | DIASTOLIC BLOOD PRESSURE: 103 MMHG | SYSTOLIC BLOOD PRESSURE: 181 MMHG | TEMPERATURE: 98.6 F

## 2017-02-04 VITALS
RESPIRATION RATE: 17 BRPM | SYSTOLIC BLOOD PRESSURE: 145 MMHG | DIASTOLIC BLOOD PRESSURE: 97 MMHG | OXYGEN SATURATION: 96 % | HEART RATE: 65 BPM

## 2017-02-04 VITALS
HEART RATE: 71 BPM | DIASTOLIC BLOOD PRESSURE: 89 MMHG | RESPIRATION RATE: 18 BRPM | SYSTOLIC BLOOD PRESSURE: 157 MMHG | OXYGEN SATURATION: 97 %

## 2017-02-04 VITALS
OXYGEN SATURATION: 95 % | TEMPERATURE: 97.4 F | RESPIRATION RATE: 18 BRPM | DIASTOLIC BLOOD PRESSURE: 81 MMHG | SYSTOLIC BLOOD PRESSURE: 142 MMHG | HEART RATE: 63 BPM

## 2017-02-04 VITALS — BODY MASS INDEX: 20.28 KG/M2 | HEIGHT: 62 IN | WEIGHT: 110.23 LBS

## 2017-02-04 VITALS — HEART RATE: 68 BPM | SYSTOLIC BLOOD PRESSURE: 139 MMHG | DIASTOLIC BLOOD PRESSURE: 84 MMHG | RESPIRATION RATE: 17 BRPM

## 2017-02-04 VITALS
DIASTOLIC BLOOD PRESSURE: 58 MMHG | HEART RATE: 80 BPM | RESPIRATION RATE: 22 BRPM | SYSTOLIC BLOOD PRESSURE: 125 MMHG | TEMPERATURE: 98.8 F | OXYGEN SATURATION: 94 %

## 2017-02-04 VITALS
DIASTOLIC BLOOD PRESSURE: 86 MMHG | TEMPERATURE: 98.9 F | RESPIRATION RATE: 18 BRPM | OXYGEN SATURATION: 93 % | HEART RATE: 60 BPM | SYSTOLIC BLOOD PRESSURE: 155 MMHG

## 2017-02-04 VITALS — SYSTOLIC BLOOD PRESSURE: 140 MMHG | DIASTOLIC BLOOD PRESSURE: 83 MMHG

## 2017-02-04 DIAGNOSIS — K31.84: ICD-10-CM

## 2017-02-04 DIAGNOSIS — N30.00: Primary | ICD-10-CM

## 2017-02-04 DIAGNOSIS — I25.2: ICD-10-CM

## 2017-02-04 DIAGNOSIS — G89.4: ICD-10-CM

## 2017-02-04 DIAGNOSIS — E86.0: ICD-10-CM

## 2017-02-04 DIAGNOSIS — J98.11: ICD-10-CM

## 2017-02-04 DIAGNOSIS — Z85.528: ICD-10-CM

## 2017-02-04 DIAGNOSIS — R10.84: ICD-10-CM

## 2017-02-04 DIAGNOSIS — Z86.73: ICD-10-CM

## 2017-02-04 DIAGNOSIS — D64.9: ICD-10-CM

## 2017-02-04 DIAGNOSIS — Z90.5: ICD-10-CM

## 2017-02-04 DIAGNOSIS — F11.20: ICD-10-CM

## 2017-02-04 LAB
ALP SERPL-CCNC: 94 U/L (ref 45–117)
ALT SERPL-CCNC: 21 U/L (ref 10–53)
ANION GAP SERPL CALC-SCNC: 9 MEQ/L (ref 5–15)
AST SERPL-CCNC: 12 U/L (ref 15–37)
BACTERIA #/AREA URNS HPF: (no result) /HPF
BASOPHILS # BLD AUTO: 0 TH/MM3 (ref 0–0.2)
BASOPHILS NFR BLD: 0.1 % (ref 0–2)
BILIRUB SERPL-MCNC: 0.5 MG/DL (ref 0.2–1)
BUN SERPL-MCNC: 14 MG/DL (ref 7–18)
CHLORIDE SERPL-SCNC: 106 MEQ/L (ref 98–107)
COLOR UR: (no result)
COMMENT (UR): (no result)
CULTURE IF INDICATED: (no result)
EOSINOPHIL # BLD: 0 TH/MM3 (ref 0–0.4)
EOSINOPHIL NFR BLD: 0.7 % (ref 0–4)
ERYTHROCYTE [DISTWIDTH] IN BLOOD BY AUTOMATED COUNT: 16.8 % (ref 11.6–17.2)
GFR SERPLBLD BASED ON 1.73 SQ M-ARVRAT: 68 ML/MIN (ref 89–?)
GLUCOSE UR STRIP-MCNC: (no result) MG/DL
HCO3 BLD-SCNC: 23.9 MEQ/L (ref 21–32)
HCT VFR BLD CALC: 36.9 % (ref 35–46)
HEMO FLAGS: (no result)
HGB UR QL STRIP: (no result)
KETONES UR STRIP-MCNC: (no result) MG/DL
LYMPHOCYTES # BLD AUTO: 0.8 TH/MM3 (ref 1–4.8)
LYMPHOCYTES NFR BLD AUTO: 19.4 % (ref 9–44)
MAGNESIUM SERPL-MCNC: 1.8 MG/DL (ref 1.5–2.5)
MCH RBC QN AUTO: 28.6 PG (ref 27–34)
MCHC RBC AUTO-ENTMCNC: 33.7 % (ref 32–36)
MCV RBC AUTO: 85 FL (ref 80–100)
MONOCYTES NFR BLD: 3.7 % (ref 0–8)
NEUTROPHILS # BLD AUTO: 3.3 TH/MM3 (ref 1.8–7.7)
NEUTROPHILS NFR BLD AUTO: 76.1 % (ref 16–70)
NITRITE UR QL STRIP: (no result)
PLATELET # BLD: 206 TH/MM3 (ref 150–450)
POTASSIUM SERPL-SCNC: 3.7 MEQ/L (ref 3.5–5.1)
RBC # BLD AUTO: 4.34 MIL/MM3 (ref 4–5.3)
SODIUM SERPL-SCNC: 139 MEQ/L (ref 136–145)
SP GR UR STRIP: 1.01 (ref 1–1.03)
SQUAMOUS #/AREA URNS HPF: 19 /HPF (ref 0–5)
WBC # BLD AUTO: 4.3 TH/MM3 (ref 4–11)

## 2017-02-04 PROCEDURE — 99285 EMERGENCY DEPT VISIT HI MDM: CPT

## 2017-02-04 PROCEDURE — 96375 TX/PRO/DX INJ NEW DRUG ADDON: CPT

## 2017-02-04 PROCEDURE — 85025 COMPLETE CBC W/AUTO DIFF WBC: CPT

## 2017-02-04 PROCEDURE — 83735 ASSAY OF MAGNESIUM: CPT

## 2017-02-04 PROCEDURE — 96361 HYDRATE IV INFUSION ADD-ON: CPT

## 2017-02-04 PROCEDURE — 85007 BL SMEAR W/DIFF WBC COUNT: CPT

## 2017-02-04 PROCEDURE — 80053 COMPREHEN METABOLIC PANEL: CPT

## 2017-02-04 PROCEDURE — 80048 BASIC METABOLIC PNL TOTAL CA: CPT

## 2017-02-04 PROCEDURE — 93005 ELECTROCARDIOGRAM TRACING: CPT

## 2017-02-04 PROCEDURE — 96365 THER/PROPH/DIAG IV INF INIT: CPT

## 2017-02-04 PROCEDURE — 87086 URINE CULTURE/COLONY COUNT: CPT

## 2017-02-04 PROCEDURE — 85027 COMPLETE CBC AUTOMATED: CPT

## 2017-02-04 PROCEDURE — 83690 ASSAY OF LIPASE: CPT

## 2017-02-04 PROCEDURE — G0378 HOSPITAL OBSERVATION PER HR: HCPCS

## 2017-02-04 PROCEDURE — 81001 URINALYSIS AUTO W/SCOPE: CPT

## 2017-02-04 PROCEDURE — 84100 ASSAY OF PHOSPHORUS: CPT

## 2017-02-04 RX ADMIN — HEPARIN SODIUM SCH UNITS: 10000 INJECTION, SOLUTION INTRAVENOUS; SUBCUTANEOUS at 10:13

## 2017-02-04 RX ADMIN — DIAZEPAM SCH MG: 10 TABLET ORAL at 13:05

## 2017-02-04 RX ADMIN — PHENYTOIN SODIUM SCH MLS/HR: 50 INJECTION INTRAMUSCULAR; INTRAVENOUS at 21:43

## 2017-02-04 RX ADMIN — CIPROFLOXACIN SCH MLS/HR: 2 INJECTION, SOLUTION INTRAVENOUS at 21:43

## 2017-02-04 RX ADMIN — OLANZAPINE SCH MG: 5 TABLET, FILM COATED ORAL at 09:30

## 2017-02-04 RX ADMIN — SODIUM CHLORIDE, PRESERVATIVE FREE SCH ML: 5 INJECTION INTRAVENOUS at 21:45

## 2017-02-04 RX ADMIN — PHENYTOIN SODIUM SCH MLS/HR: 50 INJECTION INTRAMUSCULAR; INTRAVENOUS at 10:02

## 2017-02-04 RX ADMIN — OLANZAPINE SCH MG: 5 TABLET, FILM COATED ORAL at 21:44

## 2017-02-04 RX ADMIN — DIAZEPAM SCH MG: 10 TABLET ORAL at 17:23

## 2017-02-04 RX ADMIN — HEPARIN SODIUM SCH UNITS: 10000 INJECTION, SOLUTION INTRAVENOUS; SUBCUTANEOUS at 21:45

## 2017-02-04 NOTE — HHI.HP
HPI


Service


[facility]


Primary Care Physician


Caridad Ruvalcaba M.D.


Admission Diagnosis


UTI, chronic abdominal pain


Diagnoses:  


Travel History


International Travel<30 Days:  No


Contact w/Intl Traveler <30 Da:  No


Traveled to Known Affected Are:  No


History of Present Illness


This is a very pleasant 56-year-old female patient of Dr. Ruvalcaba.  She presented 

to the emergency department at [facility] with abdominal pain, 

nausea, vomiting and burning when urinating.  She has chronic recurrent 

abdominal pain.  She stated that she has an abnormal gastric emptying study.  

She has seen Dr. Davila in the past.


No fever chills or diaphoresis.  She was seen by the undersigned in room C 34.  

She is alert and oriented.  She is requesting IV Dilaudid.  She takes oral 

Dilaudid normally as prescribed by neurologist Dr. Lopez.  She also takes a 

fentanyl patch which apparently is due to be changed today according to her 

statement.  She lost about 10 pounds over the last 2 weeks.





Review of Systems


Other


Weight loss, abdominal pain, nausea, vomiting, dysuria, urinary frequency, 10 

systems reviewed otherwise negative





Past Family Social History


Past Medical History


Delayed gastric emptying malnutrition


Anxiety


Chronic pain syndrome


Heart attack in 2005


Renal cancer involving her right kidney


Past Surgical History


Right nephrectomy


Gastrectomy with pouch in 2003


Hernia repair


Hysterectomy


Cholecystectomy


She mentioned a device placed in her heart in 2006


Reported Medications





Reported Meds & Active Scripts


Active


Cipro (Ciprofloxacin HCl) 500 Mg Tab 500 Mg PO BID 7 Days


Pyridium (Phenazopyridine HCl) 200 Mg Tab 200 Mg PO Q8H PRN 2 Days


Macrobid (Nitrofurantoin Monoh/Nitrofur Macro) 100 Mg Cap 100 Mg PO BID 10 Days


Lidoderm Patch 12 HR (Lidocaine) 5% Patch 1 Patch TD DAILY


Reported


Buspirone (Buspirone HCl) 7.5 Mg Tab 7.5 Mg PO BID


Valium (Diazepam) 10 Mg Tab 10 Mg PO TID


Dilaudid (Hydromorphone HCl) 4 Mg Tab 4 Mg PO Q6HR


Fentanyl Patch 72 HR (Fentanyl) 25 Mcg/Hr Patch 25 Mcg T-DERMAL Q72H


Zyprexa (Olanzapine) 5 Mg Tab 5 Mg PO BID


Cyanocobalamin Inj (Cyanocobalamin) 1,000 Mcg/Ml Inj 1,000 Mcg IM Q30D


Hydroxyzine HCl 50 Mg Tab 50 Mg PO Q8HR PRN


Imipramine HCL (Imipramine HCl) 25 Mg Tab 25 Mg PO HS


Allergies:  


Coded Allergies:  


     Compazine (Verified  Allergy, Severe, SOB, 2/4/17)


     Gadolinium Derivatives (Verified  Allergy, Severe, RESPIRATORY DISTRESS, 2/ 4/17)


     Lobster (Verified  Allergy, Severe, Anaphylaxis, 2/4/17)


     Morphine (Verified  Allergy, Severe, Hives, 2/4/17)


 PT HAVING HIVES AND ITCHING TO ARM ABOVE IV SITE AFTER


 ADMINISTRATION OF MORPHINE


     Phenergan (Verified  Allergy, Severe, SOB, 2/4/17)


     Reglan (Verified  Allergy, Severe, SOB, 2/4/17)


     Toradol (Verified  Allergy, Severe, Shortness of Breath, 2/4/17)


     Zofran (Verified  Allergy, Severe, SOB, 2/4/17)


     Penicillin (Verified  Allergy, Mild, RASH, 2/4/17)


     Sulfa (Verified  Allergy, Mild, RASH, 2/4/17)


     *MDRO Multi-Drug Resistant Organism (Verified  Adverse Reaction, Unknown, 2 /4/17)


 MDR-E.Coli (urine-11/27/16)


Uncoded Allergies:  


     GADOLINIUM (Adverse Reaction, Severe, PT STOPPED BREATHING WITH GADO, 1/17/ 17)


 PT STATES SHE STOPPED BREATHING AFTER HAVING GADO AT ANOTHER


 FACILITY. 1/17/17


 [facility]


Family History


Reviewed but not contributory


Social History


No smoking alcohol or illicit drug use





Physical Exam


Vital Signs





 Vital Signs








  Date Time  Temp Pulse Resp B/P Pulse Ox O2 Delivery O2 Flow Rate FiO2


 


2/4/17 07:15  71 18 157/89 97 Room Air  


 


2/4/17 06:58 98.6 87 14 181/103 98 Room Air  








Physical Exam


GENERAL: This is a pleasant, poorly -nourished, well-developed patient, looking 

anxious.


SKIN: No rashes, ecchymoses or lesions. Cool and dry.


HEAD: Atraumatic. Normocephalic. No temporal or scalp tenderness.


EYES: Pupils equal round and reactive. Extraocular motions intact. No scleral 

icterus. No injection or drainage. 


ENT: Nose without bleeding, purulent drainage or septal hematoma. Throat 

without erythema, tonsillar hypertrophy or exudate. Uvula midline. Airway 

patent.


NECK: Trachea midline. No JVD or lymphadenopathy. Supple, nontender, no 

meningeal signs.


CARDIOVASCULAR: Regular rate and rhythm without murmurs, gallops, or rubs. 


RESPIRATORY: Clear to auscultation. Breath sounds equal bilaterally. No wheezes

, rales, or rhonchi.  


GASTROINTESTINAL: Abdomen tender on very light touch, voluntary guarding .


MUSCULOSKELETAL: Extremities without clubbing, cyanosis, or edema. No joint 

tenderness, effusion, or edema noted.


NEUROLOGICAL: Awake and alert.  Normal speech.


Laboratory





Laboratory Tests








Test 2/4/17 2/4/17





 07:30 07:35


 


Urine Color ORANGE  


 


Urine Turbidity HAZY  


 


Urine pH 5.5  


 


Urine Specific Gravity 1.014  


 


Urine Protein TRACE  


 


Urine Glucose (UA) NEG  


 


Urine Ketones NEG  


 


Urine Occult Blood NEG  


 


Urine Nitrite POS  


 


Urine Bilirubin NEG  


 


Urine Urobilinogen 2.0  


 


Urine Leukocyte Esterase NEG  


 


Urine RBC 7  


 


Urine WBC 7  


 


Urine Squamous Epithelial 19  





Cells  


 


Urine Bacteria FEW  


 


Microscopic Urinalysis Comment CULTURE 





 INDICATED 


 


White Blood Count  4.3 


 


Red Blood Count  4.34 


 


Hemoglobin  12.4 


 


Hematocrit  36.9 


 


Mean Corpuscular Volume  85.0 


 


Mean Corpuscular Hemoglobin  28.6 


 


Mean Corpuscular Hemoglobin  33.7 





Concent  


 


Red Cell Distribution Width  16.8 


 


Platelet Count  206 


 


Mean Platelet Volume  8.7 


 


Neutrophils (%) (Auto)  76.1 


 


Lymphocytes (%) (Auto)  19.4 


 


Monocytes (%) (Auto)  3.7 


 


Eosinophils (%) (Auto)  0.7 


 


Basophils (%) (Auto)  0.1 


 


Neutrophils # (Auto)  3.3 


 


Lymphocytes # (Auto)  0.8 


 


Monocytes # (Auto)  0.2 


 


Eosinophils # (Auto)  0.0 


 


Basophils # (Auto)  0.0 


 


CBC Comment  DIFF FINAL 


 


Differential Comment   


 


Sodium Level  139 


 


Potassium Level  3.7 


 


Chloride Level  106 


 


Carbon Dioxide Level  23.9 


 


Anion Gap  9 


 


Blood Urea Nitrogen  14 


 


Creatinine  0.86 


 


Estimat Glomerular Filtration  68 





Rate  


 


Random Glucose  109 


 


Calcium Level  9.1 


 


Total Bilirubin  0.5 


 


Aspartate Amino Transf  12 





(AST/SGOT)  


 


Alanine Aminotransferase  21 





(ALT/SGPT)  


 


Alkaline Phosphatase  94 


 


Total Protein  7.5 


 


Albumin  4.2 


 


Lipase  261 














 Date/Time Procedure Status





Source Growth 


 


 2/4/17 07:30 Urine Culture Received





Urine Clean Catch Pending 








Result Diagram:  


2/4/17 0735 2/4/17 0735








Assessment and Plan


Assessment and Plan








Assessment





Intractable nausea and vomiting


Dysuria


Chronic pain syndrome


Narcotic dependence





Management





The patient is placed on observation


IV fluids


IV Cipro


Continue oral medications


Once her vomiting abates she will be discharged


Follow-up with gastroenterology as outpatient


It was explained to the patient that this is imperative for her to try to get 

off narcotics as soon as possible


As this will only perpetuate her problems


Discussed with emergency physician Dr. Herr


Discussed  with nurse








Yanna Torres MD Feb 4, 2017 11:33

## 2017-02-04 NOTE — PD
HPI


Chief Complaint:  GI Complaint


Time Seen by Provider:  07:13


Travel History


International Travel<30 days:  No


Contact w/Intl Traveler<30days:  No


Traveled to known affect area:  No





History of Present Illness


HPI


Patient is a 56-year-old female who presents to emergency room with complaints 

of chronic abdominal pain, dysuria with nausea vomiting and dry heaves.  

Patient reports that she was seen in the hospital a few days ago and was 

admitted to the hospital for acute pancreatitis, reports that she was seen in 

the emergency room yesterday and was discharged with diagnoses of urinary tract 

infection and was discharged home on Macrobid as well as Pyridium.  Patient 

reports that she keep down her medications and has been dry heaving.  Reports 

that she feels nauseous and can't keep anything down at this time.  Patient 

reports that the only thing that helps the nausea is Ativan.  Patient does have 

history of chronic abdominal pain with history of gastroparesis, pancreatitis 

and has history of multiple abdominal surgeries in the past.  She is currently 

on a fentanyl patch as well as or Dilaudid on a regular basis for chronic pain.

  Patient does have chronic nausea and has multiple allergies to most of the 

antiemetics and his prescribed Ativan and Valium as an outpatient help with her 

symptoms.





PFSH


Past Medical History


Hx Anticoagulant Therapy:  No


Asthma:  No


Blood Disorders:  No


Anxiety:  Yes


Depression:  No


Heart Rhythm Problems:  No


Cancer:  Yes (kidney)


Cardiovascular Problems:  Yes ( MI )


High Cholesterol:  No


Chemotherapy:  Yes (KIDNEY )


Chest Pain:  No


Congestive Heart Failure:  No


COPD:  No


Cerebrovascular Accident:  Yes ()


Diabetes:  No


Diminished Hearing:  No


Endocrine:  No


Gastrointestinal Disorders:  Yes


GERD:  No


Genitourinary:  Yes (RIGHT NEPHRECTOMY)


Headaches:  Yes


Hiatal Hernia:  Yes


Hypertension:  No


Immune Disorder:  No


Implanted Vascular Access Dvce:  No


Kidney Stones:  No


Musculoskeletal:  No


Neurologic:  No


Psychiatric:  No


Reproductive:  No


Respiratory:  No


Immunizations Current:  Yes


Migraines:  Yes


Myocardial Infarction:  Yes ()


Pneumonia:  Yes


Radiation Therapy:  No


Renal Failure:  No


Seizures:  Yes


Sleep Apnea:  No


Thyroid Disease:  No


Ulcer:  No


Tetanus Vaccination:  < 5 Years


Influenza Vaccination:  Yes


Pregnant?:  Not Pregnant


Menopausal:  Yes


:  1


Para:  1


Miscarriage:  0


:  0


Ectopic Pregnancy:  No


Ovarian Cysts:  No


Dilation and Curettage (D&C):  Yes


Tubal Ligation:  Yes





Past Surgical History


Abdominal Surgery:  Yes (total gastrectomy w/pouch , hernia repair)


Appendectomy:  Yes


Cardiac Surgery:  Yes (pt states an occulator was placed in her heart )


Cholecystectomy:  Yes


Gynecologic Surgery:  Yes (hysterectomy)


Hysterectomy:  Yes


Thoracic Surgery:  No


Tonsillectomy:  Yes


Other Surgery:  Yes (right kidney removed)





Social History


Alcohol Use:  No


Tobacco Use:  No


Substance Use:  No





Allergies-Medications


(Allergen,Severity, Reaction):  


Coded Allergies:  


     Compazine (Verified  Allergy, Severe, SOB, 17)


     Gadolinium Derivatives (Verified  Allergy, Severe, RESPIRATORY DISTRESS, 17)


     Lobster (Verified  Allergy, Severe, Anaphylaxis, 17)


     Morphine (Verified  Allergy, Severe, Hives, 17)


 PT HAVING HIVES AND ITCHING TO ARM ABOVE IV SITE AFTER


 ADMINISTRATION OF MORPHINE


     Phenergan (Verified  Allergy, Severe, SOB, 17)


     Reglan (Verified  Allergy, Severe, SOB, 17)


     Toradol (Verified  Allergy, Severe, Shortness of Breath, 17)


     Zofran (Verified  Allergy, Severe, SOB, 17)


     Penicillin (Verified  Allergy, Mild, RASH, 17)


     Sulfa (Verified  Allergy, Mild, RASH, 17)


     *MDRO Multi-Drug Resistant Organism (Verified  Adverse Reaction, Unknown, )


 MDR-E.Coli (urine-16)


Uncoded Allergies:  


     GADOLINIUM (Adverse Reaction, Severe, PT STOPPED BREATHING WITH GADO, )


 PT STATES SHE STOPPED BREATHING AFTER HAVING GADO AT ANOTHER


 FACILITY. 17


 [facility]


Reported Meds & Prescriptions





Reported Meds & Active Scripts


Active


Cipro (Ciprofloxacin HCl) 500 Mg Tab 500 Mg PO BID 7 Days


Pyridium (Phenazopyridine HCl) 200 Mg Tab 200 Mg PO Q8H PRN 2 Days


Macrobid (Nitrofurantoin Monoh/Nitrofur Macro) 100 Mg Cap 100 Mg PO BID 10 Days


Lidoderm Patch 12 HR (Lidocaine) 5% Patch 1 Patch TD DAILY


Reported


Buspirone (Buspirone HCl) 7.5 Mg Tab 7.5 Mg PO BID


Valium (Diazepam) 10 Mg Tab 10 Mg PO TID


Dilaudid (Hydromorphone HCl) 4 Mg Tab 4 Mg PO Q6HR


Fentanyl Patch 72 HR (Fentanyl) 25 Mcg/Hr Patch 25 Mcg T-DERMAL Q72H


Zyprexa (Olanzapine) 5 Mg Tab 5 Mg PO BID


Cyanocobalamin Inj (Cyanocobalamin) 1,000 Mcg/Ml Inj 1,000 Mcg IM Q30D


Hydroxyzine HCl 50 Mg Tab 50 Mg PO Q8HR PRN


Imipramine HCL (Imipramine HCl) 25 Mg Tab 25 Mg PO HS








Review of Systems


General / Constitutional:  No: Fever


Eyes:  No: Visual changes


HENT:  No: Headaches


Cardiovascular:  No: Chest Pain or Discomfort


Respiratory:  No: Shortness of Breath


Gastrointestinal:  Positive: Nausea, Vomiting, Abdominal Pain


Genitourinary:  Positive: Urgency, Frequency, Dysuria


Musculoskeletal:  No: Pain


Skin:  No Rash


Neurologic:  No: Weakness


Psychiatric:  No: Depression


Endocrine:  No: Polydipsia


Hematologic/Lymphatic:  No: Easy Bruising





Physical Exam


Narrative


GENERAL: No acute distress, nontoxic


SKIN: Warm and dry.


HEAD: Atraumatic. Normocephalic. 


EYES: Pupils equal and round. No scleral icterus. No injection or drainage. 


ENT: No nasal bleeding or discharge.  Mucous membranes pink and moist.


NECK: Trachea midline. No JVD. 


CARDIOVASCULAR: Regular rate and rhythm.  No murmur appreciated.


RESPIRATORY: No accessory muscle use. Clear to auscultation. Breath sounds 

equal bilaterally. 


GASTROINTESTINAL: Abdomen soft, non-tender, nondistended. 


MUSCULOSKELETAL: No obvious deformities. No clubbing.  No cyanosis.  No edema. 


NEUROLOGICAL: Awake and alert. No obvious cranial nerve deficits.  Motor 

grossly within normal limits. Normal speech.


PSYCHIATRIC: Appropriate mood and affect; insight and judgment normal.





Data


Data


Last Documented VS





Vital Signs








  Date Time  Temp Pulse Resp B/P Pulse Ox O2 Delivery O2 Flow Rate FiO2


 


17 07:15  71 18 157/89 97 Room Air  


 


17 06:58 98.6       








Orders





 Complete Blood Count With Diff (17 07:17)


Comprehensive Metabolic Panel (17 07:17)


Lipase (17 07:17)


Urinalysis - C+S If Indicated (17 07:17)


Sodium Chlor 0.9% 1000 Ml Inj (Ns 1000 M (17 07:17)


Sodium Chloride 0.9% Flush (Ns Flush) (17 07:30)


Lorazepam Inj (Ativan Inj) (17 07:30)


Electrocardiogram (17 )


Hydromorphone Pf Inj (Dilaudid Pf Inj) (17 08:00)


Ciprofloxacin 400 Mg Premix (Cipro 400 M (17 08:15)


Urine Culture (17 07:30)





Labs





 Laboratory Tests








Test 17





 07:30 07:35


 


Urine Color ORANGE  


 


Urine Turbidity HAZY  


 


Urine pH 5.5  


 


Urine Specific Gravity 1.014  


 


Urine Protein TRACE mg/dL 


 


Urine Glucose (UA) NEG mg/dL 


 


Urine Ketones NEG mg/dL 


 


Urine Occult Blood NEG  


 


Urine Nitrite POS  


 


Urine Bilirubin NEG  


 


Urine Urobilinogen 2.0 MG/DL 


 


Urine Leukocyte Esterase NEG  


 


Urine RBC 7 /hpf 


 


Urine WBC 7 /hpf 


 


Urine Squamous Epithelial 19 /hpf 





Cells  


 


Urine Bacteria FEW /hpf 


 


Microscopic Urinalysis Comment CULTURE 





 INDICATED 


 


White Blood Count  4.3 TH/MM3


 


Red Blood Count  4.34 MIL/MM3


 


Hemoglobin  12.4 GM/DL


 


Hematocrit  36.9 %


 


Mean Corpuscular Volume  85.0 FL


 


Mean Corpuscular Hemoglobin  28.6 PG


 


Mean Corpuscular Hemoglobin  33.7 %





Concent  


 


Red Cell Distribution Width  16.8 %


 


Platelet Count  206 TH/MM3


 


Mean Platelet Volume  8.7 FL


 


Neutrophils (%) (Auto)  76.1 %


 


Lymphocytes (%) (Auto)  19.4 %


 


Monocytes (%) (Auto)  3.7 %


 


Eosinophils (%) (Auto)  0.7 %


 


Basophils (%) (Auto)  0.1 %


 


Neutrophils # (Auto)  3.3 TH/MM3


 


Lymphocytes # (Auto)  0.8 TH/MM3


 


Monocytes # (Auto)  0.2 TH/MM3


 


Eosinophils # (Auto)  0.0 TH/MM3


 


Basophils # (Auto)  0.0 TH/MM3


 


CBC Comment  DIFF FINAL 


 


Differential Comment   


 


Sodium Level  139 MEQ/L


 


Potassium Level  3.7 MEQ/L


 


Chloride Level  106 MEQ/L


 


Carbon Dioxide Level  23.9 MEQ/L


 


Anion Gap  9 MEQ/L


 


Blood Urea Nitrogen  14 MG/DL


 


Creatinine  0.86 MG/DL


 


Estimat Glomerular Filtration  68 ML/MIN





Rate  


 


Random Glucose  109 MG/DL


 


Calcium Level  9.1 MG/DL


 


Total Bilirubin  0.5 MG/DL


 


Aspartate Amino Transf  12 U/L





(AST/SGOT)  


 


Alanine Aminotransferase  21 U/L





(ALT/SGPT)  


 


Alkaline Phosphatase  94 U/L


 


Total Protein  7.5 GM/DL


 


Albumin  4.2 GM/DL


 


Lipase  261 U/L











MDM


Medical Decision Making


Medical Screen Exam Complete:  Yes


Emergency Medical Condition:  Yes


Interpretation(s)


EKG at 0743 NSR at 73bpm, qt/qtc: 441/467, lbbb, no acute st or t wave changes





Vital Signs








  Date Time  Temp Pulse Resp B/P Pulse Ox O2 Delivery O2 Flow Rate FiO2


 


17 06:58 98.6 87 14 181/103 98 Room Air  








Differential Diagnosis


Gastroparesis, acute on chronic pain, pancreatitis, UTI, pyelonephritis, CAD, 

gerd


Narrative Course


Patient is a 56-year-old female who presents to emergency room with complaints 

of chronic abdominal pain with nausea, with dry heaves with dysuria.  Patient 

was just seen in emergency yesterday was diagnosed with a urinary tract 

infection and was sent home on Pyridium as well as Macrobid.  Patient reports 

that she cannot tolerate these medications at home, reports that she has 

history of renal cell cancer as well as history of right-sided nephrectomy, 

reports that "I only have one kidney left."  Patient reports that she feels 

dehydrated and the only thing that helps with her nausea is Ativan.  Patient 

also requesting pain medications that she is chronic abdominal pain which is at 

baseline for her..





Patient was recently admitted to the hospital and had multiple test performed.





Gastric emptying study performed on 2017: Stasis of the isotope 

within the upper abdominal gastrointestinal tract.  The patient has some postop 

changes.  A normal appearing stomach is not present.  There is very little 

emptying of the segment of the bowel into the intervention with erythromycin.





Abdominal MRI on 2017: 


1.  Status post right nephrectomy and gastrectomy and cholecystectomy


2.  No acute intra-abdominal abnormality seen


3.  Increased signal seen at the posterior lung base likely representing some 

degree of atelectasis or consolidation








Line was established.  Plan to recheck patient's labwork including CBC, BMP, 

lipase, UA.  Plan to monitor patient and administer IV fluids as well as 

antiemetics.





Urine culture from 2017 reviewed, patient did grow oiut >100,000 

Klebsiella Pneumoniae sensitive to cipro and Indeterminate to Nitrofurantoin, 

will change her antibiotics to CIPRO





CBC


WBC 4.3


Hemoglobin 12.4


Hematocrit 36.9


Platelets 206





BMP


Sodium 139


Chloride 106


Potassium 3.7


BUN 14


Creatinine 0.86





Lipase 261





UA: Orange and hazy urine with positive nitrites and 7 white blood cells with 

few bacteria.  Prior cultures reviewed, give patient a dose of ciprofloxacin 

for UTI.





Patient reevaluated, patient reports that she still feels nauseous and has pain 

all of her abdomen.  Patient reports that this is her second visit to the 

emergency room she did come to the emergency room yesterday for evaluation.  

Reports that she cannot go home and does not feel comfortable going home.  

Patient requesting to be admitted to the hospital for observation for IV fluids.





Case reviewed with Dr. Grullon





Request obs for intractable abdominal pain, requested that I inform patient 

that she is to be NPO and will only get her home meds for pain. This was 

relayed to patient and patient understands.





Diagnosis





 Primary Impression:  


 Abdominal pain


 Qualified Code:  R10.84 - Generalized abdominal pain


 Additional Impressions:  


 Pyuria


 Nausea & vomiting


 Qualified Code:  R11.2 - Non-intractable vomiting with nausea, unspecified 

vomiting type


 UTI (urinary tract infection)


 Qualified Code:  N30.00 - Acute cystitis without hematuria





Admitting Information


Admitting Physician Requests:  Observation


Patient Instructions:  General Instructions, Narcotic given in the ED





***Additional Instructions:


Please follow-up with all cultures from today





Please call your doctor first thing in the morning for earliest follow-up 

appointment





Please complete full course of antibiotics





Return to the emergency room if symptoms return or progress





Return to the emergency room as needed


***Med/Other Pt SpecificInfo:  Prescription(s) given


Scripts


Ciprofloxacin (Cipro)500 Mg Trk253 Mg PO BID  7 Days  Ref 0


   Prov:Jannie Costa DO         17


Disposition:  01 DISCHARGE HOME


Condition:  Stable








Jannie Costa DO 2017 07:40

## 2017-02-05 VITALS
TEMPERATURE: 98.8 F | SYSTOLIC BLOOD PRESSURE: 143 MMHG | RESPIRATION RATE: 18 BRPM | HEART RATE: 72 BPM | OXYGEN SATURATION: 93 % | DIASTOLIC BLOOD PRESSURE: 81 MMHG

## 2017-02-05 VITALS
TEMPERATURE: 98.5 F | OXYGEN SATURATION: 96 % | DIASTOLIC BLOOD PRESSURE: 79 MMHG | HEART RATE: 73 BPM | RESPIRATION RATE: 20 BRPM | SYSTOLIC BLOOD PRESSURE: 158 MMHG

## 2017-02-05 VITALS — RESPIRATION RATE: 18 BRPM

## 2017-02-05 LAB
ANION GAP SERPL CALC-SCNC: 8 MEQ/L (ref 5–15)
BUN SERPL-MCNC: 7 MG/DL (ref 7–18)
CHLORIDE SERPL-SCNC: 107 MEQ/L (ref 98–107)
EOSINOPHIL NFR BLD: 6 % (ref 0–4)
ERYTHROCYTE [DISTWIDTH] IN BLOOD BY AUTOMATED COUNT: 17.4 % (ref 11.6–17.2)
GFR SERPLBLD BASED ON 1.73 SQ M-ARVRAT: 93 ML/MIN (ref 89–?)
HCO3 BLD-SCNC: 26.4 MEQ/L (ref 21–32)
HCT VFR BLD CALC: 33.4 % (ref 35–46)
HEMO FLAGS: (no result)
MCH RBC QN AUTO: 28 PG (ref 27–34)
MCHC RBC AUTO-ENTMCNC: 31 % (ref 32–36)
MCV RBC AUTO: 90.3 FL (ref 80–100)
NEUTS BAND # BLD MANUAL: 1.6 TH/MM3 (ref 1.8–7.7)
NEUTS SEG NFR BLD MANUAL: 47 % (ref 16–70)
PLAT MORPH BLD: NORMAL
PLATELET # BLD: 144 TH/MM3 (ref 150–450)
PLATELET BLD QL SMEAR: (no result)
POTASSIUM SERPL-SCNC: 3.7 MEQ/L (ref 3.5–5.1)
RBC # BLD AUTO: 3.69 MIL/MM3 (ref 4–5.3)
SCAN/DIFF: (no result)
SODIUM SERPL-SCNC: 141 MEQ/L (ref 136–145)
WBC # BLD AUTO: 3.4 TH/MM3 (ref 4–11)
WBC DIFF SAMPLE: 100

## 2017-02-05 RX ADMIN — OLANZAPINE SCH MG: 5 TABLET, FILM COATED ORAL at 10:42

## 2017-02-05 RX ADMIN — DIAZEPAM SCH MG: 10 TABLET ORAL at 10:42

## 2017-02-05 RX ADMIN — SODIUM CHLORIDE, PRESERVATIVE FREE SCH ML: 5 INJECTION INTRAVENOUS at 10:42

## 2017-02-05 RX ADMIN — HEPARIN SODIUM SCH UNITS: 10000 INJECTION, SOLUTION INTRAVENOUS; SUBCUTANEOUS at 10:00

## 2017-02-05 RX ADMIN — CIPROFLOXACIN SCH MLS/HR: 2 INJECTION, SOLUTION INTRAVENOUS at 09:00

## 2017-02-05 RX ADMIN — PHENYTOIN SODIUM SCH MLS/HR: 50 INJECTION INTRAMUSCULAR; INTRAVENOUS at 05:40
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
## 2017-02-05 NOTE — HHI.DS
Discharge Summary


Admission Date


Feb 4, 2017 at 09:27


Admitting Diagnosis


UTI, chronic abdominal pain





Procedures


None


Brief History


This was a very pleasant 56-year-old female patient of Dr. Ruvalcaba.  She presented 

to the emergency department at [facility] with abdominal pain, 

nausea, vomiting and burning when urinating.  She had chronic recurrent 

abdominal pain.  She stated that she has an abnormal gastric emptying study.  

She had seen Dr. Davila in the past.


No fever chills or diaphoresis.  She was seen by the undersigned in room C 34.  

She was alert and oriented.  She was requesting IV Dilaudid.  She takes oral 

Dilaudid normally as prescribed by neurologist Dr. Lopez.  She also takes a 

fentanyl patch which apparently is due to be changed today according to her 

statement.  She lost about 10 pounds over the last 2 weeks.


CBC/BMP:  


2/5/17 0433                                                                    

            2/5/17 0143





Significant Findings





Laboratory Tests








Test 2/4/17 2/4/17 2/5/17 2/5/17





 07:30 07:35 01:43 04:33


 


Urine Color ORANGE   





 (YELLW/STRAW)   


 


Urine Turbidity HAZY  (CLEAR)   


 


Urine Nitrite POS  (NEG)   


 


Urine RBC 7 /hpf (0-3)   


 


Urine WBC 7 /hpf (0-5)   


 


Urine Bacteria FEW /hpf (NONE)   


 


Neutrophils (%) (Auto)  76.1 %  





  (16.0-70.0)  


 


Lymphocytes # (Auto)  0.8 TH/MM3  





  (1.0-4.8)  


 


Estimat Glomerular Filtration  68 ML/MIN (>89)  





Rate    


 


Random Glucose  109 MG/DL  





  ()  


 


Aspartate Amino Transf  12 U/L (15-37)  





(AST/SGOT)    


 


Calcium Level   8.2 MG/DL 





   (8.5-10.1) 


 


White Blood Count    3.4 TH/MM3





    (4.0-11.0)


 


Red Blood Count    3.69 MIL/MM3





    (4.00-5.30)


 


Hemoglobin    10.4 GM/DL





    (11.6-15.3)


 


Hematocrit    33.4 %





    (35.0-46.0)


 


Mean Corpuscular Hemoglobin    31.0 %





Concent    (32.0-36.0)


 


Red Cell Distribution Width    17.4 %





    (11.6-17.2)


 


Platelet Count    144 TH/MM3





    (150-450)


 


Eosinophils %    6 % (0-4)


 


Neutrophils # (Manual)    1.6 TH/MM3





    (1.8-7.7)


 


Platelet Estimate    LOW  (NORMAL)








PE at Discharge


General


General Appearance:  Well Developed, Anxious


Appearance Remarks


Thin frame 


Eyes


Eye Exam:  Pupils Equal, Pupils Reactive 


Ears & Nose


Ears & Nose Exam:  Nasal Mucosa Pink 


Throat


Throat Exam:  Oral Mucosa Pink & Moist 


Neck


Neck Exam:  Neck Supple, Trachea Midline


Pulmonary


Resp Exam:  Clear Bilaterally, Breath Sounds Equal, No Distress 


Cardiology


CV Exam:  Regular 


Gastrointestinal/Abdomen


GI Exam:  Soft, Bowel Sounds Present 


Genitourinary


 Remarks


Mild left flank tenderness 


Musculoskeletal


MS Exam:  Joints Intact


Skin Exam:  Warm, Dry


Extremeties


Extremities Exam:  No Edema 


Neurologic


Neuro Exam:  Alert, Awake, Oriented, Speech Clear, Moving All Extremities





VTE Prophylaxis


VTE Prophylaxis Meds:  Heparin








Hospital Course


Patient was admitted with the following diagnosis


 nausea , secondary to UTI


Dysuria


Probable UTI


acute on Chronic pain syndrome


Narcotic dependence


amenia








Patient was started on IV fluids for gentle hydration and kept nothing by mouth 

due to her abdominal pain


Labs were drawn which included her hemoglobin ,Anemia stable, 10.4


IV Cipro initiated or antibiotic coverage


Continue oral medications from home


Nausea persisted, but so far no emesis





Pain control meds, and 10 use with left flank pain and dysuria.


DVT prophylaxis with heparin





Follow-up with gastroenterology as outpatient.  Julia NUNEZ and Dr. Torres patient in the a.m. of admission.  Patient's lab results, as well as her 

exam were negative. 


After review of systems patient could be managed on an outpatient basis.  She 

was discharged from the hospital, stable, and will follow up with her PCP.


Pt Condition on Discharge:  Stable


Discharge Disposition:  Discharge Home


Discharge Instructions


DIET: Follow Instructions for:  As Tolerated, No Restrictions


Additional Diet Instructions:  


Nonfat


Activities you can perform:  Regular-No Restrictions


Follow up Referrals:  


Behavioral Services - 2-3 Days





Continued Medications:  


Buspirone (Buspirone) 7.5 Mg Tab


7.5 MG PO BID Anxiety Ref 0 TAB


Cyanocobalamin Inj (Cyanocobalamin Inj) 1,000 Mcg/Ml Inj


1000 MCG IM Q30D #1 Ref 0 VIAL


Hydroxyzine HCl (Hydroxyzine HCl) 50 Mg Tab


50 MG PO Q8HR PRN ITCHING Ref 0 TAB


Imipramine HCL (Imipramine HCL) 25 Mg Tab


25 MG PO HS Control Depression Ref 0 TAB


Lidocaine Patch 12 HR (Lidoderm Patch 12 HR) 5% Patch


1 PATCH TD DAILY back pain #7 Ref 0 PATCH


Nitrofurantoin Monohydrate Macrocrystals (Macrobid) 100 Mg Cap


100 MG PO BID Infection Days 10 Ref 0 CAP


Olanzapine (Zyprexa) 5 Mg Tab


5 MG PO BID #60 Ref 0 TAB


Phenazopyridine (Pyridium) 200 Mg Tab


200 MG PO Q8H PRN DYSURIA Days 2 Ref 0 TAB


 


Discontinued Medications:  


Ciprofloxacin (Cipro) 500 Mg Tab


500 MG PO BID Infection Days 7 Ref 0 TAB


Diazepam (Valium) 10 Mg Tab


10 MG PO TID Ref 0 TAB


Fentanyl Patch 72 HR (Fentanyl Patch 72 HR) 25 Mcg/Hr Patch


25 MCG T-DERMAL Q72H Pain Management #10 Ref 0 PATCH


Hydromorphone (Dilaudid) 4 Mg Tab


4 MG PO Q6HR Pain Management Ref 0 TAB











Julia Thompson Feb 5, 2017 19:08

## 2017-02-05 NOTE — EKG
Date Performed: 02/04/2017       Time Performed: 07:43:29

 

PTAGE:      56 years

 

EKG:      Sinus rhythm 

 

 POSSIBLE LEFT ATRIAL ENLARGEMENT LEFT BUNDLE BRANCH BLOCK When compared to previous tracing, there i
s now evidence of a New left bundle branch block. ABNORMAL ECG

 

PREVIOUS TRACING       : 01/17/2017 00.22

 

DOCTOR:   Srikanth Dasilva  Interpretating Date/Time  02/05/2017 14:51:58

## 2017-02-09 ENCOUNTER — HOSPITAL ENCOUNTER (EMERGENCY)
Dept: HOSPITAL 17 - NEPD | Age: 57
Discharge: HOME | End: 2017-02-09
Payer: COMMERCIAL

## 2017-02-09 VITALS
DIASTOLIC BLOOD PRESSURE: 74 MMHG | HEART RATE: 70 BPM | OXYGEN SATURATION: 97 % | RESPIRATION RATE: 20 BRPM | SYSTOLIC BLOOD PRESSURE: 111 MMHG

## 2017-02-09 VITALS — HEIGHT: 62 IN | BODY MASS INDEX: 17.7 KG/M2 | WEIGHT: 96.21 LBS

## 2017-02-09 VITALS
RESPIRATION RATE: 12 BRPM | TEMPERATURE: 98.4 F | HEART RATE: 108 BPM | SYSTOLIC BLOOD PRESSURE: 150 MMHG | DIASTOLIC BLOOD PRESSURE: 105 MMHG | OXYGEN SATURATION: 96 %

## 2017-02-09 VITALS — RESPIRATION RATE: 18 BRPM

## 2017-02-09 DIAGNOSIS — I25.2: ICD-10-CM

## 2017-02-09 DIAGNOSIS — R11.2: Primary | ICD-10-CM

## 2017-02-09 DIAGNOSIS — R10.13: ICD-10-CM

## 2017-02-09 LAB
ALP SERPL-CCNC: 88 U/L (ref 45–117)
ALT SERPL-CCNC: 35 U/L (ref 10–53)
ANION GAP SERPL CALC-SCNC: 10 MEQ/L (ref 5–15)
AST SERPL-CCNC: 48 U/L (ref 15–37)
BASOPHILS # BLD AUTO: 0 TH/MM3 (ref 0–0.2)
BASOPHILS NFR BLD: 0 % (ref 0–2)
BILIRUB SERPL-MCNC: 0.5 MG/DL (ref 0.2–1)
BUN SERPL-MCNC: 11 MG/DL (ref 7–18)
CHLORIDE SERPL-SCNC: 103 MEQ/L (ref 98–107)
EOSINOPHIL # BLD: 0.1 TH/MM3 (ref 0–0.4)
EOSINOPHIL NFR BLD: 0.9 % (ref 0–4)
ERYTHROCYTE [DISTWIDTH] IN BLOOD BY AUTOMATED COUNT: 16.7 % (ref 11.6–17.2)
GFR SERPLBLD BASED ON 1.73 SQ M-ARVRAT: 62 ML/MIN (ref 89–?)
HCO3 BLD-SCNC: 26.4 MEQ/L (ref 21–32)
HCT VFR BLD CALC: 38.5 % (ref 35–46)
HEMO FLAGS: (no result)
LYMPHOCYTES # BLD AUTO: 1.9 TH/MM3 (ref 1–4.8)
LYMPHOCYTES NFR BLD AUTO: 33.1 % (ref 9–44)
MCH RBC QN AUTO: 28.3 PG (ref 27–34)
MCHC RBC AUTO-ENTMCNC: 33.5 % (ref 32–36)
MCV RBC AUTO: 84.3 FL (ref 80–100)
MONOCYTES NFR BLD: 7.7 % (ref 0–8)
NEUTROPHILS # BLD AUTO: 3.3 TH/MM3 (ref 1.8–7.7)
NEUTROPHILS NFR BLD AUTO: 58.3 % (ref 16–70)
PLATELET # BLD: 236 TH/MM3 (ref 150–450)
POTASSIUM SERPL-SCNC: 3 MEQ/L (ref 3.5–5.1)
RBC # BLD AUTO: 4.57 MIL/MM3 (ref 4–5.3)
SODIUM SERPL-SCNC: 139 MEQ/L (ref 136–145)
WBC # BLD AUTO: 5.7 TH/MM3 (ref 4–11)

## 2017-02-09 PROCEDURE — 71010: CPT

## 2017-02-09 PROCEDURE — 96361 HYDRATE IV INFUSION ADD-ON: CPT

## 2017-02-09 PROCEDURE — 99284 EMERGENCY DEPT VISIT MOD MDM: CPT

## 2017-02-09 PROCEDURE — 80053 COMPREHEN METABOLIC PANEL: CPT

## 2017-02-09 PROCEDURE — 96376 TX/PRO/DX INJ SAME DRUG ADON: CPT

## 2017-02-09 PROCEDURE — 96366 THER/PROPH/DIAG IV INF ADDON: CPT

## 2017-02-09 PROCEDURE — 96375 TX/PRO/DX INJ NEW DRUG ADDON: CPT

## 2017-02-09 PROCEDURE — 85025 COMPLETE CBC W/AUTO DIFF WBC: CPT

## 2017-02-09 PROCEDURE — C9113 INJ PANTOPRAZOLE SODIUM, VIA: HCPCS

## 2017-02-09 PROCEDURE — 83690 ASSAY OF LIPASE: CPT

## 2017-02-09 PROCEDURE — 96365 THER/PROPH/DIAG IV INF INIT: CPT

## 2017-02-09 NOTE — PD
HPI


Chief Complaint:  Abdominal Pain


Time Seen by Provider:  13:27


Travel History


International Travel<30 days:  No


Contact w/Intl Traveler<30days:  No


Traveled to known affect area:  No





History of Present Illness


HPI


56-year-old female that presents to the ED for evaluation of abdominal pain 

with nausea and vomiting.  Per patient she's not been able to keep anything 

down for the past 2 days.  Per patient she was hoping that she will get better 

so she had to come here but she did not.  Per patient she's not even able to 

keep down her on medications for pain.  Per patient she's been having a couple 

of episodes of emesis with brown sludge.  She does have a chronic history of 

dry heaving with vomiting but states that she's never had anything like this 

before.  Per patient he was not blood.  Per patient he looked like bile which 

is high in the past but it was darker.  Per patient she has a burning sensation 

in her throat and chest.  She denies any bowel movement issues or urinary issue 

but she does have a history of UTIs that are chronic as well as chronic 

abdominal discomfort from multiple surgeries and conditions.  She does have a 

history of allergies to multiple medications.  She has been here multiple 

times.  Per patient her current pain is 9 out of 10.  She states the last time 

she threw up was an hour ago before coming.  She states that she did try to 

contact her PCP as well as her GI on the told her to come here.  She has no 

other complaints at this time.  Per patient symptoms started yesterday.  Have 

not improved.  She was just seen here last week and admitted for a couple days 

for acute on chronic abdominal discomfort as well as inability to keep anything 

down.  She also just recently was admitted for a positive troponin and liver 

issue as well as for acute pancreatitis use in the past month.





PFSH


Past Medical History


Hx Anticoagulant Therapy:  No


Asthma:  No


Blood Disorders:  No


Anxiety:  Yes


Depression:  No


Heart Rhythm Problems:  No


Cancer:  Yes (kidney)


Cardiovascular Problems:  Yes ( MI )


High Cholesterol:  No


Chemotherapy:  Yes (KIDNEY )


Chest Pain:  No


Congestive Heart Failure:  No


COPD:  No


Cerebrovascular Accident:  Yes ()


Diabetes:  No


Diminished Hearing:  No


Endocrine:  No


Gastrointestinal Disorders:  Yes


GERD:  No


Genitourinary:  Yes (RIGHT NEPHRECTOMY)


Headaches:  Yes


Hiatal Hernia:  Yes


Hypertension:  No


Immune Disorder:  No


Implanted Vascular Access Dvce:  No


Kidney Stones:  No


Musculoskeletal:  No


Neurologic:  No


Psychiatric:  No


Reproductive:  No


Respiratory:  No


Immunizations Current:  Yes


Migraines:  Yes


Myocardial Infarction:  Yes ()


Pneumonia:  Yes


Radiation Therapy:  No


Renal Failure:  No


Seizures:  Yes


Sleep Apnea:  No


Thyroid Disease:  No


Ulcer:  No


Tetanus Vaccination:  < 5 Years


Pregnant?:  Not Pregnant


Menopausal:  Yes


:  1


Para:  1


Miscarriage:  0


:  0


Ectopic Pregnancy:  No


Ovarian Cysts:  No


Dilation and Curettage (D&C):  Yes


Tubal Ligation:  Yes





Past Surgical History


Abdominal Surgery:  Yes (total gastrectomy w/pouch , hernia repair)


Appendectomy:  Yes


Cardiac Surgery:  Yes (pt states an occulator was placed in her heart )


Cholecystectomy:  Yes


Gynecologic Surgery:  Yes (hysterectomy)


Hysterectomy:  Yes


Thoracic Surgery:  No


Tonsillectomy:  Yes


Other Surgery:  Yes (right kidney removed)





Social History


Alcohol Use:  No


Tobacco Use:  No


Substance Use:  No





Allergies-Medications


(Allergen,Severity, Reaction):  


Coded Allergies:  


     Compazine (Verified  Allergy, Severe, SOB, 17)


     Gadolinium Derivatives (Verified  Allergy, Severe, RESPIRATORY DISTRESS, 17)


     Lobster (Verified  Allergy, Severe, Anaphylaxis, 17)


     Morphine (Verified  Allergy, Severe, Hives, 17)


 PT HAVING HIVES AND ITCHING TO ARM ABOVE IV SITE AFTER


 ADMINISTRATION OF MORPHINE


     Phenergan (Verified  Allergy, Severe, SOB, 17)


     Reglan (Verified  Allergy, Severe, SOB, 17)


     Toradol (Verified  Allergy, Severe, Shortness of Breath, 17)


     Zofran (Verified  Allergy, Severe, SOB, 17)


     Penicillin (Verified  Allergy, Mild, RASH, 17)


     Sulfa (Verified  Allergy, Mild, RASH, 17)


     *MDRO Multi-Drug Resistant Organism (Verified  Adverse Reaction, Unknown, )


 MDR-E.Coli (urine-16)


Uncoded Allergies:  


     GADOLINIUM (Adverse Reaction, Severe, PT STOPPED BREATHING WITH GADO, )


 PT STATES SHE STOPPED BREATHING AFTER HAVING GADO AT ANOTHER


 FACILITY. 17


 [facility]


Reported Meds & Prescriptions





Reported Meds & Active Scripts


Active


Pyridium (Phenazopyridine HCl) 200 Mg Tab 200 Mg PO Q8H PRN 2 Days


Reported


Dilaudid (Hydromorphone HCl) 4 Mg Tab 4 Mg PO Q6H PRN


Valium (Diazepam) 10 Mg Tab 10 Mg PO TID


Ciprofloxacin (Ciprofloxacin HCl) 500 Mg Tab 500 Mg PO BID


Buspirone (Buspirone HCl) 7.5 Mg Tab 7.5 Mg PO BID


Zyprexa (Olanzapine) 5 Mg Tab 5 Mg PO BID


Cyanocobalamin Inj (Cyanocobalamin) 1,000 Mcg/Ml Inj 1,000 Mcg IM Q30D


Hydroxyzine HCl 50 Mg Tab 50 Mg PO Q8HR PRN


Imipramine HCL (Imipramine HCl) 25 Mg Tab 25 Mg PO HS








Review of Systems


General / Constitutional:  Positive: Weight Loss,  No: Fever, Chills, Weight 

Gain, Other


Eyes:  No: Diploplia, Blurred Vision, Photophobia, Drainage, Redness, Foreign 

Body Sensation, Pain, Tearing, Blind Spots, Visual changes, Blindness, Other


HENT:  No: Headaches, Vertigo, Lightheadedness, Sore Throat, Rhinitis, 

Rhinorrhea, Congestion, Nosebleed, Neck Stiffness, Neck Pain, Masses, Gingival 

Bleeding, Dental Difficulties, Ear Discharge, Earache, Other


Cardiovascular:  Positive: Chest Pain or Discomfort,  No: Palpitations, 

Irregular Rhythm, Tachycardia, Diaphoresis, Syncope, Dyspnea on exertion, 

Varicosities, Edema, Cyanosis, Varicosities, Phlebitis, Claudication, Other


Respiratory:  No: Cough, Shortness of Breath, Wheezing, Sneezing, Orthopnea, 

Hemoptysis, Stridor, Night Sweats, Pleuritic Pain, Other


Gastrointestinal:  Positive: Nausea, Vomiting, Abdominal Pain, Dysphagia, Loss 

of Appetite,  No: Diarrhea, Hematemesis, Hematochezia, Constipation, Changes in 

Bowel Habits, Indigestion, Other


Genitourinary:  No: Urgency, Frequency, Dysuria, Nocturia, Hematuria, Decreased 

Urinary Output, Oliguria, Hesitancy, Dribbling, Incontinence, Pelvic Pain, 

Flank Pain, Dyspareunia, Discharge, Dysmenorrhea, Menorrhagia, Metorrhagia, 

Vaginal Bleeding, Other


Musculoskeletal:  No: Myalgias, Arthralgias, Limited ROM, Weakness, Cramping, 

Edema, Pain, Atrophy, Other


Skin:  No Rash, No Itching, No Dryness, No Lumps, No Hives, No Change in 

Pigmentation, No Change in nails, No Alopecia, No Lesions, No Breast Lumps, No 

Breast Tenderness, No Breast Swelling, No Other


Neurologic:  No: Weakness, Dizziness, Syncope, Focal Abnormalities, 

Coordination Problem, Tremor, Ataxia, Headache, Change in Mentation, Slurred 

Speech, Paresthesia, Incontinence, Seizures, Sensory Disturbance, Other


Psychiatric:  No: Anxiety, Depression, Suicidal Ideations, Disorder of Thought, 

Mood Disorder, Substance Abuse, Homicidal Ideation, Other


Endocrine:  No: Heat Intolerance, Cold Intolerance, Polyuria, Polydipsia, Other


Hematologic/Lymphatic:  No: Easy Bruising, Lymph Node Enlargement, Other





Physical Exam


Narrative


GENERAL: 


SKIN: Warm and dry.


HEAD: Atraumatic. Normocephalic. 


EYES: Pupils equal and round 4mm reactive to light and accomodation. No scleral 

icterus. No injection or drainage. 


ENT: No nasal bleeding or discharge.  Mucous membranes pink and moist. Tongue 

is midline, no uvula deviation. 


NECK: Trachea midline. No JVD. 


CARDIOVASCULAR: Regular rate and rhythm.  No murmurs, S3, S4. 


RESPIRATORY: No accessory muscle use. Clear to auscultation. Breath sounds 

equal bilaterally. 


GASTROINTESTINAL: Abdomen soft, tender with deep palpation in epigastric area, 

nondistended. Hepatic and splenic margins not palpable. 


MUSCULOSKELETAL: Extremities without clubbing, cyanosis, or edema. No obvious 

deformities. 


NEUROLOGICAL: Awake and alert. No obvious cranial nerve deficits.  Motor 

grossly within normal limits. Five out of 5 muscle strength in the arms and 

legs.  Normal speech.


PSYCHIATRIC: Appropriate mood and affect; insight and judgment normal.





Data


Data


Last Documented VS





Vital Signs








  Date Time  Temp Pulse Resp B/P Pulse Ox O2 Delivery O2 Flow Rate FiO2


 


17 14:13   18     


 


17 11:59 98.4 108  150/105 96 Room Air  








Orders





 Complete Blood Count With Diff (17 12:15)


Comprehensive Metabolic Panel (17 12:15)


Lipase (17 12:15)


Urinalysis - C+S If Indicated (17 12:15)


Iv Access Insert/Monitor (17 12:15)


Sodium Chlor 0.9% 1000 Ml Inj (Ns 1000 M (17 12:15)


Hydromorphone Pf Inj (Dilaudid Pf Inj) (17 13:00)


Diphenhydramine Inj (Benadryl Inj) (17 13:00)


Chest, Single Ap (17 )


Pantoprazole Inj (Protonix Inj) (17 13:00)


Al-Mag Hy-Si 40-40-4 Mg/Ml Liq (Mag-Al P (17 13:00)


Lidocaine 2% Viscous (Xylocaine 2% Visco (17 13:00)


Potassium Cl 40 Meq/30 Ml Liq (Kcl 40 Me (17 14:45)


Lorazepam Inj (Ativan Inj) (17 14:45)





Labs





 Laboratory Tests








Test 17





 13:05


 


White Blood Count 5.7 TH/MM3


 


Red Blood Count 4.57 MIL/MM3


 


Hemoglobin 12.9 GM/DL


 


Hematocrit 38.5 %


 


Mean Corpuscular Volume 84.3 FL


 


Mean Corpuscular Hemoglobin 28.3 PG


 


Mean Corpuscular Hemoglobin 33.5 %





Concent 


 


Red Cell Distribution Width 16.7 %


 


Platelet Count 236 TH/MM3


 


Mean Platelet Volume 9.0 FL


 


Neutrophils (%) (Auto) 58.3 %


 


Lymphocytes (%) (Auto) 33.1 %


 


Monocytes (%) (Auto) 7.7 %


 


Eosinophils (%) (Auto) 0.9 %


 


Basophils (%) (Auto) 0.0 %


 


Neutrophils # (Auto) 3.3 TH/MM3


 


Lymphocytes # (Auto) 1.9 TH/MM3


 


Monocytes # (Auto) 0.4 TH/MM3


 


Eosinophils # (Auto) 0.1 TH/MM3


 


Basophils # (Auto) 0.0 TH/MM3


 


CBC Comment DIFF FINAL 


 


Differential Comment  


 


Sodium Level 139 MEQ/L


 


Potassium Level 3.0 MEQ/L


 


Chloride Level 103 MEQ/L


 


Carbon Dioxide Level 26.4 MEQ/L


 


Anion Gap 10 MEQ/L


 


Blood Urea Nitrogen 11 MG/DL


 


Creatinine 0.94 MG/DL


 


Estimat Glomerular Filtration 62 ML/MIN





Rate 


 


Random Glucose 93 MG/DL


 


Calcium Level 9.4 MG/DL


 


Total Bilirubin 0.5 MG/DL


 


Aspartate Amino Transf 48 U/L





(AST/SGOT) 


 


Alanine Aminotransferase 35 U/L





(ALT/SGPT) 


 


Alkaline Phosphatase 88 U/L


 


Total Protein 7.5 GM/DL


 


Albumin 4.0 GM/DL


 


Lipase 235 U/L











[facility]


Medical Decision Making


Medical Screen Exam Complete:  Yes


Emergency Medical Condition:  Yes


Medical Record Reviewed:  Yes


Interpretation(s)


CBC & BMP Diagram


17 13:05








LFTs WNL


Lipase WNL


Differential Diagnosis


Acute abdominal pain versus nausea and vomiting versus chronic pain versus 

peptic ulcer disease versus gastritis versus chest pain versus a typical chest 

pain versus GERD versus nausea versus intractable nausea


Narrative Course


56-year-old female that presents to the ED for evaluation of abdominal pain 

with nausea and vomiting.  Patient was properly examined and was found to have 

signs and symptoms consistent what appears to be acute on chronic abdominal 

pain.  Patient states that she cannot keep anything down including her 

medications.  She does appear to be somewhat dehydrated exam.  I did review her 

records and she has been admitted 3 times in the past 30 days.  At this time I 

will do recommend some labs and imaging to rule out any sign of other acute 

disease.  She did vomit a couple times and concern for aspiration pneumonia is 

present.  I do not believe that her chest pain is cardiac in nature as she 

states that it's a burning sensation secondary to the nausea and vomiting.  I 

definitely believe that this is related to her GERD.  She was given medications 

for pain as well as emesis as well as GERD.  She will be reassessed once labs 

come back.  Labs came essentially unremarkable other than slight hypo kalemia 

of 3.0.  Patient was given by mouth potassium with patient able to keep that 

down.  Patient was told that she needs to follow with PCP.  Told to follow up 

with GI doctor.  See ED worsening symptoms.





Diagnosis





 Primary Impression:  


 Nausea & vomiting


 Qualified Code:  R11.2 - Non-intractable vomiting with nausea, unspecified 

vomiting type


 Additional Impression:  


 Abdominal pain


 Qualified Code:  R10.13 - Epigastric pain


Patient Instructions:  General Instructions, Narcotic given in the ED





***Additional Instructions:


Follow with PCP.  See ED for worsening symptoms.


***Med/Other Pt SpecificInfo:  No Change to Meds


Disposition:  01 DISCHARGE HOME


Condition:  Stable








Damian Roberts 2017 13:34

## 2017-02-09 NOTE — RADRPT
EXAM DATE/TIME:  02/09/2017 13:20 

 

[facility] COMPARISON:     

CHEST SINGLE AP, October 30, 2016, 9:58.

 

                     

INDICATIONS :     

Pain in medial Chest. Trouble breathing

                     

 

MEDICAL HISTORY :     

None.          

 

SURGICAL HISTORY :        

gastrectomy

 

ENCOUNTER:     

Initial                                        

 

ACUITY:     

2 days      

 

PAIN SCORE:     

0/10

 

LOCATION:     

Bilateral chest 

 

FINDINGS:     

A single view of the chest demonstrates the lungs to be symmetrically aerated without evidence of mas
s, infiltrate or effusion.  The cardiomediastinal contours are unremarkable. Scoliosis and degenerati
ve changes are noted within the thoracic spine.

 

CONCLUSION:     

1. No acute disease.  

2. Scoliosis and degenerative changes involving the thoracic spine. 

 

 

 Kostas Abebe MD on February 09, 2017 at 14:30           

Board Certified Radiologist.

 This report was verified electronically.

## 2017-02-10 ENCOUNTER — HOSPITAL ENCOUNTER (OUTPATIENT)
Dept: HOSPITAL 17 - NEPE | Age: 57
Setting detail: OBSERVATION
LOS: 2 days | Discharge: HOME | End: 2017-02-12
Attending: SPECIALIST | Admitting: SPECIALIST
Payer: COMMERCIAL

## 2017-02-10 VITALS — SYSTOLIC BLOOD PRESSURE: 136 MMHG | HEART RATE: 61 BPM | DIASTOLIC BLOOD PRESSURE: 88 MMHG | RESPIRATION RATE: 20 BRPM

## 2017-02-10 VITALS
RESPIRATION RATE: 18 BRPM | OXYGEN SATURATION: 97 % | DIASTOLIC BLOOD PRESSURE: 67 MMHG | HEART RATE: 85 BPM | SYSTOLIC BLOOD PRESSURE: 156 MMHG

## 2017-02-10 VITALS
DIASTOLIC BLOOD PRESSURE: 88 MMHG | HEART RATE: 69 BPM | SYSTOLIC BLOOD PRESSURE: 128 MMHG | OXYGEN SATURATION: 99 % | RESPIRATION RATE: 18 BRPM

## 2017-02-10 VITALS
TEMPERATURE: 98.2 F | DIASTOLIC BLOOD PRESSURE: 98 MMHG | RESPIRATION RATE: 14 BRPM | SYSTOLIC BLOOD PRESSURE: 142 MMHG | HEART RATE: 98 BPM | OXYGEN SATURATION: 98 %

## 2017-02-10 VITALS
HEART RATE: 61 BPM | SYSTOLIC BLOOD PRESSURE: 129 MMHG | OXYGEN SATURATION: 98 % | TEMPERATURE: 98.1 F | DIASTOLIC BLOOD PRESSURE: 81 MMHG | RESPIRATION RATE: 19 BRPM

## 2017-02-10 VITALS — WEIGHT: 94.8 LBS | HEIGHT: 62 IN | BODY MASS INDEX: 17.44 KG/M2

## 2017-02-10 VITALS — DIASTOLIC BLOOD PRESSURE: 86 MMHG | HEART RATE: 68 BPM | SYSTOLIC BLOOD PRESSURE: 141 MMHG | RESPIRATION RATE: 20 BRPM

## 2017-02-10 DIAGNOSIS — I25.2: ICD-10-CM

## 2017-02-10 DIAGNOSIS — E87.6: ICD-10-CM

## 2017-02-10 DIAGNOSIS — Z85.528: ICD-10-CM

## 2017-02-10 DIAGNOSIS — R10.84: Primary | ICD-10-CM

## 2017-02-10 DIAGNOSIS — Z90.3: ICD-10-CM

## 2017-02-10 DIAGNOSIS — G43.A1: ICD-10-CM

## 2017-02-10 DIAGNOSIS — K85.90: ICD-10-CM

## 2017-02-10 DIAGNOSIS — K76.0: ICD-10-CM

## 2017-02-10 DIAGNOSIS — Z87.74: ICD-10-CM

## 2017-02-10 DIAGNOSIS — Z86.73: ICD-10-CM

## 2017-02-10 DIAGNOSIS — Z90.5: ICD-10-CM

## 2017-02-10 DIAGNOSIS — Z98.84: ICD-10-CM

## 2017-02-10 DIAGNOSIS — E86.0: ICD-10-CM

## 2017-02-10 DIAGNOSIS — R74.8: ICD-10-CM

## 2017-02-10 DIAGNOSIS — G89.4: ICD-10-CM

## 2017-02-10 DIAGNOSIS — F11.20: ICD-10-CM

## 2017-02-10 LAB
ALP SERPL-CCNC: 85 U/L (ref 45–117)
ALT SERPL-CCNC: 34 U/L (ref 10–53)
ANION GAP SERPL CALC-SCNC: 7 MEQ/L (ref 5–15)
AST SERPL-CCNC: 37 U/L (ref 15–37)
BACTERIA #/AREA URNS HPF: (no result) /HPF
BASOPHILS # BLD AUTO: 0 TH/MM3 (ref 0–0.2)
BASOPHILS NFR BLD: 0.1 % (ref 0–2)
BILIRUB SERPL-MCNC: 0.3 MG/DL (ref 0.2–1)
BUN SERPL-MCNC: 12 MG/DL (ref 7–18)
CHLORIDE SERPL-SCNC: 113 MEQ/L (ref 98–107)
COLOR UR: YELLOW
COMMENT (UR): (no result)
CULTURE IF INDICATED: (no result)
EOSINOPHIL # BLD: 0.1 TH/MM3 (ref 0–0.4)
EOSINOPHIL NFR BLD: 1.8 % (ref 0–4)
ERYTHROCYTE [DISTWIDTH] IN BLOOD BY AUTOMATED COUNT: 17.1 % (ref 11.6–17.2)
GFR SERPLBLD BASED ON 1.73 SQ M-ARVRAT: 69 ML/MIN (ref 89–?)
GLUCOSE UR STRIP-MCNC: (no result) MG/DL
HCO3 BLD-SCNC: 24.2 MEQ/L (ref 21–32)
HCT VFR BLD CALC: 36 % (ref 35–46)
HEMO FLAGS: (no result)
HGB UR QL STRIP: (no result)
KETONES UR STRIP-MCNC: (no result) MG/DL
LYMPHOCYTES # BLD AUTO: 1 TH/MM3 (ref 1–4.8)
LYMPHOCYTES NFR BLD AUTO: 28.6 % (ref 9–44)
MCH RBC QN AUTO: 27.8 PG (ref 27–34)
MCHC RBC AUTO-ENTMCNC: 32.5 % (ref 32–36)
MCV RBC AUTO: 85.6 FL (ref 80–100)
MONOCYTES NFR BLD: 6.9 % (ref 0–8)
MUCOUS THREADS #/AREA URNS LPF: (no result) /LPF
NEUTROPHILS # BLD AUTO: 2.3 TH/MM3 (ref 1.8–7.7)
NEUTROPHILS NFR BLD AUTO: 62.6 % (ref 16–70)
NITRITE UR QL STRIP: (no result)
PLATELET # BLD: 203 TH/MM3 (ref 150–450)
POTASSIUM SERPL-SCNC: 3.9 MEQ/L (ref 3.5–5.1)
RBC # BLD AUTO: 4.21 MIL/MM3 (ref 4–5.3)
SODIUM SERPL-SCNC: 144 MEQ/L (ref 136–145)
SP GR UR STRIP: 1.01 (ref 1–1.03)
SQUAMOUS #/AREA URNS HPF: 5 /HPF (ref 0–5)
WBC # BLD AUTO: 3.6 TH/MM3 (ref 4–11)

## 2017-02-10 PROCEDURE — 93005 ELECTROCARDIOGRAM TRACING: CPT

## 2017-02-10 PROCEDURE — 80053 COMPREHEN METABOLIC PANEL: CPT

## 2017-02-10 PROCEDURE — 99285 EMERGENCY DEPT VISIT HI MDM: CPT

## 2017-02-10 PROCEDURE — 85025 COMPLETE CBC W/AUTO DIFF WBC: CPT

## 2017-02-10 PROCEDURE — 81001 URINALYSIS AUTO W/SCOPE: CPT

## 2017-02-10 PROCEDURE — 83690 ASSAY OF LIPASE: CPT

## 2017-02-10 PROCEDURE — G0378 HOSPITAL OBSERVATION PER HR: HCPCS

## 2017-02-10 PROCEDURE — C9113 INJ PANTOPRAZOLE SODIUM, VIA: HCPCS

## 2017-02-10 RX ADMIN — SODIUM CHLORIDE, PRESERVATIVE FREE SCH ML: 5 INJECTION INTRAVENOUS at 21:00

## 2017-02-10 RX ADMIN — IMIPRAMINE HYDROCHLORIDE SCH MG: 25 TABLET, FILM COATED ORAL at 21:28

## 2017-02-10 RX ADMIN — HYDROMORPHONE HYDROCHLORIDE PRN MG: 1 INJECTION, SOLUTION INTRAMUSCULAR; INTRAVENOUS; SUBCUTANEOUS at 22:57

## 2017-02-10 RX ADMIN — DIAZEPAM SCH MG: 10 TABLET ORAL at 18:48

## 2017-02-10 RX ADMIN — DOCUSATE SODIUM SCH MG: 100 CAPSULE, LIQUID FILLED ORAL at 16:00

## 2017-02-10 RX ADMIN — OLANZAPINE SCH MG: 5 TABLET, FILM COATED ORAL at 21:28

## 2017-02-10 NOTE — PD
HPI


Chief Complaint:  GI Complaint


Time Seen by Provider:  11:10


Travel History


International Travel<30 days:  No


Contact w/Intl Traveler<30days:  No


Traveled to known affect area:  No





History of Present Illness


HPI


56 year old female presents for evaluation nausea, vomiting, abdominal pain.  

She reports that the past 2 days she has had multiple of sodium nonbloody 

emesis on a daily basis, retching, unable to keep any food or fluids down.  She 

was seen here yesterday for evaluation of these symptoms.  She is found to have 

hypokalemia and she was discharged.  She returns today because symptoms have 

worsened.  She does endorse chronic abdominal pain but over the past several 

hours she has had worsening abdominal pain in her left lower quadrant which she 

describes as "pinching."  Denies fevers, chills, chest pain, shortness of breath

, diarrhea, dysuria.  She reports that she is allergic to most common ENT in 

medical medications and she is typically given Valium for her nausea.  She 

reports that she called her gastroenterologist who she does not recall the name 

of but is a partner of Dr. Davila and it was recommended that she return to the 

emergency room.  She has no other complaints at this time.





PFSH


Past Medical History


Hx Anticoagulant Therapy:  No


Asthma:  No


Blood Disorders:  No


Anxiety:  Yes


Depression:  No


Heart Rhythm Problems:  No


Cancer:  Yes (kidney)


Cardiovascular Problems:  Yes


High Cholesterol:  No


Chemotherapy:  Yes (KIDNEY )


Chest Pain:  No


Congestive Heart Failure:  No


COPD:  No


Cerebrovascular Accident:  Yes


Diabetes:  No


Diminished Hearing:  No


Endocrine:  No


Gastrointestinal Disorders:  Yes


GERD:  No


Genitourinary:  Yes (RIGHT NEPHRECTOMY)


Headaches:  Yes


Hiatal Hernia:  Yes


Hypertension:  No


Immune Disorder:  No


Implanted Vascular Access Dvce:  No


Kidney Stones:  No


Musculoskeletal:  No


Neurologic:  No


Psychiatric:  No


Reproductive:  No


Respiratory:  No


Immunizations Current:  Yes


Migraines:  Yes


Myocardial Infarction:  Yes ()


Pneumonia:  Yes


Radiation Therapy:  No


Renal Failure:  No


Seizures:  Yes


Sleep Apnea:  No


Thyroid Disease:  No


Ulcer:  No


Pregnant?:  Not Pregnant


Menopausal:  Yes


:  1


Para:  1


Miscarriage:  0


:  0


Ectopic Pregnancy:  No


Ovarian Cysts:  No


Dilation and Curettage (D&C):  Yes


Tubal Ligation:  Yes





Past Surgical History


Abdominal Surgery:  Yes (total gastrectomy w/pouch , hernia repair)


Appendectomy:  Yes


Cardiac Surgery:  Yes (pt states an occulator was placed in her heart )


Cholecystectomy:  Yes


Gynecologic Surgery:  Yes (hysterectomy)


Hysterectomy:  Yes


Thoracic Surgery:  No


Tonsillectomy:  Yes


Other Surgery:  Yes (right kidney removed)





Social History


Alcohol Use:  No


Tobacco Use:  No


Substance Use:  No





Allergies-Medications


(Allergen,Severity, Reaction):  


Coded Allergies:  


     Compazine (Verified  Allergy, Severe, SOB, 2/10/17)


     Gadolinium Derivatives (Verified  Allergy, Severe, RESPIRATORY DISTRESS, 2/

10/17)


     Lobster (Verified  Allergy, Severe, Anaphylaxis, 2/10/17)


     Morphine (Verified  Allergy, Severe, Hives, 2/10/17)


 PT HAVING HIVES AND ITCHING TO ARM ABOVE IV SITE AFTER


 ADMINISTRATION OF MORPHINE


     Phenergan (Verified  Allergy, Severe, SOB, 2/10/17)


     Reglan (Verified  Allergy, Severe, SOB, 2/10/17)


     Toradol (Verified  Allergy, Severe, Shortness of Breath, 2/10/17)


     Zofran (Verified  Allergy, Severe, SOB, 2/10/17)


     Penicillin (Verified  Allergy, Mild, RASH, 2/10/17)


     Sulfa (Verified  Allergy, Mild, RASH, 2/10/17)


     *MDRO Multi-Drug Resistant Organism (Verified  Adverse Reaction, Unknown, 2

/10/17)


 MDR-E.Coli (urine-16)


Uncoded Allergies:  


     GADOLINIUM (Adverse Reaction, Severe, PT STOPPED BREATHING WITH GADO, )


 PT STATES SHE STOPPED BREATHING AFTER HAVING GADO AT ANOTHER


 FACILITY. 17


 [facility]


Reported Meds & Prescriptions





Reported Meds & Active Scripts


Active


Pyridium (Phenazopyridine HCl) 200 Mg Tab 200 Mg PO Q8H PRN 2 Days


Reported


Dilaudid (Hydromorphone HCl) 4 Mg Tab 4 Mg PO Q6H PRN


Valium (Diazepam) 10 Mg Tab 10 Mg PO TID


Ciprofloxacin (Ciprofloxacin HCl) 500 Mg Tab 500 Mg PO BID


Buspirone (Buspirone HCl) 7.5 Mg Tab 7.5 Mg PO BID


Zyprexa (Olanzapine) 5 Mg Tab 5 Mg PO BID


Cyanocobalamin Inj (Cyanocobalamin) 1,000 Mcg/Ml Inj 1,000 Mcg IM Q30D


Hydroxyzine HCl 50 Mg Tab 50 Mg PO Q8HR PRN


Imipramine HCL (Imipramine HCl) 25 Mg Tab 25 Mg PO HS








Review of Systems


Except as stated in HPI:  all other systems reviewed are Neg





Physical Exam


Narrative


GENERAL: Well-developed well-nourished female in no acute distress, anxious 

appearing


SKIN: Warm and dry.


HEAD: Atraumatic. Normocephalic. 


EYES: Pupils equal and round. No scleral icterus. No injection or drainage. 


ENT: No nasal bleeding or discharge.  Mucous membranes pink and moist.


NECK: Trachea midline. No JVD. 


CARDIOVASCULAR: Regular rate and rhythm.  No murmur appreciated.


RESPIRATORY: No accessory muscle use. Clear to auscultation. Breath sounds 

equal bilaterally. 


GASTROINTESTINAL: Abdomen soft, there is generalized tenderness to palpation 

without guarding.


MUSCULOSKELETAL: No obvious deformities. No clubbing.  No cyanosis.  No edema. 


NEUROLOGICAL: Awake and alert. No obvious cranial nerve deficits.  Motor 

grossly within normal limits. Normal speech.





Data


Data


Last Documented VS





Vital Signs








  Date Time  Temp Pulse Resp B/P Pulse Ox O2 Delivery O2 Flow Rate FiO2


 


2/10/17 09:45 98.2 98 14 142/98 98   








Orders





 Complete Blood Count With Diff (2/10/17 11:15)


Comprehensive Metabolic Panel (2/10/17 11:15)


Lipase (2/10/17 11:15)


Urinalysis - C+S If Indicated (2/10/17 11:15)


Electrocardiogram (2/10/17 11:15)


Pantoprazole Inj (Protonix Inj) (2/10/17 14:00)


Sodium Chlor 0.9% 1000 Ml Inj (Ns 1000 M (2/10/17 13:56)


Hydromorphone Pf Inj (Dilaudid Pf Inj) (2/10/17 14:00)


Al-Mag Hy-Si 40-40-4 Mg/Ml Liq (Mag-Al P (2/10/17 14:00)


Lidocaine 2% Viscous (Xylocaine 2% Visco (2/10/17 14:00)


Lorazepam Inj (Ativan Inj) (2/10/17 14:15)





Labs








 Laboratory Tests








Test 2/10/17 2/10/17





 11:37 11:50


 


White Blood Count 3.6 TH/MM3 


 


Red Blood Count 4.21 MIL/MM3 


 


Hemoglobin 11.7 GM/DL 


 


Hematocrit 36.0 % 


 


Mean Corpuscular Volume 85.6 FL 


 


Mean Corpuscular Hemoglobin 27.8 PG 


 


Mean Corpuscular Hemoglobin 32.5 % 





Concent  


 


Red Cell Distribution Width 17.1 % 


 


Platelet Count 203 TH/MM3 


 


Mean Platelet Volume 9.1 FL 


 


Neutrophils (%) (Auto) 62.6 % 


 


Lymphocytes (%) (Auto) 28.6 % 


 


Monocytes (%) (Auto) 6.9 % 


 


Eosinophils (%) (Auto) 1.8 % 


 


Basophils (%) (Auto) 0.1 % 


 


Neutrophils # (Auto) 2.3 TH/MM3 


 


Lymphocytes # (Auto) 1.0 TH/MM3 


 


Monocytes # (Auto) 0.2 TH/MM3 


 


Eosinophils # (Auto) 0.1 TH/MM3 


 


Basophils # (Auto) 0.0 TH/MM3 


 


CBC Comment DIFF FINAL  


 


Differential Comment   


 


Sodium Level 144 MEQ/L 


 


Potassium Level 3.9 MEQ/L 


 


Chloride Level 113 MEQ/L 


 


Carbon Dioxide Level 24.2 MEQ/L 


 


Anion Gap 7 MEQ/L 


 


Blood Urea Nitrogen 12 MG/DL 


 


Creatinine 0.85 MG/DL 


 


Estimat Glomerular Filtration 69 ML/MIN 





Rate  


 


Random Glucose 83 MG/DL 


 


Calcium Level 8.7 MG/DL 


 


Total Bilirubin 0.3 MG/DL 


 


Aspartate Amino Transf 37 U/L 





(AST/SGOT)  


 


Alanine Aminotransferase 34 U/L 





(ALT/SGPT)  


 


Alkaline Phosphatase 85 U/L 


 


Total Protein 6.6 GM/DL 


 


Albumin 3.5 GM/DL 


 


Lipase 411 U/L 


 


Urine Color  YELLOW 


 


Urine Turbidity  CLEAR 


 


Urine pH  5.5 


 


Urine Specific Gravity  1.015 


 


Urine Protein  NEG mg/dL


 


Urine Glucose (UA)  NEG mg/dL


 


Urine Ketones  NEG mg/dL


 


Urine Occult Blood  TRACE 


 


Urine Nitrite  NEG 


 


Urine Bilirubin  NEG 


 


Urine Urobilinogen  LESS THAN 2.0





  MG/DL


 


Urine Leukocyte Esterase  SMALL 


 


Urine RBC  2 /hpf


 


Urine WBC  4 /hpf


 


Urine Squamous Epithelial  5 /hpf





Cells  


 


Urine Bacteria  RARE /hpf


 


Urine Mucus  FEW /lpf


 


Microscopic Urinalysis Comment  CULT NOT





  INDICATED














MDM


Medical Decision Making


Medical Screen Exam Complete:  Yes


Emergency Medical Condition:  Yes


Medical Record Reviewed:  Yes


Differential Diagnosis


Dehydration, electrolyte abnormality, pancreatitis, gastroenteritis, colitis, 

diverticulitis, gastritis, peptic ulcer disease, mesenteric ischemia


Narrative Course


56-year-old female with history of chronic abdominal pain presents with 2 days 

of intractable nausea and vomiting, worsening left lower quadrant abdominal 

pain.





This patient was seen in triage and protocols were initiated.  The patient will 

be transferred to a medical bed when one becomes available.








Darrel Grande Feb 10, 2017 11:20

## 2017-02-10 NOTE — MB
cc:

YULIYA CARR M.D., JAWED PECK, REBECCA (. M.D.

****

 

 

DATE OF CONSULTATION:  02/10/2017.

 

REASON FOR CONSULTATION:

Nausea, vomiting, abdominal pain, gastroparesis.

 

PATIENT OF:

Dr. Caridad Ruvalcaba.

 

HISTORY OF PRESENT ILLNESS:

Ms. Stout is a 56-year-old lady with chronic problems with nausea, vomiting,

abdominal pain.  She has an extensive past medical and surgical history.  She

is in and out of the hospital repetitively for similar complaints. previous

workup includes MRIs, gastric emptying scan, endoscopy and a colonoscopy.  She

says for the last four days her pain is worse.  She was unable to keep anything

down so she came to the hospital.

 

PAST MEDICAL HISTORY:

1. Kidney cancer ten years ago.

2. CVA.

3. Hiatal hernia.

4. Headaches.

5. Myocardial infarction.

 

PAST SURGICAL HISTORY:

1. Tubal ligation.

2. D&C.

3. Cholecystectomy.

4. Right nephrectomy.

5. Partial gastrectomy done in 2003.

6. Appendectomy.

7. Cardiac surgery.

 

SOCIAL HISTORY:

No tobacco. No alcohol.

 

ALLERGIES:

1. COMPAZINE.

2. GADOLINIUM.

3. LOBSTER.

4. MORPHINE.

5. PHENERGAN.

6. TORADOL.

7. ZOFRAN.

8. PENICILLIN.

 

MEDICATIONS ON ADMISSION:

1. Dilaudid.

2. Valium.

3. Ciprofloxacin.

4. _________.

5. Zyprexa.

6. Hydroxyzine.

7. Imipramine.

 

REVIEW OF SYSTEMS:

The review of systems includes nausea, vomiting and abdominal pain at this

time.  The patient is asking for pain medicines even prior to the IV being

started because she states she has had multiple sticks for the IV, she has a

lot of pain and would require pain medicines IM prior to any further attempts

with IV placement.

 

PHYSICAL EXAMINATION:

GENERAL:  The physical exam reveals an anxious-looking lady in no apparent

distress.

HEAD AND NECK: Anicteric sclerae.

CHEST: Bilateral air entry

ABDOMEN: Abdomen is soft. Tenderness present diffusely. No guarding. No

rigidity.

CNS: Nonfocal.

RECTAL: Deferred at this time.

 

LABS:

Labs reveal white cell count of 3.6, hemoglobin 11.7, lipase 411.

Liver function tests normal.

 

IMPRESSION:

Nausea, vomiting, gastroparesis, abdominal pain.

 

RECOMMENDATIONS:

The patient has had extensive workup in the past, most of it done recently.

 

I have would recommend erythromycin to be started IV twice a day,  Protonix

IV.

 

The patient has had recent imaging studies without any findings.

 

Possible endoscopy can be repeated depending upon clinical course.

 

This has been discussed with Dr. Estrella, the patient's admitting physician.

 

Will follow with you.  Thank you for this referral.

 

 

 

                              _________________________________

                              MD ZELALEM Liz/ZUHAIR

D:  2/10/2017/4:03 PM

T:  2/10/2017/4:15 PM

Visit #:  [id number]

Job #:  [id number]

MTDD

## 2017-02-10 NOTE — PD
Physical Exam


Date Seen by Provider:  Feb 10, 2017


Time Seen by Provider:  14:26


Narrative


56-year-old female that presents to the ED for evaluation of nausea and 

vomiting.  Patient was initially seen by Darrel PICKARD and was signed out to me 

pending labs and disposition.  I am actually familiar with this patient as well 

as I did evaluate her yesterday.  Patient did felt improved yesterday after 

medications but states that she has not been able to keep anything down after 

she was released from here.  She states that she feels still pretty lousy and 

she is unable to tolerate any of her by mouth medications as he is still some.  

Please refer to the parents provider for further information.





Data


Data


Last Documented VS





Vital Signs








  Date Time  Temp Pulse Resp B/P Pulse Ox O2 Delivery O2 Flow Rate FiO2


 


2/10/17 09:45 98.2 98 14 142/98 98   








Orders





 Complete Blood Count With Diff (2/10/17 11:15)


Comprehensive Metabolic Panel (2/10/17 11:15)


Lipase (2/10/17 11:15)


Urinalysis - C+S If Indicated (2/10/17 11:15)


Electrocardiogram (2/10/17 11:15)


Pantoprazole Inj (Protonix Inj) (2/10/17 14:00)


Sodium Chlor 0.9% 1000 Ml Inj (Ns 1000 M (2/10/17 13:56)


Hydromorphone Pf Inj (Dilaudid Pf Inj) (2/10/17 14:00)


Al-Mag Hy-Si 40-40-4 Mg/Ml Liq (Mag-Al P (2/10/17 14:00)


Lidocaine 2% Viscous (Xylocaine 2% Visco (2/10/17 14:00)


Lorazepam Inj (Ativan Inj) (2/10/17 14:15)


Admit Order (Ed Use Only) (2/10/17 15:20)





Labs





 Laboratory Tests








Test 2/10/17 2/10/17





 11:37 11:50


 


White Blood Count 3.6 TH/MM3 


 


Red Blood Count 4.21 MIL/MM3 


 


Hemoglobin 11.7 GM/DL 


 


Hematocrit 36.0 % 


 


Mean Corpuscular Volume 85.6 FL 


 


Mean Corpuscular Hemoglobin 27.8 PG 


 


Mean Corpuscular Hemoglobin 32.5 % 





Concent  


 


Red Cell Distribution Width 17.1 % 


 


Platelet Count 203 TH/MM3 


 


Mean Platelet Volume 9.1 FL 


 


Neutrophils (%) (Auto) 62.6 % 


 


Lymphocytes (%) (Auto) 28.6 % 


 


Monocytes (%) (Auto) 6.9 % 


 


Eosinophils (%) (Auto) 1.8 % 


 


Basophils (%) (Auto) 0.1 % 


 


Neutrophils # (Auto) 2.3 TH/MM3 


 


Lymphocytes # (Auto) 1.0 TH/MM3 


 


Monocytes # (Auto) 0.2 TH/MM3 


 


Eosinophils # (Auto) 0.1 TH/MM3 


 


Basophils # (Auto) 0.0 TH/MM3 


 


CBC Comment DIFF FINAL  


 


Differential Comment   


 


Sodium Level 144 MEQ/L 


 


Potassium Level 3.9 MEQ/L 


 


Chloride Level 113 MEQ/L 


 


Carbon Dioxide Level 24.2 MEQ/L 


 


Anion Gap 7 MEQ/L 


 


Blood Urea Nitrogen 12 MG/DL 


 


Creatinine 0.85 MG/DL 


 


Estimat Glomerular Filtration 69 ML/MIN 





Rate  


 


Random Glucose 83 MG/DL 


 


Calcium Level 8.7 MG/DL 


 


Total Bilirubin 0.3 MG/DL 


 


Aspartate Amino Transf 37 U/L 





(AST/SGOT)  


 


Alanine Aminotransferase 34 U/L 





(ALT/SGPT)  


 


Alkaline Phosphatase 85 U/L 


 


Total Protein 6.6 GM/DL 


 


Albumin 3.5 GM/DL 


 


Lipase 411 U/L 


 


Urine Color  YELLOW 


 


Urine Turbidity  CLEAR 


 


Urine pH  5.5 


 


Urine Specific Gravity  1.015 


 


Urine Protein  NEG mg/dL


 


Urine Glucose (UA)  NEG mg/dL


 


Urine Ketones  NEG mg/dL


 


Urine Occult Blood  TRACE 


 


Urine Nitrite  NEG 


 


Urine Bilirubin  NEG 


 


Urine Urobilinogen  LESS THAN 2.0





  MG/DL


 


Urine Leukocyte Esterase  SMALL 


 


Urine RBC  2 /hpf


 


Urine WBC  4 /hpf


 


Urine Squamous Epithelial  5 /hpf





Cells  


 


Urine Bacteria  RARE /hpf


 


Urine Mucus  FEW /lpf


 


Microscopic Urinalysis Comment  CULT NOT





  INDICATED











MDM


Medical Record Reviewed:  Yes


Supervised Visit with DELLA:  No


Interpretation(s)


CBC & BMP Diagram


2/10/17 11:37








UA negative.


Lipase is slightly elevated in the 400s.


Differential Diagnosis


Dehydration versus intractable nausea versus intractable vomiting versus 

chronic pain versus GERD


Narrative Course


56-year-old female that presents to the ED for evaluation of abdominal pain and 

nausea and vomiting.  Please refer to the previous provider note.  Patient is 

well-known to me and to staff as she is a frequent flier to the hospital.  

Patient still complains that she cannot keep anything by mouth.  On exam she 

does look dehydrated.  She does not feel improved from yesterday and she is 

unable to keep any of her own by mouth.  Pain medications.  Because of her 

symptoms and her [facility] outpatient treatment I spoke with my attending Dr. Sosa who evaluated the patient and agrees the patient  requires obs ADMISSION 

for intractable nausea and vomiting..  Initial labs were essentially negative.  

[facility] hospitalist was contacted Dr. De Jesus who requested that I speak with GI 

specialist.  I spoke with Dr. Jimenez, who agrees to admission.  I spoke again 

with Dr. Estrella who agrees to observation.


HemaPrompt Test Point of Care


Fecal Specimen Occult Blood:  Negative





Diagnosis





 Primary Impression:  


 Nausea & vomiting


 Qualified Code:  G43.A1 - Intractable cyclical vomiting with nausea


 Additional Impression:  


 Abdominal pain


 Qualified Code:  R10.84 - Generalized abdominal pain





Admitting Information


Admitting Physician Requests:  Observation








Dmaian Roberts Feb 10, 2017 14:30

## 2017-02-10 NOTE — MH
cc:

TYLER ESTRELLA

****

 

DATE OF ADMISSION:  2/10/2017

 

REASON FOR ADMISSION:

Intractable nausea, vomiting, and abdominal pain.

 

ADMISSION DOCTOR:

Dr. Tyler Estrella.

 

PRIMARY CARE PHYSICIAN:

Dr. Caridad Ruvalcaba.

 

HISTORY OF PRESENT ILLNESS:

The patient is a 56-year-old  female who is known to our service from

quite a few previous admissions.  The patient has had chronic abdominal pain

and nausea and vomiting.  The patient has a history of gastroparesis.  She is

on a chronic Fentanyl and Dilaudid.  The patient again came to the ER because

of the intractable nausea, vomiting and abdominal pain. According to the

patient this time it has been going on for three to four days and she has been

unable to keep anything down. She still feels pretty lousy and she is unable to

tolerate anything by mouth. The patient was evaluated by the emergency room

physician who did talk to the gastroenterologist who recommended for admission

for intractable nausea and vomiting with gastroparesis and chronic abdominal

pain.

 

The patient is seen in her ER room with the emergency room nurse at bedside.

The patient is still having nausea. She has abdominal pain which is severe in

intensity as per the patient. She has no other associated symptoms.

 

PAST MEDICAL HISTORY

Her past medical history is significant for:

1. Gastroparesis.

2. Chronic abdominal pain after multiple surgeries in the past.

3. Acute myocardial infarction.

4. Anxiety.

5. Migraines.

6. History of seizures in the past.

7. Fatty liver.

8. _____ which lead to embolic stroke in the past.

 

 

 

PAST SURGICAL HISTORY:

1. History of right nephrectomy.

2. Tubal ligation.

3. Gastrectomy with pouch hernia repair.

4. Appendectomy.

5. Cholecystectomy.

6. Hysterectomy.

7. Tonsillectomy.

8. Fundoplication.

9. PFO closure with _________.

 

MEDICATIONS:

Reviewed. Please see the MAR.

 

ALLERGIES:

THE PATIENT HAS MULTIPLE MEDICAL ALLERGIES INCLUDIN. COMPAZINE.

2. GADOLINIUM.

3. LOBSTER.

4. MORPHINE.

5. PENICILLIN.

6. PHENERGAN.

7. REGLAN.

8. SULFA.

9. TORADOL.

10. ZOFRAN.

 

SOCIAL HISTORY:

The patient does not take alcohol or smoke.

 

FAMILY HISTORY:

The mother had multiple sclerosis and COPD.  The father had COPD.

 

REVIEW OF SYSTEMS:

The review of systems is as described above in the history of present illness

and is negative for ten systems.

 

PHYSICAL EXAMINATION:

GENERAL: The patient is alert and oriented x3, thin built lying on bed not in

any apparent cardiorespiratory distress.

VITAL SIGNS:  The vitals show the patient is afebrile, Pulse is 69, respiratory

rate 18, blood pressure 128/88, pulse oximetry of 99 on room air.

HEAD, EYES, EARS, NOSE, THROAT:  Head is normocephalic and atraumatic. Eyes -

negative conjunctival icterus. Mouth unremarkable.

NECK: The neck is supple. No increased jugular venous distention. Central

trachea.

RESPIRATORY SYSTEM: Chest clear to auscultation.

CARDIOVASCULAR: S1 and S2 audible. Unable to hear any S3 gallop.

GASTROINTESTINAL: Abdomen soft, tender seems to be mostly upper abdomen. No

rebound. Unable to appreciate any guarding. _____ noted. Positive bowel

sounds.

MUSCULOSKELETAL: No cyanosis or pedal edema appreciated.

CENTRAL NERVOUS SYSTEM: Grossly intact.

SKIN: Warm and dry.

PSYCHIATRIC: Appropriate mood and affect.

 

INVESTIGATIONS:

WBCs 3.6, hemoglobin/hematocrit and platelet count within normal limits.

 

Chloride 113, GFR 69 otherwise BMP within normal limits.

 

Liver function tests within normal limits.

 

Lipase 411.

 

Urinalysis shows urine occult blood trace, leukocyte esterase small and

bacteria rare, mucus few.

 

ASSESSMENT:

1. Intractable nausea and vomiting.

2. Chronic abdominal pain with increasing lipase questionable pancreatitis

___________ nausea and vomiting.

3. History of MI in the past.

4. Slight leukopenia.

 

PLAN:

1. Admit to the floor.

2. GI consultation appreciated. Discussed with the gastroenterologist.

3. IV hydration.

4. IV analgesics.

5. Antiemetics on a PRN basis.

6. Continue home medications as indicated.

7. Labs in the morning.

8. Condition discussed with the patient in detail.

9. Discussed with the emergency room P.A.

10. Discussed with GI.

11. Discussed with the RN.

 

Further recommendations will follow as the patient progresses.

 

                               __________________________________

                           Tyler Estrella MD JP/ZUHAIR

D:  2/10/2017/6:12 PM

T:  2/10/2017/6:25 PM

Visit #:  [id number]

Job #:  [id number]



PT SEEN AND EXAMINED ON DAY ADMISSION AS ABOVE 

FACE TO FACE TIME SPENT WITH PT 

CHART WAS REVIEWED. INCLUDING LABS MEDS AND RAD DATA 

NOTES WERE REVEIWED

PLAN OF CARE WAS DW ARNP AS ABOVE 

DW PT IN DETAIL 

DW GI IN ER ON DAY OF ADMISSION 

DW ER PA 

COND WAS GUARDED 
MTDD

## 2017-02-11 VITALS
TEMPERATURE: 98 F | SYSTOLIC BLOOD PRESSURE: 124 MMHG | RESPIRATION RATE: 18 BRPM | OXYGEN SATURATION: 97 % | DIASTOLIC BLOOD PRESSURE: 68 MMHG | HEART RATE: 71 BPM

## 2017-02-11 VITALS
HEART RATE: 73 BPM | OXYGEN SATURATION: 96 % | SYSTOLIC BLOOD PRESSURE: 116 MMHG | TEMPERATURE: 98.2 F | RESPIRATION RATE: 19 BRPM | DIASTOLIC BLOOD PRESSURE: 72 MMHG

## 2017-02-11 VITALS
DIASTOLIC BLOOD PRESSURE: 74 MMHG | RESPIRATION RATE: 17 BRPM | HEART RATE: 78 BPM | OXYGEN SATURATION: 100 % | TEMPERATURE: 98 F | SYSTOLIC BLOOD PRESSURE: 131 MMHG

## 2017-02-11 VITALS
SYSTOLIC BLOOD PRESSURE: 135 MMHG | RESPIRATION RATE: 17 BRPM | OXYGEN SATURATION: 100 % | TEMPERATURE: 97.8 F | HEART RATE: 65 BPM | DIASTOLIC BLOOD PRESSURE: 82 MMHG

## 2017-02-11 VITALS
DIASTOLIC BLOOD PRESSURE: 72 MMHG | TEMPERATURE: 98.2 F | SYSTOLIC BLOOD PRESSURE: 120 MMHG | OXYGEN SATURATION: 96 % | RESPIRATION RATE: 17 BRPM | HEART RATE: 76 BPM

## 2017-02-11 VITALS
OXYGEN SATURATION: 97 % | SYSTOLIC BLOOD PRESSURE: 116 MMHG | DIASTOLIC BLOOD PRESSURE: 72 MMHG | TEMPERATURE: 98.3 F | HEART RATE: 73 BPM | RESPIRATION RATE: 18 BRPM

## 2017-02-11 LAB
ALP SERPL-CCNC: 74 U/L (ref 45–117)
ALT SERPL-CCNC: 25 U/L (ref 10–53)
ANION GAP SERPL CALC-SCNC: 10 MEQ/L (ref 5–15)
AST SERPL-CCNC: 36 U/L (ref 15–37)
BASOPHILS # BLD AUTO: 0 TH/MM3 (ref 0–0.2)
BASOPHILS NFR BLD: 0.1 % (ref 0–2)
BILIRUB SERPL-MCNC: 0.3 MG/DL (ref 0.2–1)
BUN SERPL-MCNC: 6 MG/DL (ref 7–18)
CHLORIDE SERPL-SCNC: 110 MEQ/L (ref 98–107)
EOSINOPHIL # BLD: 0.2 TH/MM3 (ref 0–0.4)
EOSINOPHIL NFR BLD: 5.7 % (ref 0–4)
ERYTHROCYTE [DISTWIDTH] IN BLOOD BY AUTOMATED COUNT: 17 % (ref 11.6–17.2)
GFR SERPLBLD BASED ON 1.73 SQ M-ARVRAT: 82 ML/MIN (ref 89–?)
HCO3 BLD-SCNC: 22.6 MEQ/L (ref 21–32)
HCT VFR BLD CALC: 29.5 % (ref 35–46)
HEMO FLAGS: (no result)
LYMPHOCYTES # BLD AUTO: 1.6 TH/MM3 (ref 1–4.8)
LYMPHOCYTES NFR BLD AUTO: 47.7 % (ref 9–44)
MCH RBC QN AUTO: 29 PG (ref 27–34)
MCHC RBC AUTO-ENTMCNC: 33.9 % (ref 32–36)
MCV RBC AUTO: 85.7 FL (ref 80–100)
MONOCYTES NFR BLD: 8.4 % (ref 0–8)
NEUTROPHILS # BLD AUTO: 1.3 TH/MM3 (ref 1.8–7.7)
NEUTROPHILS NFR BLD AUTO: 38.1 % (ref 16–70)
PLATELET # BLD: 145 TH/MM3 (ref 150–450)
POTASSIUM SERPL-SCNC: 3.9 MEQ/L (ref 3.5–5.1)
RBC # BLD AUTO: 3.44 MIL/MM3 (ref 4–5.3)
SODIUM SERPL-SCNC: 143 MEQ/L (ref 136–145)
WBC # BLD AUTO: 3.4 TH/MM3 (ref 4–11)

## 2017-02-11 RX ADMIN — SODIUM CHLORIDE, PRESERVATIVE FREE SCH ML: 5 INJECTION INTRAVENOUS at 09:25

## 2017-02-11 RX ADMIN — OLANZAPINE SCH MG: 5 TABLET, FILM COATED ORAL at 21:00

## 2017-02-11 RX ADMIN — SODIUM CHLORIDE, PRESERVATIVE FREE SCH ML: 5 INJECTION INTRAVENOUS at 21:00

## 2017-02-11 RX ADMIN — ERYTHROMYCIN LACTOBIONATE SCH MLS/HR: 500 INJECTION, POWDER, LYOPHILIZED, FOR SOLUTION INTRAVENOUS at 18:36

## 2017-02-11 RX ADMIN — HYDROMORPHONE HYDROCHLORIDE PRN MG: 1 INJECTION, SOLUTION INTRAMUSCULAR; INTRAVENOUS; SUBCUTANEOUS at 21:12

## 2017-02-11 RX ADMIN — DIAZEPAM SCH MG: 10 TABLET ORAL at 09:26

## 2017-02-11 RX ADMIN — PHENYTOIN SODIUM SCH MLS/HR: 50 INJECTION INTRAMUSCULAR; INTRAVENOUS at 08:09

## 2017-02-11 RX ADMIN — PANTOPRAZOLE SCH MG: 40 TABLET, DELAYED RELEASE ORAL at 10:42

## 2017-02-11 RX ADMIN — DOCUSATE SODIUM SCH MG: 100 CAPSULE, LIQUID FILLED ORAL at 04:00

## 2017-02-11 RX ADMIN — DIAZEPAM SCH MG: 10 TABLET ORAL at 14:09

## 2017-02-11 RX ADMIN — HYDROMORPHONE HYDROCHLORIDE PRN MG: 1 INJECTION, SOLUTION INTRAMUSCULAR; INTRAVENOUS; SUBCUTANEOUS at 12:37

## 2017-02-11 RX ADMIN — DIAZEPAM SCH MG: 10 TABLET ORAL at 18:34

## 2017-02-11 RX ADMIN — OLANZAPINE SCH MG: 5 TABLET, FILM COATED ORAL at 09:26

## 2017-02-11 RX ADMIN — IMIPRAMINE HYDROCHLORIDE SCH MG: 25 TABLET, FILM COATED ORAL at 21:33

## 2017-02-11 RX ADMIN — PHENYTOIN SODIUM SCH MLS/HR: 50 INJECTION INTRAMUSCULAR; INTRAVENOUS at 21:32

## 2017-02-11 RX ADMIN — DOCUSATE SODIUM SCH MG: 100 CAPSULE, LIQUID FILLED ORAL at 15:40

## 2017-02-11 RX ADMIN — ERYTHROMYCIN LACTOBIONATE SCH MLS/HR: 500 INJECTION, POWDER, LYOPHILIZED, FOR SOLUTION INTRAVENOUS at 11:17

## 2017-02-11 RX ADMIN — HYDROMORPHONE HYDROCHLORIDE PRN MG: 1 INJECTION, SOLUTION INTRAMUSCULAR; INTRAVENOUS; SUBCUTANEOUS at 03:46

## 2017-02-11 RX ADMIN — HYDROMORPHONE HYDROCHLORIDE PRN MG: 1 INJECTION, SOLUTION INTRAMUSCULAR; INTRAVENOUS; SUBCUTANEOUS at 08:07

## 2017-02-11 RX ADMIN — PHENYTOIN SODIUM SCH MLS/HR: 50 INJECTION INTRAMUSCULAR; INTRAVENOUS at 08:10

## 2017-02-11 RX ADMIN — HYDROMORPHONE HYDROCHLORIDE PRN MG: 1 INJECTION, SOLUTION INTRAMUSCULAR; INTRAVENOUS; SUBCUTANEOUS at 17:04

## 2017-02-11 NOTE — HHI.PR
Subjective


Subjective Remarks


Complaining of persistent nausea, dry heaves


Indicates severe abdominal pain, mid abdomen radiates all the way across the 

back


States the pain is worse than before, "it's my pancreatitis"


Requesting more hydromorphone intravenously


Afebrile


No chest pain


No vomiting noted per staff


Has been able to tolerate oral medications





Review of Systems


Constitutional


Constitutional Remarks


12 point ROS completed, negative except as noted above





Vitals/Results


Vital Signs





 Vital Signs








  Date Time  Temp Pulse Resp B/P Pulse Ox O2 Delivery O2 Flow Rate FiO2


 


2/11/17 03:22 98.0 71 18 124/68 97   


 


2/10/17 21:20 98.1 61 19 129/81 98   


 


2/10/17 18:46  61 20 136/88  Room Air  


 


2/10/17 18:38  68 20 141/86  Room Air  


 


2/10/17 17:01  69 18 128/88 99 Room Air  


 


2/10/17 09:45 98.2 98 14 142/98 98   








CBC/BMP:  


2/10/17 1137                                                                   

             2/10/17 1137





Lab Results





Laboratory Tests








Test 2/10/17 2/10/17





 11:37 11:50


 


White Blood Count 3.6 TH/MM3 


 


Red Blood Count 4.21 MIL/MM3 


 


Hemoglobin 11.7 GM/DL 


 


Hematocrit 36.0 % 


 


Mean Corpuscular Volume 85.6 FL 


 


Mean Corpuscular Hemoglobin 27.8 PG 


 


Mean Corpuscular Hemoglobin 32.5 % 





Concent  


 


Red Cell Distribution Width 17.1 % 


 


Platelet Count 203 TH/MM3 


 


Mean Platelet Volume 9.1 FL 


 


Neutrophils (%) (Auto) 62.6 % 


 


Lymphocytes (%) (Auto) 28.6 % 


 


Monocytes (%) (Auto) 6.9 % 


 


Eosinophils (%) (Auto) 1.8 % 


 


Basophils (%) (Auto) 0.1 % 


 


Neutrophils # (Auto) 2.3 TH/MM3 


 


Lymphocytes # (Auto) 1.0 TH/MM3 


 


Monocytes # (Auto) 0.2 TH/MM3 


 


Eosinophils # (Auto) 0.1 TH/MM3 


 


Basophils # (Auto) 0.0 TH/MM3 


 


CBC Comment DIFF FINAL  


 


Differential Comment   


 


Sodium Level 144 MEQ/L 


 


Potassium Level 3.9 MEQ/L 


 


Chloride Level 113 MEQ/L 


 


Carbon Dioxide Level 24.2 MEQ/L 


 


Anion Gap 7 MEQ/L 


 


Blood Urea Nitrogen 12 MG/DL 


 


Creatinine 0.85 MG/DL 


 


Estimat Glomerular Filtration 69 ML/MIN 





Rate  


 


Random Glucose 83 MG/DL 


 


Calcium Level 8.7 MG/DL 


 


Total Bilirubin 0.3 MG/DL 


 


Aspartate Amino Transf 37 U/L 





(AST/SGOT)  


 


Alanine Aminotransferase 34 U/L 





(ALT/SGPT)  


 


Alkaline Phosphatase 85 U/L 


 


Total Protein 6.6 GM/DL 


 


Albumin 3.5 GM/DL 


 


Lipase 411 U/L 


 


Urine Color  YELLOW 


 


Urine Turbidity  CLEAR 


 


Urine pH  5.5 


 


Urine Specific Gravity  1.015 


 


Urine Protein  NEG mg/dL


 


Urine Glucose (UA)  NEG mg/dL


 


Urine Ketones  NEG mg/dL


 


Urine Occult Blood  TRACE 


 


Urine Nitrite  NEG 


 


Urine Bilirubin  NEG 


 


Urine Urobilinogen  LESS THAN 2.0





  MG/DL


 


Urine Leukocyte Esterase  SMALL 


 


Urine RBC  2 /hpf


 


Urine WBC  4 /hpf


 


Urine Squamous Epithelial  5 /hpf





Cells  


 


Urine Bacteria  RARE /hpf


 


Urine Mucus  FEW /lpf


 


Microscopic Urinalysis Comment  CULT NOT





  INDICATED











Physical Exam


General


General Appearance:  Well Developed, No Acute Distress, Comfortable, Anxious





Eyes


Eye Exam:  Pupils Equal, Pupils Reactive





Ears & Nose


Ears & Nose Exam:  Nasal Mucosa Pink





Throat


Throat Exam:  Oral Mucosa Pink & Moist





Neck


Neck Exam:  Neck Supple, Trachea Midline





Pulmonary


Resp Exam:  Clear Bilaterally





Cardiology


CV Exam:  Regular, Good Perfusion





Gastrointestinal/Abdomen


GI Exam:  Soft, Non-Distended, Bowel Sounds Hypoactive


GI Remarks


diffuse abdominal tenderness


voluntary guarding


c/o pain with minimal touch





Musculoskeletal


MS Exam:  Joints Intact





Integumentary


Skin Exam:  Warm, Dry





Extremeties


Extremities Exam:  No Edema, Pedal Pulses Palpable





Neurologic


Neuro Exam:  Alert, Awake, Oriented, Speech Clear, Moving All Extremities, No 

Focal Deficits





Psychiatric


Psych Exam:  Appropriate Responses





VTE Prophylaxis


VTE Prophylaxis Device:  SCDs





PUD Prophylasis


PUD Prophylaxis:  Protonix





Assessment/Plan


Problem List:  


(1) Abdominal pain


(2) Nausea & vomiting


(3) Chronic narcotic dependence


(4) Chronic pain


(5) History of fundoplication


(6) GERD (gastroesophageal reflux disease)


(7) History of gastrectomy


(8) Hx of hiatal hernia


(9) Elevated lipase


Assessment/Plan


Appreciate gastroenterology input, recommends to continue with symptom 

management, patient has had extensive workup in the past no interventions 

recommended at this time


Patient recommended to follow-up at [facility] possibly [facility] due to 

chronicity of problems


Patient is to follow-up with outpatient pain management specialist as well





Continue with IV fluids


Anti-emetics as needed


Continue with Valium around-the-clock as well as Ativan when necessary


Continue with Dilaudid for now we'll start weaning off later today and changed 

to oral medications


Attending discussed with patient at length the need to monitor her narcotic use 

as this worsens gastroparesis


Advance diet to clear liquid








Continue home medications


SCDs for DVT prophylaxis


We will add Protonix for GI prophylaxis


Monitor electrolytes


Labs pending, poor IV access


Hopefully discharge tomorrow





D/W RN


D/W Dr. Estrella


D/W pt


This patient was seen by myself and Dr. Estrella, this note is written on his 

behalf





Problem Qualifiers





(1) Abdominal pain:  


Qualified Code:  R10.84 - Generalized abdominal pain


(2) Nausea & vomiting:  


Qualified Code:  G43.A1 - Intractable cyclical vomiting with nausea


(3) Chronic pain:  


Qualified Code:  G89.4 - Chronic pain syndrome


(4) GERD (gastroesophageal reflux disease):  


Qualified Code:  K21.9 - Gastroesophageal reflux disease, esophagitis presence 

not specified





Nikia Simental Feb 11, 2017 07:35

## 2017-02-11 NOTE — HHI.GIFU
Subjective


Remarks


Resting in bed.  States her pain is "real bad today."  C/O constant epigastric/

LUQ pain "where my pancreatitis is."  C/O Nausea, but is anxious to have diet 

advanced. (Abbi Walsh)





Objective


Vitals I&O





 Vital Signs








  Date Time  Temp Pulse Resp B/P Pulse Ox O2 Delivery O2 Flow Rate FiO2


 


2/11/17 07:45 98.0 78 17 131/74 100   


 


2/11/17 03:22 98.0 71 18 124/68 97   


 


2/10/17 21:20 98.1 61 19 129/81 98   


 


2/10/17 18:46  61 20 136/88  Room Air  


 


2/10/17 18:38  68 20 141/86  Room Air  


 


2/10/17 17:01  69 18 128/88 99 Room Air  


 


2/10/17 09:45 98.2 98 14 142/98 98   








Laboratory





Laboratory Tests








Test 2/10/17 2/10/17





 11:37 11:50


 


White Blood Count 3.6  


 


Red Blood Count 4.21  


 


Hemoglobin 11.7  


 


Hematocrit 36.0  


 


Mean Corpuscular Volume 85.6  


 


Mean Corpuscular Hemoglobin 27.8  


 


Mean Corpuscular Hemoglobin 32.5  





Concent  


 


Red Cell Distribution Width 17.1  


 


Platelet Count 203  


 


Mean Platelet Volume 9.1  


 


Neutrophils (%) (Auto) 62.6  


 


Lymphocytes (%) (Auto) 28.6  


 


Monocytes (%) (Auto) 6.9  


 


Eosinophils (%) (Auto) 1.8  


 


Basophils (%) (Auto) 0.1  


 


Neutrophils # (Auto) 2.3  


 


Lymphocytes # (Auto) 1.0  


 


Monocytes # (Auto) 0.2  


 


Eosinophils # (Auto) 0.1  


 


Basophils # (Auto) 0.0  


 


CBC Comment DIFF FINAL  


 


Differential Comment   


 


Sodium Level 144  


 


Potassium Level 3.9  


 


Chloride Level 113  


 


Carbon Dioxide Level 24.2  


 


Anion Gap 7  


 


Blood Urea Nitrogen 12  


 


Creatinine 0.85  


 


Estimat Glomerular Filtration 69  





Rate  


 


Random Glucose 83  


 


Calcium Level 8.7  


 


Total Bilirubin 0.3  


 


Aspartate Amino Transf 37  





(AST/SGOT)  


 


Alanine Aminotransferase 34  





(ALT/SGPT)  


 


Alkaline Phosphatase 85  


 


Total Protein 6.6  


 


Albumin 3.5  


 


Lipase 411  


 


Urine Color  YELLOW 


 


Urine Turbidity  CLEAR 


 


Urine pH  5.5 


 


Urine Specific Gravity  1.015 


 


Urine Protein  NEG 


 


Urine Glucose (UA)  NEG 


 


Urine Ketones  NEG 


 


Urine Occult Blood  TRACE 


 


Urine Nitrite  NEG 


 


Urine Bilirubin  NEG 


 


Urine Urobilinogen  LESS THAN 2.0 


 


Urine Leukocyte Esterase  SMALL 


 


Urine RBC  2 


 


Urine WBC  4 


 


Urine Squamous Epithelial  5 





Cells  


 


Urine Bacteria  RARE 


 


Urine Mucus  FEW 


 


Microscopic Urinalysis Comment  CULT NOT





  INDICATED








Physical Exam


HEENT: Normocephalic; atraumatic; no jaundice.  Throat is clear.


NECK: Neck is supple, no JVD, no lymphadenopathy.


CHEST:  CTA.


CARDIAC:  RRR


ABDOMEN:  Soft, nondistended, diffuse tenderness, worse in epigastric area, 

left side.; no hepatosplenomegaly; bowel sounds are present in all four 

quadrants.


EXTREMITIES: No clubbing, cyanosis, or edema.


SKIN:  Normal; no rash; no jaundice.


CNS:  No focal deficits; alert and oriented times three. (Abbi Walsh)





Assessment and Plan


Plan


ASSESSMENT:


- Acute on chronic abdominal pain with associated nausea/dry heaving.  S/P 

extensive testing with EGD/Colonoscopy, CT scans, Xrays,


   MRI, GES.  Recently found to have gastroparesis with good response to EES.  

EGD/Colonoscopy (6/17/16) and this revealed s/p gastric


   bypass, biopsy of gastric polyp; diverticulosis in sigmoid and descending 

colon, retroflexed views revealed small internal hemorrhoids, 


   external hemorrhoids.  Pathology revealed mild chronic gastritis, negative 

for helicobacter pylori.  Pt still with significant pain and nausea,


   but also feels that her hunger may be contributing to this and would like a 

diet ordered.  Will give clear liquids, trial of EES, PPI.  


- Elevated lipase, undetermined significance.  This is very minimally elevated 

and suspect that it may be related to her n/v.





PLAN:


- Clear liquids


- IVF


- PPI


- EES


- Zofran prn


- Consider repeat EGD if no improvement


- Supportive care


- Further recommendations to follow based on results of above


- Pt seen and examined by Dr. Olivera and myself and this note is written on 

his behalf (Abbi Walsh)


Physician Comments


Agree with above, repeat lipase, amylase. Advance diet. Possible egd next week. 

(Tameka Olivera MD)








Abbi Walsh Feb 11, 2017 09:52


Tameka Olivera MD Feb 11, 2017 12:56

## 2017-02-11 NOTE — EKG
Date Performed: 02/10/2017       Time Performed: 12:05:29

 

PTAGE:      56 years

 

EKG:      Sinus rhythm 

 

 LEFT BUNDLE BRANCH BLOCK ABNORMAL ECG Compared to prior tracing no significant change 

 

 PREVIOUS TRACING            : 02/04/2017 07.43

 

DOCTOR:   Jesus Russell  Interpretating Date/Time  02/11/2017 13:13:57

## 2017-02-12 VITALS
RESPIRATION RATE: 18 BRPM | HEART RATE: 70 BPM | OXYGEN SATURATION: 93 % | DIASTOLIC BLOOD PRESSURE: 84 MMHG | SYSTOLIC BLOOD PRESSURE: 146 MMHG | TEMPERATURE: 98 F

## 2017-02-12 VITALS
RESPIRATION RATE: 18 BRPM | HEART RATE: 78 BPM | OXYGEN SATURATION: 96 % | DIASTOLIC BLOOD PRESSURE: 71 MMHG | SYSTOLIC BLOOD PRESSURE: 113 MMHG

## 2017-02-12 VITALS
HEART RATE: 72 BPM | RESPIRATION RATE: 18 BRPM | DIASTOLIC BLOOD PRESSURE: 74 MMHG | OXYGEN SATURATION: 96 % | TEMPERATURE: 98 F | SYSTOLIC BLOOD PRESSURE: 118 MMHG

## 2017-02-12 RX ADMIN — HYDROMORPHONE HYDROCHLORIDE PRN MG: 1 INJECTION, SOLUTION INTRAMUSCULAR; INTRAVENOUS; SUBCUTANEOUS at 03:25

## 2017-02-12 RX ADMIN — DOCUSATE SODIUM SCH MG: 100 CAPSULE, LIQUID FILLED ORAL at 03:53

## 2017-02-12 RX ADMIN — DIAZEPAM SCH MG: 10 TABLET ORAL at 09:17

## 2017-02-12 RX ADMIN — HYDROMORPHONE HYDROCHLORIDE PRN MG: 1 INJECTION, SOLUTION INTRAMUSCULAR; INTRAVENOUS; SUBCUTANEOUS at 08:05

## 2017-02-12 RX ADMIN — ERYTHROMYCIN LACTOBIONATE SCH MLS/HR: 500 INJECTION, POWDER, LYOPHILIZED, FOR SOLUTION INTRAVENOUS at 03:24

## 2017-02-12 RX ADMIN — ERYTHROMYCIN LACTOBIONATE SCH MLS/HR: 500 INJECTION, POWDER, LYOPHILIZED, FOR SOLUTION INTRAVENOUS at 10:53

## 2017-02-12 RX ADMIN — PANTOPRAZOLE SCH MG: 40 TABLET, DELAYED RELEASE ORAL at 09:17

## 2017-02-12 RX ADMIN — PHENYTOIN SODIUM SCH MLS/HR: 50 INJECTION INTRAMUSCULAR; INTRAVENOUS at 08:10

## 2017-02-12 RX ADMIN — DIAZEPAM SCH MG: 10 TABLET ORAL at 13:08

## 2017-02-12 RX ADMIN — OLANZAPINE SCH MG: 5 TABLET, FILM COATED ORAL at 10:53

## 2017-02-12 RX ADMIN — DOCUSATE SODIUM SCH MG: 100 CAPSULE, LIQUID FILLED ORAL at 15:00

## 2017-02-12 RX ADMIN — SODIUM CHLORIDE, PRESERVATIVE FREE SCH ML: 5 INJECTION INTRAVENOUS at 09:17

## 2017-02-12 NOTE — HHI.DCPOC
Discharge Care Plan


Diagnosis:  


(1) Abdominal pain


(2) Nausea & vomiting


(3) Chronic narcotic dependence


(4) Elevated lipase


(5) Hx of hiatal hernia


(6) History of gastrectomy


(7) Chronic pain


Your Health Problems Are:     Appetite Changes


         Irregular Bowel Function


Goals to Promote Your Health


* To prevent worsening of your condition and complications


* To maintain your health at the optimal level


Directions to Meet Your Goals


*** Take your medications as prescribed


*** Follow your dietary instruction


*** Follow activity as directed








*** Keep your appointments as scheduled


*** Take your immunizations and boosters as scheduled


*** If your symptoms worsen call your PCP, if no PCP go to Urgent Care Center 

or Emergency Room***


*** Smoking is Dangerous to Your Health. Avoid second hand smoke***


***Call the 24-hour hour crisis hotline for domestic abuse at 1-858.150.1011***








Nikia Simental Feb 12, 2017 10:48

## 2017-02-12 NOTE — HHI.PR
Subjective


Subjective Remarks


has been able to keep liquids down


nausea improved, no vomiting


pain still persistent, slight improvement


wants to try more foods and poss. go home this afternoon


ok with switching to PO Dilaudid


understands she has to call [facility] or [facility] for further work up for chronic GI 

and pain problems 


no fever


no cp


no sob





Review of Systems


Constitutional


Constitutional Remarks


12 point ROS completed, negative except as noted above





Vitals/Results


Intake & Output











 2/11/17 2/11/17 2/12/17





 15:00 23:00 07:00


 


Intake Total  1200 ml 


 


Balance  1200 ml 


 


   


 


Intake IV Total  1200 ml 








Vital Signs





 Vital Signs








  Date Time  Temp Pulse Resp B/P Pulse Ox O2 Delivery O2 Flow Rate FiO2


 


2/12/17 08:00 98.0 70 18 146/84 93   


 


2/12/17 04:20 98.0 72 18 118/74 96   


 


2/11/17 23:45 98.2 73 19 116/72 96   


 


2/11/17 19:36 98.2 76 17 120/72 96   


 


2/11/17 15:30 98.3 73 18 116/72 97   


 


2/11/17 12:21 97.8 65 17 135/82 100   








CBC/BMP:  


2/11/17 1612                                                                   

             2/11/17 1233





Lab Results





Laboratory Tests








Test 2/11/17 2/11/17





 12:33 16:12


 


Sodium Level 143 MEQ/L 


 


Potassium Level 3.9 MEQ/L 


 


Chloride Level 110 MEQ/L 


 


Carbon Dioxide Level 22.6 MEQ/L 


 


Anion Gap 10 MEQ/L 


 


Blood Urea Nitrogen 6 MG/DL 


 


Creatinine 0.73 MG/DL 


 


Estimat Glomerular Filtration 82 ML/MIN 





Rate  


 


Random Glucose 75 MG/DL 


 


Calcium Level 8.0 MG/DL 


 


Total Bilirubin 0.3 MG/DL 


 


Aspartate Amino Transf 36 U/L 





(AST/SGOT)  


 


Alanine Aminotransferase 25 U/L 





(ALT/SGPT)  


 


Alkaline Phosphatase 74 U/L 


 


Total Protein 5.5 GM/DL 


 


Albumin 3.0 GM/DL 


 


Lipase 149 U/L 


 


White Blood Count  3.4 TH/MM3


 


Red Blood Count  3.44 MIL/MM3


 


Hemoglobin  10.0 GM/DL


 


Hematocrit  29.5 %


 


Mean Corpuscular Volume  85.7 FL


 


Mean Corpuscular Hemoglobin  29.0 PG


 


Mean Corpuscular Hemoglobin  33.9 %





Concent  


 


Red Cell Distribution Width  17.0 %


 


Platelet Count  145 TH/MM3


 


Mean Platelet Volume  9.3 FL


 


Neutrophils (%) (Auto)  38.1 %


 


Lymphocytes (%) (Auto)  47.7 %


 


Monocytes (%) (Auto)  8.4 %


 


Eosinophils (%) (Auto)  5.7 %


 


Basophils (%) (Auto)  0.1 %


 


Neutrophils # (Auto)  1.3 TH/MM3


 


Lymphocytes # (Auto)  1.6 TH/MM3


 


Monocytes # (Auto)  0.3 TH/MM3


 


Eosinophils # (Auto)  0.2 TH/MM3


 


Basophils # (Auto)  0.0 TH/MM3


 


CBC Comment  DIFF FINAL 


 


Differential Comment   











Physical Exam


General


General Appearance:  Well Developed, No Acute Distress, Comfortable, Anxious





Eyes


Eye Exam:  Pupils Equal, Pupils Reactive





Ears & Nose


Ears & Nose Exam:  Nasal Mucosa Pink





Throat


Throat Exam:  Oral Mucosa Pink & Moist





Neck


Neck Exam:  Neck Supple, Trachea Midline





Pulmonary


Resp Exam:  Clear Bilaterally





Cardiology


CV Exam:  Regular, Good Perfusion





Gastrointestinal/Abdomen


GI Exam:  Soft, Bowel Sounds Present, Non-Distended, Bowel Sounds Hypoactive


GI Remarks


diffuse tenderness





Musculoskeletal


MS Exam:  Joints Intact





Integumentary


Skin Exam:  Warm, Dry





Extremeties


Extremities Exam:  No Edema, Pedal Pulses Palpable





Neurologic


Neuro Exam:  Alert, Awake, Oriented, Speech Clear, Moving All Extremities, No 

Focal Deficits





Psychiatric


Psych Exam:  Appropriate Responses





VTE Prophylaxis


VTE Prophylaxis Device:  SCDs





PUD Prophylasis


PUD Prophylaxis:  Protonix





Assessment/Plan


Problem List:  


(1) Abdominal pain


(2) Nausea & vomiting


(3) Chronic narcotic dependence


(4) Chronic pain


(5) History of fundoplication


(6) GERD (gastroesophageal reflux disease)


(7) History of gastrectomy


(8) Hx of hiatal hernia


(9) Elevated lipase


Assessment/Plan


Appreciate gastroenterology input, recommends to continue with symptom 

management, patient has had extensive workup in the past no interventions 

recommended at this time


Patient recommended to follow-up at [facility] possibly [facility] due to 

chronicity of problems


Patient is to follow-up with outpatient pain management specialist as well





Continue with IV fluids-dec. to 60/hr 


Anti-emetics as needed


Continue with Valium around-the-clock as well as Ativan when necessary


Change to oral Dilaudid, no more IV narcotics, D/W pt and staff 


Attending discussed with patient at length the need to monitor her narcotic use 

as this worsens gastroparesis


Advance to full liquid diet


started on Erythromycin by GI, tolerating well 








Continue home medications


SCDs for DVT prophylaxis


Protonix for GI prophylaxis


Lipase back to normal


Will re-evaluate this afternoon, if she is able to tolerate full liquid will dc 

today


Needs to F/U at [facility] or [facility] for further work up, verbalizes understanding





D/W RN


D/W Dr. Estrella


D/W pt


This patient was seen by myself and Dr. Estrella, this note is written on his 

behalf





Problem Qualifiers





(1) Abdominal pain:  


Qualified Code:  R10.84 - Generalized abdominal pain


(2) Nausea & vomiting:  


Qualified Code:  G43.A1 - Intractable cyclical vomiting with nausea


(3) Chronic pain:  


Qualified Code:  G89.4 - Chronic pain syndrome


(4) GERD (gastroesophageal reflux disease):  


Qualified Code:  K21.9 - Gastroesophageal reflux disease, esophagitis presence 

not specified





Nikia Simental Feb 12, 2017 08:18

## 2017-02-12 NOTE — HHI.DS
Discharge Summary


Admission Date


Feb 10, 2017 at 15:21


Discharge Date:  Feb 12, 2017


Admitting Diagnosis


intractable abdominal pain and nausea and vomit





(1) Abdominal pain


(2) Nausea & vomiting


(3) History of gastrectomy


(4) Hx of hiatal hernia


(5) Elevated lipase


(6) Chronic narcotic dependence


(7) Chronic pain


(8) History of fundoplication


CBC/BMP:  


2/11/17 1612                                                                   

             2/11/17 1233





Significant Findings





Laboratory Tests








Test 2/10/17 2/10/17 2/11/17 2/11/17





 11:37 11:50 12:33 16:12


 


White Blood Count 3.6 TH/MM3   3.4 TH/MM3





 (4.0-11.0)   (4.0-11.0)


 


Chloride Level 113 MEQ/L  110 MEQ/L 





 ()  () 


 


Estimat Glomerular Filtration 69 ML/MIN (>89)  82 ML/MIN (>89) 





Rate    


 


Lipase 411 U/L   





 ()   


 


Urine Occult Blood  TRACE  (NEG)  


 


Urine Leukocyte Esterase  SMALL  (NEG)  


 


Urine Bacteria  RARE /hpf  





  (NONE)  


 


Urine Mucus  FEW /lpf (OCC)  


 


Blood Urea Nitrogen   6 MG/DL (7-18) 


 


Calcium Level   8.0 MG/DL 





   (8.5-10.1) 


 


Total Protein   5.5 GM/DL 





   (6.4-8.2) 


 


Albumin   3.0 GM/DL 





   (3.4-5.0) 


 


Red Blood Count    3.44 MIL/MM3





    (4.00-5.30)


 


Hemoglobin    10.0 GM/DL





    (11.6-15.3)


 


Hematocrit    29.5 %





    (35.0-46.0)


 


Platelet Count    145 TH/MM3





    (150-450)


 


Lymphocytes (%) (Auto)    47.7 %





    (9.0-44.0)


 


Monocytes (%) (Auto)    8.4 % (0.0-8.0)


 


Eosinophils (%) (Auto)    5.7 % (0.0-4.0)


 


Neutrophils # (Auto)    1.3 TH/MM3





    (1.8-7.7)








Hospital Course


The patient is a 56-year-old  female who is known to our service from


quite a few previous admissions.  The patient has had chronic abdominal pain


and nausea and vomiting.  The patient has a history of gastroparesis.  She is


on a chronic Fentanyl and Dilaudid.  The patient again came to the ER because


of the intractable nausea, vomiting and abdominal pain. According to the


patient, this time it has been going on for three to four days and she has been


unable to keep anything down. She felt pretty lousy and was unable to


tolerate anything by mouth. The patient was evaluated by the emergency room


physician who did talk to the gastroenterologist who recommended for admission


for intractable nausea and vomiting with gastroparesis and chronic abdominal


pain.


 


The patient was seen in her ER room with the emergency room nurse at bedside.


The patient was still having nausea. She had abdominal pain which was severe in


intensity as per the patient. She had no other associated symptoms. Pt. was 

admitted for 





(1) Abdominal pain


(2) Nausea & vomiting


(3) Chronic narcotic dependence


(4) Chronic pain


(5) History of fundoplication


(6) GERD (gastroesophageal reflux disease)


(7) History of gastrectomy


(8) Hx of hiatal hernia


(9) Elevated lipase


During the course of the hospitalization, the following to place,


Patient admitted, put on IV fluids, unable to take Zofran because of allergies.


Unable to take Reglan because of allergies


Resumed Ativan and Valium, indicated that this helped with nausea


Gastroenterology consulted- recommended to continue with symptom management, 

patient has had extensive workup in the past no interventions recommended at 

this time


Patient recommended to follow-up at [facility] possibly [facility] due to 

chronicity of problems


Erythromycin was added Continue with IV fluids-dec. to 60/hr 


Anti-emetics as needed


Continue with Valium around-the-clock as well as Ativan when necessary


Change to oral Dilaudid, no more IV narcotics, D/W pt and staff 


Attending discussed with patient at length the need to monitor her narcotic use 

as this worsens gastroparesis


Advance to full liquid diet


started on Erythromycin by , tolerating well 


Diet was advanced slowly, she tolerated well.  She had no further vomiting, 

pain was well controlled.  She was changed from IV Dilaudid to oral.


Electrolytes remained stable


Lipase back to normal, no pancreatitis--Elevation was likely due to nausea 

vomiting.


Continued home medications


Put on SCDs for DVT prophylaxis and Protonix for GI prophylaxis


Instructed to F/U at [facility] or [facility] for further work up, verbalized 

understanding


Pt. ready for discharge, symptoms better controlled


Discharge home


Instructed to F/U GI, [facility] or [facility]


Diet-as tolerated


Activity-as tolerated


Pt Condition on Discharge:  Stable


Discharge Disposition:  Discharge Home


Discharge Instructions


DIET: Follow Instructions for:  Heart Healthy Diet


Activities you can perform:  Weight Bearing as Nu


Follow up Referrals:  


Gastroenterology with Gina Davila MD


PCP Follow-up





Continued Medications:  


Buspirone (Buspirone) 7.5 Mg Tab


7.5 MG PO BID Anxiety Ref 0 TAB


Cyanocobalamin Inj (Cyanocobalamin Inj) 1,000 Mcg/Ml Inj


1000 MCG IM Q30D #1 Ref 0 VIAL


Diazepam (Valium) 10 Mg Tab


10 MG PO TID Ref 0 TAB


Hydromorphone (Dilaudid) 4 Mg Tab


4 MG PO Q6H PRN Pain Management Ref 0 TAB


Imipramine HCL (Imipramine HCL) 25 Mg Tab


25 MG PO HS Control Depression Ref 0 TAB


Olanzapine (Zyprexa) 5 Mg Tab


5 MG PO BID #60 Ref 0 TAB


 


Discontinued Medications:  


Ciprofloxacin (Ciprofloxacin) 500 Mg Tab


500 MG PO BID Infection Ref 0 TAB











Nikia Simental Feb 12, 2017 15:16

## 2017-02-15 ENCOUNTER — HOSPITAL ENCOUNTER (EMERGENCY)
Dept: HOSPITAL 17 - NETRI | Age: 57
Discharge: HOME | End: 2017-02-15
Payer: COMMERCIAL

## 2017-02-15 VITALS
DIASTOLIC BLOOD PRESSURE: 84 MMHG | TEMPERATURE: 98.4 F | OXYGEN SATURATION: 96 % | RESPIRATION RATE: 16 BRPM | SYSTOLIC BLOOD PRESSURE: 156 MMHG | HEART RATE: 102 BPM

## 2017-02-15 DIAGNOSIS — R55: Primary | ICD-10-CM

## 2017-02-15 LAB
ANION GAP SERPL CALC-SCNC: 10 MEQ/L (ref 5–15)
BASOPHILS # BLD AUTO: 0 TH/MM3 (ref 0–0.2)
BASOPHILS NFR BLD: 0.2 % (ref 0–2)
BUN SERPL-MCNC: 13 MG/DL (ref 7–18)
CHLORIDE SERPL-SCNC: 102 MEQ/L (ref 98–107)
EOSINOPHIL # BLD: 0 TH/MM3 (ref 0–0.4)
EOSINOPHIL NFR BLD: 1.2 % (ref 0–4)
ERYTHROCYTE [DISTWIDTH] IN BLOOD BY AUTOMATED COUNT: 16.9 % (ref 11.6–17.2)
GFR SERPLBLD BASED ON 1.73 SQ M-ARVRAT: 68 ML/MIN (ref 89–?)
HCO3 BLD-SCNC: 27.4 MEQ/L (ref 21–32)
HCT VFR BLD CALC: 34.8 % (ref 35–46)
HEMO FLAGS: (no result)
LYMPHOCYTES # BLD AUTO: 1 TH/MM3 (ref 1–4.8)
LYMPHOCYTES NFR BLD AUTO: 25.4 % (ref 9–44)
MCH RBC QN AUTO: 28.6 PG (ref 27–34)
MCHC RBC AUTO-ENTMCNC: 33.3 % (ref 32–36)
MCV RBC AUTO: 86 FL (ref 80–100)
MONOCYTES NFR BLD: 9.1 % (ref 0–8)
NEUTROPHILS # BLD AUTO: 2.5 TH/MM3 (ref 1.8–7.7)
NEUTROPHILS NFR BLD AUTO: 64.1 % (ref 16–70)
PLATELET # BLD: 143 TH/MM3 (ref 150–450)
POTASSIUM SERPL-SCNC: 3.1 MEQ/L (ref 3.5–5.1)
RBC # BLD AUTO: 4.05 MIL/MM3 (ref 4–5.3)
SODIUM SERPL-SCNC: 139 MEQ/L (ref 136–145)
WBC # BLD AUTO: 3.8 TH/MM3 (ref 4–11)

## 2017-02-15 PROCEDURE — 85025 COMPLETE CBC W/AUTO DIFF WBC: CPT

## 2017-02-15 PROCEDURE — 93005 ELECTROCARDIOGRAM TRACING: CPT

## 2017-02-15 PROCEDURE — 80048 BASIC METABOLIC PNL TOTAL CA: CPT

## 2017-02-15 NOTE — PD
HPI


Chief Complaint:  Fall


Time Seen by Provider:  12:45


Travel History


International Travel<30 days:  No


Contact w/Intl Traveler<30days:  No


Traveled to known affect area:  No





History of Present Illness


HPI


56-year-old woman, frequent ED patient with chronic recurrent abdominal pain, 

and pain all over, presents today after she states that she passed out last 

night and was weak and lying on the floor.  She states she has pain all over 

after a fall.  Since include her head elbow and hip.  She denies any other new 

or worsening symptoms.





History


Past Medical History


*** Narrative Medical


Per patient


Previous gastrectomy


History of kidney CA


History of CVA


CAD, MI





Chest is chronic recurrent abdominal pain, is on chronic opiates.


Menopausal:  Yes


:  1


Para:  1


Dilation and Curettage (D&C):  Yes





Social History


Alcohol Use:  No


Tobacco Use:  No





Allergies-Medications


(Allergen,Severity, Reaction):  


Coded Allergies:  


     Compazine (Verified  Allergy, Severe, SOB, 2/15/17)


     Gadolinium Derivatives (Verified  Allergy, Severe, RESPIRATORY DISTRESS, 2/

15/17)


     Lobster (Verified  Allergy, Severe, Anaphylaxis, 2/15/17)


     Morphine (Verified  Allergy, Severe, Hives, 2/15/17)


 PT HAVING HIVES AND ITCHING TO ARM ABOVE IV SITE AFTER


 ADMINISTRATION OF MORPHINE


     Phenergan (Verified  Allergy, Severe, SOB, 2/15/17)


     Reglan (Verified  Allergy, Severe, SOB, 2/15/17)


     Toradol (Verified  Allergy, Severe, Shortness of Breath, 2/15/17)


     Zofran (Verified  Allergy, Severe, SOB, 2/15/17)


     Penicillin (Verified  Allergy, Mild, RASH, 2/15/17)


     Sulfa (Verified  Allergy, Mild, RASH, 2/15/17)


     *MDRO Multi-Drug Resistant Organism (Verified  Adverse Reaction, Unknown, )


 MDR-E. coli (urine) - 16


Uncoded Allergies:  


     GADOLINIUM (Adverse Reaction, Severe, PT STOPPED BREATHING WITH GADO, )


 PT STATES SHE STOPPED BREATHING AFTER HAVING GADO AT ANOTHER


 FACILITY. 17


 [facility]


Reported Meds & Prescriptions





Reported Meds & Active Scripts


Active


Reported


Dilaudid (Hydromorphone HCl) 4 Mg Tab 4 Mg PO Q6H PRN


Valium (Diazepam) 10 Mg Tab 10 Mg PO TID


Buspirone (Buspirone HCl) 7.5 Mg Tab 7.5 Mg PO BID


Zyprexa (Olanzapine) 5 Mg Tab 5 Mg PO BID


Cyanocobalamin Inj (Cyanocobalamin) 1,000 Mcg/Ml Inj 1,000 Mcg IM Q30D


Imipramine HCL (Imipramine HCl) 25 Mg Tab 25 Mg PO HS








Review of Systems


Except as stated in HPI:  all other systems reviewed are Neg





Physical Exam


Narrative


GENERAL: 56-year-old woman, no acute distress.


SKIN: Warm and dry.


HEAD: Normocephalic.  She has a couple areas of tenderness but don't see any 

contusions or bruises or areas of ecchymosis.


EYES: Pupils equal and round. No scleral icterus. No injection or drainage. 


ENT: No nasal bleeding or discharge.  Mucous membranes pink and moist.


NECK: Trachea midline.  Moves neck freely.  Some paraspinous muscle neck 

tenderness.


CARDIOVASCULAR: Regular rate and rhythm.  No murmur appreciated.


RESPIRATORY: No accessory muscle use. Clear to auscultation. Breath sounds 

equal bilaterally. 


GASTROINTESTINAL: Admits flat and soft.  Minimal diffuse tenderness.


MUSCULOSKELETAL: No obvious deformities.  Decreased muscle bulk.  No edema.


NEUROLOGICAL: Awake and alert. No obvious cranial nerve deficits.  Motor 

grossly within normal limits. Normal speech.


PSYCHIATRIC: Anxious.





Data


Data


Last Documented VS





Vital Signs








  Date Time  Temp Pulse Resp B/P Pulse Ox O2 Delivery O2 Flow Rate FiO2


 


2/15/17 06:11 98.4 102 16 156/84 96   








Orders





 Complete Blood Count With Diff (2/15/17 10:52)


Basic Metabolic Panel (Bmp) (2/15/17 10:52)


Electrocardiogram (2/15/17 )





Labs








 Laboratory Tests








Test 2/15/17





 11:15


 


White Blood Count 3.8 TH/MM3


 


Red Blood Count 4.05 MIL/MM3


 


Hemoglobin 11.6 GM/DL


 


Hematocrit 34.8 %


 


Mean Corpuscular Volume 86.0 FL


 


Mean Corpuscular Hemoglobin 28.6 PG


 


Mean Corpuscular Hemoglobin 33.3 %





Concent 


 


Red Cell Distribution Width 16.9 %


 


Platelet Count 143 TH/MM3


 


Mean Platelet Volume 10.2 FL


 


Neutrophils (%) (Auto) 64.1 %


 


Lymphocytes (%) (Auto) 25.4 %


 


Monocytes (%) (Auto) 9.1 %


 


Eosinophils (%) (Auto) 1.2 %


 


Basophils (%) (Auto) 0.2 %


 


Neutrophils # (Auto) 2.5 TH/MM3


 


Lymphocytes # (Auto) 1.0 TH/MM3


 


Monocytes # (Auto) 0.3 TH/MM3


 


Eosinophils # (Auto) 0.0 TH/MM3


 


Basophils # (Auto) 0.0 TH/MM3


 


CBC Comment DIFF FINAL 


 


Differential Comment  


 


Sodium Level 139 MEQ/L


 


Potassium Level 3.1 MEQ/L


 


Chloride Level 102 MEQ/L


 


Carbon Dioxide Level 27.4 MEQ/L


 


Anion Gap 10 MEQ/L


 


Blood Urea Nitrogen 13 MG/DL


 


Creatinine 0.86 MG/DL


 


Estimat Glomerular Filtration 68 ML/MIN





Rate 


 


Random Glucose 95 MG/DL


 


Calcium Level 9.3 MG/DL














[facility]


Medical Decision Making


Medical Screen Exam Complete:  Yes


Emergency Medical Condition:  Yes


Interpretation(s)


My review of EKG: Normal sinus rhythm at a rate of 84, left bundle branch block

, otherwise unremarkable.  Compared to previous EKG from 5 days ago there is no 

significant change.





CBC and CMP are unremarkable.


Differential Diagnosis


Syncope and collapse, adverse effect of prescription medications, dehydration, 

anemia, other


Narrative Course


Medical decision making





56-year-old woman, chronic recurrent abdominal pain, on multiple sedating 

medications, presents after a trip and fall or syncope and collapse, was on the 

floor for a while.  She was just discharged nostril 3 days ago.  She is here 

several times a month with various complaints most related to her chronic 

recurrent abdominal pain.  She looks about normal for her.  I saw her walk and 

she is walking unassisted without any difficulty.  I don't see any evidence of 

significant trauma on her.  Recommend screening labs EKG and outpatient follow-

up.





Diagnosis





 Primary Impression:  


 Syncope and collapse


Patient Instructions:  General Instructions





***Additional Instructions:


Reduce her restrict sedating medications.





Follow-up with her primary doctor in the next 2-4 days.





Return to the emergency department for any new or worsening symptoms.


***Med/Other Pt SpecificInfo:  No Change to Meds


Disposition:  01 DISCHARGE HOME


Condition:  Stable








Jesus Alberto Byrnes MD Feb 15, 2017 11:12

## 2017-02-16 NOTE — EKG
Date Performed: 02/15/2017       Time Performed: 11:05:10

 

PTAGE:      56 years

 

EKG:      Sinus rhythm 

 

 LEFT ATRIAL ENLARGEMENT LEFT BUNDLE BRANCH BLOCK ABNORMAL ECG

 

PREVIOUS TRACING       : 02/10/2017 12.05

 

DOCTOR:   Jesus Alberto Shankar  Interpretating Date/Time  02/16/2017 11:27:22

## 2017-02-25 ENCOUNTER — HOSPITAL ENCOUNTER (EMERGENCY)
Dept: HOSPITAL 17 - NEPC | Age: 57
Discharge: HOME | End: 2017-02-25
Payer: COMMERCIAL

## 2017-02-25 VITALS
HEART RATE: 88 BPM | SYSTOLIC BLOOD PRESSURE: 157 MMHG | TEMPERATURE: 98.1 F | OXYGEN SATURATION: 97 % | DIASTOLIC BLOOD PRESSURE: 89 MMHG | RESPIRATION RATE: 16 BRPM

## 2017-02-25 VITALS — HEART RATE: 90 BPM | RESPIRATION RATE: 16 BRPM | OXYGEN SATURATION: 95 %

## 2017-02-25 VITALS
HEART RATE: 88 BPM | DIASTOLIC BLOOD PRESSURE: 99 MMHG | RESPIRATION RATE: 16 BRPM | OXYGEN SATURATION: 98 % | SYSTOLIC BLOOD PRESSURE: 168 MMHG

## 2017-02-25 VITALS — HEIGHT: 62 IN | WEIGHT: 102.51 LBS | BODY MASS INDEX: 18.86 KG/M2

## 2017-02-25 VITALS — RESPIRATION RATE: 16 BRPM

## 2017-02-25 DIAGNOSIS — Z86.69: ICD-10-CM

## 2017-02-25 DIAGNOSIS — Z87.448: ICD-10-CM

## 2017-02-25 DIAGNOSIS — Z86.59: ICD-10-CM

## 2017-02-25 DIAGNOSIS — Z87.01: ICD-10-CM

## 2017-02-25 DIAGNOSIS — Z87.19: ICD-10-CM

## 2017-02-25 DIAGNOSIS — G89.29: ICD-10-CM

## 2017-02-25 DIAGNOSIS — Z86.73: ICD-10-CM

## 2017-02-25 DIAGNOSIS — Z86.79: ICD-10-CM

## 2017-02-25 DIAGNOSIS — R10.84: Primary | ICD-10-CM

## 2017-02-25 DIAGNOSIS — R11.0: ICD-10-CM

## 2017-02-25 DIAGNOSIS — Z85.528: ICD-10-CM

## 2017-02-25 LAB
ALP SERPL-CCNC: 86 U/L (ref 45–117)
ALT SERPL-CCNC: 28 U/L (ref 10–53)
ANION GAP SERPL CALC-SCNC: 8 MEQ/L (ref 5–15)
AST SERPL-CCNC: 24 U/L (ref 15–37)
BASOPHILS # BLD AUTO: 0 TH/MM3 (ref 0–0.2)
BASOPHILS NFR BLD: 0.1 % (ref 0–2)
BILIRUB SERPL-MCNC: 0.3 MG/DL (ref 0.2–1)
BUN SERPL-MCNC: 6 MG/DL (ref 7–18)
CHLORIDE SERPL-SCNC: 103 MEQ/L (ref 98–107)
EOSINOPHIL # BLD: 0 TH/MM3 (ref 0–0.4)
EOSINOPHIL NFR BLD: 1.2 % (ref 0–4)
ERYTHROCYTE [DISTWIDTH] IN BLOOD BY AUTOMATED COUNT: 16.5 % (ref 11.6–17.2)
GFR SERPLBLD BASED ON 1.73 SQ M-ARVRAT: 70 ML/MIN (ref 89–?)
HCO3 BLD-SCNC: 27.7 MEQ/L (ref 21–32)
HCT VFR BLD CALC: 36.6 % (ref 35–46)
HEMO FLAGS: (no result)
LYMPHOCYTES # BLD AUTO: 0.8 TH/MM3 (ref 1–4.8)
LYMPHOCYTES NFR BLD AUTO: 25.1 % (ref 9–44)
MCH RBC QN AUTO: 27.7 PG (ref 27–34)
MCHC RBC AUTO-ENTMCNC: 32.5 % (ref 32–36)
MCV RBC AUTO: 85.3 FL (ref 80–100)
MONOCYTES NFR BLD: 6.9 % (ref 0–8)
NEUTROPHILS # BLD AUTO: 2.1 TH/MM3 (ref 1.8–7.7)
NEUTROPHILS NFR BLD AUTO: 66.7 % (ref 16–70)
PLATELET # BLD: 152 TH/MM3 (ref 150–450)
POTASSIUM SERPL-SCNC: 3.2 MEQ/L (ref 3.5–5.1)
RBC # BLD AUTO: 4.29 MIL/MM3 (ref 4–5.3)
SODIUM SERPL-SCNC: 139 MEQ/L (ref 136–145)
WBC # BLD AUTO: 3.1 TH/MM3 (ref 4–11)

## 2017-02-25 PROCEDURE — 96374 THER/PROPH/DIAG INJ IV PUSH: CPT

## 2017-02-25 PROCEDURE — 99284 EMERGENCY DEPT VISIT MOD MDM: CPT

## 2017-02-25 PROCEDURE — 80053 COMPREHEN METABOLIC PANEL: CPT

## 2017-02-25 PROCEDURE — 85025 COMPLETE CBC W/AUTO DIFF WBC: CPT

## 2017-02-25 PROCEDURE — 96361 HYDRATE IV INFUSION ADD-ON: CPT

## 2017-02-25 NOTE — PD
HPI


Chief Complaint:  Abdominal Pain


Time Seen by Provider:  07:13


Travel History


International Travel<30 days:  No


Contact w/Intl Traveler<30days:  No


Traveled to known affect area:  No





History of Present Illness


HPI


This is a 56-year-old female who has a history of subtotal gastrectomy, 

fundoplication with revision, 2 incisional hernia repairs, cholecystectomy and 

appendectomy who is chronically opiate dependent who presents to the emergency 

department with abdominal pain that started yesterday as well as dry heaving.  

She describes her pain is all over her abdomen worse in the epigastrium, 

constant, aching, associated with multiple episodes of dry heaving.  She says 

she is not able to keep anything down and she feels dehydrated and weak.  This 

is similar to symptoms she's had in the past in the setting of a "flare up" of 

her chronic abdominal pain.  She denies any fevers or chills.  She had a loose 

stool this morning.





PFSH


Past Medical History


Hx Anticoagulant Therapy:  No


Asthma:  No


Blood Disorders:  No


Anxiety:  Yes


Depression:  No


Heart Rhythm Problems:  No


Cancer:  Yes (kidney)


Cardiovascular Problems:  Yes (MI)


High Cholesterol:  No


Chemotherapy:  Yes (KIDNEY )


Chest Pain:  No


Congestive Heart Failure:  No


COPD:  No


Cerebrovascular Accident:  Yes


Diabetes:  No


Diminished Hearing:  No


Endocrine:  No


Gastrointestinal Disorders:  Yes


GERD:  No


Genitourinary:  Yes (RIGHT NEPHRECTOMY)


Headaches:  Yes


Hiatal Hernia:  Yes


Hypertension:  No


Immune Disorder:  No


Implanted Vascular Access Dvce:  No


Kidney Stones:  No


Musculoskeletal:  No


Neurologic:  No


Psychiatric:  No


Reproductive:  No


Respiratory:  No


Immunizations Current:  Yes


Migraines:  Yes


Myocardial Infarction:  Yes ()


Pneumonia:  Yes


Radiation Therapy:  No


Renal Failure:  No


Seizures:  Yes


Sleep Apnea:  No


Thyroid Disease:  No


Ulcer:  No


Tetanus Vaccination:  > 5 Years


Influenza Vaccination:  Yes


Menopausal:  Yes


:  1


Para:  1


Miscarriage:  0


:  0


Ectopic Pregnancy:  No


Ovarian Cysts:  No


Dilation and Curettage (D&C):  Yes


Tubal Ligation:  Yes





Past Surgical History


Abdominal Surgery:  Yes (total gastrectomy w/pouch , hernia repair)


Appendectomy:  Yes


Cardiac Surgery:  Yes (pt states an occulator was placed in her heart )


Cholecystectomy:  Yes


Gynecologic Surgery:  Yes


Hysterectomy:  Yes


Thoracic Surgery:  No


Tonsillectomy:  Yes


Other Surgery:  Yes (RIGHT NEPHRECTOMY)





Social History


Alcohol Use:  No


Tobacco Use:  No


Substance Use:  No





Allergies-Medications


(Allergen,Severity, Reaction):  


Coded Allergies:  


     Compazine (Verified  Allergy, Severe, SOB, 17)


     Gadolinium Derivatives (Verified  Allergy, Severe, RESPIRATORY DISTRESS, )


     Lobster (Verified  Allergy, Severe, Anaphylaxis, 17)


     Morphine (Verified  Allergy, Severe, Hives, 17)


 PT HAVING HIVES AND ITCHING TO ARM ABOVE IV SITE AFTER


 ADMINISTRATION OF MORPHINE


     Phenergan (Verified  Allergy, Severe, SOB, 17)


     Reglan (Verified  Allergy, Severe, SOB, 17)


     Toradol (Verified  Allergy, Severe, Shortness of Breath, 17)


     Zofran (Verified  Allergy, Severe, SOB, 17)


     Penicillin (Verified  Allergy, Mild, RASH, 17)


     Sulfa (Verified  Allergy, Mild, RASH, 17)


     *MDRO Multi-Drug Resistant Organism (Verified  Adverse Reaction, Unknown, )


 MDR-E. coli (urine) - 16


Uncoded Allergies:  


     GADOLINIUM (Adverse Reaction, Severe, PT STOPPED BREATHING WITH GADO, )


 PT STATES SHE STOPPED BREATHING AFTER HAVING GADO AT ANOTHER


 FACILITY. 17


 [facility]


Reported Meds & Prescriptions





Reported Meds & Active Scripts


Active


Reported


Dilaudid (Hydromorphone HCl) 4 Mg Tab 4 Mg PO Q6H PRN


Valium (Diazepam) 10 Mg Tab 10 Mg PO TID


Buspirone (Buspirone HCl) 7.5 Mg Tab 7.5 Mg PO BID


Zyprexa (Olanzapine) 5 Mg Tab 5 Mg PO BID


Cyanocobalamin Inj (Cyanocobalamin) 1,000 Mcg/Ml Inj 1,000 Mcg IM Q30D


Imipramine HCL (Imipramine HCl) 25 Mg Tab 25 Mg PO HS








Review of Systems


Except as stated in HPI:  all other systems reviewed are Neg





Physical Exam


Narrative


GENERAL: Frail, no acute distress


SKIN: Warm and dry.


HEAD: Atraumatic. Normocephalic. 


EYES: Pupils equal and round.  No injection or drainage. 


ENT:  Moist mucous membranes


NECK: Trachea midline. 


CARDIOVASCULAR: Regular rate and rhythm.  No murmur appreciated.


RESPIRATORY: Clear to auscultation. Breath sounds equal bilaterally. 


GASTROINTESTINAL: Abdomen soft, diffusely tender to palpation with no rebound 

or guarding


MUSCULOSKELETAL: No obvious deformities. 


NEUROLOGICAL: Awake and alert. No obvious cranial nerve deficits.  Moving all 

extremities.


PSYCHIATRIC: Appropriate mood and affect; insight and judgment normal.





Data


Data


Last Documented VS





Vital Signs








  Date Time  Temp Pulse Resp B/P Pulse Ox O2 Delivery O2 Flow Rate FiO2


 


17 07:30  88 16 168/99 98 Room Air  


 


17 06:57 98.1       








Orders





 Complete Blood Count With Diff (17 07:19)


Comprehensive Metabolic Panel (17 07:19)


^ Insert Iv (17 07:19)


Sodium Chlor 0.9% 1000 Ml Inj (Ns 1000 M (17 07:30)


Haloperidol Inj (Haldol Inj) (17 08:00)


Hydromorphone (Dilaudid) (17 08:00)





Labs








 Laboratory Tests








Test 17





 08:00


 


White Blood Count 3.1 TH/MM3


 


Red Blood Count 4.29 MIL/MM3


 


Hemoglobin 11.9 GM/DL


 


Hematocrit 36.6 %


 


Mean Corpuscular Volume 85.3 FL


 


Mean Corpuscular Hemoglobin 27.7 PG


 


Mean Corpuscular Hemoglobin 32.5 %





Concent 


 


Red Cell Distribution Width 16.5 %


 


Platelet Count 152 TH/MM3


 


Mean Platelet Volume 10.2 FL


 


Neutrophils (%) (Auto) 66.7 %


 


Lymphocytes (%) (Auto) 25.1 %


 


Monocytes (%) (Auto) 6.9 %


 


Eosinophils (%) (Auto) 1.2 %


 


Basophils (%) (Auto) 0.1 %


 


Neutrophils # (Auto) 2.1 TH/MM3


 


Lymphocytes # (Auto) 0.8 TH/MM3


 


Monocytes # (Auto) 0.2 TH/MM3


 


Eosinophils # (Auto) 0.0 TH/MM3


 


Basophils # (Auto) 0.0 TH/MM3


 


CBC Comment DIFF FINAL 


 


Differential Comment  


 


Sodium Level 139 MEQ/L


 


Potassium Level 3.2 MEQ/L


 


Chloride Level 103 MEQ/L


 


Carbon Dioxide Level 27.7 MEQ/L


 


Anion Gap 8 MEQ/L


 


Blood Urea Nitrogen 6 MG/DL


 


Creatinine 0.84 MG/DL


 


Estimat Glomerular Filtration 70 ML/MIN





Rate 


 


Random Glucose 95 MG/DL


 


Calcium Level 9.1 MG/DL


 


Total Bilirubin 0.3 MG/DL


 


Aspartate Amino Transf 24 U/L





(AST/SGOT) 


 


Alanine Aminotransferase 28 U/L





(ALT/SGPT) 


 


Alkaline Phosphatase 86 U/L


 


Total Protein 7.0 GM/DL


 


Albumin 3.8 GM/DL














MDM


Medical Decision Making


Medical Screen Exam Complete:  Yes


Emergency Medical Condition:  Yes


Interpretation(s)


Afebrile, no tachycardia, hypertensive


Differential Diagnosis


Bowel obstruction, dehydration, electrolyte abnormality, chronic abdominal pain

, opiate withdrawal


Narrative Course


This is a 56-year-old female who is well-known to our emergency department for 

presentations for chronic abdominal pain.  She does have a history of multiple 

abdominal surgeries and she is chronically dependent on opiates for pain.  Her 

pain management physician prescribes her 150 tablets of 4 mg of hydromorphone a 

month, and she also uses 25 g per hour fentanyl patch.  She was placed on a 

monitor and IV was established.  Labs were obtained to rule out significant 

dehydration or electrolyte abdomen.  Unfortunately this patient has continually 

shown signs of drug-seeking behavior.





She has stated allergies to morphine, Phenergan, Reglan, Toradol and Zofran.


She has filled 63 controlled substances prescriptions this year from 14 

different providers.


She's been seen in our emergency department 39 times in the last year for pain-

related complaints.





She has had 8 CTs of her abdomen and pelvis in the past year from our emergency 

department alone.





I don't think any further imaging is warranted in this patient.  This woman is 

very unfortunate as opiates are likely not an appropriate therapy for her 

chronic abdominal pain and,  I think many of her symptoms are related to 

chronic opiate dependence.  I discussed this with her again, as I have in the 

past and urged her to try to engage in a psychiatric detox program.  Patient 

will be discharged home.





Diagnosis





 Primary Impression:  


 Chronic abdominal pain


Patient Instructions:  General Instructions





***Additional Instructions:


If you develop severe or worsening abdominal pain, fever>100.4, persistent 

vomiting or inability to eat or drink return to the emergency department 

immediately. 


Follow up with your primary care physician in 1-2 days for a check-up.


***Med/Other Pt SpecificInfo:  No Change to Meds


Disposition:  01 DISCHARGE HOME


Condition:  Stable








Lorena Villnaueva MD 2017 07:34

## 2017-02-28 ENCOUNTER — HOSPITAL ENCOUNTER (EMERGENCY)
Dept: HOSPITAL 17 - NEPC | Age: 57
Discharge: HOME | End: 2017-02-28
Payer: COMMERCIAL

## 2017-02-28 VITALS
SYSTOLIC BLOOD PRESSURE: 156 MMHG | OXYGEN SATURATION: 100 % | HEART RATE: 65 BPM | RESPIRATION RATE: 15 BRPM | DIASTOLIC BLOOD PRESSURE: 92 MMHG

## 2017-02-28 VITALS
SYSTOLIC BLOOD PRESSURE: 180 MMHG | TEMPERATURE: 98.2 F | RESPIRATION RATE: 18 BRPM | HEART RATE: 91 BPM | DIASTOLIC BLOOD PRESSURE: 110 MMHG | OXYGEN SATURATION: 98 %

## 2017-02-28 VITALS — HEIGHT: 62 IN | WEIGHT: 99.21 LBS | BODY MASS INDEX: 18.26 KG/M2

## 2017-02-28 DIAGNOSIS — F11.20: ICD-10-CM

## 2017-02-28 DIAGNOSIS — G89.29: ICD-10-CM

## 2017-02-28 DIAGNOSIS — R11.2: ICD-10-CM

## 2017-02-28 DIAGNOSIS — R10.13: Primary | ICD-10-CM

## 2017-02-28 LAB
ALP SERPL-CCNC: 105 U/L (ref 45–117)
ALT SERPL-CCNC: 30 U/L (ref 10–53)
ANION GAP SERPL CALC-SCNC: 9 MEQ/L (ref 5–15)
AST SERPL-CCNC: 24 U/L (ref 15–37)
BASOPHILS # BLD AUTO: 0 TH/MM3 (ref 0–0.2)
BASOPHILS NFR BLD: 0.1 % (ref 0–2)
BILIRUB SERPL-MCNC: 0.4 MG/DL (ref 0.2–1)
BUN SERPL-MCNC: 9 MG/DL (ref 7–18)
CHLORIDE SERPL-SCNC: 100 MEQ/L (ref 98–107)
EOSINOPHIL # BLD: 0 TH/MM3 (ref 0–0.4)
EOSINOPHIL NFR BLD: 0 % (ref 0–4)
ERYTHROCYTE [DISTWIDTH] IN BLOOD BY AUTOMATED COUNT: 16.7 % (ref 11.6–17.2)
GFR SERPLBLD BASED ON 1.73 SQ M-ARVRAT: 58 ML/MIN (ref 89–?)
HCO3 BLD-SCNC: 27.7 MEQ/L (ref 21–32)
HCT VFR BLD CALC: 41.7 % (ref 35–46)
HEMO FLAGS: (no result)
LYMPHOCYTES # BLD AUTO: 0.7 TH/MM3 (ref 1–4.8)
LYMPHOCYTES NFR BLD AUTO: 15.8 % (ref 9–44)
MAGNESIUM SERPL-MCNC: 2.5 MG/DL (ref 1.5–2.5)
MCH RBC QN AUTO: 27.6 PG (ref 27–34)
MCHC RBC AUTO-ENTMCNC: 32.4 % (ref 32–36)
MCV RBC AUTO: 85.1 FL (ref 80–100)
MONOCYTES NFR BLD: 5.6 % (ref 0–8)
NEUTROPHILS # BLD AUTO: 3.7 TH/MM3 (ref 1.8–7.7)
NEUTROPHILS NFR BLD AUTO: 78.5 % (ref 16–70)
PLATELET # BLD: 218 TH/MM3 (ref 150–450)
POTASSIUM SERPL-SCNC: 3.5 MEQ/L (ref 3.5–5.1)
RBC # BLD AUTO: 4.9 MIL/MM3 (ref 4–5.3)
SODIUM SERPL-SCNC: 137 MEQ/L (ref 136–145)
WBC # BLD AUTO: 4.7 TH/MM3 (ref 4–11)

## 2017-02-28 PROCEDURE — 83735 ASSAY OF MAGNESIUM: CPT

## 2017-02-28 PROCEDURE — 96375 TX/PRO/DX INJ NEW DRUG ADDON: CPT

## 2017-02-28 PROCEDURE — 99284 EMERGENCY DEPT VISIT MOD MDM: CPT

## 2017-02-28 PROCEDURE — 85025 COMPLETE CBC W/AUTO DIFF WBC: CPT

## 2017-02-28 PROCEDURE — 96374 THER/PROPH/DIAG INJ IV PUSH: CPT

## 2017-02-28 PROCEDURE — 80053 COMPREHEN METABOLIC PANEL: CPT

## 2017-02-28 NOTE — PD
HPI


Chief Complaint:  Abdominal Pain


Time Seen by Provider:  18:27


Travel History


International Travel<30 days:  No


Contact w/Intl Traveler<30days:  No


Traveled to known affect area:  No





History of Present Illness


HPI


This is a 56 her old female who had a told gastrectomy in  with resultant 

chronic epigastric abdominal pain.  She presents here for evaluation of the 

same.  This is her seventh visit to this emergency department for this issue 

this month.  Most recently she was seen here on .  She reports that 

her current pain has not stopped since her previous visit.  She has had 

continued nausea, vomiting, dry heaves, unable to eat and she is concerned that 

she is getting dehydrated.  She has fentanyl patches as well as oral Dilaudid 

that she uses for chronic pain but because of the vomiting she has been unable 

to take her Dilaudid.  In addition she is allergic to all routine antiemetics 

and therefore she is prescribed Valium for nausea but she is unable to take it 

secondary to the nausea and vomiting.  She is concerned that she is getting 

dehydrated and that her potassium is lower than during her previous visit.  She 

has been unable take her oral potassium supplementation as well.  She made an 

appointment with her gastroenterologist Dr. Davila but it is not until next 

week.  No other complaints.





PFSH


Past Medical History


Hx Anticoagulant Therapy:  No


Asthma:  No


Blood Disorders:  No


Anxiety:  Yes


Depression:  No


Heart Rhythm Problems:  No


Cancer:  Yes (kidney)


Cardiovascular Problems:  Yes (MI)


High Cholesterol:  No


Chemotherapy:  Yes (KIDNEY )


Chest Pain:  No


Congestive Heart Failure:  No


COPD:  No


Cerebrovascular Accident:  Yes


Diabetes:  No


Diminished Hearing:  No


Endocrine:  No


Gastrointestinal Disorders:  Yes


GERD:  No


Genitourinary:  Yes (RIGHT NEPHRECTOMY)


Headaches:  Yes


Hiatal Hernia:  Yes


Hypertension:  No


Immune Disorder:  No


Implanted Vascular Access Dvce:  No


Kidney Stones:  No


Musculoskeletal:  No


Neurologic:  No


Psychiatric:  No


Reproductive:  No


Respiratory:  No


Immunizations Current:  Yes


Migraines:  Yes


Myocardial Infarction:  Yes ()


Pneumonia:  Yes


Radiation Therapy:  No


Renal Failure:  No


Seizures:  Yes


Sleep Apnea:  No


Thyroid Disease:  No


Ulcer:  No


Pregnant?:  Not Pregnant


Menopausal:  Yes


:  1


Para:  1


Miscarriage:  0


:  0


Ectopic Pregnancy:  No


Ovarian Cysts:  No


Dilation and Curettage (D&C):  Yes


Tubal Ligation:  Yes





Past Surgical History


Abdominal Surgery:  Yes (total gastrectomy w/pouch , hernia repair)


Appendectomy:  Yes


Cardiac Surgery:  Yes (pt states an occulator was placed in her heart )


Cholecystectomy:  Yes


Gynecologic Surgery:  Yes


Hysterectomy:  Yes


Thoracic Surgery:  No


Tonsillectomy:  Yes


Other Surgery:  Yes (RIGHT NEPHRECTOMY)





Social History


Alcohol Use:  No


Tobacco Use:  No


Substance Use:  No





Allergies-Medications


(Allergen,Severity, Reaction):  


Coded Allergies:  


     Compazine (Verified  Allergy, Severe, SOB, 17)


     Gadolinium Derivatives (Verified  Allergy, Severe, RESPIRATORY DISTRESS, )


     Lobster (Verified  Allergy, Severe, Anaphylaxis, 17)


     Morphine (Verified  Allergy, Severe, Hives, 17)


 PT HAVING HIVES AND ITCHING TO ARM ABOVE IV SITE AFTER


 ADMINISTRATION OF MORPHINE


     Phenergan (Verified  Allergy, Severe, SOB, 17)


     Reglan (Verified  Allergy, Severe, SOB, 17)


     Toradol (Verified  Allergy, Severe, Shortness of Breath, 17)


     Zofran (Verified  Allergy, Severe, SOB, 17)


     Penicillin (Verified  Allergy, Mild, RASH, 17)


     Sulfa (Verified  Allergy, Mild, RASH, 17)


     *MDRO Multi-Drug Resistant Organism (Verified  Adverse Reaction, Unknown, )


 MDR-E. coli (urine) - 16


Uncoded Allergies:  


     GADOLINIUM (Adverse Reaction, Severe, PT STOPPED BREATHING WITH GADO, )


 PT STATES SHE STOPPED BREATHING AFTER HAVING GADO AT ANOTHER


 FACILITY. 17


 [facility]


Reported Meds & Prescriptions





Reported Meds & Active Scripts


Active


Reported


Dilaudid (Hydromorphone HCl) 4 Mg Tab 4 Mg PO Q6H PRN


Valium (Diazepam) 10 Mg Tab 10 Mg PO TID


Buspirone (Buspirone HCl) 7.5 Mg Tab 7.5 Mg PO BID


Zyprexa (Olanzapine) 5 Mg Tab 5 Mg PO BID


Cyanocobalamin Inj (Cyanocobalamin) 1,000 Mcg/Ml Inj 1,000 Mcg IM Q30D


Imipramine HCL (Imipramine HCl) 25 Mg Tab 25 Mg PO HS








Review of Systems


Except as stated in HPI:  all other systems reviewed are Neg





Physical Exam


Narrative


GENERAL: Anxious appearing female who is in no acute distress


SKIN: Warm and dry.


HEAD: Atraumatic. Normocephalic. 


EYES: Pupils equal and round. No scleral icterus. No injection or drainage. 


ENT: No nasal bleeding or discharge.  Mucous membranes pink and moist.


NECK: Trachea midline. No JVD. 


CARDIOVASCULAR: Regular rate and rhythm.  No murmur appreciated.


RESPIRATORY: No accessory muscle use. Clear to auscultation. Breath sounds 

equal bilaterally. 


GASTROINTESTINAL: Abdomen soft, epigastric tenderness to palpation without 

guarding.


MUSCULOSKELETAL: No obvious deformities. No edema. 


NEUROLOGICAL: Awake and alert. No obvious cranial nerve deficits.  Motor 

grossly within normal limits. Normal speech.


PSYCHIATRIC: Appropriate mood and affect; insight and judgment normal.





Data


Data


Last Documented VS





Vital Signs








  Date Time  Temp Pulse Resp B/P Pulse Ox O2 Delivery O2 Flow Rate FiO2


 


17 17:43 98.2 91 18 180/110 98   








Orders





 Complete Blood Count With Diff (17 18:30)


Comprehensive Metabolic Panel (17 18:30)


Magnesium (Mg) (17 18:30)








MDM


Medical Decision Making


Medical Screen Exam Complete:  Yes


Emergency Medical Condition:  Yes


Medical Record Reviewed:  Yes


Differential Diagnosis


Acute exacerbation of chronic abdominal pain, dehydration, hypokalemia, 

gastroenteritis, pancreatitis, AAA


Narrative Course


56 year old female with chronic abdominal pain presents for evaluation of acute 

exacerbation of her chronic abdominal pain.  Symptoms have been ongoing for the 

past 3 days, worsened since her most recent evaluation on the  with 

persistent vomiting and dry heaving.  She has been unable to take her regular 

medication including her Dilaudid, Valium, potassium and she is concerned that 

she's been coming increasingly dehydrated.  It is reasonable to recheck her 

blood work as she was somewhat hypokalemic during last visit.





The patient was initially seen in triage.  She'll be moved to a medical bed 

when one becomes available.








Darrel Grande 2017 18:33

## 2017-02-28 NOTE — PD
Physical Exam


Time Seen by Provider:  19:48


Narrative


Patient seen by provider in triage initiated workup, please see his full 

history of present illness.  This is a 56-year-old female with a history of 

chronic abdominal pain who presents to the emergency department for abdominal 

pain with nausea and vomiting.  Patient has a history of partial gastrectomy in 

2003, appendectomy, cholecystectomy, tubal ligation, D&C, right nephrectomy 

secondary to kidney cancer 10 years ago.  She has fentanyl and Dilaudid at home 

for her chronic abdominal pain.  She came in today because she is concerned 

that her potassium may be low due to vomiting and unable to take her potassium 

supplements.  She is requesting Ativan for her nausea as she has multiple 

allergies to antiemetics.





GENERAL: Well-nourished and well-developed female patient in no acute distress 

who is nontoxic appearing.


SKIN: Warm and dry.


HEAD: Normocephalic and atraumatic.  


EYES: No injection, drainage, or hyphema noted.  PERRLA.  EOMI.  


ENT: No nasal drainage noted.  Oropharynx is clear.


NECK: Supple and the trachea is midline. 


CARDIOVASCULAR: Regular rate and rhythm.


RESPIRATORY: Breath sounds are equal bilaterally with no accessory muscle use, 

wheezing, rhonchi, or crackles.


GASTROINTESTINAL: Mild epigastric tenderness to palpation.  Abdomen is soft and 

nondistended.  


MUSCULOSKELETAL: No obvious deformities, swelling, cyanosis, or ecchymosis is 

present throughout the upper and lower extremities.  Patient has full range of 

motion without any signs of neurovascular compromise.  


NEUROLOGICAL: Awake, alert, and oriented. Normal speech and gait. Cranial 

nerves are grossly intact.





Data


Data


Last Documented VS





Vital Signs








  Date Time  Temp Pulse Resp B/P Pulse Ox O2 Delivery O2 Flow Rate FiO2


 


2/28/17 19:08  65 15 156/92 100   


 


2/28/17 17:43 98.2       








Orders





 Complete Blood Count With Diff (2/28/17 18:30)


Comprehensive Metabolic Panel (2/28/17 18:30)


Magnesium (Mg) (2/28/17 18:30)


Sodium Chlor 0.9% 1000 Ml Inj (Ns 1000 M (2/28/17 18:49)


Lorazepam Inj (Ativan Inj) (2/28/17 19:00)


Hydromorphone Pf Inj (Dilaudid Pf Inj) (2/28/17 19:00)





Labs





 Laboratory Tests








Test 2/28/17





 19:15


 


White Blood Count 4.7 TH/MM3


 


Red Blood Count 4.90 MIL/MM3


 


Hemoglobin 13.5 GM/DL


 


Hematocrit 41.7 %


 


Mean Corpuscular Volume 85.1 FL


 


Mean Corpuscular Hemoglobin 27.6 PG


 


Mean Corpuscular Hemoglobin 32.4 %





Concent 


 


Red Cell Distribution Width 16.7 %


 


Platelet Count 218 TH/MM3


 


Mean Platelet Volume 10.4 FL


 


Neutrophils (%) (Auto) 78.5 %


 


Lymphocytes (%) (Auto) 15.8 %


 


Monocytes (%) (Auto) 5.6 %


 


Eosinophils (%) (Auto) 0.0 %


 


Basophils (%) (Auto) 0.1 %


 


Neutrophils # (Auto) 3.7 TH/MM3


 


Lymphocytes # (Auto) 0.7 TH/MM3


 


Monocytes # (Auto) 0.3 TH/MM3


 


Eosinophils # (Auto) 0.0 TH/MM3


 


Basophils # (Auto) 0.0 TH/MM3


 


CBC Comment DIFF FINAL 


 


Differential Comment  


 


Sodium Level 137 MEQ/L


 


Potassium Level 3.5 MEQ/L


 


Chloride Level 100 MEQ/L


 


Carbon Dioxide Level 27.7 MEQ/L


 


Anion Gap 9 MEQ/L


 


Blood Urea Nitrogen 9 MG/DL


 


Creatinine 0.99 MG/DL


 


Estimat Glomerular Filtration 58 ML/MIN





Rate 


 


Random Glucose 123 MG/DL


 


Calcium Level 9.8 MG/DL


 


Magnesium Level 2.5 MG/DL


 


Total Bilirubin 0.4 MG/DL


 


Aspartate Amino Transf 24 U/L





(AST/SGOT) 


 


Alanine Aminotransferase 30 U/L





(ALT/SGPT) 


 


Alkaline Phosphatase 105 U/L


 


Total Protein 8.8 GM/DL


 


Albumin 4.7 GM/DL











[facility]


Supervised Visit with DELLA:  No


Differential Diagnosis


Chronic abdominal pain versus narcotic dependence versus drug-seeking behavior 

versus electrolyte abnormality


Narrative Course


56-year-old female presents to the emergency department for the seventh time 

this month for epigastric abdominal pain, nausea and vomiting.  Patient is 

afebrile, vital signs are stable.  She has a history of chronic abdominal pain 

and is dependent on opiates for pain control.  She was seen by provider in 

triage and initiated workup, please see his documentation.





CBC is unremarkable.


CMP is unremarkable.





No evidence of acute infection or dehydration.  Labs are all reassuring.  

Discussed with the patient.  She is requesting Ativan for her nausea.  

Unfortunately this patient has multiple allergies to antiemetics and pain 

medication.  She presents with narcotic seeking behavior.  She has pain 

medication and benzos for antiemetics at home.  She is stable for discharge.





I discussed the case with my attending physician Dr. Sosa who is aware of the 

patients history, physical examination findings, and treatment plan.


Diagnosis





 Primary Impression:  


 Chronic abdominal pain


 Additional Impression:  


 Chronic narcotic dependence


Referrals:  


Gastroenterologist





Pain Management


Patient Instructions:  Chronic Abdominal Pain (ED), General Instructions





***Additional Instruction:


Follow-up with your pain management physician.


Return to the ED for any acute worsening of symptoms.


***Med/Other Pt SpecificInfo:  No Change to Meds


Disposition:  01 DISCHARGE HOME


Condition:  Stable








Irene Lopes Feb 28, 2017 19:49

## 2017-03-03 ENCOUNTER — HOSPITAL ENCOUNTER (INPATIENT)
Dept: HOSPITAL 17 - NEPA | Age: 57
LOS: 4 days | Discharge: HOME | DRG: 641 | End: 2017-03-07
Attending: FAMILY MEDICINE | Admitting: FAMILY MEDICINE
Payer: COMMERCIAL

## 2017-03-03 VITALS
DIASTOLIC BLOOD PRESSURE: 58 MMHG | OXYGEN SATURATION: 92 % | SYSTOLIC BLOOD PRESSURE: 104 MMHG | RESPIRATION RATE: 20 BRPM | HEART RATE: 114 BPM

## 2017-03-03 VITALS — WEIGHT: 108.25 LBS | HEIGHT: 62 IN | BODY MASS INDEX: 19.92 KG/M2

## 2017-03-03 VITALS
OXYGEN SATURATION: 98 % | RESPIRATION RATE: 16 BRPM | DIASTOLIC BLOOD PRESSURE: 59 MMHG | SYSTOLIC BLOOD PRESSURE: 102 MMHG | HEART RATE: 102 BPM

## 2017-03-03 VITALS — RESPIRATION RATE: 16 BRPM

## 2017-03-03 DIAGNOSIS — Z85.528: ICD-10-CM

## 2017-03-03 DIAGNOSIS — E86.0: ICD-10-CM

## 2017-03-03 DIAGNOSIS — Z88.8: ICD-10-CM

## 2017-03-03 DIAGNOSIS — I25.10: ICD-10-CM

## 2017-03-03 DIAGNOSIS — E87.6: Primary | ICD-10-CM

## 2017-03-03 DIAGNOSIS — E87.5: ICD-10-CM

## 2017-03-03 DIAGNOSIS — Z79.891: ICD-10-CM

## 2017-03-03 DIAGNOSIS — K31.84: ICD-10-CM

## 2017-03-03 DIAGNOSIS — I25.2: ICD-10-CM

## 2017-03-03 DIAGNOSIS — Z86.73: ICD-10-CM

## 2017-03-03 DIAGNOSIS — F41.1: ICD-10-CM

## 2017-03-03 DIAGNOSIS — R10.13: ICD-10-CM

## 2017-03-03 DIAGNOSIS — Z90.5: ICD-10-CM

## 2017-03-03 DIAGNOSIS — E46: ICD-10-CM

## 2017-03-03 DIAGNOSIS — G89.29: ICD-10-CM

## 2017-03-03 DIAGNOSIS — Z90.3: ICD-10-CM

## 2017-03-03 LAB
ANION GAP SERPL CALC-SCNC: 14 MEQ/L (ref 5–15)
BASOPHILS # BLD AUTO: 0 TH/MM3 (ref 0–0.2)
BASOPHILS NFR BLD: 0.2 % (ref 0–2)
BUN SERPL-MCNC: 20 MG/DL (ref 7–18)
CHLORIDE SERPL-SCNC: 107 MEQ/L (ref 98–107)
EOSINOPHIL # BLD: 0.1 TH/MM3 (ref 0–0.4)
EOSINOPHIL NFR BLD: 0.4 % (ref 0–4)
ERYTHROCYTE [DISTWIDTH] IN BLOOD BY AUTOMATED COUNT: 16.3 % (ref 11.6–17.2)
GFR SERPLBLD BASED ON 1.73 SQ M-ARVRAT: 42 ML/MIN (ref 89–?)
HCO3 BLD-SCNC: 17.6 MEQ/L (ref 21–32)
HCT VFR BLD CALC: 40.2 % (ref 35–46)
HEMO FLAGS: (no result)
LYMPHOCYTES # BLD AUTO: 1.5 TH/MM3 (ref 1–4.8)
LYMPHOCYTES NFR BLD AUTO: 11.5 % (ref 9–44)
MCH RBC QN AUTO: 27.3 PG (ref 27–34)
MCHC RBC AUTO-ENTMCNC: 31.6 % (ref 32–36)
MCV RBC AUTO: 86.5 FL (ref 80–100)
MONOCYTES NFR BLD: 3.9 % (ref 0–8)
NEUTROPHILS # BLD AUTO: 11.1 TH/MM3 (ref 1.8–7.7)
NEUTROPHILS NFR BLD AUTO: 84 % (ref 16–70)
PLATELET # BLD: 206 TH/MM3 (ref 150–450)
POTASSIUM SERPL-SCNC: 2.3 MEQ/L (ref 3.5–5.1)
RBC # BLD AUTO: 4.65 MIL/MM3 (ref 4–5.3)
SODIUM SERPL-SCNC: 139 MEQ/L (ref 136–145)
WBC # BLD AUTO: 13.2 TH/MM3 (ref 4–11)

## 2017-03-03 PROCEDURE — C9113 INJ PANTOPRAZOLE SODIUM, VIA: HCPCS

## 2017-03-03 PROCEDURE — 85025 COMPLETE CBC W/AUTO DIFF WBC: CPT

## 2017-03-03 PROCEDURE — 96374 THER/PROPH/DIAG INJ IV PUSH: CPT

## 2017-03-03 PROCEDURE — 85027 COMPLETE CBC AUTOMATED: CPT

## 2017-03-03 PROCEDURE — 85610 PROTHROMBIN TIME: CPT

## 2017-03-03 PROCEDURE — 93005 ELECTROCARDIOGRAM TRACING: CPT

## 2017-03-03 PROCEDURE — 96372 THER/PROPH/DIAG INJ SC/IM: CPT

## 2017-03-03 PROCEDURE — 80053 COMPREHEN METABOLIC PANEL: CPT

## 2017-03-03 PROCEDURE — 83690 ASSAY OF LIPASE: CPT

## 2017-03-03 PROCEDURE — 85730 THROMBOPLASTIN TIME PARTIAL: CPT

## 2017-03-03 PROCEDURE — 83735 ASSAY OF MAGNESIUM: CPT

## 2017-03-03 PROCEDURE — 96375 TX/PRO/DX INJ NEW DRUG ADDON: CPT

## 2017-03-03 PROCEDURE — 80048 BASIC METABOLIC PNL TOTAL CA: CPT

## 2017-03-03 RX ADMIN — HYDROMORPHONE HYDROCHLORIDE PRN MG: 1 INJECTION, SOLUTION INTRAMUSCULAR; INTRAVENOUS; SUBCUTANEOUS at 23:34

## 2017-03-03 NOTE — HHI.HP
__________________________________________________





[facility]


Service


[facility]


Primary Care Physician


Caridad Ruvalcaba M.D.


Admission Diagnosis


hypokalemia.  Dehydration.  Abdominal pain.


Diagnoses:  


Travel History


International Travel<30 Days:  No


Contact w/Intl Traveler <30 Da:  No


Traveled to Known Affected Are:  No





Past Family Social History


Allergies:  


Coded Allergies:  


     Compazine (Verified  Allergy, Severe, SOB, 3/3/17)


     Gadolinium Derivatives (Verified  Allergy, Severe, RESPIRATORY DISTRESS, 3/

3/17)


     Lobster (Verified  Allergy, Severe, Anaphylaxis, 3/3/17)


     Morphine (Verified  Allergy, Severe, Hives, 3/3/17)


 PT HAVING HIVES AND ITCHING TO ARM ABOVE IV SITE AFTER


 ADMINISTRATION OF MORPHINE


     Phenergan (Verified  Allergy, Severe, SOB, 3/3/17)


     Reglan (Verified  Allergy, Severe, SOB, 3/3/17)


     Toradol (Verified  Allergy, Severe, Shortness of Breath, 3/3/17)


     Zofran (Verified  Allergy, Severe, SOB, 3/3/17)


     Penicillin (Verified  Allergy, Mild, RASH, 3/3/17)


     Sulfa (Verified  Allergy, Mild, RASH, 3/3/17)


     *MDRO Multi-Drug Resistant Organism (Verified  Adverse Reaction, Unknown, 3

/3/17)


 MDR-E. coli (urine) - 11/27/16


Uncoded Allergies:  


     GADOLINIUM (Adverse Reaction, Severe, PT STOPPED BREATHING WITH GADO, 1/17/ 17)


 PT STATES SHE STOPPED BREATHING AFTER HAVING GADO AT ANOTHER


 FACILITY. 1/17/17


 [facility]





Physical Exam


Vital Signs





 Vital Signs








  Date Time  Temp Pulse Resp B/P Pulse Ox O2 Delivery O2 Flow Rate FiO2


 


3/3/17 21:07  102 16 102/59 98 Room Air  


 


3/3/17 17:57  114 20 104/58 92 Room Air  








Physical Exam


GENERAL: This is a well-nourished, well-developed patient, in no apparent 

distress.


SKIN: No rashes, ecchymoses or lesions. Cool and dry.


HEAD: Atraumatic. Normocephalic. No temporal or scalp tenderness.


EYES: Pupils equal round and reactive. Extraocular motions intact. No scleral 

icterus. No injection or drainage. 


ENT: Nose without bleeding, purulent drainage or septal hematoma. Throat 

without erythema, tonsillar hypertrophy or exudate. Uvula midline. Airway 

patent.


NECK: Trachea midline. No JVD or lymphadenopathy. Supple, nontender, no 

meningeal signs.


CARDIOVASCULAR: Regular rate and rhythm without murmurs, gallops, or rubs. 


RESPIRATORY: Clear to auscultation. Breath sounds equal bilaterally. No wheezes

, rales, or rhonchi.  


GASTROINTESTINAL: Abdomen soft, non-tender, nondistended. No hepato-splenomegaly

, or palpable masses. No guarding.


MUSCULOSKELETAL: Extremities without clubbing, cyanosis, or edema. No joint 

tenderness, effusion, or edema noted. No calf tenderness. Negative Homans sign 

bilaterally.


NEUROLOGICAL: Awake and alert. Cranial nerves II through XII intact.  Motor and 

sensory grossly within normal limits. Five out of 5 muscle strength in all 

muscle groups.  Normal speech.


Laboratory





Laboratory Tests








Test 3/3/17





 21:10


 


White Blood Count 13.2 


 


Red Blood Count 4.65 


 


Hemoglobin 12.7 


 


Hematocrit 40.2 


 


Mean Corpuscular Volume 86.5 


 


Mean Corpuscular Hemoglobin 27.3 


 


Mean Corpuscular Hemoglobin 31.6 





Concent 


 


Red Cell Distribution Width 16.3 


 


Platelet Count 206 


 


Mean Platelet Volume 10.4 


 


Neutrophils (%) (Auto) 84.0 


 


Lymphocytes (%) (Auto) 11.5 


 


Monocytes (%) (Auto) 3.9 


 


Eosinophils (%) (Auto) 0.4 


 


Basophils (%) (Auto) 0.2 


 


Neutrophils # (Auto) 11.1 


 


Lymphocytes # (Auto) 1.5 


 


Monocytes # (Auto) 0.5 


 


Eosinophils # (Auto) 0.1 


 


Basophils # (Auto) 0.0 


 


CBC Comment DIFF FINAL 


 


Differential Comment  


 


Sodium Level 139 


 


Potassium Level 2.3 


 


Chloride Level 107 


 


Carbon Dioxide Level 17.6 


 


Anion Gap 14 


 


Blood Urea Nitrogen 20 


 


Creatinine 1.30 


 


Estimat Glomerular Filtration 42 





Rate 


 


Random Glucose 137 


 


Calcium Level 8.9 








Result Diagram:  


3/3/17 2110                                                                    

            3/3/17 2110








Physician Certification


Order for Inpatient Services


The services are ordered in accordance with Medicare regulations or non-

Medicare payer requirements, as applicable.  In the case of services not 

specified as inpatient-only, they are appropriately provided as inpatient 

services in accordance with the 2-midnight benchmark.


 days is the estimated time the patient will need to remain in the hospital, 

assuming treatment plan goals are met and no additional complications.








Erica Voss MD Mar 3, 2017 23:00

## 2017-03-03 NOTE — PD
HPI


Chief Complaint:  Abdominal Pain


Time Seen by Provider:  20:42


Travel History


International Travel<30 days:  No


Contact w/Intl Traveler<30days:  No


Traveled to known affect area:  No





History of Present Illness


HPI


56 years old female complains of abdominal pain with nausea vomiting and 

diarrhea.  Patient has history of recurrent abdominal pain with nausea 

vomiting.  Patient has been to the emergency room multiple times in the past 

with the same problem.  Patient states that she started having diarrhea since 

last night.  Patient denies any blood or mucus is patient denies any headache.  

Patient denies any chest pain or shortness of breath.  Patient states that the 

abdominal pain is cramping pain diffuse over the abdomen, , is the same pain 

she has for years.  Patient denies any pain radiation.  Patient denies any 

dysuria or frequency.  Patient denies any vaginal discharge or bleeding.  

Patient states that she usually gets Dilaudid and Ativan for the pain.  Patient 

has been seen by gastroenterologist for GI problems.





PFSH


Past Medical History


Hx Anticoagulant Therapy:  No


Asthma:  No


Blood Disorders:  No


Anxiety:  Yes


Depression:  No


Heart Rhythm Problems:  No


Cancer:  Yes (kidney)


Cardiovascular Problems:  Yes (MI)


High Cholesterol:  No


Chemotherapy:  Yes (KIDNEY )


Chest Pain:  No


Congestive Heart Failure:  No


COPD:  No


Cerebrovascular Accident:  Yes


Diabetes:  No


Diminished Hearing:  No


Endocrine:  No


Gastrointestinal Disorders:  Yes


GERD:  No


Genitourinary:  Yes (RIGHT NEPHRECTOMY)


Headaches:  Yes


Hiatal Hernia:  Yes


Hypertension:  No


Immune Disorder:  No


Implanted Vascular Access Dvce:  No


Kidney Stones:  No


Musculoskeletal:  No


Neurologic:  No


Psychiatric:  No


Reproductive:  No


Respiratory:  No


Immunizations Current:  Yes


Migraines:  Yes


Myocardial Infarction:  Yes ()


Pneumonia:  Yes


Radiation Therapy:  No


Renal Failure:  No


Seizures:  Yes


Sleep Apnea:  No


Thyroid Disease:  No


Ulcer:  No


Tetanus Vaccination:  < 5 Years


Influenza Vaccination:  Yes


Menopausal:  Yes


:  1


Para:  1


Miscarriage:  0


:  0


Ectopic Pregnancy:  No


Ovarian Cysts:  No


Dilation and Curettage (D&C):  Yes


Tubal Ligation:  Yes





Past Surgical History


Abdominal Surgery:  Yes (total gastrectomy w/pouch , hernia repair)


Appendectomy:  Yes


Cardiac Surgery:  Yes (pt states an occulator was placed in her heart )


Cholecystectomy:  Yes


Gynecologic Surgery:  Yes


Hysterectomy:  Yes


Thoracic Surgery:  No


Tonsillectomy:  Yes


Other Surgery:  Yes (RIGHT NEPHRECTOMY)





Social History


Alcohol Use:  No


Tobacco Use:  No


Substance Use:  No





Allergies-Medications


(Allergen,Severity, Reaction):  


Coded Allergies:  


     Compazine (Verified  Allergy, Severe, SOB, 3/3/17)


     Gadolinium Derivatives (Verified  Allergy, Severe, RESPIRATORY DISTRESS, 3/

3/17)


     Lobster (Verified  Allergy, Severe, Anaphylaxis, 3/3/17)


     Morphine (Verified  Allergy, Severe, Hives, 3/3/17)


 PT HAVING HIVES AND ITCHING TO ARM ABOVE IV SITE AFTER


 ADMINISTRATION OF MORPHINE


     Phenergan (Verified  Allergy, Severe, SOB, 3/3/17)


     Reglan (Verified  Allergy, Severe, SOB, 3/3/17)


     Toradol (Verified  Allergy, Severe, Shortness of Breath, 3/3/17)


     Zofran (Verified  Allergy, Severe, SOB, 3/3/17)


     Penicillin (Verified  Allergy, Mild, RASH, 3/3/17)


     Sulfa (Verified  Allergy, Mild, RASH, 3/3/17)


     *MDRO Multi-Drug Resistant Organism (Verified  Adverse Reaction, Unknown, 3

/3/17)


 MDR-E. coli (urine) - 16


Uncoded Allergies:  


     GADOLINIUM (Adverse Reaction, Severe, PT STOPPED BREATHING WITH GADO, )


 PT STATES SHE STOPPED BREATHING AFTER HAVING GADO AT ANOTHER


 FACILITY. 17


 [facility]


Reported Meds & Prescriptions





Reported Meds & Active Scripts


Active


Reported


Dilaudid (Hydromorphone HCl) 4 Mg Tab 4 Mg PO Q6H PRN


Valium (Diazepam) 10 Mg Tab 10 Mg PO TID


Buspirone (Buspirone HCl) 7.5 Mg Tab 7.5 Mg PO BID


Zyprexa (Olanzapine) 5 Mg Tab 5 Mg PO BID


Cyanocobalamin Inj (Cyanocobalamin) 1,000 Mcg/Ml Inj 1,000 Mcg IM Q30D


Imipramine HCL (Imipramine HCl) 25 Mg Tab 25 Mg PO HS








Review of Systems


General / Constitutional:  No: Fever


Eyes:  No: Visual changes


HENT:  No: Headaches


Cardiovascular:  No: Chest Pain or Discomfort


Respiratory:  No: Shortness of Breath


Gastrointestinal:  Positive: Nausea, Vomiting, Diarrhea, Abdominal Pain


Genitourinary:  No: Dysuria


Musculoskeletal:  No: Pain


Skin:  No Rash


Neurologic:  No: Weakness


Psychiatric:  No: Depression


Endocrine:  No: Polydipsia


Hematologic/Lymphatic:  No: Easy Bruising





Physical Exam


Narrative


GENERAL: Well-nourished, well-developed patient.


SKIN: Warm and dry.


HEAD: Normocephalic.


EYES: No scleral icterus. No injection or drainage. 


NECK: Supple, trachea midline. No JVD or lymphadenopathy.


CARDIOVASCULAR: Regular rate and rhythm without murmurs, gallops, or rubs. 


RESPIRATORY: Breath sounds equal bilaterally. No accessory muscle use.


GASTROINTESTINAL: Abdomen soft,  nondistended.  Patient has mild diffuse 

tenderness over the abdomen.  No rebound tenderness.  No mass.


MUSCULOSKELETAL: No cyanosis, or edema. 


BACK: Nontender without obvious deformity. No CVA tenderness. 


Neurologic exam normal.





Data


Data


Last Documented VS





Vital Signs








  Date Time  Temp Pulse Resp B/P Pulse Ox O2 Delivery O2 Flow Rate FiO2


 


3/3/17 21:07  102 16 102/59 98 Room Air  








Orders





 Complete Blood Count With Diff (3/3/17 21:07)


Basic Metabolic Panel (Bmp) (3/3/17 21:07)


Dicyclomine Inj (Bentyl Inj) (3/3/17 21:15)


Hydroxyzine Hcl Inj (Vistaril Inj) (3/3/17 21:15)


Comprehensive Metabolic Panel (3/3/17 22:52)


Lipase (3/3/17 22:52)


Prothrombin Time / Inr (Pt) (3/3/17 22:52)


Act Partial Throm Time (Ptt) (3/3/17 22:52)


Urinalysis - C+S If Indicated (3/3/17 22:52)


Iv Access Insert/Monitor (3/3/17 22:52)


Ecg Monitoring (3/3/17 22:52)


Oximetry (3/3/17 22:52)


Pantoprazole Inj (Protonix Inj) (3/3/17 23:00)


Sodium Chlor 0.9% 1000 Ml Inj (Ns 1000 M (3/3/17 22:52)


Potassium Chloride (Kcl) (3/3/17 23:00)


Potassium Chlor 20 Meq Premix (Kcl 20 Me (3/3/17 23:00)


Electrocardiogram (3/3/17 )


Admit Order (Ed Use Only) (3/3/17 22:56)





Labs





 Laboratory Tests








Test 3/3/17





 21:10


 


White Blood Count 13.2 TH/MM3


 


Red Blood Count 4.65 MIL/MM3


 


Hemoglobin 12.7 GM/DL


 


Hematocrit 40.2 %


 


Mean Corpuscular Volume 86.5 FL


 


Mean Corpuscular Hemoglobin 27.3 PG


 


Mean Corpuscular Hemoglobin 31.6 %





Concent 


 


Red Cell Distribution Width 16.3 %


 


Platelet Count 206 TH/MM3


 


Mean Platelet Volume 10.4 FL


 


Neutrophils (%) (Auto) 84.0 %


 


Lymphocytes (%) (Auto) 11.5 %


 


Monocytes (%) (Auto) 3.9 %


 


Eosinophils (%) (Auto) 0.4 %


 


Basophils (%) (Auto) 0.2 %


 


Neutrophils # (Auto) 11.1 TH/MM3


 


Lymphocytes # (Auto) 1.5 TH/MM3


 


Monocytes # (Auto) 0.5 TH/MM3


 


Eosinophils # (Auto) 0.1 TH/MM3


 


Basophils # (Auto) 0.0 TH/MM3


 


CBC Comment DIFF FINAL 


 


Differential Comment  


 


Sodium Level 139 MEQ/L


 


Potassium Level 2.3 MEQ/L


 


Chloride Level 107 MEQ/L


 


Carbon Dioxide Level 17.6 MEQ/L


 


Anion Gap 14 MEQ/L


 


Blood Urea Nitrogen 20 MG/DL


 


Creatinine 1.30 MG/DL


 


Estimat Glomerular Filtration 42 ML/MIN





Rate 


 


Random Glucose 137 MG/DL


 


Calcium Level 8.9 MG/DL











[facility]


Medical Decision Making


Medical Screen Exam Complete:  Yes


Emergency Medical Condition:  Yes


Interpretation(s)


22:43 PM.  CBC WBC 13.2.  84 neutrophil.  Potassium 2.3.  BUN 20.  Creatinine 

1.3.


23:20 PM.  EKG shows sinus rhythm with left bundle-branch block.  Unchanged 

from previous EKG.


Differential Diagnosis


Differential diagnosis including chronic pain, gastritis, PUD, pancreatitis, 

cholecystitis, colitis, UTI, pyelonephritis, dehydration


Narrative Course


56 years old female with recurrent abdominal pain and nausea vomiting.  Patient 

states the diarrhea is new.  Bentyl 20 mg IM.  Vistaril 25 mg IM.  Normal 

saline solution 1 L IV bolus.  Protonix 40 mg IV.  KCl 20 mEq IV given.  KCl 40 

mEq by mouth given.





Diagnosis





 Primary Impression:  


 Hypokalemia


 Additional Impressions:  


 Dehydration


 Chronic abdominal pain


 Gastroenteritis








Marito Zamudio MD Mar 3, 2017 21:20

## 2017-03-04 VITALS
DIASTOLIC BLOOD PRESSURE: 56 MMHG | OXYGEN SATURATION: 96 % | RESPIRATION RATE: 20 BRPM | SYSTOLIC BLOOD PRESSURE: 90 MMHG | HEART RATE: 69 BPM | TEMPERATURE: 97.9 F

## 2017-03-04 VITALS
DIASTOLIC BLOOD PRESSURE: 53 MMHG | TEMPERATURE: 97.4 F | OXYGEN SATURATION: 99 % | RESPIRATION RATE: 18 BRPM | HEART RATE: 69 BPM | SYSTOLIC BLOOD PRESSURE: 97 MMHG

## 2017-03-04 VITALS
HEART RATE: 78 BPM | OXYGEN SATURATION: 98 % | RESPIRATION RATE: 18 BRPM | SYSTOLIC BLOOD PRESSURE: 118 MMHG | DIASTOLIC BLOOD PRESSURE: 68 MMHG

## 2017-03-04 VITALS
HEART RATE: 75 BPM | SYSTOLIC BLOOD PRESSURE: 99 MMHG | OXYGEN SATURATION: 96 % | DIASTOLIC BLOOD PRESSURE: 65 MMHG | RESPIRATION RATE: 20 BRPM | TEMPERATURE: 97.6 F

## 2017-03-04 VITALS
RESPIRATION RATE: 20 BRPM | DIASTOLIC BLOOD PRESSURE: 53 MMHG | TEMPERATURE: 97.8 F | OXYGEN SATURATION: 100 % | HEART RATE: 68 BPM | SYSTOLIC BLOOD PRESSURE: 94 MMHG

## 2017-03-04 VITALS — HEART RATE: 70 BPM

## 2017-03-04 VITALS — HEART RATE: 68 BPM

## 2017-03-04 VITALS
OXYGEN SATURATION: 100 % | SYSTOLIC BLOOD PRESSURE: 80 MMHG | HEART RATE: 66 BPM | RESPIRATION RATE: 20 BRPM | TEMPERATURE: 97.6 F | DIASTOLIC BLOOD PRESSURE: 50 MMHG

## 2017-03-04 LAB
ALP SERPL-CCNC: 88 U/L (ref 45–117)
ALT SERPL-CCNC: 23 U/L (ref 10–53)
ANION GAP SERPL CALC-SCNC: 11 MEQ/L (ref 5–15)
ANION GAP SERPL CALC-SCNC: 17 MEQ/L (ref 5–15)
APTT BLD: 25.4 SEC (ref 24.3–30.1)
AST SERPL-CCNC: 11 U/L (ref 15–37)
BILIRUB SERPL-MCNC: 0.4 MG/DL (ref 0.2–1)
BUN SERPL-MCNC: 17 MG/DL (ref 7–18)
BUN SERPL-MCNC: 22 MG/DL (ref 7–18)
CHLORIDE SERPL-SCNC: 105 MEQ/L (ref 98–107)
CHLORIDE SERPL-SCNC: 107 MEQ/L (ref 98–107)
ERYTHROCYTE [DISTWIDTH] IN BLOOD BY AUTOMATED COUNT: 16.6 % (ref 11.6–17.2)
GFR SERPLBLD BASED ON 1.73 SQ M-ARVRAT: 44 ML/MIN (ref 89–?)
GFR SERPLBLD BASED ON 1.73 SQ M-ARVRAT: 55 ML/MIN (ref 89–?)
HCO3 BLD-SCNC: 17.2 MEQ/L (ref 21–32)
HCO3 BLD-SCNC: 23.9 MEQ/L (ref 21–32)
HCT VFR BLD CALC: 39.1 % (ref 35–46)
INR PPP: 1 RATIO
MCH RBC QN AUTO: 28.4 PG (ref 27–34)
MCHC RBC AUTO-ENTMCNC: 32.4 % (ref 32–36)
MCV RBC AUTO: 87.7 FL (ref 80–100)
PLATELET # BLD: 135 TH/MM3 (ref 150–450)
POTASSIUM SERPL-SCNC: 2.3 MEQ/L (ref 3.5–5.1)
POTASSIUM SERPL-SCNC: 4.2 MEQ/L (ref 3.5–5.1)
PROTHROMBIN TIME: 11.6 SEC (ref 9.8–11.6)
RBC # BLD AUTO: 4.46 MIL/MM3 (ref 4–5.3)
REVIEW FLAG: (no result)
SODIUM SERPL-SCNC: 140 MEQ/L (ref 136–145)
SODIUM SERPL-SCNC: 141 MEQ/L (ref 136–145)
WBC # BLD AUTO: 3.9 TH/MM3 (ref 4–11)

## 2017-03-04 RX ADMIN — ERYTHROMYCIN LACTOBIONATE SCH MLS/HR: 500 INJECTION, POWDER, LYOPHILIZED, FOR SOLUTION INTRAVENOUS at 17:44

## 2017-03-04 RX ADMIN — HYDROMORPHONE HYDROCHLORIDE PRN MG: 1 INJECTION, SOLUTION INTRAMUSCULAR; INTRAVENOUS; SUBCUTANEOUS at 06:33

## 2017-03-04 RX ADMIN — DIAZEPAM SCH MG: 10 TABLET ORAL at 12:59

## 2017-03-04 RX ADMIN — POTASSIUM CHLORIDE SCH MLS/HR: 10 INJECTION, SOLUTION INTRAVENOUS at 05:50

## 2017-03-04 RX ADMIN — TRIMETHOBENZAMIDE HYDROCHLORIDE PRN MG: 100 INJECTION INTRAMUSCULAR at 07:04

## 2017-03-04 RX ADMIN — ERYTHROMYCIN LACTOBIONATE SCH MLS/HR: 500 INJECTION, POWDER, LYOPHILIZED, FOR SOLUTION INTRAVENOUS at 01:54

## 2017-03-04 RX ADMIN — OLANZAPINE SCH MG: 5 TABLET, FILM COATED ORAL at 20:59

## 2017-03-04 RX ADMIN — POTASSIUM CHLORIDE SCH MLS/HR: 10 INJECTION, SOLUTION INTRAVENOUS at 01:55

## 2017-03-04 RX ADMIN — DIAZEPAM SCH MG: 10 TABLET ORAL at 17:44

## 2017-03-04 RX ADMIN — HYDROMORPHONE HYDROCHLORIDE PRN MG: 1 INJECTION, SOLUTION INTRAMUSCULAR; INTRAVENOUS; SUBCUTANEOUS at 16:02

## 2017-03-04 RX ADMIN — POTASSIUM CHLORIDE SCH MLS/HR: 10 INJECTION, SOLUTION INTRAVENOUS at 01:40

## 2017-03-04 RX ADMIN — HYDROMORPHONE HYDROCHLORIDE PRN MG: 1 INJECTION, SOLUTION INTRAMUSCULAR; INTRAVENOUS; SUBCUTANEOUS at 21:03

## 2017-03-04 RX ADMIN — HYDROMORPHONE HYDROCHLORIDE PRN MG: 1 INJECTION, SOLUTION INTRAMUSCULAR; INTRAVENOUS; SUBCUTANEOUS at 10:37

## 2017-03-04 RX ADMIN — FAMOTIDINE SCH MG: 10 INJECTION, SOLUTION INTRAVENOUS at 12:59

## 2017-03-04 RX ADMIN — ERYTHROMYCIN LACTOBIONATE SCH MLS/HR: 500 INJECTION, POWDER, LYOPHILIZED, FOR SOLUTION INTRAVENOUS at 13:00

## 2017-03-04 RX ADMIN — DEXTROSE MONOHYDRATE, SODIUM CHLORIDE, AND POTASSIUM CHLORIDE SCH MLS/HR: 50; 4.5; .745 INJECTION, SOLUTION INTRAVENOUS at 21:02

## 2017-03-04 RX ADMIN — OLANZAPINE SCH MG: 5 TABLET, FILM COATED ORAL at 09:28

## 2017-03-04 RX ADMIN — DIAZEPAM SCH MG: 10 TABLET ORAL at 09:28

## 2017-03-04 RX ADMIN — FAMOTIDINE SCH MG: 10 INJECTION, SOLUTION INTRAVENOUS at 01:55

## 2017-03-04 RX ADMIN — IMIPRAMINE HYDROCHLORIDE SCH MG: 25 TABLET, FILM COATED ORAL at 20:59

## 2017-03-04 RX ADMIN — ERYTHROMYCIN LACTOBIONATE SCH MLS/HR: 500 INJECTION, POWDER, LYOPHILIZED, FOR SOLUTION INTRAVENOUS at 05:51

## 2017-03-04 RX ADMIN — DEXTROSE MONOHYDRATE, SODIUM CHLORIDE, AND POTASSIUM CHLORIDE SCH MLS/HR: 50; 4.5; .745 INJECTION, SOLUTION INTRAVENOUS at 01:54

## 2017-03-04 NOTE — EKG
Date Performed: 03/03/2017       Time Performed: 23:15:52

 

PTAGE:      56 years

 

EKG:      Sinus rhythm 

 

 LEFT BUNDLE BRANCH BLOCK Since previous tracing, no significant change noted ABNORMAL ECG

 

PREVIOUS TRACING       : 03/03/2017 23.15

 

DOCTOR:   Tacos Mott  Interpretating Date/Time  03/04/2017 19:39:16

## 2017-03-04 NOTE — MH
DATE OF ADMISSION:  3/3/2017

 

YOB: 1960

 

ADMITTING PHYSICIAN

Dr. Luis Gonzalez.

 

ATTENDING PHYSICIAN

Dr. Yanna Torres.

 

CHIEF COMPLAINT:

Nausea, diarrhea, abdominal pain.

 

HISTORY OF PRESENT ILLNESS

The patient is a 56-year-old female who presented to the ER with complaints of

abdominal pain with nausea, vomiting and diarrhea.  The patient has a history

of recurrent and chronic abdominal pain with nausea and vomiting.  The patient

has been to the emergency room multiple times in the past with the same

problem.  The patient states that he started having diarrhea since last night.

Denies any blood or mucus, denies any chest pain, shortness of breath or

headache, denies any fever.  Does complain of diffuse abdominal pain which is

cramping in nature and per patient it has not worsened, it has been there for a

long time.  The patient denies any radiation, denies any dysuria or frequency.

Denies any vaginal discharge or bleeding.  Per patient she usually gets

Dilaudid and Ativan for the pain.  The patient is being followed by a

gastroenterologist for GI problems and her gastroenterologist is Dr. Davila and

per patient she has an appointment next week.

 

PAST MEDICAL HISTORY

1.   Anxiety.

2.   History of kidney cancer, status post nephrectomy.

3.   History of MI in 2006.

4.   History of tubal ligation in the past.

 

PAST SURGICAL HISTORY

1.   Total gastrectomy with pouch in 2003.

2.   Hernia repair.

3.   The patient states she had an occulator placed in her heart

in 2006.

4.   History of cholecystectomy.

5.   History of hysterectomy.

6.   History of right nephrectomy.

 

SOCIAL HISTORY

No alcohol, tobacco or substance use.

 

ALLERGIES

COMPAZINE, GADOLINIUM, LOBSTER, MORPHINE, PHENERGAN, REGLAN, TORADOL, ZOFRAN,

PENICILLIN, SULFA.

 

MEDICATION

Active reported medications:

1.   Dilaudid.

2.   Valium.

3.   Buspirone.

4.   Zyprexa.

5.   Cyanocobalamin injections.

6.   Imipramine.

 

REVIEW OF SYSTEMS

Denies any fever.

EYES:  Denies any visual changes.

HEENT:  Denies any headaches.

CARDIOVASCULAR:  Denies any chest pain or discomfort.

RESPIRATORY:  Denies any shortness of breath.

GASTROINTESTINAL:  Positive for nausea, vomiting, diarrhea and abdominal pain.

GENITOURINARY:  Denies any dysuria.

MUSCULOSKELETAL:  Denies any arthralgias.

SKIN:  No rash.

NEUROLOGIC:  No focal deficit.

PSYCHIATRIC:  No depression.

ENDOCRINE:  No polydipsia.

HEMATOLOGIC/LYMPHATIC:  No easy bruising.

 

PHYSICAL EXAMINATION

GENERAL:  The patient is well-nourished, well-developed, in no acute distress.

SKIN:  Warm and dry.

HEAD:  Normocephalic.

EYES:  No scleral icterus.  No injection or drainage.

NECK:  Supple.  Trachea midline.  No JVD or lymphadenopathy.

CARDIOVASCULAR:  Regular rate and rhythm without murmurs, gallops or rubs.

RESPIRATORY:  Breath sounds are equal bilaterally.  No accessory muscle use.

GASTROINTESTINAL:  Abdomen is soft, nondistended.  Mild diffuse tenderness over

the abdomen.  No rebound or guarding.  No rigidity.  No mass.

MUSCULOSKELETAL:  No cyanosis or edema.

BACK:  Nontender without obvious deformity.  CVA tenderness.

NEUROLOGIC:  Cranial nerves II-XII grossly intact.  No focal deficit.

PSYCHIATRIC:  Anxious.  No suicidal ideation or homicidal intension.

VITAL SIGNS:  Blood pressure is 102/59, pulse ox is 98 on room air.  Pulse is

102.  Temperature is 97.5.

 

LABORATORY DATA

WBC is 13.2, hemoglobin 12.7, hematocrit 40.2, platelets 206,000, potassium is

2.3, sodium 139, calcium 8.9.

 

DIAGNOSTIC IMPRESSION

1.   Hypokalemia.

2.   Dehydration.

3.   Chronic abdominal pain.

4.   Gastroenteritis.

5.   Generalized anxiety disorder.

6.   History of nephrectomy.

7.   History of MI.

 

PLAN

Will admit the patient.  

Will start the patient on IV fluids, normal saline at 125 cc per hour.

The patient has received 60 meq of potassium. Will monitor the potassium 
closely.  

Will start the patient on Bentyl 20 mg IM.

Will start the patient on Protonix 40 mg IV.  

Will consult gastroenterologist for a complete GI workup.  

Will monitor the CBC and the BMP.  

Continue the home medications as appropriate.  

DVT prophylaxis with SCDs.  

GI prophylaxis with PPI.  

Continue home medications as appropriate.

Further management depends on hospital course. 

Will monitor patient closely during hospital stay. 



Patient is acutely ill with acute dehydration and severe hypokalemia,

will need replacement and monitoring.  Will need a complete GI workup.  Will

need inpatient stay of minimum three midnights.  Anticipate discharge to home

when the patient is stable.

 

                               Luis Gonzalez MD

 

[facility]

## 2017-03-05 VITALS
TEMPERATURE: 98.4 F | SYSTOLIC BLOOD PRESSURE: 114 MMHG | HEART RATE: 71 BPM | RESPIRATION RATE: 16 BRPM | DIASTOLIC BLOOD PRESSURE: 59 MMHG | OXYGEN SATURATION: 100 %

## 2017-03-05 VITALS
OXYGEN SATURATION: 100 % | TEMPERATURE: 98.4 F | DIASTOLIC BLOOD PRESSURE: 60 MMHG | SYSTOLIC BLOOD PRESSURE: 100 MMHG | HEART RATE: 72 BPM | RESPIRATION RATE: 16 BRPM

## 2017-03-05 VITALS
SYSTOLIC BLOOD PRESSURE: 109 MMHG | RESPIRATION RATE: 15 BRPM | TEMPERATURE: 98.3 F | DIASTOLIC BLOOD PRESSURE: 64 MMHG | HEART RATE: 79 BPM | OXYGEN SATURATION: 97 %

## 2017-03-05 VITALS
TEMPERATURE: 97.4 F | OXYGEN SATURATION: 98 % | DIASTOLIC BLOOD PRESSURE: 62 MMHG | RESPIRATION RATE: 18 BRPM | HEART RATE: 75 BPM | SYSTOLIC BLOOD PRESSURE: 111 MMHG

## 2017-03-05 VITALS
RESPIRATION RATE: 18 BRPM | TEMPERATURE: 98.1 F | HEART RATE: 73 BPM | DIASTOLIC BLOOD PRESSURE: 62 MMHG | SYSTOLIC BLOOD PRESSURE: 116 MMHG | OXYGEN SATURATION: 99 %

## 2017-03-05 VITALS
RESPIRATION RATE: 16 BRPM | TEMPERATURE: 98.2 F | HEART RATE: 85 BPM | OXYGEN SATURATION: 100 % | SYSTOLIC BLOOD PRESSURE: 115 MMHG | DIASTOLIC BLOOD PRESSURE: 59 MMHG

## 2017-03-05 VITALS — HEART RATE: 73 BPM

## 2017-03-05 LAB
ANION GAP SERPL CALC-SCNC: 11 MEQ/L (ref 5–15)
BASOPHILS # BLD AUTO: 0 TH/MM3 (ref 0–0.2)
BASOPHILS NFR BLD: 0.1 % (ref 0–2)
BUN SERPL-MCNC: 9 MG/DL (ref 7–18)
CHLORIDE SERPL-SCNC: 106 MEQ/L (ref 98–107)
EOSINOPHIL # BLD: 0.1 TH/MM3 (ref 0–0.4)
EOSINOPHIL NFR BLD: 2.8 % (ref 0–4)
ERYTHROCYTE [DISTWIDTH] IN BLOOD BY AUTOMATED COUNT: 16.2 % (ref 11.6–17.2)
GFR SERPLBLD BASED ON 1.73 SQ M-ARVRAT: 70 ML/MIN (ref 89–?)
HCO3 BLD-SCNC: 23 MEQ/L (ref 21–32)
HCT VFR BLD CALC: 31.4 % (ref 35–46)
HEMO FLAGS: (no result)
LYMPHOCYTES # BLD AUTO: 1.2 TH/MM3 (ref 1–4.8)
LYMPHOCYTES NFR BLD AUTO: 29.3 % (ref 9–44)
MCH RBC QN AUTO: 27.9 PG (ref 27–34)
MCHC RBC AUTO-ENTMCNC: 33.2 % (ref 32–36)
MCV RBC AUTO: 84 FL (ref 80–100)
MONOCYTES NFR BLD: 9.1 % (ref 0–8)
NEUTROPHILS # BLD AUTO: 2.3 TH/MM3 (ref 1.8–7.7)
NEUTROPHILS NFR BLD AUTO: 58.7 % (ref 16–70)
PLATELET # BLD: 124 TH/MM3 (ref 150–450)
POTASSIUM SERPL-SCNC: 5.9 MEQ/L (ref 3.5–5.1)
RBC # BLD AUTO: 3.74 MIL/MM3 (ref 4–5.3)
SODIUM SERPL-SCNC: 140 MEQ/L (ref 136–145)
WBC # BLD AUTO: 4 TH/MM3 (ref 4–11)

## 2017-03-05 RX ADMIN — DIAZEPAM SCH MG: 10 TABLET ORAL at 18:18

## 2017-03-05 RX ADMIN — HYDROMORPHONE HYDROCHLORIDE PRN MG: 1 INJECTION, SOLUTION INTRAMUSCULAR; INTRAVENOUS; SUBCUTANEOUS at 10:32

## 2017-03-05 RX ADMIN — TRIMETHOBENZAMIDE HYDROCHLORIDE PRN MG: 100 INJECTION INTRAMUSCULAR at 22:23

## 2017-03-05 RX ADMIN — PANTOPRAZOLE SCH MG: 40 TABLET, DELAYED RELEASE ORAL at 14:42

## 2017-03-05 RX ADMIN — HYDROMORPHONE HYDROCHLORIDE PRN MG: 1 INJECTION, SOLUTION INTRAMUSCULAR; INTRAVENOUS; SUBCUTANEOUS at 02:30

## 2017-03-05 RX ADMIN — ERYTHROMYCIN LACTOBIONATE SCH MLS/HR: 500 INJECTION, POWDER, LYOPHILIZED, FOR SOLUTION INTRAVENOUS at 00:42

## 2017-03-05 RX ADMIN — DIAZEPAM SCH MG: 10 TABLET ORAL at 11:59

## 2017-03-05 RX ADMIN — HYDROMORPHONE HYDROCHLORIDE PRN MG: 1 INJECTION, SOLUTION INTRAMUSCULAR; INTRAVENOUS; SUBCUTANEOUS at 06:36

## 2017-03-05 RX ADMIN — HYDROMORPHONE HYDROCHLORIDE PRN MG: 1 INJECTION, SOLUTION INTRAMUSCULAR; INTRAVENOUS; SUBCUTANEOUS at 22:24

## 2017-03-05 RX ADMIN — OLANZAPINE SCH MG: 5 TABLET, FILM COATED ORAL at 20:20

## 2017-03-05 RX ADMIN — ERYTHROMYCIN LACTOBIONATE SCH MLS/HR: 500 INJECTION, POWDER, LYOPHILIZED, FOR SOLUTION INTRAVENOUS at 18:17

## 2017-03-05 RX ADMIN — THIAMINE HYDROCHLORIDE SCH MLS/HR: 100 INJECTION, SOLUTION INTRAMUSCULAR; INTRAVENOUS at 22:24

## 2017-03-05 RX ADMIN — OLANZAPINE SCH MG: 5 TABLET, FILM COATED ORAL at 09:14

## 2017-03-05 RX ADMIN — FAMOTIDINE SCH MG: 10 INJECTION, SOLUTION INTRAVENOUS at 00:42

## 2017-03-05 RX ADMIN — FAMOTIDINE SCH MG: 10 INJECTION, SOLUTION INTRAVENOUS at 11:59

## 2017-03-05 RX ADMIN — HYDROMORPHONE HYDROCHLORIDE PRN MG: 1 INJECTION, SOLUTION INTRAMUSCULAR; INTRAVENOUS; SUBCUTANEOUS at 14:42

## 2017-03-05 RX ADMIN — DEXTROSE MONOHYDRATE, SODIUM CHLORIDE, AND POTASSIUM CHLORIDE SCH MLS/HR: 50; 4.5; .745 INJECTION, SOLUTION INTRAVENOUS at 09:16

## 2017-03-05 RX ADMIN — IMIPRAMINE HYDROCHLORIDE SCH MG: 25 TABLET, FILM COATED ORAL at 20:19

## 2017-03-05 RX ADMIN — DEXTROSE MONOHYDRATE, SODIUM CHLORIDE, AND POTASSIUM CHLORIDE SCH MLS/HR: 50; 4.5; .745 INJECTION, SOLUTION INTRAVENOUS at 00:42

## 2017-03-05 RX ADMIN — ERYTHROMYCIN LACTOBIONATE SCH MLS/HR: 500 INJECTION, POWDER, LYOPHILIZED, FOR SOLUTION INTRAVENOUS at 11:59

## 2017-03-05 RX ADMIN — ERYTHROMYCIN LACTOBIONATE SCH MLS/HR: 500 INJECTION, POWDER, LYOPHILIZED, FOR SOLUTION INTRAVENOUS at 05:31

## 2017-03-05 RX ADMIN — THIAMINE HYDROCHLORIDE SCH MLS/HR: 100 INJECTION, SOLUTION INTRAMUSCULAR; INTRAVENOUS at 14:42

## 2017-03-05 RX ADMIN — HYDROMORPHONE HYDROCHLORIDE PRN MG: 1 INJECTION, SOLUTION INTRAMUSCULAR; INTRAVENOUS; SUBCUTANEOUS at 18:18

## 2017-03-05 RX ADMIN — DIAZEPAM SCH MG: 10 TABLET ORAL at 09:13

## 2017-03-05 NOTE — PD.CONS
HPI


History of Present Illness


This is a 56 year old female with chronic abdominal pain and nausea.  She is 

well-known to our service and has had multiple hospitalizations and undergone 

extensive testing with EGD/Colonoscopy, CT scans, Xrays, MRI, GES.  She was 

recently found to have gastroparesis with good response to EES.  Unfortunately 

she has multiple drug allergies and continues to take schedule narcotics for 

her chronic abdominal pain.  The patient takes Dilaudid 4 mg by mouth every 4 

hours at home as well as Valium 3 times a day for her nausea.  She reports that 

she is allergic to Phenergan, Zofran, Compazine and has difficulty breathing if 

she receives these meds and therefore the only medication she can take for her 

nausea is Valium.  Discussed with patient that her chronic dilated use is 

likely contributing to her gastroparesis and aggravating her abdominal pain.  

She became somewhat upset and reports that if we take away her dialogue it she 

will not eat and that it is the only thing that helps her with her appetite and 

her abdominal pain.  She is unwilling to stop this.  She reports that since her 

last hospitalization, her symptoms only mildly improved.  She continues to have 

constant abdominal pain that she describes as a severe dull ache in her 

epigastric area that radiates straight through to her back.  She has associated 

nausea and dry heaving.  She denies any heartburn.  She does report chills with 

no fever.  She reports that she had a small bowel movement last night.  She 

occasionally sees blood in her stool but states she hasn't seen this in quite 

some time.  She was last evaluated with an EGD/colonoscopy (6/17/16) and this 

revealed status post gastric bypass, biopsy of gastric polyp, diverticulosis in 

the sigmoid and descending colon, retroflex views revealed small internal 

hemorrhoids, external hemorrhoids.  Pathology revealed mild chronic gastritis, 

negative for helicobacter pylori.  On admission she had mild leukocytosis with 

a WBC of 13.2.  This is since normalized.  Her hemoglobin is stable.  Her 

lipase and LFTs are within normal limits. (Abbi Walsh)





PFSH


Past Medical History


? Pancreatitis once about 2 years ago


Kidney cancer, S/P Right nephrectomy


MI/CAD, undetected PFO


Double embolic CVA, coma 7 days


GERD


Aspiration pneumonia


Ileus


Gastric polyp


Chronic abdominal pain


Gastroparesis


Past Surgical History


Fundoplication x 2


Subtotal gastrectomy


G tube placement


Incisional hernia repair x 2


EGD/Colonoscopy


Cardiac catheterization


Tonsillectomy


Appendectomy


Cholecystectomy


Tubal ligation (Abbi Walsh)


Coded Allergies:  


     Compazine (Verified  Allergy, Severe, SOB, 3/3/17)


     Gadolinium Derivatives (Verified  Allergy, Severe, RESPIRATORY DISTRESS, 3/

3/17)


     Lobster (Verified  Allergy, Severe, Anaphylaxis, 3/3/17)


     Morphine (Verified  Allergy, Severe, Hives, 3/3/17)


 PT HAVING HIVES AND ITCHING TO ARM ABOVE IV SITE AFTER


 ADMINISTRATION OF MORPHINE


     Phenergan (Verified  Allergy, Severe, SOB, 3/3/17)


     Reglan (Verified  Allergy, Severe, SOB, 3/3/17)


     Toradol (Verified  Allergy, Severe, Shortness of Breath, 3/3/17)


     Zofran (Verified  Allergy, Severe, SOB, 3/3/17)


     Penicillin (Verified  Allergy, Mild, RASH, 3/3/17)


     Sulfa (Verified  Allergy, Mild, RASH, 3/3/17)


     *MDRO Multi-Drug Resistant Organism (Verified  Adverse Reaction, Unknown, 3

/3/17)


 MDR-E. coli (urine) - 11/27/16


Uncoded Allergies:  


     GADOLINIUM (Adverse Reaction, Severe, PT STOPPED BREATHING WITH GADO, 1/17/ 17)


 PT STATES SHE STOPPED BREATHING AFTER HAVING GADO AT ANOTHER


 FACILITY. 1/17/17


 VSV


Medications





 Allergies








Coded Allergies Type Severity Reaction Last Updated Verified


 


  Compazine Allergy Severe SOB 3/3/17 Yes


 


  Gadolinium Derivatives Allergy Severe RESPIRATORY DISTRESS 3/3/17 Yes


 


  Lobster Allergy Severe Anaphylaxis 3/3/17 Yes


 


  Morphine Allergy Severe Hives 3/3/17 Yes


 


  Phenergan Allergy Severe SOB 3/3/17 Yes


 


  Reglan Allergy Severe SOB 3/3/17 Yes


 


  Toradol Allergy Severe Shortness of Breath 3/3/17 Yes


 


  Zofran Allergy Severe SOB 3/3/17 Yes


 


  Penicillin Allergy Mild RASH 3/3/17 Yes


 


  Sulfa Allergy Mild RASH 3/3/17 Yes


 


  *MDRO Multi-Drug Resistant Organism Adverse Reaction Unknown  3/3/17 Yes


 


Uncoded Allergies Type Severity Reaction Last Updated Verified


 


  GADOLINIUM Adverse Reaction Severe PT STOPPED BREATHING WITH GADO 1/17/17 








 Active Scripts








 Medications  Dose


 Route/Sig Days Date Category


 


 Dilaudid


  (Hydromorphone


 HCl) 4 Mg Tab  4 Mg


 PO Q6H PRN   2/9/17 Reported


 


 Valium (Diazepam)


 10 Mg Tab  10 Mg


 PO TID   2/9/17 Reported


 


 Buspirone


  (Buspirone HCl)


 7.5 Mg Tab  7.5 Mg


 PO BID   12/29/16 Reported


 


 Zyprexa


  (Olanzapine) 5 Mg


 Tab  5 Mg


 PO BID   10/30/16 Reported


 


 Cyanocobalamin


 Inj


  (Cyanocobalamin)


 1,000 Mcg/Ml Inj  1,000 Mcg


 IM Q30D   10/30/16 Reported


 


 Imipramine HCL


  (Imipramine HCl)


 25 Mg Tab  25 Mg


 PO HS   10/30/16 Reported








Family History


Both parents had COPD, Mother also had MS.


Social History


No tobacco.


No ETOH


No illicit drug use (Abbi Walsh)





Review of Systems


Constitutional:  COMPLAINS OF: Fatigue, Weight loss, Chills, Change in appetite

,  DENIES: Fever


Respiratory:  DENIES: Cough


Cardiovascular:  DENIES: Chest pain


Gastrointestinal:  COMPLAINS OF: Abdominal pain, Nausea, Vomiting, Anorexia,  

DENIES: Constipation, Diarrhea, Swelling of Abdomen, Heartburn


Musculoskeletal:  COMPLAINS OF: Back pain,  DENIES: Joint pain


Hematologic/lymphatic:  DENIES: Bruising


Psychiatric:  COMPLAINS OF: Anxiety,  DENIES: Confusion (Abbi Walsh)





GI Exam


Vitals I&O





 Vital Signs








  Date Time  Temp Pulse Resp B/P Pulse Ox O2 Delivery O2 Flow Rate FiO2


 


3/5/17 08:30      Room Air  


 


3/5/17 08:00 98.4 76 16 114/59 100   


 


3/5/17 04:00 98.1 73 18 116/62 99   


 


3/5/17 00:00 97.4 75 18 111/62 98   


 


3/4/17 20:24  68      


 


3/4/17 20:00 97.4 69 18 97/53 99   


 


3/4/17 19:45      Room Air  


 


3/4/17 16:00 97.9 69 20 90/56 96   


 


3/4/17 12:00 97.6 66 20 80/50 100   








 I/O








 3/4/17 3/4/17 3/4/17 3/5/17 3/5/17 3/5/17





 07:00 15:00 23:00 07:00 15:00 23:00


 


Intake Total   1240 ml 1100 ml  


 


Balance   1240 ml 1100 ml  


 


      


 


Intake Oral   240 ml 100 ml  


 


IV Total   1000 ml 1000 ml  


 


# Voids   1 2  


 


# Bowel Movements    0  








Imaging





Laboratory











Test 3/4/17 3/4/17 3/5/17





 12:32 15:21 08:04


 


Sodium Level 140 MEQ/L  140 MEQ/L


 


Potassium Level 4.2 MEQ/L  5.9 MEQ/L


 


Chloride Level 105 MEQ/L  106 MEQ/L


 


Carbon Dioxide Level 23.9 MEQ/L  23.0 MEQ/L


 


Anion Gap 11 MEQ/L  11 MEQ/L


 


Blood Urea Nitrogen 17 MG/DL  9 MG/DL


 


Creatinine 1.04 MG/DL  0.84 MG/DL


 


Estimat Glomerular Filtration 55 ML/MIN  70 ML/MIN





Rate   


 


Random Glucose 95 MG/DL  91 MG/DL


 


Calcium Level 8.4 MG/DL  7.9 MG/DL


 


White Blood Count  3.9 TH/MM3 4.0 TH/MM3


 


Red Blood Count  4.46 MIL/MM3 3.74 MIL/MM3


 


Hemoglobin  12.7 GM/DL 10.4 GM/DL


 


Hematocrit  39.1 % 31.4 %


 


Mean Corpuscular Volume  87.7 FL 84.0 FL


 


Mean Corpuscular Hemoglobin  28.4 PG 27.9 PG


 


Mean Corpuscular Hemoglobin  32.4 % 33.2 %





Concent   


 


Red Cell Distribution Width  16.6 % 16.2 %


 


Platelet Count  135 TH/MM3 124 TH/MM3


 


Mean Platelet Volume  9.8 FL 10.9 FL


 


Neutrophils (%) (Auto)   58.7 %


 


Lymphocytes (%) (Auto)   29.3 %


 


Monocytes (%) (Auto)   9.1 %


 


Eosinophils (%) (Auto)   2.8 %


 


Basophils (%) (Auto)   0.1 %


 


Neutrophils # (Auto)   2.3 TH/MM3


 


Lymphocytes # (Auto)   1.2 TH/MM3


 


Monocytes # (Auto)   0.4 TH/MM3


 


Eosinophils # (Auto)   0.1 TH/MM3


 


Basophils # (Auto)   0.0 TH/MM3


 


CBC Comment   DIFF FINAL 


 


Differential Comment    


 


Hematology Comments    








Physical Examination


HEENT: Normocephalic; atraumatic; no jaundice.  


CHEST:  CTA


CARDIAC:  RRR


ABDOMEN:  Soft, nondistended, mild to moderate epigastric tenderness; no 

hepatosplenomegaly; bowel sounds are present in all four quadrants.


EXTREMITIES: No clubbing, cyanosis, or edema.


SKIN:  Normal; no rash; no jaundice.


CNS:  No focal deficits; alert and oriented times three. (Abbi Walsh)





Assessment and Plan


Plan


ASSESSMENT:


- Acute on chronic abdominal pain in patient with known gastroparesis with 

chronic opioid use.  She reports that she has had her 


   symptoms for a long time and requires Dilaudid 4mg po q4h at home along with 

Valium TID for nausea- states she is allergic


   to Reglan, Phenergan, Compazine, Zofran- cause difficulty breathing and 

therefore can only take Valium.  Pt was recently 


   found to have gastroparesis with response to EES on GES.  D/W patient that 

her chronic narcotic use is contributing to her 


   symptoms, but the patient refuses to stop her Dilaudid, stating that she 

will not eat if this is stopped and that it is the only


   thing that helps her symptoms. Last EGD/colonoscopy (6/17/16) and this 

revealed status post gastric bypass, biopsy of gastric


   polyp, diverticulosis in the sigmoid and descending colon, retroflex views 

revealed small internal hemorrhoids, external hemorrhoids. 


   Pathology revealed mild chronic gastritis, negative for helicobacter pylori. 

She is getting EES IVPB and Pepcid.  Strongly 


   recommend weaning off narcotics.  Will change the pepcid to PPI and continue 

EES.  





PLAN:


- XAVIER


- Cont. EES


- D/C Pepcid


- Protonix 40mg po daily


- Highly recommend avoiding narcotics, as this is contributing to her 

gastroparesis and abdominal pain.  D/W patient, but she reports that


   without the Dilaudid, she will not eat and it is the only thing that helps 

her.  


- If no improvement, consider repeat EGD.


- Supportive care


- Further recommendations to follow based on results of above


- Pt seen and examined by Dr. Dooley and myself and this note is written on 

his behalf (Abbi Walsh)


Physician Comments


Patient Seen and examined


Agree with above


Continue with current supportive care


Monitor labs


Recommend pain management (Soto Dooley MD)








Abbi Walsh Mar 5, 2017 11:01


Soto Dooley MD Mar 5, 2017 18:23

## 2017-03-05 NOTE — HHI.PR
Subjective


Remarks


56yr old female seen and examined today. 


Able tolerate liquids. 


Feels very weak. 


Per GI: S/P extensive testing with EGD/Colonoscopy, CT scans, Xrays, MRI, GES.  

Recently found to have gastroparesis with good response to EES.  EGD/

Colonoscopy (6/17/16) and this revealed s/p gastric bypass, biopsy of gastric 

polyp; diverticulosis in sigmoid and descending colon, retroflexed views 

revealed small internal hemorrhoids, external hemorrhoids.  Pathology revealed 

mild chronic gastritis, negative for helicobacter pylori.





Objective


Objective Results


-





 Vital Signs








  Date Time  Temp Pulse Resp B/P Pulse Ox O2 Delivery O2 Flow Rate FiO2


 


3/5/17 08:30      Room Air  


 


3/5/17 08:00 98.4 76 16 114/59 100   


 


3/5/17 04:00 98.1 73 18 116/62 99   


 


3/5/17 00:00 97.4 75 18 111/62 98   


 


3/4/17 20:24  68      


 


3/4/17 20:00 97.4 69 18 97/53 99   


 


3/4/17 19:45      Room Air  


 


3/4/17 16:00 97.9 69 20 90/56 96   


 


3/4/17 12:00 97.6 66 20 80/50 100   








 I/O








 3/4/17 3/4/17 3/4/17 3/5/17 3/5/17 3/5/17





 07:00 15:00 23:00 07:00 15:00 23:00


 


Intake Total   1240 ml 1100 ml  


 


Balance   1240 ml 1100 ml  


 


      


 


Intake Oral   240 ml 100 ml  


 


IV Total   1000 ml 1000 ml  


 


# Voids   1 2  


 


# Bowel Movements    0  








Result Diagram:  


3/5/17 0804                                                                    

            3/5/17 0804





Other Results





Laboratory Tests








Test 3/4/17 3/4/17 3/5/17





 12:32 15:21 08:04


 


Sodium Level 140   140 


 


Potassium Level 4.2   5.9 


 


Chloride Level 105   106 


 


Carbon Dioxide Level 23.9   23.0 


 


Anion Gap 11   11 


 


Blood Urea Nitrogen 17   9 


 


Creatinine 1.04   0.84 


 


Estimat Glomerular Filtration 55   70 





Rate   


 


Random Glucose 95   91 


 


Calcium Level 8.4   7.9 


 


White Blood Count  3.9  4.0 


 


Red Blood Count  4.46  3.74 


 


Hemoglobin  12.7  10.4 


 


Hematocrit  39.1  31.4 


 


Mean Corpuscular Volume  87.7  84.0 


 


Mean Corpuscular Hemoglobin  28.4  27.9 


 


Mean Corpuscular Hemoglobin  32.4  33.2 





Concent   


 


Red Cell Distribution Width  16.6  16.2 


 


Platelet Count  135  124 


 


Mean Platelet Volume  9.8  10.9 


 


Neutrophils (%) (Auto)   58.7 


 


Lymphocytes (%) (Auto)   29.3 


 


Monocytes (%) (Auto)   9.1 


 


Eosinophils (%) (Auto)   2.8 


 


Basophils (%) (Auto)   0.1 


 


Neutrophils # (Auto)   2.3 


 


Lymphocytes # (Auto)   1.2 


 


Monocytes # (Auto)   0.4 


 


Eosinophils # (Auto)   0.1 


 


Basophils # (Auto)   0.0 


 


CBC Comment   DIFF FINAL 


 


Differential Comment    


 


Hematology Comments    











ROS


General:  Fatigue, Weakness


HEENT:  No: Sore Throat, Dysphagia, Other


Cardiac:  No: Chest Pain, Edema, Palpitations, Other


Pulmonary:  No: Cough, SOB, Wheezing, Other


GI:  Abdominal Pain, Diarrhea, N/V


/GYN:  No: Dysuria, Urgency, Other


Neuro/MS:  No: Lightheaded, Confusion, Other


Psych:  No: Anxiety, Depression, Other


Skin:  No: Itching, Rash, Other





Physical Exam


Physical Exam


PHYSICAL EXAMINATION


GENERAL:  This is a frail looking   female 


who appears to be in no acute distress.  She  is alert and awake


HEAD:  Normocephalic without any lesion or mass noted.  Facial features


appear symmetric.


EYES:  Perrla, Normal eye movement, No icterus.


OROPHARYNGEAL:  Oropharynx without erythema or edema.


MOUTH/THROAT:  Buccal mucosa is moist


NECK:  Supple.  No nuchal rigidity or lymphadenopathy.


Trachea midline without deviation.  Thyroid not palpable, no bruits


appreciated.


CARDIAC:  Regular rhythm, regular rate, S1 and S2 are heard.  No murmur.


LUNGS:  Clear to auscultation bilaterally. 


ABDOMEN:  Soft, nontender, no organomegaly or masses.  Bowel sounds are


heard in all four quadrants.  No rebound.  No guarding.


EXTREMITIES:  No edema. 


NEUROLOGICAL:  No focal deficits. 


SKIN:Warm and moist


PSYCH: Mood and affect appropriate





A/P


Assessment and Plan


DIAGNOSTIC IMPRESSION


1.   Hypokalemia now hyperkalemia. 


2.   Dehydration.


3.   Chronic abdominal pain.


4.   Gastroparesis.


5.   Generalized anxiety disorder.


6.   History of nephrectomy.


7.   History of MI.


 


PLAN


Change IVF to 1/2 NS at 100cc/hr. 


Advance diet as tolerated. 


Per patient she is aware of foods that exacerbate her condition. 


Patient declined nutrition consult. 


Potassium elevated. S/p replacement yesterday. 


Kayexalate 15gm po x 1.


Rpt potassium level in am. 


Bentyl 20 mg IM prn.


Protonix 40 mg IV.  


Appreciate GI input. 


Will monitor labs.


Continued home medications as appropriate.  


DVT prophylaxis with SCDs.  


GI prophylaxis with PPI.








Luis Gonzalez MD Mar 5, 2017 11:18

## 2017-03-06 VITALS
SYSTOLIC BLOOD PRESSURE: 112 MMHG | TEMPERATURE: 98.6 F | RESPIRATION RATE: 14 BRPM | OXYGEN SATURATION: 97 % | HEART RATE: 71 BPM | DIASTOLIC BLOOD PRESSURE: 68 MMHG

## 2017-03-06 VITALS
HEART RATE: 79 BPM | RESPIRATION RATE: 16 BRPM | SYSTOLIC BLOOD PRESSURE: 133 MMHG | OXYGEN SATURATION: 98 % | DIASTOLIC BLOOD PRESSURE: 83 MMHG | TEMPERATURE: 97.4 F

## 2017-03-06 VITALS
SYSTOLIC BLOOD PRESSURE: 127 MMHG | DIASTOLIC BLOOD PRESSURE: 75 MMHG | HEART RATE: 81 BPM | OXYGEN SATURATION: 97 % | RESPIRATION RATE: 16 BRPM | TEMPERATURE: 98.1 F

## 2017-03-06 VITALS
HEART RATE: 74 BPM | DIASTOLIC BLOOD PRESSURE: 60 MMHG | TEMPERATURE: 98.1 F | SYSTOLIC BLOOD PRESSURE: 109 MMHG | OXYGEN SATURATION: 95 % | RESPIRATION RATE: 16 BRPM

## 2017-03-06 VITALS
DIASTOLIC BLOOD PRESSURE: 60 MMHG | OXYGEN SATURATION: 94 % | SYSTOLIC BLOOD PRESSURE: 110 MMHG | TEMPERATURE: 97.8 F | HEART RATE: 76 BPM | RESPIRATION RATE: 16 BRPM

## 2017-03-06 VITALS
SYSTOLIC BLOOD PRESSURE: 109 MMHG | TEMPERATURE: 97.9 F | HEART RATE: 64 BPM | RESPIRATION RATE: 12 BRPM | DIASTOLIC BLOOD PRESSURE: 70 MMHG | OXYGEN SATURATION: 100 %

## 2017-03-06 VITALS
HEART RATE: 69 BPM | TEMPERATURE: 97.9 F | RESPIRATION RATE: 16 BRPM | DIASTOLIC BLOOD PRESSURE: 72 MMHG | OXYGEN SATURATION: 100 % | SYSTOLIC BLOOD PRESSURE: 121 MMHG

## 2017-03-06 VITALS — HEART RATE: 67 BPM

## 2017-03-06 LAB
ANION GAP SERPL CALC-SCNC: 10 MEQ/L (ref 5–15)
BUN SERPL-MCNC: 7 MG/DL (ref 7–18)
CHLORIDE SERPL-SCNC: 108 MEQ/L (ref 98–107)
ERYTHROCYTE [DISTWIDTH] IN BLOOD BY AUTOMATED COUNT: 16.3 % (ref 11.6–17.2)
GFR SERPLBLD BASED ON 1.73 SQ M-ARVRAT: 71 ML/MIN (ref 89–?)
HCO3 BLD-SCNC: 23.9 MEQ/L (ref 21–32)
HCT VFR BLD CALC: 30.2 % (ref 35–46)
MCH RBC QN AUTO: 27.8 PG (ref 27–34)
MCHC RBC AUTO-ENTMCNC: 32.3 % (ref 32–36)
MCV RBC AUTO: 85.9 FL (ref 80–100)
PLATELET # BLD: 112 TH/MM3 (ref 150–450)
POTASSIUM SERPL-SCNC: 3.1 MEQ/L (ref 3.5–5.1)
RBC # BLD AUTO: 3.52 MIL/MM3 (ref 4–5.3)
REVIEW FLAG: (no result)
SODIUM SERPL-SCNC: 142 MEQ/L (ref 136–145)
WBC # BLD AUTO: 3.4 TH/MM3 (ref 4–11)

## 2017-03-06 RX ADMIN — IMIPRAMINE HYDROCHLORIDE SCH MG: 25 TABLET, FILM COATED ORAL at 20:50

## 2017-03-06 RX ADMIN — OLANZAPINE SCH MG: 5 TABLET, FILM COATED ORAL at 07:43

## 2017-03-06 RX ADMIN — PANTOPRAZOLE SCH MG: 40 TABLET, DELAYED RELEASE ORAL at 07:43

## 2017-03-06 RX ADMIN — DIAZEPAM SCH MG: 10 TABLET ORAL at 17:26

## 2017-03-06 RX ADMIN — HYDROMORPHONE HYDROCHLORIDE PRN MG: 1 INJECTION, SOLUTION INTRAMUSCULAR; INTRAVENOUS; SUBCUTANEOUS at 12:35

## 2017-03-06 RX ADMIN — DICYCLOMINE HYDROCHLORIDE PRN MG: 20 INJECTION, SOLUTION INTRAMUSCULAR at 20:55

## 2017-03-06 RX ADMIN — DICYCLOMINE HYDROCHLORIDE PRN MG: 20 INJECTION, SOLUTION INTRAMUSCULAR at 08:34

## 2017-03-06 RX ADMIN — THIAMINE HYDROCHLORIDE SCH MLS/HR: 100 INJECTION, SOLUTION INTRAMUSCULAR; INTRAVENOUS at 18:29

## 2017-03-06 RX ADMIN — OLANZAPINE SCH MG: 5 TABLET, FILM COATED ORAL at 20:50

## 2017-03-06 RX ADMIN — DIAZEPAM SCH MG: 10 TABLET ORAL at 07:44

## 2017-03-06 RX ADMIN — ERYTHROMYCIN LACTOBIONATE SCH MLS/HR: 500 INJECTION, POWDER, LYOPHILIZED, FOR SOLUTION INTRAVENOUS at 17:26

## 2017-03-06 RX ADMIN — DIAZEPAM SCH MG: 10 TABLET ORAL at 13:42

## 2017-03-06 RX ADMIN — THIAMINE HYDROCHLORIDE SCH MLS/HR: 100 INJECTION, SOLUTION INTRAMUSCULAR; INTRAVENOUS at 07:51

## 2017-03-06 RX ADMIN — ERYTHROMYCIN LACTOBIONATE SCH MLS/HR: 500 INJECTION, POWDER, LYOPHILIZED, FOR SOLUTION INTRAVENOUS at 04:26

## 2017-03-06 RX ADMIN — ERYTHROMYCIN LACTOBIONATE SCH MLS/HR: 500 INJECTION, POWDER, LYOPHILIZED, FOR SOLUTION INTRAVENOUS at 12:36

## 2017-03-06 RX ADMIN — HYDROMORPHONE HYDROCHLORIDE PRN MG: 1 INJECTION, SOLUTION INTRAMUSCULAR; INTRAVENOUS; SUBCUTANEOUS at 04:26

## 2017-03-06 RX ADMIN — ERYTHROMYCIN LACTOBIONATE SCH MLS/HR: 500 INJECTION, POWDER, LYOPHILIZED, FOR SOLUTION INTRAVENOUS at 00:28

## 2017-03-06 RX ADMIN — HYDROMORPHONE HYDROCHLORIDE PRN MG: 1 INJECTION, SOLUTION INTRAMUSCULAR; INTRAVENOUS; SUBCUTANEOUS at 08:33

## 2017-03-06 NOTE — HHI.PR
Subjective


Remarks


Indicates she has had a rough day, has felt nauseous no actual vomiting


Has had abdominal pain


Indicates that only Valium works for her nausea, however she did receive Tigan 

yesterday without any problems or adverse effects


States that she never contacted [facility] or [facility] after last admission


Discussed with patient the  need to wean her off IV Dilaudid and try her on 

oral as she is able to tolerate diet


Reiterated advise from GI that narcotics are worsening her gastroparesis


pt. agreeable with plan to wean off IV narcotics and then switch to PO, she 

wants to go home tomorrow





Objective


Objective Results


-





 Vital Signs








  Date Time  Temp Pulse Resp B/P Pulse Ox O2 Delivery O2 Flow Rate FiO2


 


3/6/17 12:00 97.9 64 12 109/70 100   


 


3/6/17 08:48      Room Air  


 


3/6/17 08:00 97.9 69 16 121/72 100   


 


3/6/17 07:45  67      


 


3/6/17 04:46 97.4 79 16 133/83 98   


 


3/6/17 00:00 98.6 71 14 112/68 97   


 


3/5/17 20:22  73      


 


3/5/17 20:00 98.3 79 15 109/64 97   


 


3/5/17 19:45      Room Air  


 


3/5/17 16:00 98.2 85 16 115/59 100   








 I/O








 3/5/17 3/5/17 3/5/17 3/6/17 3/6/17 3/6/17





 07:00 15:00 23:00 07:00 15:00 23:00


 


Intake Total 1100 ml 480 ml 480 ml  881 ml 


 


Output Total   300 ml   


 


Balance 1100 ml 480 ml 180 ml  881 ml 


 


      


 


Intake Oral 100 ml 480 ml 480 ml   


 


IV Total 1000 ml    881 ml 


 


Output Urine Total   300 ml   


 


# Voids 2 2  1  


 


# Bowel Movements 0 1  0  








Result Diagram:  


3/6/17 0634                                                                    

            3/6/17 0634





Other Results





Laboratory Tests








Test 3/6/17





 06:34


 


White Blood Count 3.4 


 


Red Blood Count 3.52 


 


Hemoglobin 9.8 


 


Hematocrit 30.2 


 


Mean Corpuscular Volume 85.9 


 


Mean Corpuscular Hemoglobin 27.8 


 


Mean Corpuscular Hemoglobin 32.3 





Concent 


 


Red Cell Distribution Width 16.3 


 


Platelet Count 112 


 


Mean Platelet Volume 10.0 


 


Sodium Level 142 


 


Potassium Level 3.1 


 


Chloride Level 108 


 


Carbon Dioxide Level 23.9 


 


Anion Gap 10 


 


Blood Urea Nitrogen 7 


 


Creatinine 0.83 


 


Estimat Glomerular Filtration 71 





Rate 


 


Random Glucose 84 


 


Calcium Level 8.0 











ROS


General:  No: Fatigue, Weakness


HEENT:  No: Sore Throat, Dysphagia


Cardiac:  No: Chest Pain, Edema, Palpitations


Pulmonary:  No: Cough, SOB, Wheezing


GI:  Abdominal Pain, N/V


/GYN:  No: Dysuria, Urgency


Neuro/MS:  No: Lightheaded, Confusion


Psych:  Anxiety


Skin:  No: Itching, Rash





Physical Exam


Physical Exam


GENERAL:  This is a frail looking   female 


who appears to be in no acute distress.  She  is alert and awake


HEAD:  Normocephalic without any lesion or mass noted.  Facial features


appear symmetric.


EYES:  Perrla, Normal eye movement, No icterus.


OROPHARYNGEAL:  Oropharynx without erythema or edema.


MOUTH/THROAT:  Buccal mucosa is moist


NECK:  Supple.  No nuchal rigidity or lymphadenopathy.


Trachea midline without deviation.  Thyroid not palpable, no bruits


appreciated.


CARDIAC:  Regular rhythm, regular rate, S1 and S2 are heard.  No murmur.


LUNGS:  Clear to auscultation bilaterally. 


ABDOMEN:  Soft, mild diffuse tenderness, no organomegaly or masses.  Bowel 

sounds are


heard in all four quadrants.  No rebound.  No guarding.


EXTREMITIES:  No edema. 


NEUROLOGICAL:  No focal deficits. 


SKIN:Warm and moist


PSYCH: Anxious, tearful when discussing pain management


Urinary Catheter:  No


Vascular Central Line Catheter:  No





A/P


Diagnosis:  


(1) Hypokalemia


(2) Chronic pain


(3) Nausea & vomiting


(4) GERD (gastroesophageal reflux disease)


(5) Narcotic abuse


(6) History of CVA (cerebrovascular accident)


(7) CAD (coronary artery disease)


(8) History of fundoplication


(9) Gastroparesis


(10) Malnourished


Assessment and Plan


Continue with IVF 1/2 NS at 100cc/hr. 


Replace potassium


Advance diet as tolerated, patient is aware of foods that exacerbate her 

condition





Appreciate GI input, patient has been counseled about use of narcotics 

exacerbating gastroparesis.


Continue Bentyl


Continue EES


Continue PPI


Per GI, she has been recommended repeat EGD versus GJ placement if no 

improvement.  Patient would rather avoid





During previous admission, she was counseled to seek opinion from [facility] 

versus [facility]


Patient has had extensive GI workup


Patient has not made any attempts to contact any of the facilities


States that she will try to do so this time





Discussed narcotic use, we will give her 1 more dose of IV Dilaudid today then 

switch her to oral-patient is agreeable


She will order lunch, monitor intake and see if she can tolerate


If she does well, we plan to discharge tomorrow


Advance diet as tolerated. 





Patient unable to tolerate any antiemetics, indicates they'll cause different 

allergic reactions including Tigan although this was given yesterday and she 

tolerated well


Continue Valium as needed for nausea


She is requesting sleeping pill, has slept poorly, Ambien 10 mg by mouth daily 

at bedtime when necessary for insomnia





 


DVT prophylaxis with SCDs.  


GI prophylaxis with PPI.





D/W RN


D/W Dr. Torres


D/W pt


This patient was seen by myself in Dr. Torres, this note is written on his 

behalf





Problem Qualifiers





(1) Chronic pain:  


Qualified Code:  G89.4 - Chronic pain syndrome


(2) Nausea & vomiting:  


Qualified Code:  R11.2 - Non-intractable vomiting with nausea, unspecified 

vomiting type


(3) GERD (gastroesophageal reflux disease):  


Qualified Code:  K21.9 - Gastroesophageal reflux disease, esophagitis presence 

not specified


(4) CAD (coronary artery disease):  


Qualified Code:  I25.10 - Coronary artery disease involving native coronary 

artery of native heart without angina pectoris





Nikia Simental Mar 6, 2017 15:42

## 2017-03-06 NOTE — HHI.GIFU
Subjective


Remarks


States she had a bad night, was dry heaving all night.  Continues to have 

constant abdominal pain.  She reports that she does not want her Dilaudid 

adjusted as this is the only thing that seems to help her pain and helps her 

have an appetite to eat and gain weight.  States she does not want her Dilaudid 

adjusted for at least 2 weeks, after her current issues resolve.





Objective


Vitals I&O





 Vital Signs








  Date Time  Temp Pulse Resp B/P Pulse Ox O2 Delivery O2 Flow Rate FiO2


 


3/6/17 08:48      Room Air  


 


3/6/17 08:00 97.9 69 16 121/72 100   


 


3/6/17 07:45  67      


 


3/6/17 04:46 97.4 79 16 133/83 98   


 


3/6/17 00:00 98.6 71 14 112/68 97   


 


3/5/17 20:22  73      


 


3/5/17 20:00 98.3 79 15 109/64 97   


 


3/5/17 19:45      Room Air  


 


3/5/17 16:00 98.2 85 16 115/59 100   


 


3/5/17 12:00 98.4 72 16 100/60 100   








 I/O








 3/5/17 3/5/17 3/5/17 3/6/17 3/6/17 3/6/17





 07:00 15:00 23:00 07:00 15:00 23:00


 


Intake Total 1100 ml 480 ml 480 ml   


 


Output Total   300 ml   


 


Balance 1100 ml 480 ml 180 ml   


 


      


 


Intake Oral 100 ml 480 ml 480 ml   


 


IV Total 1000 ml     


 


Output Urine Total   300 ml   


 


# Voids 2 2  1  


 


# Bowel Movements 0 1  0  








Laboratory





Laboratory Tests








Test 3/6/17





 06:34


 


White Blood Count 3.4 


 


Red Blood Count 3.52 


 


Hemoglobin 9.8 


 


Hematocrit 30.2 


 


Mean Corpuscular Volume 85.9 


 


Mean Corpuscular Hemoglobin 27.8 


 


Mean Corpuscular Hemoglobin 32.3 





Concent 


 


Red Cell Distribution Width 16.3 


 


Platelet Count 112 


 


Mean Platelet Volume 10.0 


 


Sodium Level 142 


 


Potassium Level 3.1 


 


Chloride Level 108 


 


Carbon Dioxide Level 23.9 


 


Anion Gap 10 


 


Blood Urea Nitrogen 7 


 


Creatinine 0.83 


 


Estimat Glomerular Filtration 71 





Rate 


 


Random Glucose 84 


 


Calcium Level 8.0 








Physical Exam


HEENT: Normocephalic; atraumatic; no jaundice.  


CHEST:  CTA


CARDIAC:  RRR


ABDOMEN:  Soft, nondistended, mild to moderate epigastric tenderness; no 

hepatosplenomegaly; bowel sounds are present in all four quadrants.


EXTREMITIES: No clubbing, cyanosis, or edema.


SKIN:  Normal; no rash; no jaundice.


CNS:  No focal deficits; alert and oriented times three.





Assessment and Plan


Plan


ASSESSMENT:


- Acute on chronic abdominal pain in patient with known gastroparesis with 

chronic opioid use.  She reports that she has had her 


   symptoms for a long time and requires Dilaudid 4mg po q4h at home along with 

Valium TID for nausea- states she is allergic


   to Reglan, Phenergan, Compazine, Zofran- cause difficulty breathing and 

therefore can only take Valium.  Pt was recently 


   found to have gastroparesis with response to EES on GES.  D/W patient that 

her chronic narcotic use is contributing to her 


   symptoms, but the patient refuses to stop her Dilaudid, stating that she 

will not eat if this is stopped and that it is the only


   thing that helps her symptoms. Last EGD/colonoscopy (6/17/16) and this 

revealed status post gastric bypass, biopsy of gastric


   polyp, diverticulosis in the sigmoid and descending colon, retroflex views 

revealed small internal hemorrhoids, external hemorrhoids. 


   Pathology revealed mild chronic gastritis, negative for helicobacter pylori. 

She is getting EES IVPB. PPI.  Dicyclomine. Strongly 


   recommend weaning off narcotics- D/W patient again, she became extremely 

upset and tearful and states she does not want her


   pain meds adjusted for at least 2 weeks after her symptoms resolve.  She 

again states that she can only eat if she takes 


   the Dilaudid.   





PLAN:


- XAVIER


- Cont. EES


- Cont. Protonix 


- Cont. Dicyclomine


- Supportive care


- Highly recommend avoiding narcotics, as this is contributing to her 

gastroparesis and abdominal pain.  D/W patient, but she reports that


   without the Dilaudid, she will not eat and it is the only thing that helps 

her.  


- If no improvement, consider repeat EGD vs. G/J tube placement.


- Further recommendations to follow based on results of above


- Pt seen and examined by Dr. Michelle and myself and this note is written on 

his behalf








Abbi Walsh Mar 6, 2017 11:29

## 2017-03-07 VITALS
HEART RATE: 72 BPM | OXYGEN SATURATION: 97 % | RESPIRATION RATE: 16 BRPM | SYSTOLIC BLOOD PRESSURE: 130 MMHG | DIASTOLIC BLOOD PRESSURE: 60 MMHG | TEMPERATURE: 98.3 F

## 2017-03-07 VITALS
TEMPERATURE: 98.6 F | HEART RATE: 68 BPM | DIASTOLIC BLOOD PRESSURE: 63 MMHG | RESPIRATION RATE: 18 BRPM | OXYGEN SATURATION: 96 % | SYSTOLIC BLOOD PRESSURE: 104 MMHG

## 2017-03-07 VITALS
TEMPERATURE: 98.9 F | HEART RATE: 70 BPM | OXYGEN SATURATION: 99 % | SYSTOLIC BLOOD PRESSURE: 127 MMHG | RESPIRATION RATE: 16 BRPM | DIASTOLIC BLOOD PRESSURE: 74 MMHG

## 2017-03-07 RX ADMIN — ERYTHROMYCIN LACTOBIONATE SCH MLS/HR: 500 INJECTION, POWDER, LYOPHILIZED, FOR SOLUTION INTRAVENOUS at 01:50

## 2017-03-07 RX ADMIN — THIAMINE HYDROCHLORIDE SCH MLS/HR: 100 INJECTION, SOLUTION INTRAMUSCULAR; INTRAVENOUS at 07:53

## 2017-03-07 RX ADMIN — DIAZEPAM SCH MG: 10 TABLET ORAL at 12:10

## 2017-03-07 RX ADMIN — DIAZEPAM SCH MG: 10 TABLET ORAL at 07:52

## 2017-03-07 RX ADMIN — OLANZAPINE SCH MG: 5 TABLET, FILM COATED ORAL at 07:52

## 2017-03-07 RX ADMIN — ERYTHROMYCIN LACTOBIONATE SCH MLS/HR: 500 INJECTION, POWDER, LYOPHILIZED, FOR SOLUTION INTRAVENOUS at 05:55

## 2017-03-07 RX ADMIN — PANTOPRAZOLE SCH MG: 40 TABLET, DELAYED RELEASE ORAL at 07:52

## 2017-03-07 RX ADMIN — THIAMINE HYDROCHLORIDE SCH MLS/HR: 100 INJECTION, SOLUTION INTRAMUSCULAR; INTRAVENOUS at 05:55

## 2017-03-07 RX ADMIN — ERYTHROMYCIN LACTOBIONATE SCH MLS/HR: 500 INJECTION, POWDER, LYOPHILIZED, FOR SOLUTION INTRAVENOUS at 12:10

## 2017-03-07 NOTE — HHI.DCPOC
Discharge Care Plan


Diagnosis:  


(1) Gastroparesis


(2) Nausea & vomiting


Your Health Problems Are:     Appetite Changes


Goals to Promote Your Health


* To prevent worsening of your condition and complications


* To maintain your health at the optimal level


Directions to Meet Your Goals


*** Take your medications as prescribed


*** Follow your dietary instruction


*** Follow activity as directed








*** Keep your appointments as scheduled


*** Take your immunizations and boosters as scheduled


*** If your symptoms worsen call your PCP, if no PCP go to Urgent Care Center 

or Emergency Room***


*** Smoking is Dangerous to Your Health. Avoid second hand smoke***


***Call the 24-hour hour crisis hotline for domestic abuse at 1-579.197.5154***








Nikia Simental. [facility] Mar 7, 2017 14:10

## 2017-03-07 NOTE — HHI.GIFU
Subjective


Remarks


Resting in bed with eyes closed and fork in hand.  Appears to have fallen 

asleep while eating lunch.  Easily arousable and once awake c/o abdominal pain 

and states she is upset that her IV Dilaudid was discontinued and was asking 

for this to be changed back to IV.  I explained that I cannot change her pain 

medications and she then asked me to call her attending and tell them that she 

needs the IV Dilaudid instead of PO.  States she will not be able to eat unless 

she gets the Dilaudid IV and that she has already lost so much weight.  D/W 

patient possible need for G/J tube if she is not able to eat, given her hx of 

gastroparesis.  She then stated that she has thought about this, but does not 

want to pursue now and would like to go home and see if she gets better.





Objective


Vitals I&O





 Vital Signs








  Date Time  Temp Pulse Resp B/P Pulse Ox O2 Delivery O2 Flow Rate FiO2


 


3/7/17 12:00 98.9 70 16 127/74 99   


 


3/7/17 09:08      Room Air  


 


3/7/17 08:00 98.6 68 18 104/63 96   


 


3/7/17 03:40 98.3 72 16 130/60 97   


 


3/6/17 23:22 97.8 76 16 110/60 94   


 


3/6/17 21:38 98.1 74 16 109/60 95   


 


3/6/17 21:00  67      


 


3/6/17 20:00      Room Air  


 


3/6/17 16:00 98.1 81 16 127/75 97   








 I/O








 3/6/17 3/6/17 3/6/17 3/7/17 3/7/17 3/7/17





 07:00 15:00 23:00 07:00 15:00 23:00


 


Intake Total  881 ml 240 ml 240 ml  


 


Output Total   350 ml 1050 ml  


 


Balance  881 ml -110 ml -810 ml  


 


      


 


Intake Oral   240 ml 240 ml  


 


IV Total  881 ml    


 


Output Urine Total   350 ml 1050 ml  


 


# Voids 1     


 


# Bowel Movements 0  0 0  








Physical Exam


HEENT: Normocephalic; atraumatic; no jaundice.  


CHEST:  CTA


CARDIAC:  RRR


ABDOMEN:  Soft, nondistended, mild epigastric tenderness; no hepatosplenomegaly

; bowel sounds are present in all four quadrants.


EXTREMITIES: No clubbing, cyanosis, or edema.


SKIN:  Normal; no rash; no jaundice.


CNS:  No focal deficits; alert and oriented times three.





Assessment and Plan


Plan


ASSESSMENT:


- Acute on chronic abdominal pain in patient with known gastroparesis with 

chronic opioid use.  She reports that she has had her 


   symptoms for a long time and requires Dilaudid 4mg po q4h at home along with 

Valium TID for nausea- states she is allergic


   to Reglan, Phenergan, Compazine, Zofran- cause difficulty breathing and 

therefore can only take Valium.  Pt was recently 


   found to have gastroparesis with response to EES on GES.  D/W patient that 

her chronic narcotic use is contributing to her 


   symptoms, but the patient refuses to stop her Dilaudid, stating that she 

will not eat if this is stopped and that it is the only


   thing that helps her symptoms. Last EGD/colonoscopy (6/17/16) and this 

revealed status post gastric bypass, biopsy of gastric


   polyp, diverticulosis in the sigmoid and descending colon, retroflex views 

revealed small internal hemorrhoids, external hemorrhoids. 


   Pathology revealed mild chronic gastritis, negative for helicobacter pylori. 

She is getting EES IVPB. PPI.  Dicyclomine. Strongly 


   recommend weaning off narcotics- D/W patient again, she became extremely 

upset and wants her Dilaudid changed back to IV.


   Okay to change EES to po.  Cont. PPI/Dicyclomine. 





PLAN:


- XAVIER


- Change EES to po


- Cont. Protonix 


- Cont. Dicyclomine


- Pt states that she cannot eat without the Dilaudid.  D/W patient G/J tube 

placement- she is refusing and states she would like to go home and 


   consider this if she has no improvement over the next 2 weeks


- FU MAN 2 weeks.


- GI will sign off, please reconsult as needed


- Pt seen and examined by Dr. Michelle and myself and this note is written on 

his behalf








Abbi Walsh Mar 7, 2017 14:08

## 2017-03-07 NOTE — HHI.PR
Subjective


Remarks


wants more IV Dilaudid


was able to tolerate some dinner last night


eating lunch now


no n/v


offered J/G tube for nutrition, doesn't want one " I want to try eating at home

"
no fever


ambulating in room





Objective


Objective Results


-





 Vital Signs








  Date Time  Temp Pulse Resp B/P Pulse Ox O2 Delivery O2 Flow Rate FiO2


 


3/7/17 12:00 98.9 70 16 127/74 99   


 


3/7/17 09:08      Room Air  


 


3/7/17 08:00 98.6 68 18 104/63 96   


 


3/7/17 03:40 98.3 72 16 130/60 97   


 


3/6/17 23:22 97.8 76 16 110/60 94   


 


3/6/17 21:38 98.1 74 16 109/60 95   


 


3/6/17 21:00  67      


 


3/6/17 20:00      Room Air  


 


3/6/17 16:00 98.1 81 16 127/75 97   








 I/O








 3/6/17 3/6/17 3/6/17 3/7/17 3/7/17 3/7/17





 07:00 15:00 23:00 07:00 15:00 23:00


 


Intake Total  881 ml 240 ml 240 ml  


 


Output Total   350 ml 1050 ml  


 


Balance  881 ml -110 ml -810 ml  


 


      


 


Intake Oral   240 ml 240 ml  


 


IV Total  881 ml    


 


Output Urine Total   350 ml 1050 ml  


 


# Voids 1     


 


# Bowel Movements 0  0 0  








Result Diagram:  


3/6/17 0634                                                                    

            3/6/17 0634








ROS


General:  No: Fatigue, Weakness


HEENT:  No: Sore Throat, Dysphagia


Cardiac:  No: Chest Pain, Edema, Palpitations


Pulmonary:  No: Cough, SOB, Wheezing


GI:  Abdominal Pain,  No: BM, Diarrhea, N/V, Other


/GYN:  No: Dysuria, Urgency


Neuro/MS:  No: Lightheaded, Confusion


Psych:  No: Anxiety, Depression


Skin:  No: Itching, Rash





Physical Exam


Physical Exam


GENERAL:  This is a frail looking   female 


who appears to be in no acute distress.  She  is alert and awake


HEAD:  Normocephalic without any lesion or mass noted.  Facial features


appear symmetric.


EYES:  [place], Normal eye movement, No icterus.


OROPHARYNGEAL:  Oropharynx without erythema or edema.


MOUTH/THROAT:  Buccal mucosa is moist


NECK:  Supple.  No nuchal rigidity or lymphadenopathy.


Trachea midline without deviation.  Thyroid not palpable, no bruits


appreciated.


CARDIAC:  Regular rhythm, regular rate, S1 and S2 are heard.  No murmur.


LUNGS:  Clear to auscultation bilaterally. 


ABDOMEN:  Soft, mild diffuse tenderness, no organomegaly or masses.  Bowel 

sounds are


heard in all four quadrants.  No rebound.  No guarding.


EXTREMITIES:  No edema. 


NEUROLOGICAL:  No focal deficits. 


SKIN:Warm and moist


PSYCH: Anxious


Urinary Catheter:  No


Vascular Central Line Catheter:  No





A/P


Diagnosis:  


(1) Hypokalemia


(2) Chronic pain


(3) Nausea & vomiting


(4) GERD (gastroesophageal reflux disease)


(5) Narcotic abuse


(6) History of CVA (cerebrovascular accident)


(7) CAD (coronary artery disease)


(8) History of fundoplication


(9) Gastroparesis


(10) Malnourished


Assessment and Plan


Continue with IVF 1/2 NS at 100cc/hr. 


Advance diet as tolerated, patient is aware of foods that exacerbate her 

condition





Appreciate GI input, patient has been counseled about use of narcotics 

exacerbating gastroparesis.


Continue Bentyl


Continue EES-change to PO


Valium PRN nausea 


Continue PPI


Per GI, she has been recommended repeat EGD versus GJ placement if no 

improvement.  Patient would rather avoid








During previous admission, she was counseled to seek opinion from [facility] 

versus [facility]


Patient has had extensive GI workup


Patient has not made any attempts to contact any of the facilities


States that she will try to do so this time





Continue PO Dilaudid, again she was counselled about narc and exacerbating her 

condition. 


She is having a hard time weaning off 


Tolerating diet fairly well. 


 


DVT prophylaxis with SCDs.  


GI prophylaxis with PPI.





Plan to discharge today 


F/U at [facility] or [facility]. If she continues to have problems with weight loss and 

not eating, may benefit from GJ tube. Reluctant to have it done


Diet-as tolerated


Activity-as tolerated 





D/W RN


D/W Dr. Torres


D/W pt


This patient was seen by myself in Dr. Torres, this note is written on his 

behalf


Discharge Planning


40 minutes





Problem Qualifiers





(1) Chronic pain:  


Qualified Code:  G89.4 - Chronic pain syndrome


(2) Nausea & vomiting:  


Qualified Code:  R11.2 - Non-intractable vomiting with nausea, unspecified 

vomiting type


(3) GERD (gastroesophageal reflux disease):  


Qualified Code:  K21.9 - Gastroesophageal reflux disease, esophagitis presence 

not specified


(4) CAD (coronary artery disease):  


Qualified Code:  I25.10 - Coronary artery disease involving native coronary 

artery of native heart without angina pectoris





Nikia Simental Mar 7, 2017 14:00

## 2017-03-08 ENCOUNTER — HOSPITAL ENCOUNTER (OUTPATIENT)
Dept: HOSPITAL 17 - NEPC | Age: 57
Setting detail: OBSERVATION
LOS: 1 days | Discharge: LEFT BEFORE DISCHARGE | End: 2017-03-09
Attending: SPECIALIST | Admitting: SPECIALIST
Payer: COMMERCIAL

## 2017-03-08 VITALS — BODY MASS INDEX: 16.63 KG/M2 | WEIGHT: 90.39 LBS | HEIGHT: 62 IN

## 2017-03-08 VITALS — OXYGEN SATURATION: 100 % | SYSTOLIC BLOOD PRESSURE: 168 MMHG | HEART RATE: 64 BPM | DIASTOLIC BLOOD PRESSURE: 89 MMHG

## 2017-03-08 VITALS
SYSTOLIC BLOOD PRESSURE: 132 MMHG | RESPIRATION RATE: 18 BRPM | OXYGEN SATURATION: 100 % | DIASTOLIC BLOOD PRESSURE: 82 MMHG | HEART RATE: 60 BPM

## 2017-03-08 VITALS
DIASTOLIC BLOOD PRESSURE: 70 MMHG | TEMPERATURE: 98 F | HEART RATE: 73 BPM | SYSTOLIC BLOOD PRESSURE: 118 MMHG | RESPIRATION RATE: 20 BRPM | OXYGEN SATURATION: 100 %

## 2017-03-08 VITALS
DIASTOLIC BLOOD PRESSURE: 79 MMHG | HEART RATE: 64 BPM | SYSTOLIC BLOOD PRESSURE: 119 MMHG | OXYGEN SATURATION: 100 % | RESPIRATION RATE: 18 BRPM

## 2017-03-08 DIAGNOSIS — I25.2: ICD-10-CM

## 2017-03-08 DIAGNOSIS — Z85.528: ICD-10-CM

## 2017-03-08 DIAGNOSIS — G89.29: ICD-10-CM

## 2017-03-08 DIAGNOSIS — R55: ICD-10-CM

## 2017-03-08 DIAGNOSIS — Z88.8: ICD-10-CM

## 2017-03-08 DIAGNOSIS — Z87.01: ICD-10-CM

## 2017-03-08 DIAGNOSIS — K44.9: ICD-10-CM

## 2017-03-08 DIAGNOSIS — G43.909: ICD-10-CM

## 2017-03-08 DIAGNOSIS — D64.9: ICD-10-CM

## 2017-03-08 DIAGNOSIS — Z88.0: ICD-10-CM

## 2017-03-08 DIAGNOSIS — Z86.73: ICD-10-CM

## 2017-03-08 DIAGNOSIS — Z88.2: ICD-10-CM

## 2017-03-08 DIAGNOSIS — Z88.1: ICD-10-CM

## 2017-03-08 DIAGNOSIS — K31.84: Primary | ICD-10-CM

## 2017-03-08 DIAGNOSIS — I25.10: ICD-10-CM

## 2017-03-08 LAB
ALP SERPL-CCNC: 87 U/L (ref 45–117)
ALT SERPL-CCNC: 28 U/L (ref 10–53)
ANION GAP SERPL CALC-SCNC: 9 MEQ/L (ref 5–15)
AST SERPL-CCNC: 31 U/L (ref 15–37)
BASOPHILS # BLD AUTO: 0 TH/MM3 (ref 0–0.2)
BASOPHILS NFR BLD: 0.1 % (ref 0–2)
BILIRUB SERPL-MCNC: 0.3 MG/DL (ref 0.2–1)
BUN SERPL-MCNC: 13 MG/DL (ref 7–18)
CHLORIDE SERPL-SCNC: 105 MEQ/L (ref 98–107)
CK MB SERPL-MCNC: 1.5 NG/ML (ref 0.5–3.6)
CK SERPL-CCNC: 301 U/L (ref 26–192)
EOSINOPHIL # BLD: 0 TH/MM3 (ref 0–0.4)
EOSINOPHIL NFR BLD: 0.9 % (ref 0–4)
ERYTHROCYTE [DISTWIDTH] IN BLOOD BY AUTOMATED COUNT: 16.1 % (ref 11.6–17.2)
GFR SERPLBLD BASED ON 1.73 SQ M-ARVRAT: 69 ML/MIN (ref 89–?)
HCO3 BLD-SCNC: 27.7 MEQ/L (ref 21–32)
HCT VFR BLD CALC: 34.2 % (ref 35–46)
HEMO FLAGS: (no result)
LYMPHOCYTES # BLD AUTO: 1.2 TH/MM3 (ref 1–4.8)
LYMPHOCYTES NFR BLD AUTO: 35.3 % (ref 9–44)
MAGNESIUM SERPL-MCNC: 2.1 MG/DL (ref 1.5–2.5)
MCH RBC QN AUTO: 27.4 PG (ref 27–34)
MCHC RBC AUTO-ENTMCNC: 32.2 % (ref 32–36)
MCV RBC AUTO: 85 FL (ref 80–100)
MONOCYTES NFR BLD: 6.5 % (ref 0–8)
NEUTROPHILS # BLD AUTO: 2 TH/MM3 (ref 1.8–7.7)
NEUTROPHILS NFR BLD AUTO: 57.2 % (ref 16–70)
PLATELET # BLD: 148 TH/MM3 (ref 150–450)
POTASSIUM SERPL-SCNC: 4 MEQ/L (ref 3.5–5.1)
RBC # BLD AUTO: 4.03 MIL/MM3 (ref 4–5.3)
SODIUM SERPL-SCNC: 142 MEQ/L (ref 136–145)
WBC # BLD AUTO: 3.4 TH/MM3 (ref 4–11)

## 2017-03-08 PROCEDURE — G0378 HOSPITAL OBSERVATION PER HR: HCPCS

## 2017-03-08 PROCEDURE — 82552 ASSAY OF CPK IN BLOOD: CPT

## 2017-03-08 PROCEDURE — 80048 BASIC METABOLIC PNL TOTAL CA: CPT

## 2017-03-08 PROCEDURE — 83735 ASSAY OF MAGNESIUM: CPT

## 2017-03-08 PROCEDURE — 70450 CT HEAD/BRAIN W/O DYE: CPT

## 2017-03-08 PROCEDURE — 84484 ASSAY OF TROPONIN QUANT: CPT

## 2017-03-08 PROCEDURE — 93005 ELECTROCARDIOGRAM TRACING: CPT

## 2017-03-08 PROCEDURE — 96374 THER/PROPH/DIAG INJ IV PUSH: CPT

## 2017-03-08 PROCEDURE — 80053 COMPREHEN METABOLIC PANEL: CPT

## 2017-03-08 PROCEDURE — 99285 EMERGENCY DEPT VISIT HI MDM: CPT

## 2017-03-08 PROCEDURE — 85025 COMPLETE CBC W/AUTO DIFF WBC: CPT

## 2017-03-08 PROCEDURE — 82550 ASSAY OF CK (CPK): CPT

## 2017-03-08 RX ADMIN — PHENYTOIN SODIUM SCH MLS/HR: 50 INJECTION INTRAMUSCULAR; INTRAVENOUS at 21:00

## 2017-03-08 RX ADMIN — HEPARIN SODIUM SCH UNITS: 10000 INJECTION, SOLUTION INTRAVENOUS; SUBCUTANEOUS at 21:00

## 2017-03-08 RX ADMIN — SODIUM CHLORIDE, PRESERVATIVE FREE SCH ML: 5 INJECTION INTRAVENOUS at 21:00

## 2017-03-08 NOTE — RADRPT
EXAM DATE/TIME:  03/08/2017 16:39 

 

[facility] COMPARISON:     

CT BRAIN W/O CONTRAST, January 17, 2017, 1:07.

 

 

INDICATIONS :     

Fall three times today hit head on table.

                      

 

RADIATION DOSE:     

56.36 CTDIvol (mGy) 

 

 

 

MEDICAL HISTORY :     

Cerebrovascular disease. Seizures. Cardiovascular diseaseKidney cancer

 

SURGICAL HISTORY :      

Appendectomy. Cholecystectomy.Hysterectomy.

 

ENCOUNTER:      

Initial

 

ACUITY:      

1 day

 

PAIN SCALE:      

6/10

 

LOCATION:        

cranial 

 

TECHNIQUE:     

Multiple contiguous axial images were obtained of the head.  Using automated exposure control and adj
ustment of the mA and/or kV according to patient size, radiation dose was kept as low as reasonably a
chievable to obtain optimal diagnostic quality images. 

 

FINDINGS:     

 

CEREBRUM:     

The ventricles are normal for age.  No evidence of midline shift, mass lesion, hemorrhage or acute in
farction.  No extra-axial fluid collections are seen.

 

POSTERIOR FOSSA:     

The cerebellum and brainstem are intact.  The 4th ventricle is midline.  The cerebellopontine angle i
s unremarkable.

 

EXTRACRANIAL:     

The visualized portion of the orbits is intact.

 

SKULL:     

The calvaria is intact.  No evidence of skull fracture.

 

CONCLUSION:     

1. No evidence of acute intracranial pathology. No masses are identified.

 

 

 

 Benito Gomez MD on March 08, 2017 at 17:09           

Board Certified Radiologist.

 This report was verified electronically.

## 2017-03-08 NOTE — PD
HPI


Chief Complaint:  Abdominal Pain


Time Seen by Provider:  16:20


Travel History


International Travel<30 days:  No


Contact w/Intl Traveler<30days:  No


Traveled to known affect area:  No





History of Present Illness


HPI


56-year-old female with history of chronic abdominal pain, gastrectomy, 

gastroparesis, cholecystectomy, frequent visitor to the emergency room for 

evaluation of these symptoms.  She presents with persistent abdominal pain.  It 

is an aching pain that is epigastric and constant.  Associated with nausea and 

dry heaving.  She is allergic to most antiemetics, she typically takes Ativan 

or Valium for nausea as well as Dilaudid for chronic pain.  The patient was 

discharged yesterday after being hospitalized for hyperkalemia and chronic 

abdominal pain.  She reports that this morning she was dizzy and hit her head 

on the table and passed out.  She woke up on the ground.  She is now having a 

right-sided headache along with her chronic abdominal pain.  Denies any 

episodes of chest pain, shortness of breath or palpitation.  Her 

gastroenterologist is Dr. Davila.  No other complaints.





PFSH


Past Medical History


Hx Anticoagulant Therapy:  No


Asthma:  No


Blood Disorders:  No


Anxiety:  Yes


Depression:  No


Heart Rhythm Problems:  No


Cancer:  Yes (kidney)


Cardiovascular Problems:  Yes (MI)


High Cholesterol:  No


Chemotherapy:  Yes (KIDNEY )


Chest Pain:  No


Congestive Heart Failure:  No


COPD:  No


Cerebrovascular Accident:  Yes


Diabetes:  No


Diminished Hearing:  No


Endocrine:  No


Gastrointestinal Disorders:  Yes


GERD:  No


Genitourinary:  Yes (RIGHT NEPHRECTOMY)


Headaches:  Yes


Hiatal Hernia:  Yes


Hypertension:  No


Immune Disorder:  No


Implanted Vascular Access Dvce:  No


Kidney Stones:  No


Musculoskeletal:  No


Neurologic:  No


Psychiatric:  No


Reproductive:  No


Respiratory:  No


Immunizations Current:  Yes


Migraines:  Yes


Myocardial Infarction:  Yes ()


Pneumonia:  Yes


Radiation Therapy:  No


Renal Failure:  No


Seizures:  Yes


Sleep Apnea:  No


Thyroid Disease:  No


Ulcer:  No


Pregnant?:  Not Pregnant


Menopausal:  Yes


:  1


Para:  1


Miscarriage:  0


:  0


Ectopic Pregnancy:  No


Ovarian Cysts:  No


Dilation and Curettage (D&C):  Yes


Tubal Ligation:  Yes





Past Surgical History


Abdominal Surgery:  Yes (total gastrectomy w/pouch , hernia repair)


Appendectomy:  Yes


Cardiac Surgery:  Yes (pt states an occulator was placed in her heart )


Cholecystectomy:  Yes


Gynecologic Surgery:  Yes


Hysterectomy:  Yes


Thoracic Surgery:  No


Tonsillectomy:  Yes


Other Surgery:  Yes (tot gastrectomy)





Social History


Alcohol Use:  No


Tobacco Use:  No


Substance Use:  No





Allergies-Medications


(Allergen,Severity, Reaction):  


Coded Allergies:  


     Compazine (Verified  Allergy, Severe, SOB, 3/8/17)


     Gadolinium Derivatives (Verified  Allergy, Severe, RESPIRATORY DISTRESS, 3/

8/17)


     Lobster (Verified  Allergy, Severe, Anaphylaxis, 3/8/17)


     Morphine (Verified  Allergy, Severe, Hives, 3/8/17)


 PT HAVING HIVES AND ITCHING TO ARM ABOVE IV SITE AFTER


 ADMINISTRATION OF MORPHINE


     Phenergan (Verified  Allergy, Severe, SOB, 3/8/17)


     Reglan (Verified  Allergy, Severe, SOB, 3/8/17)


     Toradol (Verified  Allergy, Severe, Shortness of Breath, 3/8/17)


     Zofran (Verified  Allergy, Severe, SOB, 3/8/17)


     Tigan (Verified  Allergy, Intermediate, 3/8/17)


 difficult breathing


     Penicillin (Verified  Allergy, Mild, RASH, 3/8/17)


     Sulfa (Verified  Allergy, Mild, RASH, 3/8/17)


Uncoded Allergies:  


     GADOLINIUM (Adverse Reaction, Severe, PT STOPPED BREATHING WITH GADO, )


 PT STATES SHE STOPPED BREATHING AFTER HAVING GADO AT ANOTHER


 FACILITY. 17


 [facility]


Reported Meds & Prescriptions





Reported Meds & Active Scripts


Active


Erythromycin Ethylsuccinate Liq (Erythromycin Ethylsuccinate) 200 Mg/Ml Susp 

200 Mg PO Q8H


Reported


Ambien (Zolpidem Tartrate) 10 Mg Tab 10 Mg PO HS PRN


Dilaudid (Hydromorphone HCl) 4 Mg Tab 4 Mg PO Q6H PRN


Valium (Diazepam) 10 Mg Tab 10 Mg PO TID


Buspirone (Buspirone HCl) 7.5 Mg Tab 7.5 Mg PO BID


Zyprexa (Olanzapine) 5 Mg Tab 5 Mg PO BID


Cyanocobalamin Inj (Cyanocobalamin) 1,000 Mcg/Ml Inj 1,000 Mcg IM Q30D


Imipramine HCL (Imipramine HCl) 25 Mg Tab 25 Mg PO HS








Review of Systems


Except as stated in HPI:  all other systems reviewed are Neg





Physical Exam


Narrative


GENERAL: Chronically ill anxious appearing female in no acute distress


SKIN: Warm and dry.


HEAD: Atraumatic. Normocephalic. 


EYES: Pupils equal and round. No scleral icterus. No injection or drainage. 


ENT: No nasal bleeding or discharge.  Mucous membranes pink and moist.


NECK: Trachea midline. No JVD. 


CARDIOVASCULAR: Regular rate and rhythm.  No murmur appreciated.


RESPIRATORY: No accessory muscle use. Clear to auscultation. Breath sounds 

equal bilaterally. 


GASTROINTESTINAL: Abdomen soft, epigastric tenderness without guarding.


MUSCULOSKELETAL: No obvious deformities. No clubbing.  No cyanosis.  No edema. 


NEUROLOGICAL: Awake and alert. No obvious cranial nerve deficits.  Motor 

grossly within normal limits. Normal speech.  Normal  strength.  Full 

spontaneous use of the upper and lower extremities.





Data


Data


Last Documented VS





Vital Signs








  Date Time  Temp Pulse Resp B/P Pulse Ox O2 Delivery O2 Flow Rate FiO2


 


3/8/17 16:21  60 18 132/82 100 Room Air  


 


3/8/17 14:54 98.0       








Orders





 Electrocardiogram (3/8/17 16:28)


Complete Blood Count With Diff (3/8/17 16:28)


Comprehensive Metabolic Panel (3/8/17 16:28)


Magnesium (Mg) (3/8/17 16:28)


Ckmb (Isoenzyme) Profile (3/8/17 16:28)


Troponin I (3/8/17 16:28)


Ct Brain W/O Iv Contrast(Rout) (3/8/17 16:28)


Blood Glucose (3/8/17 16:28)


Sodium Chlor 0.9% 1000 Ml Inj (Ns 1000 M (3/8/17 16:28)


Lorazepam Inj (Ativan Inj) (3/8/17 16:30)


Acetaminophen (Tylenol) (3/8/17 18:30)


Al-Mag Hy-Si 40-40-4 Mg/Ml Liq (Mag-Al P (3/8/17 18:30)


Lidocaine 2% Viscous (Xylocaine 2% Visco (3/8/17 18:30)


CKMB (3/8/17 18:00)


CKMB% (3/8/17 18:00)





Labs





 Laboratory Tests








Test 3/8/17





 18:00


 


White Blood Count 3.4 TH/MM3


 


Red Blood Count 4.03 MIL/MM3


 


Hemoglobin 11.0 GM/DL


 


Hematocrit 34.2 %


 


Mean Corpuscular Volume 85.0 FL


 


Mean Corpuscular Hemoglobin 27.4 PG


 


Mean Corpuscular Hemoglobin 32.2 %





Concent 


 


Red Cell Distribution Width 16.1 %


 


Platelet Count 148 TH/MM3


 


Mean Platelet Volume 10.6 FL


 


Neutrophils (%) (Auto) 57.2 %


 


Lymphocytes (%) (Auto) 35.3 %


 


Monocytes (%) (Auto) 6.5 %


 


Eosinophils (%) (Auto) 0.9 %


 


Basophils (%) (Auto) 0.1 %


 


Neutrophils # (Auto) 2.0 TH/MM3


 


Lymphocytes # (Auto) 1.2 TH/MM3


 


Monocytes # (Auto) 0.2 TH/MM3


 


Eosinophils # (Auto) 0.0 TH/MM3


 


Basophils # (Auto) 0.0 TH/MM3


 


CBC Comment DIFF FINAL 


 


Differential Comment  


 


Sodium Level 142 MEQ/L


 


Potassium Level 4.0 MEQ/L


 


Chloride Level 105 MEQ/L


 


Carbon Dioxide Level 27.7 MEQ/L


 


Anion Gap 9 MEQ/L


 


Blood Urea Nitrogen 13 MG/DL


 


Creatinine 0.85 MG/DL


 


Estimat Glomerular Filtration 69 ML/MIN





Rate 


 


Random Glucose 81 MG/DL


 


Calcium Level 8.9 MG/DL


 


Magnesium Level 2.1 MG/DL


 


Total Bilirubin 0.3 MG/DL


 


Aspartate Amino Transf 31 U/L





(AST/SGOT) 


 


Alanine Aminotransferase 28 U/L





(ALT/SGPT) 


 


Alkaline Phosphatase 87 U/L


 


Total Creatine Kinase 301 U/L


 


Creatine Kinase MB 1.5 NG/ML


 


Creatine Kinase MB % 0.5 %


 


Troponin I LESS THAN 0.02





 NG/ML


 


Total Protein 6.9 GM/DL


 


Albumin 3.5 GM/DL











[facility]


Medical Decision Making


Medical Screen Exam Complete:  Yes


Emergency Medical Condition:  Yes


Medical Record Reviewed:  Yes


Interpretation(s)


EKG sinus rhythm, new T-wave inversions are noted in V1 through V3


CT of the brain no acute abnormalities


Differential Diagnosis


Syncope, dehydration, electrolyte abnormality, hypoglycemia, closed head injury

, intracranial hemorrhage, chronic abdominal pain


Narrative Course


56-year-old female with chronic abdominal pain with nausea, decreased appetite 

and dry heaving.  She became dizzy while walking today and hit her head against 

a table and passed out on the ground.  We'll check CT of the brain, basic lab 

work.  She'll be given IV fluids as well as Ativan for nausea.  She will be 

reassessed.  She is repeatedly asking for IV Dilaudid for her previous notes 

she has been strongly encouraged by GI to discontinue opiates so we will hold 

off for now.


The patient will be admitted for further management given the generalized 

weakness, syncope.


Discussed with Dr. Mcclellan who is agreeable with admission and agrees that 

Dilaudid would not be in the best interest of this patient.





Procedures


EKG Prior to Arrival:  Yes





Diagnosis





 Primary Impression:  


 Syncope and collapse


 Additional Impressions:  


 Nausea and vomiting


 Qualified Code:  R11.2 - Nausea and vomiting, intractability of vomiting not 

specified, unspecified vomiting type


 Chronic abdominal pain





Admitting Information


Admitting Physician Requests:  Observation








Darrel Grande Mar 8, 2017 16:34

## 2017-03-09 VITALS
SYSTOLIC BLOOD PRESSURE: 159 MMHG | RESPIRATION RATE: 18 BRPM | OXYGEN SATURATION: 100 % | HEART RATE: 53 BPM | DIASTOLIC BLOOD PRESSURE: 85 MMHG

## 2017-03-09 VITALS
HEART RATE: 68 BPM | DIASTOLIC BLOOD PRESSURE: 74 MMHG | OXYGEN SATURATION: 99 % | RESPIRATION RATE: 18 BRPM | SYSTOLIC BLOOD PRESSURE: 136 MMHG

## 2017-03-09 VITALS
OXYGEN SATURATION: 98 % | SYSTOLIC BLOOD PRESSURE: 151 MMHG | HEART RATE: 70 BPM | DIASTOLIC BLOOD PRESSURE: 78 MMHG | RESPIRATION RATE: 14 BRPM

## 2017-03-09 VITALS
OXYGEN SATURATION: 98 % | DIASTOLIC BLOOD PRESSURE: 65 MMHG | HEART RATE: 78 BPM | SYSTOLIC BLOOD PRESSURE: 132 MMHG | RESPIRATION RATE: 16 BRPM

## 2017-03-09 LAB
ANION GAP SERPL CALC-SCNC: 8 MEQ/L (ref 5–15)
BUN SERPL-MCNC: 9 MG/DL (ref 7–18)
CHLORIDE SERPL-SCNC: 108 MEQ/L (ref 98–107)
CK MB SERPL-MCNC: 1.4 NG/ML (ref 0.5–3.6)
CK SERPL-CCNC: 230 U/L (ref 26–192)
CK SERPL-CCNC: 270 U/L (ref 26–192)
GFR SERPLBLD BASED ON 1.73 SQ M-ARVRAT: 82 ML/MIN (ref 89–?)
HCO3 BLD-SCNC: 27 MEQ/L (ref 21–32)
POTASSIUM SERPL-SCNC: 3.7 MEQ/L (ref 3.5–5.1)
SODIUM SERPL-SCNC: 143 MEQ/L (ref 136–145)

## 2017-03-09 RX ADMIN — OLANZAPINE SCH MG: 5 TABLET, FILM COATED ORAL at 01:00

## 2017-03-09 RX ADMIN — PHENYTOIN SODIUM SCH MLS/HR: 50 INJECTION INTRAMUSCULAR; INTRAVENOUS at 08:53

## 2017-03-09 RX ADMIN — ERYTHROMYCIN ETHYLSUCCINATE SCH MG: 200 GRANULE, FOR SUSPENSION ORAL at 01:01

## 2017-03-09 RX ADMIN — HEPARIN SODIUM SCH UNITS: 10000 INJECTION, SOLUTION INTRAVENOUS; SUBCUTANEOUS at 08:53

## 2017-03-09 RX ADMIN — ERYTHROMYCIN ETHYLSUCCINATE SCH MG: 200 GRANULE, FOR SUSPENSION ORAL at 01:08

## 2017-03-09 RX ADMIN — SODIUM CHLORIDE, PRESERVATIVE FREE SCH ML: 5 INJECTION INTRAVENOUS at 09:00

## 2017-03-09 RX ADMIN — OLANZAPINE SCH MG: 5 TABLET, FILM COATED ORAL at 09:30

## 2017-03-09 NOTE — EKG
Date Performed: 03/08/2017       Time Performed: 16:55:24

 

PTAGE:      56 years

 

EKG:      SINUS BRADYCARDIA NON SPECIFIC ANTEROSEPTAL ABNORMALITY PT. NO LONGER HAS A LBBB COMPARED T
O PRIOR TRACING 

 

 PREVIOUS TRACING            : 03/03/2017 23.15

 

DOCTOR:   Zev Jaimes  Interpretating Date/Time  03/09/2017 12:40:41

## 2017-03-09 NOTE — MH
cc:

MAGALY BEAVERS MD

****

 

DATE OF ADMISSION:  3/8/2017

 

DATE OF BIRTH

1960

 

ATTENDING PHYSICIAN

Dr. Beavers

 

CHIEF COMPLAINT

Abdominal pain, fall and generalized weakness.

 

TRAVEL IN THE LAST 30 DAYS

None

 

HISTORY OF PRESENT ILLNESS

This is a pleasant 56-year-old female who has A significant history of chronic

GI symptoms which include abdominal pain and gastroparesis.  She has been

followed per her gastroenterologist, Dr. Davila as well as her PCP in the ER for

chronic symptoms.  She complains of persistent abdominal pain, decreased

appetite.  She states she has had a weight loss from 115 pounds to 90 pounds in

two months.  She states that she does eat, but does admit to a decreased

appetite and a decreased p.o. intake.  The patient states that she is currently

having a flare-up with her symptoms. She has an aching sensation epigastric

which seems to be fairly persistent.  According to the record, she was

discharged yesterday from the hospital with some of the same type of symptoms.

She states that she went home and was attempting to walk across the floor and

became very dizzy and fell.  She states she did hit her head on the coffee

table and passed out.  There is no witness to this report.  She states that she

did awaken on the ground an unknown time frame afterwards and was complaining

of some headache.  The patient denies any recent fever, no chest pain.  No

shortness of breath.  Currently chief complaint continues to be her abdominal

pain.  The patient is a fair historian.  Some of the information is being

gathered from the record.

 

PAST MEDICAL HISTORY

Includes:

1.   Anxiety disorders

2.   Cancer kidney

3.   Cardiovascular disease

4.   Previous MI

5.   CVA

6.   Gastrointestinal disorders orders which include her acute on

    chronic abdominal pain and gastroparesis.

7.  History of migraines.

8.  Pneumonia

9.  Seizures

10. Hiatal hernia

11. Chronic pain

 

PAST SURGICAL HISTORY

1.  Total gastrectomy with pouch in 2003.

2.  Hernia repair

3.  Appendectomy

4.  Cardiac surgery, according to the record, the patient states

    __________ placed in her heart 2006.

5.  Hysterectomy

6.  Tonsillectomy

7.  Cholecystectomy

 

ALLERGIES

COMPAZINE, GADOLINIUM AND DERIVATIVES, MORPHINE, PENICILLIN, PHENERGAN,

LOBSTER, REGLAN, SULFA, TIGAN, TORADOL AND ZOFRAN.

 

REVIEW OF SYSTEMS

A 12-point review was done positives include generalized weakness, abdominal

pain, nausea, decreased appetite, weight loss, dizziness.  Other systems

negative or unremarkable.

 

PHYSICAL EXAM

VITAL SIGNS:  temperature is 98, pulse labile between 53 and 70, respirations

18, blood pressure initially on the ER visit 132/82 and 151/78.  O2 sat 98 on

room air.

GENERAL:  This is a 56-year-old female who looks to be her stated age resting

on a the stretcher.  She is soft-spoken and responds to simple questions and

verbal stimuli.

SKIN:  Pale, warm and dry turgor.

HEENT:  Normocephalic.  PERRL at two.  No scleral icterus.  Mucous membranes

are pale and moist without discharge.

NECK:  Thin, supple.

CARDIOVASCULAR:  Regular rate and rhythm.  No murmurs, rubs or gallops.  No

pedal edema.  Pulses intact.

RESPIRATORY:  Low volumes essentially clear anteriorly and posteriorly with no

wheezes, rales or rhonchi.

GASTROINTESTINAL:  Abdomen is flat, soft.  Epigastric tenderness, generalized

mild guarding.

MUSCULOSKELETAL:  Moves her extremities with purpose.  Equal hand .

NEUROLOGIC:  She is alert, awake, slightly anxious, a fair historian.  Speech

is normal.

PSYCHOLOGIC:  Mood and affect with anxiety.

 

DIAGNOSTIC DATA

WBC count 3.4, RBC 4.03, hemoglobin 11, hematocrit 34.2, platelet count 148.

All other blood count numbers are normal.

 

Sodium 143, potassium 3.7, chloride 108, carbon dioxide 27, amnion gap 8, BUN

9, creatinine 0.73, random glucose is 82, total creatinine kinase 230, troponin

is less than 0.02, albumin 3.5.

 

Head CT shows no evidence of any pathology no masses, no bleed.

 

ASSESSMENT

1.  Abdominal pain possible gastroenteritis

2.  Gastroparesis

3.  Chronic pain

4.  Nausea

5.  Syncope and collapse

6.  Anemia, mild

 

PLAN

1.  Admit for observation.

2.  We will monitor vital signs q4.

3.  Cardiac monitoring which will include cardiac monitoring, DVT

    prophylaxis with heparin.

4.  PUD prophylaxis with Pepcid.

5.  Reconcile her medications.

6.  Start her on erythromycin p.o.

7.  Vitamin B12 injections

8.  Pain management per Dr. Beavers.

9.  We will monitor her labs and any further dizzy spells.

10. The patient can be out of bed up only with assistance.

11. Regular diet

12.  Monitor her intake and output as well as percentage of food

     Eaten.

13.  The patient is full code, full aggressive care.

14.  We will monitor her further needs.

 

 

Dictated by MAYRA Maher

 

 

 

                               __________________________________

                           MD ARTUR Tsai/JULES

D:  3/9/2017/8:44 AM

T:  3/9/2017/10:31 AM

Visit #:  [id number]

Job #:  [id number]

## 2017-03-09 NOTE — HP.UPD
H&P Update


Note


This is a 56-year-old  female well known to this facility and to the 

undersigned.  She came in last night with episode of nausea and vomiting.  She 

has a problem with narcotic dependence and she is been demanding Dilaudid.  She 

appears to be stable from the medical standpoint.  The vomiting has stopped.  

Workup so far negative.


The situation was discussed at length with the patient.  It was reiterated to 

her that it was in her best interest to stay away from any narcotics.  She is 

not happy with this opinion however she appears to be clinically stable.  So 

far no sign of withdrawal.  She will be   monitored for the rest of the day.  

She continues to be stable she will be discharged home later today.  She has 

been recommended numerous times to see psychiatry as outpatient to help her get 

over her problem with narcotics.  Full history and physical has been dictated








Yanna Torres MD Mar 9, 2017 11:00

## 2017-03-27 ENCOUNTER — HOSPITAL ENCOUNTER (EMERGENCY)
Dept: HOSPITAL 17 - NEPE | Age: 57
Discharge: HOME | End: 2017-03-27
Payer: COMMERCIAL

## 2017-03-27 VITALS
RESPIRATION RATE: 16 BRPM | OXYGEN SATURATION: 99 % | SYSTOLIC BLOOD PRESSURE: 156 MMHG | HEART RATE: 93 BPM | TEMPERATURE: 98.8 F | DIASTOLIC BLOOD PRESSURE: 92 MMHG

## 2017-03-27 VITALS
RESPIRATION RATE: 16 BRPM | HEART RATE: 87 BPM | SYSTOLIC BLOOD PRESSURE: 162 MMHG | DIASTOLIC BLOOD PRESSURE: 92 MMHG | OXYGEN SATURATION: 100 %

## 2017-03-27 VITALS
OXYGEN SATURATION: 99 % | DIASTOLIC BLOOD PRESSURE: 85 MMHG | TEMPERATURE: 97.8 F | RESPIRATION RATE: 15 BRPM | HEART RATE: 91 BPM | SYSTOLIC BLOOD PRESSURE: 137 MMHG

## 2017-03-27 VITALS — BODY MASS INDEX: 17.04 KG/M2 | HEIGHT: 62 IN | WEIGHT: 92.59 LBS

## 2017-03-27 VITALS
DIASTOLIC BLOOD PRESSURE: 100 MMHG | SYSTOLIC BLOOD PRESSURE: 194 MMHG | RESPIRATION RATE: 16 BRPM | HEART RATE: 89 BPM | OXYGEN SATURATION: 100 %

## 2017-03-27 VITALS — DIASTOLIC BLOOD PRESSURE: 92 MMHG | SYSTOLIC BLOOD PRESSURE: 156 MMHG

## 2017-03-27 DIAGNOSIS — G89.29: ICD-10-CM

## 2017-03-27 DIAGNOSIS — R10.13: Primary | ICD-10-CM

## 2017-03-27 DIAGNOSIS — K31.84: ICD-10-CM

## 2017-03-27 LAB
ALP SERPL-CCNC: 86 U/L (ref 45–117)
ALT SERPL-CCNC: 16 U/L (ref 10–53)
ANION GAP SERPL CALC-SCNC: 9 MEQ/L (ref 5–15)
AST SERPL-CCNC: 9 U/L (ref 15–37)
BASOPHILS # BLD AUTO: 0 TH/MM3 (ref 0–0.2)
BASOPHILS NFR BLD: 0 % (ref 0–2)
BILIRUB SERPL-MCNC: 0.4 MG/DL (ref 0.2–1)
BUN SERPL-MCNC: 14 MG/DL (ref 7–18)
CHLORIDE SERPL-SCNC: 105 MEQ/L (ref 98–107)
COLOR UR: YELLOW
COMMENT (UR): (no result)
CULTURE IF INDICATED: (no result)
EOSINOPHIL # BLD: 0 TH/MM3 (ref 0–0.4)
EOSINOPHIL NFR BLD: 0.2 % (ref 0–4)
ERYTHROCYTE [DISTWIDTH] IN BLOOD BY AUTOMATED COUNT: 16.7 % (ref 11.6–17.2)
GFR SERPLBLD BASED ON 1.73 SQ M-ARVRAT: 91 ML/MIN (ref 89–?)
GLUCOSE UR STRIP-MCNC: (no result) MG/DL
HCO3 BLD-SCNC: 26.5 MEQ/L (ref 21–32)
HCT VFR BLD CALC: 35 % (ref 35–46)
HEMO FLAGS: (no result)
HGB UR QL STRIP: (no result)
KETONES UR STRIP-MCNC: 10 MG/DL
LYMPHOCYTES # BLD AUTO: 1.1 TH/MM3 (ref 1–4.8)
LYMPHOCYTES NFR BLD AUTO: 25.6 % (ref 9–44)
MCH RBC QN AUTO: 26.5 PG (ref 27–34)
MCHC RBC AUTO-ENTMCNC: 31.8 % (ref 32–36)
MCV RBC AUTO: 83.3 FL (ref 80–100)
MONOCYTES NFR BLD: 6.8 % (ref 0–8)
MUCOUS THREADS #/AREA URNS LPF: (no result) /LPF
NEUTROPHILS # BLD AUTO: 2.9 TH/MM3 (ref 1.8–7.7)
NEUTROPHILS NFR BLD AUTO: 67.4 % (ref 16–70)
NITRITE UR QL STRIP: (no result)
PLATELET # BLD: 175 TH/MM3 (ref 150–450)
POTASSIUM SERPL-SCNC: 3.1 MEQ/L (ref 3.5–5.1)
RBC # BLD AUTO: 4.2 MIL/MM3 (ref 4–5.3)
SODIUM SERPL-SCNC: 140 MEQ/L (ref 136–145)
SP GR UR STRIP: 1.04 (ref 1–1.03)
SQUAMOUS #/AREA URNS HPF: 4 /HPF (ref 0–5)
WBC # BLD AUTO: 4.4 TH/MM3 (ref 4–11)

## 2017-03-27 PROCEDURE — 96361 HYDRATE IV INFUSION ADD-ON: CPT

## 2017-03-27 PROCEDURE — 96374 THER/PROPH/DIAG INJ IV PUSH: CPT

## 2017-03-27 PROCEDURE — 83690 ASSAY OF LIPASE: CPT

## 2017-03-27 PROCEDURE — 99284 EMERGENCY DEPT VISIT MOD MDM: CPT

## 2017-03-27 PROCEDURE — 81001 URINALYSIS AUTO W/SCOPE: CPT

## 2017-03-27 PROCEDURE — 96375 TX/PRO/DX INJ NEW DRUG ADDON: CPT

## 2017-03-27 PROCEDURE — 85025 COMPLETE CBC W/AUTO DIFF WBC: CPT

## 2017-03-27 PROCEDURE — 80053 COMPREHEN METABOLIC PANEL: CPT

## 2017-03-27 NOTE — PD
HPI


Chief Complaint:  Abdominal Pain


Time Seen by Provider:  15:10


Travel History


International Travel<30 days:  No


Contact w/Intl Traveler<30days:  No


Traveled to known affect area:  No





History of Present Illness


HPI


This is a 56-year-old female with history of total gastrectomy, gastroparesis, 

chronic abdominal pain who presents for evaluation of abdominal pain.  Symptoms 

started 3 days ago.  She describes it as a aching pain in the epigastrium which 

is constant with no aggravating or relieving factors.  Associated with vomiting 

and dry heaves.  She reports a 10 pound weight loss over the past month.  She 

takes 4 mg of Dilaudid every 4 hours for pain control.  She is allergic to most 

typical antiemetics and so she is prescribed Valium for nausea.  She reports 

that her primary care physician is Dr. Davila.  She reports that she was 

recently treated for urinary tract infection and she is not currently 

expressing any dysuria or increased urinary frequency.  This patient has been 

seen here multiple times in the past with similar complaints.  Most recently 

she was seen here on , admitted for evaluation of intractable nausea and 

vomiting and syncope and she left AMA per chart review.  She has no other 

complaints.





PFSH


Past Medical History


Hx Anticoagulant Therapy:  No


Asthma:  No


Blood Disorders:  No


Anxiety:  Yes


Depression:  No


Heart Rhythm Problems:  No


Cancer:  Yes (kidney)


Cardiovascular Problems:  Yes (MI)


High Cholesterol:  No


Chemotherapy:  Yes (KIDNEY )


Chest Pain:  No


Congestive Heart Failure:  No


COPD:  No


Cerebrovascular Accident:  Yes


Diabetes:  No


Diminished Hearing:  No


Endocrine:  No


Gastrointestinal Disorders:  Yes


GERD:  No


Genitourinary:  Yes (RIGHT NEPHRECTOMY)


Headaches:  Yes


Hiatal Hernia:  Yes


Hypertension:  No


Immune Disorder:  No


Implanted Vascular Access Dvce:  No


Kidney Stones:  No


Musculoskeletal:  No


Neurologic:  No


Psychiatric:  No


Reproductive:  No


Respiratory:  No


Immunizations Current:  Yes


Migraines:  Yes


Myocardial Infarction:  Yes ()


Pneumonia:  Yes


Radiation Therapy:  No


Renal Failure:  No


Seizures:  Yes


Sleep Apnea:  No


Thyroid Disease:  No


Ulcer:  No


Pregnant?:  Not Pregnant


Menopausal:  Yes


:  1


Para:  1


Miscarriage:  0


:  0


Ectopic Pregnancy:  No


Ovarian Cysts:  No


Dilation and Curettage (D&C):  Yes


Tubal Ligation:  Yes





Past Surgical History


Abdominal Surgery:  Yes (total gastrectomy w/pouch , hernia repair)


Appendectomy:  Yes


Cardiac Surgery:  Yes (pt states an occulator was placed in her heart )


Cholecystectomy:  Yes


Gynecologic Surgery:  Yes


Hysterectomy:  Yes


Thoracic Surgery:  No


Tonsillectomy:  Yes


Other Surgery:  Yes (tot gastrectomy)





Social History


Alcohol Use:  No


Tobacco Use:  No


Substance Use:  No





Allergies-Medications


(Allergen,Severity, Reaction):  


Coded Allergies:  


     Compazine (Verified  Allergy, Severe, SOB, 3/27/17)


     Gadolinium Derivatives (Verified  Allergy, Severe, RESPIRATORY DISTRESS, 3/

27/17)


     Lobster (Verified  Allergy, Severe, Anaphylaxis, 3/27/17)


     Morphine (Verified  Allergy, Severe, Hives, 3/27/17)


 PT HAVING HIVES AND ITCHING TO ARM ABOVE IV SITE AFTER


 ADMINISTRATION OF MORPHINE


     Phenergan (Verified  Allergy, Severe, SOB, 3/27/17)


     Reglan (Verified  Allergy, Severe, SOB, 3/27/17)


     Toradol (Verified  Allergy, Severe, Shortness of Breath, 3/27/17)


     Zofran (Verified  Allergy, Severe, SOB, 3/27/17)


     Tigan (Verified  Allergy, Intermediate, 3/27/17)


 difficult breathing


     Penicillin (Verified  Allergy, Mild, RASH, 3/27/17)


     Sulfa (Verified  Allergy, Mild, RASH, 3/27/17)


Uncoded Allergies:  


     GADOLINIUM (Adverse Reaction, Severe, PT STOPPED BREATHING WITH GADO, )


 PT STATES SHE STOPPED BREATHING AFTER HAVING GADO AT ANOTHER


 FACILITY. 17


 [facility]


Reported Meds & Prescriptions





Reported Meds & Active Scripts


Active


Erythromycin Ethylsuccinate Liq (Erythromycin Ethylsuccinate) 200 Mg/Ml Susp 

200 Mg PO Q8H


Reported


Ambien (Zolpidem Tartrate) 10 Mg Tab 10 Mg PO HS PRN


Dilaudid (Hydromorphone HCl) 4 Mg Tab 4 Mg PO Q6H PRN


Valium (Diazepam) 10 Mg Tab 10 Mg PO TID


Buspirone (Buspirone HCl) 7.5 Mg Tab 7.5 Mg PO BID


Zyprexa (Olanzapine) 5 Mg Tab 5 Mg PO BID


Cyanocobalamin Inj (Cyanocobalamin) 1,000 Mcg/Ml Inj 1,000 Mcg IM Q30D


Imipramine HCL (Imipramine HCl) 25 Mg Tab 25 Mg PO HS








Review of Systems


Except as stated in HPI:  all other systems reviewed are Neg





Physical Exam


Narrative


GENERAL: This is an anxious appearing cachectic female who is in no acute 

distress.


SKIN: Warm and dry.


HEAD: Atraumatic. Normocephalic. 


EYES: Pupils equal and round. No scleral icterus. No injection or drainage. 


ENT: No nasal bleeding or discharge.  Mucous membranes pink and moist.


NECK: Trachea midline. No JVD. 


CARDIOVASCULAR: Regular rate and rhythm.  No murmur appreciated.


RESPIRATORY: No accessory muscle use. Clear to auscultation. Breath sounds 

equal bilaterally. 


GASTROINTESTINAL: Abdomen soft, generalized periumbilical and epigastric 

tenderness without guarding.


MUSCULOSKELETAL: No obvious deformities.   No edema. 


NEUROLOGICAL: Awake and alert. No obvious cranial nerve deficits.  Motor 

grossly within normal limits. Normal speech.





Data


Data


Last Documented VS





Vital Signs








  Date Time  Temp Pulse Resp B/P Pulse Ox O2 Delivery O2 Flow Rate FiO2


 


3/27/17 16:18  89 16 194/100 100 Room Air  


 


3/27/17 12:49 97.8       








Orders





 Complete Blood Count With Diff (3/27/17 15:16)


Comprehensive Metabolic Panel (3/27/17 15:16)


Lipase (3/27/17 15:16)


Urinalysis - C+S If Indicated (3/27/17 15:16)


Lipase (3/27/17 16:19)


Iv Access Insert/Monitor (3/27/17 16:19)


Ecg Monitoring (3/27/17 16:19)


Oximetry (3/27/17 16:19)


Sodium Chlor 0.9% 1000 Ml Inj (Ns 1000 M (3/27/17 16:19)


Sodium Chloride 0.9% Flush (Ns Flush) (3/27/17 16:30)


Famotidine Inj (Pepcid Inj) (3/27/17 16:30)


Lorazepam Inj (Ativan Inj) (3/27/17 16:30)


Diphenhydramine Inj (Benadryl Inj) (3/27/17 16:30)





Labs








 Laboratory Tests








Test 3/27/17





 15:50


 


Urine Color YELLOW 


 


Urine Turbidity HAZY 


 


Urine pH 6.0 


 


Urine Specific Gravity 1.039 


 


Urine Protein 30 mg/dL


 


Urine Glucose (UA) NEG mg/dL


 


Urine Ketones 10 mg/dL


 


Urine Occult Blood NEG 


 


Urine Nitrite NEG 


 


Urine Bilirubin NEG 


 


Urine Urobilinogen LESS THAN 2.0





 MG/DL


 


Urine Leukocyte Esterase NEG 


 


Urine RBC 5 /hpf


 


Urine WBC 3 /hpf


 


Urine Squamous Epithelial 4 /hpf





Cells 


 


Urine Calcium Oxalate Crystals MOD /hpf


 


Urine Mucus FEW /lpf


 


Microscopic Urinalysis Comment CULT NOT





 INDICATED














MDM


Medical Decision Making


Medical Screen Exam Complete:  Yes


Emergency Medical Condition:  Yes


Medical Record Reviewed:  Yes


Differential Diagnosis


Chronic abdominal pain, gastroparesis, pancreatitis, bowel obstruction, 

dehydration


Narrative Course


56 year old female with history of chronic abdominal pain, gastroparesis, and 

Dilaudid and Valium at home for pain and nausea.  She presents with 3 days of 

worsening epigastric pain, nausea and vomiting.





This patient was initially seen in triage where lab work has been ordered.  The 

patient will be moved to a medical bed one becomes available.








Darrel Grande Mar 27, 2017 15:19

## 2017-03-27 NOTE — PD
Physical Exam


Date Seen by Provider:  Mar 27, 2017


Time Seen by Provider:  16:28


Narrative


The patient is a 56-year-old female was initially evaluated by the mid-level 

provider.  Please refer to the initial history, physical, diagnostic evaluation

, treatment modality plan.





Data


Data


Last Documented VS





Vital Signs








  Date Time  Temp Pulse Resp B/P Pulse Ox O2 Delivery O2 Flow Rate FiO2


 


3/27/17 16:18  89 16 194/100 100 Room Air  


 


3/27/17 12:49 97.8       








Orders





 Complete Blood Count With Diff (3/27/17 15:16)


Comprehensive Metabolic Panel (3/27/17 15:16)


Lipase (3/27/17 15:16)


Urinalysis - C+S If Indicated (3/27/17 15:16)


Iv Access Insert/Monitor (3/27/17 16:19)


Ecg Monitoring (3/27/17 16:19)


Oximetry (3/27/17 16:19)


Sodium Chlor 0.9% 1000 Ml Inj (Ns 1000 M (3/27/17 16:19)


Sodium Chloride 0.9% Flush (Ns Flush) (3/27/17 16:30)


Famotidine Inj (Pepcid Inj) (3/27/17 16:30)


Lorazepam Inj (Ativan Inj) (3/27/17 16:30)


Diphenhydramine Inj (Benadryl Inj) (3/27/17 16:30)





Labs








 Laboratory Tests








Test 3/27/17 3/27/17





 15:50 17:00


 


Urine Color YELLOW  


 


Urine Turbidity HAZY  


 


Urine pH 6.0  


 


Urine Specific Gravity 1.039  


 


Urine Protein 30 mg/dL 


 


Urine Glucose (UA) NEG mg/dL 


 


Urine Ketones 10 mg/dL 


 


Urine Occult Blood NEG  


 


Urine Nitrite NEG  


 


Urine Bilirubin NEG  


 


Urine Urobilinogen LESS THAN 2.0 





 MG/DL 


 


Urine Leukocyte Esterase NEG  


 


Urine RBC 5 /hpf 


 


Urine WBC 3 /hpf 


 


Urine Squamous Epithelial 4 /hpf 





Cells  


 


Urine Calcium Oxalate Crystals MOD /hpf 


 


Urine Mucus FEW /lpf 


 


Microscopic Urinalysis Comment CULT NOT 





 INDICATED 


 


White Blood Count  4.4 TH/MM3


 


Red Blood Count  4.20 MIL/MM3


 


Hemoglobin  11.1 GM/DL


 


Hematocrit  35.0 %


 


Mean Corpuscular Volume  83.3 FL


 


Mean Corpuscular Hemoglobin  26.5 PG


 


Mean Corpuscular Hemoglobin  31.8 %





Concent  


 


Red Cell Distribution Width  16.7 %


 


Platelet Count  175 TH/MM3


 


Mean Platelet Volume  9.8 FL


 


Neutrophils (%) (Auto)  67.4 %


 


Lymphocytes (%) (Auto)  25.6 %


 


Monocytes (%) (Auto)  6.8 %


 


Eosinophils (%) (Auto)  0.2 %


 


Basophils (%) (Auto)  0.0 %


 


Neutrophils # (Auto)  2.9 TH/MM3


 


Lymphocytes # (Auto)  1.1 TH/MM3


 


Monocytes # (Auto)  0.3 TH/MM3


 


Eosinophils # (Auto)  0.0 TH/MM3


 


Basophils # (Auto)  0.0 TH/MM3


 


CBC Comment  DIFF FINAL 


 


Differential Comment   


 


Sodium Level  140 MEQ/L


 


Potassium Level  3.1 MEQ/L


 


Chloride Level  105 MEQ/L


 


Carbon Dioxide Level  26.5 MEQ/L


 


Anion Gap  9 MEQ/L


 


Blood Urea Nitrogen  14 MG/DL


 


Creatinine  0.67 MG/DL


 


Estimat Glomerular Filtration  91 ML/MIN





Rate  


 


Random Glucose  103 MG/DL


 


Calcium Level  9.2 MG/DL


 


Total Bilirubin  0.4 MG/DL


 


Aspartate Amino Transf  9 U/L





(AST/SGOT)  


 


Alanine Aminotransferase  16 U/L





(ALT/SGPT)  


 


Alkaline Phosphatase  86 U/L


 


Total Protein  6.7 GM/DL


 


Albumin  3.8 GM/DL


 


Lipase  207 U/L














[facility]


Medical Record Reviewed:  Yes


Supervised Visit with DELLA:  Yes


Interpretation(s)





 Laboratory Tests








Test 3/27/17 3/27/17





 15:50 17:00


 


Urine Color YELLOW  


 


Urine Turbidity HAZY  


 


Urine pH 6.0  


 


Urine Specific Gravity 1.039  


 


Urine Protein 30 mg/dL 


 


Urine Glucose (UA) NEG mg/dL 


 


Urine Ketones 10 mg/dL 


 


Urine Occult Blood NEG  


 


Urine Nitrite NEG  


 


Urine Bilirubin NEG  


 


Urine Urobilinogen LESS THAN 2.0 





 MG/DL 


 


Urine Leukocyte Esterase NEG  


 


Urine RBC 5 /hpf 


 


Urine WBC 3 /hpf 


 


Urine Squamous Epithelial 4 /hpf 





Cells  


 


Urine Calcium Oxalate Crystals MOD /hpf 


 


Urine Mucus FEW /lpf 


 


Microscopic Urinalysis Comment CULT NOT 





 INDICATED 


 


White Blood Count  4.4 TH/MM3


 


Red Blood Count  4.20 MIL/MM3


 


Hemoglobin  11.1 GM/DL


 


Hematocrit  35.0 %


 


Mean Corpuscular Volume  83.3 FL


 


Mean Corpuscular Hemoglobin  26.5 PG


 


Mean Corpuscular Hemoglobin  31.8 %





Concent  


 


Red Cell Distribution Width  16.7 %


 


Platelet Count  175 TH/MM3


 


Mean Platelet Volume  9.8 FL


 


Neutrophils (%) (Auto)  67.4 %


 


Lymphocytes (%) (Auto)  25.6 %


 


Monocytes (%) (Auto)  6.8 %


 


Eosinophils (%) (Auto)  0.2 %


 


Basophils (%) (Auto)  0.0 %


 


Neutrophils # (Auto)  2.9 TH/MM3


 


Lymphocytes # (Auto)  1.1 TH/MM3


 


Monocytes # (Auto)  0.3 TH/MM3


 


Eosinophils # (Auto)  0.0 TH/MM3


 


Basophils # (Auto)  0.0 TH/MM3


 


CBC Comment  DIFF FINAL 


 


Differential Comment   


 


Sodium Level  140 MEQ/L


 


Potassium Level  3.1 MEQ/L


 


Chloride Level  105 MEQ/L


 


Carbon Dioxide Level  26.5 MEQ/L


 


Anion Gap  9 MEQ/L


 


Blood Urea Nitrogen  14 MG/DL


 


Creatinine  0.67 MG/DL


 


Estimat Glomerular Filtration  91 ML/MIN





Rate  


 


Random Glucose  103 MG/DL


 


Calcium Level  9.2 MG/DL


 


Total Bilirubin  0.4 MG/DL


 


Aspartate Amino Transf  9 U/L





(AST/SGOT)  


 


Alanine Aminotransferase  16 U/L





(ALT/SGPT)  


 


Alkaline Phosphatase  86 U/L


 


Total Protein  6.7 GM/DL


 


Albumin  3.8 GM/DL


 


Lipase  207 U/L








Differential Diagnosis


Differential diagnosis includes chronic abdominal pain, gastritis, peptic ulcer 

disease, pancreatitis, drug-seeking behavior, malingering, dehydration.


Narrative Course


I, Dr. Kohler, have reviewed the advance practice practitioner's 

documentation and am in agreement, met with the patient face to face, made the 

diagnosis, and the medical decision making was done by me. 


*My assessment and Findings: The patient is a 56-year-old female was initially 

evaluated by the mid-level provider.  Please refer to the initial history, 

physical, diagnostic evaluation, treatment modality plan.  The patient states 

she has a history of gastrectomy that was performed at 2003 in [place], [place], for chronic aspiration.  The patient notes a history of chronic 

abdominal pain with multiple admissions.  The patient has a list of allergies 

to medications including Compazine, morphine, Phenergan, Reglan, sulfa, Tigan, 

Toradol, and Zofran.  The patient routinely requests Benadryl, Ativan and 

Dilaudid for her pain.  I had a discussion with the patient regarding her 

multiple visits and imaging in the past.  The patient will be  Benadryl 

and Ativan, but no Dilaudid in the emergency department as I believe she has a 

tendency for drug-seeking behavior.  The patient was comfortable with this plan 

of care.  The patient's potassium is mildly low at 3.1, therefore, was replaced 

intravenously as she has persistent nausea and vomiting.  The patient be 

discharged home and is advised to follow-up with her primary physician and/or 

gastroenterologist.


Diagnosis





 Primary Impression:  


 Chronic abdominal pain


 Additional Impression:  


 Gastroparesis


Patient Instructions:  General Instructions





***Additional Instruction:


Follow-up with her primary physician and/or gastroenterologist.  Return if 

symptoms worsen or progress.  Clear liquid diet and advance as tolerated.


Disposition:  01 DISCHARGE HOME


Condition:  Stable








Nathan Kohler MD Mar 27, 2017 16:30

## 2017-04-02 ENCOUNTER — HOSPITAL ENCOUNTER (EMERGENCY)
Dept: HOSPITAL 17 - NEPC | Age: 57
Discharge: HOME | End: 2017-04-02
Payer: COMMERCIAL

## 2017-04-02 VITALS
DIASTOLIC BLOOD PRESSURE: 70 MMHG | HEART RATE: 86 BPM | TEMPERATURE: 97.8 F | SYSTOLIC BLOOD PRESSURE: 134 MMHG | RESPIRATION RATE: 18 BRPM | OXYGEN SATURATION: 100 %

## 2017-04-02 VITALS
RESPIRATION RATE: 16 BRPM | DIASTOLIC BLOOD PRESSURE: 74 MMHG | HEART RATE: 87 BPM | SYSTOLIC BLOOD PRESSURE: 129 MMHG | OXYGEN SATURATION: 98 % | TEMPERATURE: 98.2 F

## 2017-04-02 VITALS — HEIGHT: 62 IN | BODY MASS INDEX: 18.26 KG/M2 | WEIGHT: 99.21 LBS

## 2017-04-02 DIAGNOSIS — R10.84: Primary | ICD-10-CM

## 2017-04-02 DIAGNOSIS — R11.2: ICD-10-CM

## 2017-04-02 DIAGNOSIS — I25.2: ICD-10-CM

## 2017-04-02 LAB
ALP SERPL-CCNC: 94 U/L (ref 45–117)
ALT SERPL-CCNC: 16 U/L (ref 10–53)
ANION GAP SERPL CALC-SCNC: 8 MEQ/L (ref 5–15)
AST SERPL-CCNC: 12 U/L (ref 15–37)
BASOPHILS # BLD AUTO: 0 TH/MM3 (ref 0–0.2)
BASOPHILS NFR BLD: 0.1 % (ref 0–2)
BILIRUB SERPL-MCNC: 0.3 MG/DL (ref 0.2–1)
BUN SERPL-MCNC: 19 MG/DL (ref 7–18)
CHLORIDE SERPL-SCNC: 112 MEQ/L (ref 98–107)
EOSINOPHIL # BLD: 0.1 TH/MM3 (ref 0–0.4)
EOSINOPHIL NFR BLD: 1.6 % (ref 0–4)
ERYTHROCYTE [DISTWIDTH] IN BLOOD BY AUTOMATED COUNT: 17.9 % (ref 11.6–17.2)
GFR SERPLBLD BASED ON 1.73 SQ M-ARVRAT: 66 ML/MIN (ref 89–?)
HCO3 BLD-SCNC: 20.5 MEQ/L (ref 21–32)
HCT VFR BLD CALC: 36.5 % (ref 35–46)
HEMO FLAGS: (no result)
LYMPHOCYTES # BLD AUTO: 1.2 TH/MM3 (ref 1–4.8)
LYMPHOCYTES NFR BLD AUTO: 37.7 % (ref 9–44)
MCH RBC QN AUTO: 27.2 PG (ref 27–34)
MCHC RBC AUTO-ENTMCNC: 31.9 % (ref 32–36)
MCV RBC AUTO: 85.1 FL (ref 80–100)
MONOCYTES NFR BLD: 10.2 % (ref 0–8)
NEUTROPHILS # BLD AUTO: 1.6 TH/MM3 (ref 1.8–7.7)
NEUTROPHILS NFR BLD AUTO: 50.4 % (ref 16–70)
PLATELET # BLD: 218 TH/MM3 (ref 150–450)
POTASSIUM SERPL-SCNC: 4.1 MEQ/L (ref 3.5–5.1)
RBC # BLD AUTO: 4.29 MIL/MM3 (ref 4–5.3)
SODIUM SERPL-SCNC: 140 MEQ/L (ref 136–145)
WBC # BLD AUTO: 3.2 TH/MM3 (ref 4–11)

## 2017-04-02 PROCEDURE — 85025 COMPLETE CBC W/AUTO DIFF WBC: CPT

## 2017-04-02 PROCEDURE — 96372 THER/PROPH/DIAG INJ SC/IM: CPT

## 2017-04-02 PROCEDURE — 96374 THER/PROPH/DIAG INJ IV PUSH: CPT

## 2017-04-02 PROCEDURE — 96375 TX/PRO/DX INJ NEW DRUG ADDON: CPT

## 2017-04-02 PROCEDURE — 99283 EMERGENCY DEPT VISIT LOW MDM: CPT

## 2017-04-02 PROCEDURE — 83690 ASSAY OF LIPASE: CPT

## 2017-04-02 PROCEDURE — 80053 COMPREHEN METABOLIC PANEL: CPT

## 2017-04-02 NOTE — PD
HPI


Chief Complaint:  Abdominal Pain


Time Seen by Provider:  09:53


Travel History


International Travel<30 days:  No


Contact w/Intl Traveler<30days:  No


Traveled to known affect area:  No





History of Present Illness


HPI


56-year-old woman, well-known to the emergency department, presents to the 

emergency department complaining of abdominal pain.  She has a history of 

chronic recurrent abdominal pain.  She is a previous gastrectomy.  She states 

today is "worse than its ever been".  She reports vomiting, feeling lightheaded 

and dizzy.  She states she couldn't keep her Dilaudid down this morning.





History


Past Medical History


*** Narrative Medical


Per patient


Previous gastrectomy


History of kidney CA


History of CVA


CAD, MI


Menopausal:  Yes


:  1


Para:  1


Dilation and Curettage (D&C):  Yes





Social History


Alcohol Use:  No


Tobacco Use:  No





Allergies-Medications


(Allergen,Severity, Reaction):  


Coded Allergies:  


     Compazine (Verified  Allergy, Severe, SOB, 3/27/17)


     Gadolinium Derivatives (Verified  Allergy, Severe, RESPIRATORY DISTRESS, 3/

27/17)


     Lobster (Verified  Allergy, Severe, Anaphylaxis, 3/27/17)


     Morphine (Verified  Allergy, Severe, Hives, 3/27/17)


 PT HAVING HIVES AND ITCHING TO ARM ABOVE IV SITE AFTER


 ADMINISTRATION OF MORPHINE


     Phenergan (Verified  Allergy, Severe, SOB, 3/27/17)


     Reglan (Verified  Allergy, Severe, SOB, 3/27/17)


     Toradol (Verified  Allergy, Severe, Shortness of Breath, 3/27/17)


     Zofran (Verified  Allergy, Severe, SOB, 3/27/17)


     Tigan (Verified  Allergy, Intermediate, 3/27/17)


 difficult breathing


     Penicillin (Verified  Allergy, Mild, RASH, 3/27/17)


     Sulfa (Verified  Allergy, Mild, RASH, 3/27/17)


Uncoded Allergies:  


     GADOLINIUM (Adverse Reaction, Severe, PT STOPPED BREATHING WITH GADO, )


 PT STATES SHE STOPPED BREATHING AFTER HAVING GADO AT ANOTHER


 FACILITY. 17


 [facility]


Reported Meds & Prescriptions





Reported Meds & Active Scripts


Active


Reported


Ambien (Zolpidem Tartrate) 10 Mg Tab 10 Mg PO HS PRN


Dilaudid (Hydromorphone HCl) 4 Mg Tab 4 Mg PO Q6H PRN


Valium (Diazepam) 10 Mg Tab 10 Mg PO TID


Buspirone (Buspirone HCl) 7.5 Mg Tab 7.5 Mg PO BID


Zyprexa (Olanzapine) 5 Mg Tab 5 Mg PO BID


Cyanocobalamin Inj (Cyanocobalamin) 1,000 Mcg/Ml Inj 1,000 Mcg IM Q30D


Imipramine HCL (Imipramine HCl) 25 Mg Tab 25 Mg PO HS








Review of Systems


Except as stated in HPI:  all other systems reviewed are Neg





Physical Exam


Narrative


GENERAL: 56-year-old woman, no acute distress.  No active vomiting now.


SKIN: Focused skin assessment warm/dry.


NECK: Trachea midline. No JVD. 


CARDIOVASCULAR: Regular rate and rhythm.  No murmur appreciated.


RESPIRATORY: No accessory muscle use. Clear to auscultation. Breath sounds 

equal bilaterally. 


GASTROINTESTINAL: Abdomen is flat and soft.  Moderate diffuse tenderness.


MUSCULOSKELETAL: No obvious deformities. No clubbing.  No cyanosis.  No edema. 


NEUROLOGICAL: Awake and alert. No obvious cranial nerve deficits.  Motor 

grossly within normal limits. Normal speech.


PSYCHIATRIC: Anxious.





Data


Data


Last Documented VS





Vital Signs








  Date Time  Temp Pulse Resp B/P Pulse Ox O2 Delivery O2 Flow Rate FiO2


 


17 09:41 98.2 87 16 129/74 98   








Orders





 Complete Blood Count With Diff (17 10:10)


Comprehensive Metabolic Panel (17 10:10)


Lipase (17 10:10)


Iv Access Insert/Monitor (17 10:10)


Prochlorperazine Inj (Compazine Inj) (17 10:15)


Diphenhydramine Inj (Benadryl Inj) (17 10:15)


Lorazepam Inj (Ativan Inj) (17 10:15)


Acetaminophen Inj (Ofirmev Inj) (17 10:15)


Dicyclomine Inj (Bentyl Inj) (17 10:15)


Sodium Chlor 0.9% 1000 Ml Inj (Ns 1000 M (17 11:00)


Sodium Chlor 0.9% 1000 Ml Inj (Ns 1000 M (17 11:00)


Naproxen (Naprosyn) (17 11:45)


Haloperidol Inj (Haldol Inj) (17 12:00)





Labs





 Laboratory Tests








Test 17





 10:25


 


White Blood Count 3.2 TH/MM3


 


Red Blood Count 4.29 MIL/MM3


 


Hemoglobin 11.7 GM/DL


 


Hematocrit 36.5 %


 


Mean Corpuscular Volume 85.1 FL


 


Mean Corpuscular Hemoglobin 27.2 PG


 


Mean Corpuscular Hemoglobin 31.9 %





Concent 


 


Red Cell Distribution Width 17.9 %


 


Platelet Count 218 TH/MM3


 


Mean Platelet Volume 9.8 FL


 


Neutrophils (%) (Auto) 50.4 %


 


Lymphocytes (%) (Auto) 37.7 %


 


Monocytes (%) (Auto) 10.2 %


 


Eosinophils (%) (Auto) 1.6 %


 


Basophils (%) (Auto) 0.1 %


 


Neutrophils # (Auto) 1.6 TH/MM3


 


Lymphocytes # (Auto) 1.2 TH/MM3


 


Monocytes # (Auto) 0.3 TH/MM3


 


Eosinophils # (Auto) 0.1 TH/MM3


 


Basophils # (Auto) 0.0 TH/MM3


 


CBC Comment DIFF FINAL 


 


Differential Comment  


 


Sodium Level 140 MEQ/L


 


Potassium Level 4.1 MEQ/L


 


Chloride Level 112 MEQ/L


 


Carbon Dioxide Level 20.5 MEQ/L


 


Anion Gap 8 MEQ/L


 


Blood Urea Nitrogen 19 MG/DL


 


Creatinine 0.88 MG/DL


 


Estimat Glomerular Filtration 66 ML/MIN





Rate 


 


Random Glucose 66 MG/DL


 


Calcium Level 9.1 MG/DL


 


Total Bilirubin 0.3 MG/DL


 


Aspartate Amino Transf 12 U/L





(AST/SGOT) 


 


Alanine Aminotransferase 16 U/L





(ALT/SGPT) 


 


Alkaline Phosphatase 94 U/L


 


Total Protein 7.3 GM/DL


 


Albumin 4.1 GM/DL


 


Lipase 268 U/L











[facility]


Medical Decision Making


Medical Screen Exam Complete:  Yes


Emergency Medical Condition:  Yes


Interpretation(s)


LABS:


CBC is unremarkable.


CMP is unremarkable


Lipase is normal.


Differential Diagnosis


Acute recurrent abdominal pain, obstruction, dehydration, other


Narrative Course


Medical decision making





INITIAL: This a 56-year-old woman who presents to the emergency Department with 

chronic recurrent abdominal pain.  We'll check labs.  We'll give IV fluids.  

She labs a couple days ago that were normal.  She is here because couple days 

for the same symptoms.  Avoid IV opiates.  She has multiple allergies.





FINAL: Labs are unremarkable.  Patient unchanged from previous interactions.  

Still states "can't I just get 1 shot of Dilaudid".  She treats her symptoms 

with Dilaudid and Valium.  States she is allergic to any other medications.  I 

told her that I thought that she would do best by avoiding opiates in the 

future.  We'll give her prescription for Unisom.





Diagnosis





 Primary Impression:  


 Chronic abdominal pain


 Additional Impression:  


 Nausea and vomiting


 Qualified Code:  R11.2 - Non-intractable vomiting with nausea, unspecified 

vomiting type





***Additional Instructions:


Continue current medications.





Follow-up with your primary doctor in the next one to 2 days.





Return to the emergency department for any new or worsening symptoms.


***Med/Other Pt SpecificInfo:  Prescription(s) given


Scripts


Diphenhydramine 25 Mg Cap25 Mg PO Q6H PRN (NAUSEA OR VOMITING) #12 CAP  Ref 0


   Prov:Jesus Alberto Byrnes MD         17


Disposition:  01 DISCHARGE HOME


Condition:  Stable








Jesus Alberto Byrnes MD 2017 11:03

## 2017-04-03 ENCOUNTER — HOSPITAL ENCOUNTER (EMERGENCY)
Dept: HOSPITAL 17 - NED | Age: 57
Discharge: LEFT BEFORE DISCHARGE | End: 2017-04-03
Payer: COMMERCIAL

## 2017-04-03 VITALS
TEMPERATURE: 98.5 F | SYSTOLIC BLOOD PRESSURE: 134 MMHG | OXYGEN SATURATION: 99 % | HEART RATE: 106 BPM | RESPIRATION RATE: 24 BRPM | DIASTOLIC BLOOD PRESSURE: 85 MMHG

## 2017-04-03 VITALS — WEIGHT: 91.49 LBS | BODY MASS INDEX: 16.84 KG/M2 | HEIGHT: 62 IN

## 2017-04-03 DIAGNOSIS — R10.9: Primary | ICD-10-CM

## 2017-04-03 PROCEDURE — 99283 EMERGENCY DEPT VISIT LOW MDM: CPT

## 2017-04-04 ENCOUNTER — HOSPITAL ENCOUNTER (EMERGENCY)
Dept: HOSPITAL 17 - NEPE | Age: 57
Discharge: HOME | End: 2017-04-04
Payer: COMMERCIAL

## 2017-04-04 VITALS — DIASTOLIC BLOOD PRESSURE: 78 MMHG | SYSTOLIC BLOOD PRESSURE: 120 MMHG | TEMPERATURE: 97.8 F

## 2017-04-04 VITALS
OXYGEN SATURATION: 100 % | HEART RATE: 87 BPM | DIASTOLIC BLOOD PRESSURE: 78 MMHG | SYSTOLIC BLOOD PRESSURE: 146 MMHG | TEMPERATURE: 98 F | RESPIRATION RATE: 12 BRPM

## 2017-04-04 DIAGNOSIS — R10.9: Primary | ICD-10-CM

## 2017-04-04 DIAGNOSIS — R11.2: ICD-10-CM

## 2017-04-04 DIAGNOSIS — G89.29: ICD-10-CM

## 2017-04-04 PROCEDURE — 74020: CPT

## 2017-04-04 PROCEDURE — 96372 THER/PROPH/DIAG INJ SC/IM: CPT

## 2017-04-04 PROCEDURE — 99284 EMERGENCY DEPT VISIT MOD MDM: CPT

## 2017-04-04 NOTE — PD
HPI


Chief Complaint:  Abdominal Pain


Time Seen by Provider:  07:54


Travel History


International Travel<30 days:  No


Contact w/Intl Traveler<30days:  No


Traveled to known affect area:  No





History of Present Illness


HPI


56-year-old female came to the emergency room with history of vomiting and 

abdominal pain since past 4 days.  Patient has history of chronic abdominal 

pain.  She has history of gastrectomy done many years ago and now she has a 

poorly functioning pouch which was discovered by gastric emptying study.  Vital 

signs are stable here.  Patient says she's been unable to keep anything down 

and feels dehydrated.  However she is not tachycardic and blood pressure is 

good.  Does seem pretty anxious.  She says she has a primary care but is 

thinking of changing her primary care this week.





[facility]


Past Medical History


*** Narrative Medical


List of her past medical, surgical, social and family history was reviewed from 

the nursing note.


Hx Anticoagulant Therapy:  No


Asthma:  No


Blood Disorders:  No


Anxiety:  Yes


Depression:  No


Heart Rhythm Problems:  No


Cancer:  Yes (kidney)


Cardiovascular Problems:  Yes (MI)


High Cholesterol:  No


Chemotherapy:  Yes (KIDNEY )


Chest Pain:  No


Congestive Heart Failure:  No


COPD:  No


Cerebrovascular Accident:  Yes


Diabetes:  No


Diminished Hearing:  No


Endocrine:  No


Gastrointestinal Disorders:  Yes


GERD:  No


Genitourinary:  Yes (RIGHT NEPHRECTOMY)


Headaches:  Yes


Hiatal Hernia:  Yes


Hypertension:  No


Immune Disorder:  No


Implanted Vascular Access Dvce:  No


Kidney Stones:  No


Musculoskeletal:  No


Neurologic:  No


Psychiatric:  No


Reproductive:  No


Respiratory:  No


Immunizations Current:  Yes


Migraines:  Yes


Myocardial Infarction:  Yes (2006)


Pneumonia:  Yes


Radiation Therapy:  No


Renal Failure:  No


Seizures:  Yes


Sleep Apnea:  No


Thyroid Disease:  No


Ulcer:  No


Tetanus Vaccination:  < 5 Years


Influenza Vaccination:  Yes


Menopausal:  Yes


:  1


Para:  1


Miscarriage:  0


:  0


Ectopic Pregnancy:  No


Ovarian Cysts:  No


Dilation and Curettage (D&C):  Yes


Tubal Ligation:  Yes





Past Surgical History


Abdominal Surgery:  Yes (total gastrectomy w/pouch , hernia repair)


Appendectomy:  Yes


Cardiac Surgery:  Yes (pt states an occulator was placed in her heart )


Cholecystectomy:  Yes


Gynecologic Surgery:  Yes


Hysterectomy:  Yes (ONLY TUBES TIED, NOT HYSTERECTOMY)


Thoracic Surgery:  No


Tonsillectomy:  Yes


Other Surgery:  Yes (tot gastrectomy)





Social History


Alcohol Use:  No


Tobacco Use:  No


Substance Use:  No





Allergies-Medications


(Allergen,Severity, Reaction):  


Coded Allergies:  


     Compazine (Verified  Allergy, Severe, SOB, 17)


     Gadolinium Derivatives (Verified  Allergy, Severe, RESPIRATORY DISTRESS, 17)


     Lobster (Verified  Allergy, Severe, Anaphylaxis, 17)


     Morphine (Verified  Allergy, Severe, Hives, 17)


 PT HAVING HIVES AND ITCHING TO ARM ABOVE IV SITE AFTER


 ADMINISTRATION OF MORPHINE


     Phenergan (Verified  Allergy, Severe, SOB, 17)


     Reglan (Verified  Allergy, Severe, SOB, 17)


     Toradol (Verified  Allergy, Severe, Shortness of Breath, 17)


     Zofran (Verified  Allergy, Severe, SOB, 17)


     Tigan (Verified  Allergy, Intermediate, sob, 17)


 difficult breathing


     Penicillin (Verified  Allergy, Mild, RASH, 17)


     Sulfa (Verified  Allergy, Mild, RASH, 17)


Uncoded Allergies:  


     GADOLINIUM (Adverse Reaction, Severe, PT STOPPED BREATHING WITH GADO, )


 PT STATES SHE STOPPED BREATHING AFTER HAVING GADO AT ANOTHER


 FACILITY. 17


 [facility]


Comments


List of her vast education allergy has been reviewed from the nursing note


Reported Meds & Prescriptions





Reported Meds & Active Scripts


Active


Diphenhydramine (Diphenhydramine HCl) 25 Mg Cap 25 Mg PO Q6H PRN


Reported


Ambien (Zolpidem Tartrate) 10 Mg Tab 10 Mg PO HS PRN


Dilaudid (Hydromorphone HCl) 4 Mg Tab 4 Mg PO Q6H PRN


Valium (Diazepam) 10 Mg Tab 10 Mg PO TID


Buspirone (Buspirone HCl) 7.5 Mg Tab 7.5 Mg PO BID


Zyprexa (Olanzapine) 5 Mg Tab 5 Mg PO BID


Cyanocobalamin Inj (Cyanocobalamin) 1,000 Mcg/Ml Inj 1,000 Mcg IM Q30D


Imipramine HCL (Imipramine HCl) 25 Mg Tab 25 Mg PO HS





Narrative Medication


List of her home medications reviewed from the nursing note.





Review of Systems


Except as stated in HPI:  all other systems reviewed are Neg





Physical Exam


Narrative


GENERAL: Awake, alert, anxious


SKIN: Focused skin assessment warm/dry.


HEAD: Atraumatic. Normocephalic. 


EYES: Pupils equal and round. No scleral icterus. No injection or drainage. 


ENT: No nasal bleeding or discharge.  Mucous membranes pink and moist.


NECK: Trachea midline. No JVD. 


CARDIOVASCULAR: Regular rate and rhythm.  No murmur appreciated.


RESPIRATORY: No accessory muscle use. Clear to auscultation. Breath sounds 

equal bilaterally. 


GASTROINTESTINAL: Abdomen soft, generalized tenderness and voluntary guarding, 

nondistended. Hepatic and splenic margins not palpable. 


MUSCULOSKELETAL: No obvious deformities. No clubbing.  No cyanosis.  No edema. 


NEUROLOGICAL: Awake and alert. No obvious cranial nerve deficits.  Motor 

grossly within normal limits. Normal speech.


PSYCHIATRIC: Appropriate mood and affect; insight and judgment normal.





Data


Data


Last Documented VS





Vital Signs








  Date Time  Temp Pulse Resp B/P Pulse Ox O2 Delivery O2 Flow Rate FiO2


 


17 09:50 97.8 78 17 120/78 98   








Orders





 Abdomen, Flat & Upright (17 )


Lorazepam Inj (Ativan Inj) (17 08:15)








MDM


Medical Decision Making


Medical Screen Exam Complete:  Yes


Emergency Medical Condition:  Yes


Medical Record Reviewed:  Yes


Differential Diagnosis


Gastritis, abdominal pain NOS, acute on chronic abdominal pain


Narrative Course


9:43 AM patient is allergic to almost all antiemetics.  She was given IM Ativan 

which she said she takes for her nausea.  There was an x-ray of her abdomen 

done which does not show any bowel obstruction.  Patient has had many imaging 

studies for her abdomen done in the past for her chronic abdominal pain.  I am 

comfortable discharging her home at this point.  She did not vomit in the 

emergency room since she came in.  She needs to follow up with her primary care 

this point.





Procedures


EKG Prior to Arrival:  No





Diagnosis





 Primary Impression:  


 acute on chronic abdominal pain


 Additional Impression:  


 Nausea and vomiting


 Qualified Code:  R11.2 - Non-intractable vomiting with nausea, unspecified 

vomiting type


Referrals:  


Primary Care Physician





***Additional Instructions:


Please follow-up with your primary care.  Return to the ER if the condition 

worsens.  Take clear diet first and then slowly advance to regular food as 

tolerated.


Disposition:  01 DISCHARGE HOME


Condition:  Stable








Gauri Rosenbaum MD 2017 09:44





Gauri Rosenbaum MD 2017 09:44

## 2017-04-04 NOTE — HHI.DS
Discharge Summary


Admission Date


Mar 3, 2017 at 22:58


Discharge Date:  Mar 7, 2017


Admitting Diagnosis


hypokalemia.  Dehydration.  Abdominal pain.





(1) Hypokalemia


(2) Chronic pain


(3) Nausea & vomiting


(4) GERD (gastroesophageal reflux disease)


(5) Narcotic abuse


(6) History of CVA (cerebrovascular accident)


(7) CAD (coronary artery disease)


(8) History of fundoplication


(9) Gastroparesis


(10) Malnourished


Hospital Course


The patient is a 56-year-old female who presented to the ER with complaints of


abdominal pain with nausea, vomiting and diarrhea.  The patient has a history


of recurrent and chronic abdominal pain with nausea and vomiting.  The patient


has been to the emergency room multiple times in the past with the same


problem.  The patient states that she started having diarrhea since last night.


Denies any blood or mucus, denies any chest pain, shortness of breath or


headache, denies any fever.  Does complain of diffuse abdominal pain which is


cramping in nature and per patient it has not worsened, it has been there for a


long time.  The patient denies any radiation, denies any dysuria or frequency.


Denies any vaginal discharge or bleeding.  Per patient she usually gets


Dilaudid and Ativan for the pain.  The patient is being followed by a


gastroenterologist for GI problems and her gastroenterologist is Dr. Davila and


per patient she has an appointment next week


Pt. evaluated in the ED:


VITAL SIGNS:  Blood pressure is 102/59, pulse ox is 98 on room air.  Pulse is


102.  Temperature is 97.5.


 


LABORATORY DATA


WBC is 13.2, hemoglobin 12.7, hematocrit 40.2, platelets 206,000, potassium is


2.3, sodium 139, calcium 8.9.


 


Pt. admitted for:





(1) Hypokalemia


(2) Chronic pain


(3) Nausea & vomiting


(4) GERD (gastroesophageal reflux disease)


(5) Narcotic abuse


(6) History of CVA (cerebrovascular accident)


(7) CAD (coronary artery disease)


(8) History of fundoplication


(9) Gastroparesis


(10) Malnourished


During the course of the hospitalization, the following took place:


Pt. admitted, put on IVF, analgesics, PPI


Put on Bentyl.


GI consulted. Had extensive work up in the past. 


Appreciate GI input, patient was counseled about use of narcotics exacerbating 

gastroparesis. 


Started on EE IV, then was changed to PO. Nausea improved, no more vomiting.


Pt could not take antiemetics, stated she had allergies. Only wanted Valium PRN


Per GI, she was recommended repeat EGD versus GJ placement if no improvement.  

Patient would rather avoid and wanted diet advanced and see how she did at 

home. 


During previous admission, she was counseled to seek opinion from [facility] 

versus [facility]


Patient has had extensive GI workup


Patient has not made any attempts to contact any of the facilities


States that she will try to do so this time





Continued PO Dilaudid, again she was counselled about narc and exacerbating her 

condition. 


She is having a hard time weaning off 


Tolerated diet fairly well, knew what foods triggered symptoms. 


Was put on  DVT prophylaxis with SCDs and GI prophylaxis with PPI.





Pt. improved, tolerated diet. Cleared by GI. 


Instructed to:


F/U at [place] or [facility]. If she continues to have problems with weight loss and 

not eating, may benefit from GJ tube. Reluctant to have it done


Diet-as tolerated


Activity-as tolerated


Pt Condition on Discharge:  Stable


Discharge Disposition:  Discharge Home


Discharge Instructions


DIET: Follow Instructions for:  As Tolerated, No Restrictions


Activities you can perform:  Weight Bearing as Nu


Follow up Referrals:  


Appointment for Follow Up FOLLOW UP AT [facility] OR [facility]


Gastroenterology - 2 Weeks @ [facility]


PCP Follow-up





Continued Medications:  


Buspirone (Buspirone) 7.5 Mg Tab


7.5 MG PO BID Anxiety Ref 0 TAB


Cyanocobalamin Inj (Cyanocobalamin Inj) 1,000 Mcg/Ml Inj


1000 MCG IM Q30D #1 Ref 0 VIAL


Diazepam (Valium) 10 Mg Tab


10 MG PO TID Ref 0 TAB


Hydromorphone (Dilaudid) 4 Mg Tab


4 MG PO Q6H PRN Pain Management Ref 0 TAB


Imipramine HCL (Imipramine HCL) 25 Mg Tab


25 MG PO HS Control Depression Ref 0 TAB


Olanzapine (Zyprexa) 5 Mg Tab


5 MG PO BID #60 Ref 0 TAB


Zolpidem (Ambien) 10 Mg Tab


10 MG PO HS PRN INSOMNIA Ref 0 TAB











Nikia Simental Apr 4, 2017 22:07

## 2017-04-04 NOTE — RADRPT
EXAM DATE/TIME:  04/04/2017 08:30 

 

[facility] COMPARISON:     

ABDOMEN FLAT & UPRIGHT, July 10, 2016, 14:26.

 

                     

INDICATIONS :     

Vomitting

                     

 

MEDICAL HISTORY :            

Cerebrovascular disease. Seizures. Cardiovascular diseaseKidney cancer   

 

SURGICAL HISTORY :        

Appendectomy. Cholecystectomy.Hysterectomy

 

ENCOUNTER:     

Initial                                        

 

ACUITY:     

1 day      

 

PAIN SCORE:     

10/10

 

LOCATION:     

Bilateral  Abdomen

 

FINDINGS:     

The bowel gas pattern appears normal. No free air is identified. No organomegaly is evident. There ar
e numerous surgical clips in the right renal fossa. A staple line is also present left-sided abdomen.


 

CONCLUSION:     

1. 

No evidence of obstruction 

 

 

 Benito Gomez MD on April 04, 2017 at 9:34           

Board Certified Radiologist.

 This report was verified electronically.

## 2017-04-06 ENCOUNTER — HOSPITAL ENCOUNTER (EMERGENCY)
Dept: HOSPITAL 17 - NEPD | Age: 57
Discharge: HOME | End: 2017-04-06
Payer: COMMERCIAL

## 2017-04-06 VITALS
TEMPERATURE: 97.7 F | RESPIRATION RATE: 17 BRPM | SYSTOLIC BLOOD PRESSURE: 132 MMHG | OXYGEN SATURATION: 98 % | DIASTOLIC BLOOD PRESSURE: 94 MMHG | HEART RATE: 94 BPM

## 2017-04-06 VITALS — OXYGEN SATURATION: 98 % | HEART RATE: 86 BPM

## 2017-04-06 VITALS — BODY MASS INDEX: 17.44 KG/M2 | WEIGHT: 94.8 LBS | HEIGHT: 62 IN

## 2017-04-06 DIAGNOSIS — R10.9: Primary | ICD-10-CM

## 2017-04-06 DIAGNOSIS — R11.2: ICD-10-CM

## 2017-04-06 LAB
ALP SERPL-CCNC: 96 U/L (ref 45–117)
ALT SERPL-CCNC: 23 U/L (ref 10–53)
ANION GAP SERPL CALC-SCNC: 9 MEQ/L (ref 5–15)
AST SERPL-CCNC: 13 U/L (ref 15–37)
BASOPHILS # BLD AUTO: 0 TH/MM3 (ref 0–0.2)
BASOPHILS NFR BLD: 0 % (ref 0–2)
BILIRUB SERPL-MCNC: 0.4 MG/DL (ref 0.2–1)
BUN SERPL-MCNC: 13 MG/DL (ref 7–18)
CHLORIDE SERPL-SCNC: 103 MEQ/L (ref 98–107)
EOSINOPHIL # BLD: 0 TH/MM3 (ref 0–0.4)
EOSINOPHIL NFR BLD: 0.3 % (ref 0–4)
ERYTHROCYTE [DISTWIDTH] IN BLOOD BY AUTOMATED COUNT: 18 % (ref 11.6–17.2)
GFR SERPLBLD BASED ON 1.73 SQ M-ARVRAT: 69 ML/MIN (ref 89–?)
HCO3 BLD-SCNC: 24 MEQ/L (ref 21–32)
HCT VFR BLD CALC: 35.4 % (ref 35–46)
HEMO FLAGS: (no result)
LYMPHOCYTES # BLD AUTO: 1 TH/MM3 (ref 1–4.8)
LYMPHOCYTES NFR BLD AUTO: 21.6 % (ref 9–44)
MCH RBC QN AUTO: 26.9 PG (ref 27–34)
MCHC RBC AUTO-ENTMCNC: 32.2 % (ref 32–36)
MCV RBC AUTO: 83.5 FL (ref 80–100)
MONOCYTES NFR BLD: 4 % (ref 0–8)
NEUTROPHILS # BLD AUTO: 3.6 TH/MM3 (ref 1.8–7.7)
NEUTROPHILS NFR BLD AUTO: 74.1 % (ref 16–70)
PLATELET # BLD: 207 TH/MM3 (ref 150–450)
POTASSIUM SERPL-SCNC: 3.6 MEQ/L (ref 3.5–5.1)
RBC # BLD AUTO: 4.24 MIL/MM3 (ref 4–5.3)
SODIUM SERPL-SCNC: 136 MEQ/L (ref 136–145)
WBC # BLD AUTO: 4.9 TH/MM3 (ref 4–11)

## 2017-04-06 PROCEDURE — 93005 ELECTROCARDIOGRAM TRACING: CPT

## 2017-04-06 PROCEDURE — 84484 ASSAY OF TROPONIN QUANT: CPT

## 2017-04-06 PROCEDURE — 83690 ASSAY OF LIPASE: CPT

## 2017-04-06 PROCEDURE — 99284 EMERGENCY DEPT VISIT MOD MDM: CPT

## 2017-04-06 PROCEDURE — 85025 COMPLETE CBC W/AUTO DIFF WBC: CPT

## 2017-04-06 PROCEDURE — 96372 THER/PROPH/DIAG INJ SC/IM: CPT

## 2017-04-06 PROCEDURE — 96376 TX/PRO/DX INJ SAME DRUG ADON: CPT

## 2017-04-06 PROCEDURE — 80053 COMPREHEN METABOLIC PANEL: CPT

## 2017-04-06 PROCEDURE — 96374 THER/PROPH/DIAG INJ IV PUSH: CPT

## 2017-04-06 NOTE — PD
HPI


Chief Complaint:  Chest Pain


Time Seen by Provider:  16:01


Travel History


International Travel<30 days:  No


Contact w/Intl Traveler<30days:  No


Traveled to known affect area:  No





History of Present Illness


HPI


56 years old female complains of abdominal pain and nausea vomiting.  Patient 

has history of recurrent abdominal pain with nausea vomiting.  Patient states 

that she started having increasing abdominal pain with nausea vomiting this 

afternoon.  Patient states that she was vomiting up bile looking vomitus.  

Patient states that she has sharp pain on the abdomen with radiation to left 

sided chest this afternoon.  Patient denies any coughing congestion fever 

chills.  Patient states that she has fluttering of the heart this afternoon.  

Patient denies any dysuria or frequency.  Patient denies any blood in the 

vomitus.  Patient has been to the emergency room multiple times in the past 

with same problem.  Patient also was admitted multiple times in the past with 

same problem.  GI workup has been negative for acute process.





PFSH


Past Medical History


Hx Anticoagulant Therapy:  No


Asthma:  No


Blood Disorders:  No


Anxiety:  Yes


Depression:  No


Heart Rhythm Problems:  No


Cancer:  Yes (kidney)


Cardiovascular Problems:  Yes (MI)


High Cholesterol:  No


Chemotherapy:  Yes (KIDNEY )


Chest Pain:  No


Congestive Heart Failure:  No


COPD:  No


Cerebrovascular Accident:  Yes


Diabetes:  No


Diminished Hearing:  No


Endocrine:  No


Gastrointestinal Disorders:  Yes


GERD:  No


Genitourinary:  Yes (RIGHT NEPHRECTOMY)


Headaches:  Yes


Hiatal Hernia:  Yes


Hypertension:  No


Immune Disorder:  No


Implanted Vascular Access Dvce:  No


Kidney Stones:  No


Musculoskeletal:  No


Neurologic:  No


Psychiatric:  No


Reproductive:  No


Respiratory:  No


Immunizations Current:  Yes


Migraines:  Yes


Myocardial Infarction:  Yes ()


Pneumonia:  Yes


Radiation Therapy:  No


Renal Failure:  No


Seizures:  Yes


Sleep Apnea:  No


Thyroid Disease:  No


Ulcer:  No


Pregnant?:  Not Pregnant


Menopausal:  Yes


:  1


Para:  1


Miscarriage:  0


:  0


Ectopic Pregnancy:  No


Ovarian Cysts:  No


Dilation and Curettage (D&C):  Yes


Tubal Ligation:  Yes





Past Surgical History


Abdominal Surgery:  Yes (total gastrectomy w/pouch , hernia repair)


Appendectomy:  Yes


Cardiac Surgery:  Yes (pt states an occulator was placed in her heart )


Cholecystectomy:  Yes


Gynecologic Surgery:  Yes


Hysterectomy:  Yes (ONLY TUBES TIED, NOT HYSTERECTOMY)


Thoracic Surgery:  No


Tonsillectomy:  Yes


Other Surgery:  Yes (tot gastrectomy)





Social History


Alcohol Use:  No


Tobacco Use:  No


Substance Use:  No





Allergies-Medications


(Allergen,Severity, Reaction):  


Coded Allergies:  


     Compazine (Verified  Allergy, Severe, SOB, 17)


     Gadolinium Derivatives (Verified  Allergy, Severe, RESPIRATORY DISTRESS, 17)


     Lobster (Verified  Allergy, Severe, Anaphylaxis, 17)


     Morphine (Verified  Allergy, Severe, Hives, 17)


 PT HAVING HIVES AND ITCHING TO ARM ABOVE IV SITE AFTER


 ADMINISTRATION OF MORPHINE


     Phenergan (Verified  Allergy, Severe, SOB, 17)


     Reglan (Verified  Allergy, Severe, SOB, 17)


     Toradol (Verified  Allergy, Severe, Shortness of Breath, 17)


     Zofran (Verified  Allergy, Severe, SOB, 17)


     Tigan (Verified  Allergy, Intermediate, sob, 17)


 difficult breathing


     Penicillin (Verified  Allergy, Mild, RASH, 17)


     Sulfa (Verified  Allergy, Mild, RASH, 17)


Uncoded Allergies:  


     GADOLINIUM (Adverse Reaction, Severe, PT STOPPED BREATHING WITH GADO, )


 PT STATES SHE STOPPED BREATHING AFTER HAVING GADO AT ANOTHER


 FACILITY. 17


 VSV


Reported Meds & Prescriptions





Reported Meds & Active Scripts


Active


Diphenhydramine (Diphenhydramine HCl) 25 Mg Cap 25 Mg PO Q6H PRN


Reported


Ambien (Zolpidem Tartrate) 10 Mg Tab 10 Mg PO HS PRN


Dilaudid (Hydromorphone HCl) 4 Mg Tab 4 Mg PO Q6H PRN


Valium (Diazepam) 10 Mg Tab 10 Mg PO TID


Buspirone (Buspirone HCl) 7.5 Mg Tab 7.5 Mg PO BID


Zyprexa (Olanzapine) 5 Mg Tab 5 Mg PO BID


Cyanocobalamin Inj (Cyanocobalamin) 1,000 Mcg/Ml Inj 1,000 Mcg IM Q30D


Imipramine HCL (Imipramine HCl) 25 Mg Tab 25 Mg PO HS








Review of Systems


Gastrointestinal:  Positive: Nausea, Vomiting, Abdominal Pain





Physical Exam


Narrative


GENERAL: Well-nourished, well-developed patient.


SKIN: Focused skin assessment warm/dry.


HEAD: Normocephalic.


EYES: No scleral icterus. No injection or drainage. 


NECK: Supple, trachea midline. No JVD or lymphadenopathy.


CARDIOVASCULAR: Regular rate and rhythm without murmurs, gallops, or rubs. 


RESPIRATORY: Breath sounds equal bilaterally. No accessory muscle use.


GASTROINTESTINAL: Abdomen soft,  nondistended.  Patient has moderate diffuse 

tenderness over the abdomen.  No rebound tenderness.  No mass.


MUSCULOSKELETAL: No cyanosis, or edema. 


BACK: Nontender without obvious deformity. No CVA tenderness. 


Neurologic exam normal.





Data


Data


Last Documented VS





Vital Signs








  Date Time  Temp Pulse Resp B/P Pulse Ox O2 Delivery O2 Flow Rate FiO2


 


17 16:28  86   98 Room Air  


 


17 15:24 97.7  17 132/94    








Orders





 Electrocardiogram (17 )


Complete Blood Count With Diff (17 16:11)


Comprehensive Metabolic Panel (17 16:11)


Troponin I (17 16:11)


Lipase (17 16:11)


Iv Access Insert/Monitor (17 16:11)


Ecg Monitoring (17 16:11)


Oximetry (17 16:11)


Sodium Chlor 0.9% 1000 Ml Inj (Ns 1000 M (17 16:15)


Diphenhydramine Inj (Benadryl Inj) (17 16:45)


Dicyclomine Inj (Bentyl Inj) (17 17:00)





Labs








 Laboratory Tests








Test 17





 16:55


 


White Blood Count 4.9 TH/MM3


 


Red Blood Count 4.24 MIL/MM3


 


Hemoglobin 11.4 GM/DL


 


Hematocrit 35.4 %


 


Mean Corpuscular Volume 83.5 FL


 


Mean Corpuscular Hemoglobin 26.9 PG


 


Mean Corpuscular Hemoglobin 32.2 %





Concent 


 


Red Cell Distribution Width 18.0 %


 


Platelet Count 207 TH/MM3


 


Mean Platelet Volume 9.9 FL


 


Neutrophils (%) (Auto) 74.1 %


 


Lymphocytes (%) (Auto) 21.6 %


 


Monocytes (%) (Auto) 4.0 %


 


Eosinophils (%) (Auto) 0.3 %


 


Basophils (%) (Auto) 0.0 %


 


Neutrophils # (Auto) 3.6 TH/MM3


 


Lymphocytes # (Auto) 1.0 TH/MM3


 


Monocytes # (Auto) 0.2 TH/MM3


 


Eosinophils # (Auto) 0.0 TH/MM3


 


Basophils # (Auto) 0.0 TH/MM3


 


CBC Comment DIFF FINAL 


 


Differential Comment  














MDM


Medical Decision Making


Medical Screen Exam Complete:  Yes


Emergency Medical Condition:  Yes


Medical Record Reviewed:  Yes


Differential Diagnosis


Differential diagnosis including acute exacerbation of chronic abdominal pain, 

dehydration, electrolyte imbalance.


Narrative Course


56 years old female with recurrent abdominal pain and nausea vomiting.  Normal 

saline solution 1 L IV bolus.  Bentyl 20 mg IM.  Benadryl 50 mg IV.








Marito Zamudio MD 2017 16:15

## 2017-04-06 NOTE — PD
Physical Exam


Narrative


Patient was initially evaluated by the previous provider and signed out to me 

at the beginning of my shift at approximately 5:00 PM pending labs and 

disposition.  See his note for further details.





Briefly this is a 56-year-old female with chronic abdominal pain seen in the 

emergency department multiple times for the same.  On exam there is no 

abdominal tenderness.  CBC and CMP are unremarkable.  Cardiac enzymes are 

negative.  Lipase is 171.  Patient is requesting Dilaudid and Ativan for her 

pain.  I believe the patient is displaying drug-seeking behavior.  She has been 

worked up for this abdominal pain several times with a negative workups.  At 

this point she is stable for discharge home with outpatient follow-up with her 

gastroenterologist this week.





Data


Data


Last Documented VS





Vital Signs








  Date Time  Temp Pulse Resp B/P Pulse Ox O2 Delivery O2 Flow Rate FiO2


 


4/6/17 16:28  86   98 Room Air  


 


4/6/17 15:24 97.7  17 132/94    








Orders





 Electrocardiogram (4/6/17 )


Complete Blood Count With Diff (4/6/17 16:11)


Comprehensive Metabolic Panel (4/6/17 16:11)


Troponin I (4/6/17 16:11)


Lipase (4/6/17 16:11)


Iv Access Insert/Monitor (4/6/17 16:11)


Ecg Monitoring (4/6/17 16:11)


Oximetry (4/6/17 16:11)


Sodium Chlor 0.9% 1000 Ml Inj (Ns 1000 M (4/6/17 16:15)


Diphenhydramine Inj (Benadryl Inj) (4/6/17 16:45)


Dicyclomine Inj (Bentyl Inj) (4/6/17 17:00)


Diphenhydramine Inj (Benadryl Inj) (4/6/17 18:00)


Acetaminophen (Tylenol) (4/6/17 18:00)





Labs





 Laboratory Tests








Test 4/6/17 4/6/17





 16:25 16:55


 


Sodium Level 136 MEQ/L 


 


Potassium Level 3.6 MEQ/L 


 


Chloride Level 103 MEQ/L 


 


Carbon Dioxide Level 24.0 MEQ/L 


 


Anion Gap 9 MEQ/L 


 


Blood Urea Nitrogen 13 MG/DL 


 


Creatinine 0.85 MG/DL 


 


Estimat Glomerular Filtration 69 ML/MIN 





Rate  


 


Random Glucose 106 MG/DL 


 


Calcium Level 9.6 MG/DL 


 


Total Bilirubin 0.4 MG/DL 


 


Aspartate Amino Transf 13 U/L 





(AST/SGOT)  


 


Alanine Aminotransferase 23 U/L 





(ALT/SGPT)  


 


Alkaline Phosphatase 96 U/L 


 


Troponin I LESS THAN 0.02 





 NG/ML 


 


Total Protein 7.7 GM/DL 


 


Albumin 4.4 GM/DL 


 


Lipase 171 U/L 


 


White Blood Count  4.9 TH/MM3


 


Red Blood Count  4.24 MIL/MM3


 


Hemoglobin  11.4 GM/DL


 


Hematocrit  35.4 %


 


Mean Corpuscular Volume  83.5 FL


 


Mean Corpuscular Hemoglobin  26.9 PG


 


Mean Corpuscular Hemoglobin  32.2 %





Concent  


 


Red Cell Distribution Width  18.0 %


 


Platelet Count  207 TH/MM3


 


Mean Platelet Volume  9.9 FL


 


Neutrophils (%) (Auto)  74.1 %


 


Lymphocytes (%) (Auto)  21.6 %


 


Monocytes (%) (Auto)  4.0 %


 


Eosinophils (%) (Auto)  0.3 %


 


Basophils (%) (Auto)  0.0 %


 


Neutrophils # (Auto)  3.6 TH/MM3


 


Lymphocytes # (Auto)  1.0 TH/MM3


 


Monocytes # (Auto)  0.2 TH/MM3


 


Eosinophils # (Auto)  0.0 TH/MM3


 


Basophils # (Auto)  0.0 TH/MM3


 


CBC Comment  DIFF FINAL 


 


Differential Comment   











MDM


Supervised Visit with DELLA:  No


Diagnosis





 Primary Impression:  


 Chronic abdominal pain


 Additional Impression:  


 Nausea and vomiting


 Qualified Code:  R11.2 - Non-intractable vomiting with nausea, unspecified 

vomiting type


Referrals:  


Gastroenterologist


3 days





Primary Care Physician


3 days





***Additional Instruction:


Follow-up with your primary care physician this week.


Follow-up with your gastroenterologist this week.


Return to the emergency department for worsening symptoms or any other concerns.


Disposition:  01 DISCHARGE HOME


Condition:  Stable








Prieto Sosa MD Apr 6, 2017 17:52

## 2017-04-07 NOTE — EKG
Date Performed: 04/06/2017       Time Performed: 15:42:05

 

PTAGE:      56 years

 

EKG:      Sinus rhythm 

 

 LEFT BUNDLE BRANCH BLOCK When compared to previous tracing, left bundle branch block is Now present.
 ABNORMAL ECG

 

PREVIOUS TRACING       : 03/08/2017 16.55

 

DOCTOR:   Tacos Mott  Interpretating Date/Time  04/07/2017 16:58:31

## 2017-04-08 ENCOUNTER — HOSPITAL ENCOUNTER (EMERGENCY)
Dept: HOSPITAL 17 - NEPE | Age: 57
LOS: 1 days | Discharge: HOME | End: 2017-04-09
Payer: COMMERCIAL

## 2017-04-08 VITALS
OXYGEN SATURATION: 97 % | SYSTOLIC BLOOD PRESSURE: 175 MMHG | HEART RATE: 108 BPM | RESPIRATION RATE: 20 BRPM | DIASTOLIC BLOOD PRESSURE: 97 MMHG | TEMPERATURE: 99 F

## 2017-04-08 VITALS
RESPIRATION RATE: 18 BRPM | SYSTOLIC BLOOD PRESSURE: 139 MMHG | HEART RATE: 85 BPM | DIASTOLIC BLOOD PRESSURE: 98 MMHG | OXYGEN SATURATION: 98 %

## 2017-04-08 DIAGNOSIS — G89.29: Primary | ICD-10-CM

## 2017-04-08 DIAGNOSIS — R10.9: ICD-10-CM

## 2017-04-08 DIAGNOSIS — R07.9: ICD-10-CM

## 2017-04-08 DIAGNOSIS — I44.7: ICD-10-CM

## 2017-04-08 DIAGNOSIS — I25.2: ICD-10-CM

## 2017-04-08 LAB
ANION GAP SERPL CALC-SCNC: 8 MEQ/L (ref 5–15)
APTT BLD: 24.8 SEC (ref 24.3–30.1)
BASOPHILS # BLD AUTO: 0 TH/MM3 (ref 0–0.2)
BASOPHILS NFR BLD: 0 % (ref 0–2)
BUN SERPL-MCNC: 19 MG/DL (ref 7–18)
CHLORIDE SERPL-SCNC: 101 MEQ/L (ref 98–107)
CK SERPL-CCNC: 65 U/L (ref 26–192)
EOSINOPHIL # BLD: 0 TH/MM3 (ref 0–0.4)
EOSINOPHIL NFR BLD: 0.9 % (ref 0–4)
ERYTHROCYTE [DISTWIDTH] IN BLOOD BY AUTOMATED COUNT: 17.9 % (ref 11.6–17.2)
GFR SERPLBLD BASED ON 1.73 SQ M-ARVRAT: 62 ML/MIN (ref 89–?)
HCO3 BLD-SCNC: 29 MEQ/L (ref 21–32)
HCT VFR BLD CALC: 39 % (ref 35–46)
HEMO FLAGS: (no result)
INR PPP: 1 RATIO
LYMPHOCYTES # BLD AUTO: 1.6 TH/MM3 (ref 1–4.8)
LYMPHOCYTES NFR BLD AUTO: 41.8 % (ref 9–44)
MAGNESIUM SERPL-MCNC: 2.4 MG/DL (ref 1.5–2.5)
MCH RBC QN AUTO: 27.1 PG (ref 27–34)
MCHC RBC AUTO-ENTMCNC: 32.3 % (ref 32–36)
MCV RBC AUTO: 83.9 FL (ref 80–100)
MONOCYTES NFR BLD: 9.4 % (ref 0–8)
NEUTROPHILS # BLD AUTO: 1.8 TH/MM3 (ref 1.8–7.7)
NEUTROPHILS NFR BLD AUTO: 47.9 % (ref 16–70)
PLATELET # BLD: 179 TH/MM3 (ref 150–450)
POTASSIUM SERPL-SCNC: 3.7 MEQ/L (ref 3.5–5.1)
PROTHROMBIN TIME: 11 SEC (ref 9.8–11.6)
RBC # BLD AUTO: 4.65 MIL/MM3 (ref 4–5.3)
SODIUM SERPL-SCNC: 138 MEQ/L (ref 136–145)
WBC # BLD AUTO: 3.8 TH/MM3 (ref 4–11)

## 2017-04-08 PROCEDURE — 83735 ASSAY OF MAGNESIUM: CPT

## 2017-04-08 PROCEDURE — 71010: CPT

## 2017-04-08 PROCEDURE — 82550 ASSAY OF CK (CPK): CPT

## 2017-04-08 PROCEDURE — 96361 HYDRATE IV INFUSION ADD-ON: CPT

## 2017-04-08 PROCEDURE — 96374 THER/PROPH/DIAG INJ IV PUSH: CPT

## 2017-04-08 PROCEDURE — 96372 THER/PROPH/DIAG INJ SC/IM: CPT

## 2017-04-08 PROCEDURE — 99285 EMERGENCY DEPT VISIT HI MDM: CPT

## 2017-04-08 PROCEDURE — 80048 BASIC METABOLIC PNL TOTAL CA: CPT

## 2017-04-08 PROCEDURE — 85025 COMPLETE CBC W/AUTO DIFF WBC: CPT

## 2017-04-08 PROCEDURE — 84484 ASSAY OF TROPONIN QUANT: CPT

## 2017-04-08 PROCEDURE — 85610 PROTHROMBIN TIME: CPT

## 2017-04-08 PROCEDURE — 83690 ASSAY OF LIPASE: CPT

## 2017-04-08 PROCEDURE — 85730 THROMBOPLASTIN TIME PARTIAL: CPT

## 2017-04-08 PROCEDURE — 93005 ELECTROCARDIOGRAM TRACING: CPT

## 2017-04-08 RX ADMIN — DICYCLOMINE HYDROCHLORIDE ONE MG: 20 INJECTION, SOLUTION INTRAMUSCULAR at 20:57

## 2017-04-08 RX ADMIN — DICYCLOMINE HYDROCHLORIDE ONE MG: 20 INJECTION, SOLUTION INTRAMUSCULAR at 20:00

## 2017-04-08 NOTE — PD
Data


Data


Last Documented VS





Vital Signs








  Date Time  Temp Pulse Resp B/P Pulse Ox O2 Delivery O2 Flow Rate FiO2


 


4/8/17 20:35  85 18 139/98 98 Room Air  


 


4/8/17 17:53 99.0       








Orders





 Electrocardiogram (4/8/17 )


Basic Metabolic Panel (Bmp) (4/8/17 18:49)


Ckmb (Isoenzyme) Profile (4/8/17 18:49)


Complete Blood Count With Diff (4/8/17 18:49)


Magnesium (Mg) (4/8/17 18:49)


Prothrombin Time / Inr (Pt) (4/8/17 18:49)


Act Partial Throm Time (Ptt) (4/8/17 18:49)


Troponin I (4/8/17 18:49)


Lipase (4/8/17 18:49)


Chest, Single Ap (4/8/17 18:49)


Ecg Monitoring (4/8/17 18:49)


Bilateral Bp Monitoring (4/8/17 18:49)


Iv Access Insert/Monitor (4/8/17 18:49)


Oximetry (4/8/17 18:49)


Oxygen Administration (4/8/17 18:49)


Aspirin Chew (Aspirin Chew) (4/8/17 19:00)


Sodium Chloride 0.9% Flush (Ns Flush) (4/8/17 19:00)


Sodium Chlor 0.9% 250 Ml Inj (Ns 250 Ml (4/8/17 19:00)


Meclizine (Antivert) (4/8/17 20:00)


Dicyclomine (Bentyl) (4/8/17 20:00)


Dicyclomine Inj (Bentyl Inj) (4/8/17 20:00)


Troponin I (4/8/17 20:00)


Haloperidol Inj (Haldol Inj) (4/8/17 21:00)





Labs





 Laboratory Tests








Test 4/8/17 4/8/17





 19:05 22:00


 


White Blood Count 3.8 TH/MM3 


 


Red Blood Count 4.65 MIL/MM3 


 


Hemoglobin 12.6 GM/DL 


 


Hematocrit 39.0 % 


 


Mean Corpuscular Volume 83.9 FL 


 


Mean Corpuscular Hemoglobin 27.1 PG 


 


Mean Corpuscular Hemoglobin 32.3 % 





Concent  


 


Red Cell Distribution Width 17.9 % 


 


Platelet Count 179 TH/MM3 


 


Mean Platelet Volume 9.5 FL 


 


Neutrophils (%) (Auto) 47.9 % 


 


Lymphocytes (%) (Auto) 41.8 % 


 


Monocytes (%) (Auto) 9.4 % 


 


Eosinophils (%) (Auto) 0.9 % 


 


Basophils (%) (Auto) 0.0 % 


 


Neutrophils # (Auto) 1.8 TH/MM3 


 


Lymphocytes # (Auto) 1.6 TH/MM3 


 


Monocytes # (Auto) 0.4 TH/MM3 


 


Eosinophils # (Auto) 0.0 TH/MM3 


 


Basophils # (Auto) 0.0 TH/MM3 


 


CBC Comment DIFF FINAL  


 


Differential Comment   


 


Prothrombin Time 11.0 SEC 


 


Prothromb Time International 1.0 RATIO 





Ratio  


 


Activated Partial 24.8 SEC 





Thromboplast Time  


 


Sodium Level 138 MEQ/L 


 


Potassium Level 3.7 MEQ/L 


 


Chloride Level 101 MEQ/L 


 


Carbon Dioxide Level 29.0 MEQ/L 


 


Anion Gap 8 MEQ/L 


 


Blood Urea Nitrogen 19 MG/DL 


 


Creatinine 0.93 MG/DL 


 


Estimat Glomerular Filtration 62 ML/MIN 





Rate  


 


Random Glucose 101 MG/DL 


 


Calcium Level 9.4 MG/DL 


 


Magnesium Level 2.4 MG/DL 


 


Total Creatine Kinase 65 U/L 


 


Troponin I LESS THAN 0.02 LESS THAN 0.02





 NG/ML NG/ML


 


Lipase 233 U/L 











MDM


Supervised Visit with DELLA:  Yes


Narrative Course


The history, exam, and medical decision-making in the associated midlevel 

provider note were completed with my assistance. I reviewed and agree with the 

findings presented.  I attest that I had a face-to-face encounter with the 

patient on the same day, and personally performed and documented my assessment 

and findings in the medical record. 





*My assessment and Findings: This is a 56-year-old female with a history of 

chronic abdominal pain and opiate Dependence well known to our emergency 

department who presents with increasing chronic abdominal pain and left-sided 

chest pain.  The patient has reassuring labs with 2 normal troponins.  I 

suspect this is an exacerbation of her chronic pain.  I don't think any imaging 

is warranted as her labs are reassuring.  Patient will be discharged home.


Diagnosis





 Primary Impression:  


 Chronic pain


 Qualified Code:  G89.29 - Other chronic pain


Patient Instructions:  General Instructions, Chronic Pain (ED)





***Additional Instruction:


Follow-up with your primary care physician and pain management doctor in 2-3 

days for reevaluation.  Return to the emergency department if symptoms get 

worse.


Disposition:  01 DISCHARGE HOME


Condition:  Stable








Lorena Villanueva MD Apr 8, 2017 23:54

## 2017-04-08 NOTE — PD
HPI


Chief Complaint:  Chest Pain


Time Seen by Provider:  18:51


Travel History


International Travel<30 days:  No


Contact w/Intl Traveler<30days:  No


Traveled to known affect area:  No





History of Present Illness


HPI


Patient comes in the emergency department complaining of left-sided chest pain 

that began about a hour and a half prior to arrival.  Patient states feels 

similar to chest pain she was having a few days ago when she seen emergency 

Department for the same only if is now worse.  Patient also complaining of 

chronic abdominal pain.  Patient states she's tried to use her medication 

prescribed by pain management for fentanyl patch and oral Dilaudid but is 

having difficulty keeping the Dilaudid down.  Patient reports symptoms have 

been going on for approximately 6 days.





PFSH


Past Medical History


Hx Anticoagulant Therapy:  No


Asthma:  No


Blood Disorders:  No


Anxiety:  Yes


Depression:  No


Heart Rhythm Problems:  No


Cancer:  Yes (kidney)


Cardiovascular Problems:  Yes (MI)


High Cholesterol:  No


Chemotherapy:  Yes (KIDNEY )


Chest Pain:  No


Congestive Heart Failure:  No


COPD:  No


Cerebrovascular Accident:  Yes


Diabetes:  No


Diminished Hearing:  No


Endocrine:  No


Gastrointestinal Disorders:  Yes


GERD:  No


Genitourinary:  Yes (RIGHT NEPHRECTOMY)


Headaches:  Yes


Hiatal Hernia:  Yes


Hypertension:  No


Immune Disorder:  No


Implanted Vascular Access Dvce:  No


Kidney Stones:  No


Musculoskeletal:  No


Neurologic:  No


Psychiatric:  No


Reproductive:  No


Respiratory:  No


Immunizations Current:  Yes


Migraines:  Yes


Myocardial Infarction:  Yes (2006)


Pneumonia:  Yes


Radiation Therapy:  No


Renal Failure:  No


Seizures:  Yes


Sleep Apnea:  No


Thyroid Disease:  No


Ulcer:  No


Tetanus Vaccination:  < 5 Years


Influenza Vaccination:  Yes


Pregnant?:  Not Pregnant


Menopausal:  Yes


:  1


Para:  1


Miscarriage:  0


:  0


Ectopic Pregnancy:  No


Ovarian Cysts:  No


Dilation and Curettage (D&C):  Yes


Tubal Ligation:  Yes





Past Surgical History


Abdominal Surgery:  Yes (total gastrectomy w/pouch , hernia repair)


Appendectomy:  Yes


Cardiac Surgery:  Yes (pt states an occulator was placed in her heart )


Cholecystectomy:  Yes


Gynecologic Surgery:  Yes


Hysterectomy:  Yes (ONLY TUBES TIED, NOT HYSTERECTOMY)


Thoracic Surgery:  No


Tonsillectomy:  Yes


Other Surgery:  Yes (tot gastrectomy)





Social History


Alcohol Use:  No


Tobacco Use:  No


Substance Use:  No





Allergies-Medications


(Allergen,Severity, Reaction):  


Coded Allergies:  


     Compazine (Verified  Allergy, Severe, SOB, 17)


     Gadolinium Derivatives (Verified  Allergy, Severe, RESPIRATORY DISTRESS, 17)


     Lobster (Verified  Allergy, Severe, Anaphylaxis, 17)


     Morphine (Verified  Allergy, Severe, Hives, 17)


 PT HAVING HIVES AND ITCHING TO ARM ABOVE IV SITE AFTER


 ADMINISTRATION OF MORPHINE


     Phenergan (Verified  Allergy, Severe, SOB, 17)


     Reglan (Verified  Allergy, Severe, SOB, 17)


     Toradol (Verified  Allergy, Severe, Shortness of Breath, 17)


     Zofran (Verified  Allergy, Severe, SOB, 17)


     Tigan (Verified  Allergy, Intermediate, sob, 17)


 difficult breathing


     Penicillin (Verified  Allergy, Mild, RASH, 17)


     Sulfa (Verified  Allergy, Mild, RASH, 17)


Uncoded Allergies:  


     GADOLINIUM (Adverse Reaction, Severe, PT STOPPED BREATHING WITH GADO, )


 PT STATES SHE STOPPED BREATHING AFTER HAVING GADO AT ANOTHER


 FACILITY. 17


 [facility]


Reported Meds & Prescriptions





Reported Meds & Active Scripts


Active


Diphenhydramine (Diphenhydramine HCl) 25 Mg Cap 25 Mg PO Q6H PRN


Reported


Ambien (Zolpidem Tartrate) 10 Mg Tab 10 Mg PO HS PRN


Dilaudid (Hydromorphone HCl) 4 Mg Tab 4 Mg PO Q6H PRN


Valium (Diazepam) 10 Mg Tab 10 Mg PO TID


Buspirone (Buspirone HCl) 7.5 Mg Tab 7.5 Mg PO BID


Cyanocobalamin Inj (Cyanocobalamin) 1,000 Mcg/Ml Inj 1,000 Mcg IM Q30D


Imipramine HCL (Imipramine HCl) 25 Mg Tab 25 Mg PO HS








Review of Systems


Except as stated in HPI:  all other systems reviewed are Neg





Physical Exam


Narrative


GENERAL: Well-developed, well nourished, in no acute distress, and non-ill 

appearing.


SKIN: Focused skin assessment warm and dry.


HEAD: Atraumatic. Normocephalic. 


EYES: Pupils equal and round. EOMI. No scleral icterus. No injection or 

drainage. 


ENT: No nasal bleeding or discharge.  Mucous membranes pink and moist.


NECK: Trachea midline. No JVD. Supple.  No nuclear rigidity.


CARDIOVASCULAR: Regular rate and rhythm.  No murmur appreciated.


RESPIRATORY: No accessory muscle use.  No respiratory distress. Clear to 

auscultation. Breath sounds equal bilaterally. 


GASTROINTESTINAL: Abdomen soft, nondistended. Hepatic and splenic margins not 

palpable.  Patient reports tenderness throughout her abdomen to the slightest 

touch.  No pulsatile mass.


MUSCULOSKELETAL: No obvious deformities. No clubbing.  No cyanosis.  No edema.  

Full range of motion.


NEUROLOGICAL: Awake and alert. No obvious cranial nerve deficits.  Motor 

grossly within normal limits. Normal speech.


PSYCHIATRIC: Appropriate mood and affect; insight and judgment normal.





Data


Data


Last Documented VS





Vital Signs








  Date Time  Temp Pulse Resp B/P Pulse Ox O2 Delivery O2 Flow Rate FiO2


 


17 20:35  85 18 139/98 98 Room Air  


 


17 17:53 99.0       








Orders





 Electrocardiogram (17 )


Basic Metabolic Panel (Bmp) (17 18:49)


Ckmb (Isoenzyme) Profile (17 18:49)


Complete Blood Count With Diff (17 18:49)


Magnesium (Mg) (17 18:49)


Prothrombin Time / Inr (Pt) (17 18:49)


Act Partial Throm Time (Ptt) (17 18:49)


Troponin I (17 18:49)


Lipase (17 18:49)


Chest, Single Ap (17 18:49)


Ecg Monitoring (17 18:49)


Bilateral Bp Monitoring (17 18:49)


Iv Access Insert/Monitor (17 18:49)


Oximetry (17 18:49)


Oxygen Administration (17 18:49)


Aspirin Chew (Aspirin Chew) (17 19:00)


Sodium Chloride 0.9% Flush (Ns Flush) (17 19:00)


Sodium Chlor 0.9% 250 Ml Inj (Ns 250 Ml (17 19:00)


Meclizine (Antivert) (17 20:00)


Dicyclomine (Bentyl) (17 20:00)


Dicyclomine Inj (Bentyl Inj) (17 20:00)


Troponin I (17 20:00)


Haloperidol Inj (Haldol Inj) (17 21:00)





Labs





 Laboratory Tests








Test 17





 19:05 22:00


 


White Blood Count 3.8 TH/MM3 


 


Red Blood Count 4.65 MIL/MM3 


 


Hemoglobin 12.6 GM/DL 


 


Hematocrit 39.0 % 


 


Mean Corpuscular Volume 83.9 FL 


 


Mean Corpuscular Hemoglobin 27.1 PG 


 


Mean Corpuscular Hemoglobin 32.3 % 





Concent  


 


Red Cell Distribution Width 17.9 % 


 


Platelet Count 179 TH/MM3 


 


Mean Platelet Volume 9.5 FL 


 


Neutrophils (%) (Auto) 47.9 % 


 


Lymphocytes (%) (Auto) 41.8 % 


 


Monocytes (%) (Auto) 9.4 % 


 


Eosinophils (%) (Auto) 0.9 % 


 


Basophils (%) (Auto) 0.0 % 


 


Neutrophils # (Auto) 1.8 TH/MM3 


 


Lymphocytes # (Auto) 1.6 TH/MM3 


 


Monocytes # (Auto) 0.4 TH/MM3 


 


Eosinophils # (Auto) 0.0 TH/MM3 


 


Basophils # (Auto) 0.0 TH/MM3 


 


CBC Comment DIFF FINAL  


 


Differential Comment   


 


Prothrombin Time 11.0 SEC 


 


Prothromb Time International 1.0 RATIO 





Ratio  


 


Activated Partial 24.8 SEC 





Thromboplast Time  


 


Sodium Level 138 MEQ/L 


 


Potassium Level 3.7 MEQ/L 


 


Chloride Level 101 MEQ/L 


 


Carbon Dioxide Level 29.0 MEQ/L 


 


Anion Gap 8 MEQ/L 


 


Blood Urea Nitrogen 19 MG/DL 


 


Creatinine 0.93 MG/DL 


 


Estimat Glomerular Filtration 62 ML/MIN 





Rate  


 


Random Glucose 101 MG/DL 


 


Calcium Level 9.4 MG/DL 


 


Magnesium Level 2.4 MG/DL 


 


Total Creatine Kinase 65 U/L 


 


Troponin I LESS THAN 0.02 LESS THAN 0.02





 NG/ML NG/ML


 


Lipase 233 U/L 











[facility]


Medical Decision Making


Medical Screen Exam Complete:  Yes


Emergency Medical Condition:  Yes


Interpretation(s)


EKG reviewed by Dr. Molina shows normal sinus rhythm with a rate of 92.  No 

STEMI.


Differential Diagnosis


Acute coronary syndrome, dehydration, chronic pain, drug-seeking, malingering, 

other


Narrative Course


 patient reassessed in acute distress though complaining of nausea.  

Patient has not vomited since being emergency department.  Discussed all 

findings and plan care of patient.  Splint patient will repeat cardiac enzymes 

in the meantime give her meclizine for her nausea and Bentyl for pain.  Patient 

is agreeable to this.





 RN informs me that the patient is refusing Bentyl and requesting Dilaudid.





The patients chest pain by history and evaluation appears noncardiac, nor 

noncardiopulmonary in etiology. Evaluation revealed no evidence of cardiac 

involvement at this time. There is no clinical evidence to suggest thoracic 

aortic aneurysms or pathology, nor evidence to suggest pulmonary embolism, 

pericarditis, pneumothorax, nor pneumonia at this time. The patient has minimal 

risk factors for cardiac disease, pulmonary embolism or aortic disease. 

Clinical suspicion was discussed with patient and the patient was instructed to 

follow up with primary care physician for possible Cardiology referral and for 

potential outpatient evaluation.





Patient in no obvious distress upon re-evaluation. All pertinent laboratory/

Radiology result(s) discussed with patient.  Discussed patient with Dr. Villanueva, 

who saw and evaluated the patient and agree with plan of care and disposition.  

Any questions/concerns in reference to patient diagnosis/condition discussed 

and clarified prior to patient's discharge. Reinforced sheer importance of 

close follow up with patient's primary physician or primary care clinic. 

Instructed patient to return to ED immediately, if symptoms return/worsen. Pt 

showed understanding of above instructions.  Further instructions and 

recommendations were detailed in discharge paperwork.  Pt ambulated without 

difficulty out of ED at discharge.





Diagnosis





 Primary Impression:  


 Chronic pain


 Qualified Code:  G89.29 - Other chronic pain


Patient Instructions:  Chronic Pain (ED), General Instructions





***Additional Instructions:


Follow-up with your primary care physician and pain management doctor in 2-3 

days for reevaluation.  Return to the emergency department if symptoms get 

worse.


Disposition:  01 DISCHARGE HOME


Condition:  Stable








Timothy Osullivan 2017 18:54

## 2017-04-08 NOTE — RADRPT
EXAM DATE/TIME:  04/08/2017 19:19 

 

[facility] COMPARISON:     

CHEST SINGLE AP, February 09, 2017, 13:20.

 

                     

INDICATIONS :     

Chest and abdomen pain for the past six days.

                     

 

MEDICAL HISTORY :            

Cerebrovascular disease. Seizures. Cardiovascular diseaseKidney cancer   

 

SURGICAL HISTORY :        

Appendectomy. Cholecystectomy.Hysterectomy

 

ENCOUNTER:     

Initial                                        

 

ACUITY:     

4 - 6 days      

 

PAIN SCORE:     

10/10

 

LOCATION:     

Bilateral chest 

 

FINDINGS:     

A single view of the chest demonstrates the lungs to be symmetrically aerated without evidence of mas
s, infiltrate or effusion.  There is hyperaeration bilaterally. The cardiomediastinal contours are un
remarkable.  Osseous structures are intact.

 

CONCLUSION:     

No acute disease.  No significant change has occurred.  

 

 

 

 Juan Carlos Campo MD on April 08, 2017 at 19:45           

Board Certified Radiologist.

 This report was verified electronically.

## 2017-04-09 NOTE — EKG
Date Performed: 04/08/2017       Time Performed: 18:46:43

 

PTAGE:      56 years

 

EKG:      Sinus rhythm 

 

 POSSIBLE LEFT ATRIAL ENLARGEMENT LEFT BUNDLE BRANCH BLOCK ABNORMAL ECG INTERPRETATION BASED ON A DEF
ELIZ AGE OF 40 YEARS Compared to prior tracing no significant change 

 

 PREVIOUS TRACING            : 04/06/2017 15.42

 

DOCTOR:   Srikanth Dasilva  Interpretating Date/Time  04/09/2017 10:31:04

## 2017-04-30 ENCOUNTER — HOSPITAL ENCOUNTER (EMERGENCY)
Dept: HOSPITAL 17 - NEPD | Age: 57
Discharge: HOME | End: 2017-04-30
Payer: COMMERCIAL

## 2017-04-30 VITALS
HEART RATE: 65 BPM | OXYGEN SATURATION: 97 % | SYSTOLIC BLOOD PRESSURE: 140 MMHG | RESPIRATION RATE: 15 BRPM | DIASTOLIC BLOOD PRESSURE: 80 MMHG

## 2017-04-30 VITALS
SYSTOLIC BLOOD PRESSURE: 179 MMHG | HEART RATE: 66 BPM | RESPIRATION RATE: 15 BRPM | DIASTOLIC BLOOD PRESSURE: 96 MMHG | TEMPERATURE: 98.2 F | OXYGEN SATURATION: 98 %

## 2017-04-30 VITALS
OXYGEN SATURATION: 98 % | SYSTOLIC BLOOD PRESSURE: 132 MMHG | HEART RATE: 65 BPM | DIASTOLIC BLOOD PRESSURE: 87 MMHG | RESPIRATION RATE: 16 BRPM

## 2017-04-30 VITALS — WEIGHT: 109.13 LBS | HEIGHT: 62 IN | BODY MASS INDEX: 20.08 KG/M2

## 2017-04-30 DIAGNOSIS — G89.29: ICD-10-CM

## 2017-04-30 DIAGNOSIS — R10.84: Primary | ICD-10-CM

## 2017-04-30 DIAGNOSIS — J69.0: ICD-10-CM

## 2017-04-30 LAB
ALP SERPL-CCNC: 95 U/L (ref 45–117)
ALT SERPL-CCNC: 24 U/L (ref 10–53)
ANION GAP SERPL CALC-SCNC: 8 MEQ/L (ref 5–15)
AST SERPL-CCNC: 29 U/L (ref 15–37)
BASOPHILS # BLD AUTO: 0 TH/MM3 (ref 0–0.2)
BASOPHILS NFR BLD: 0 % (ref 0–2)
BILIRUB SERPL-MCNC: 0.2 MG/DL (ref 0.2–1)
BUN SERPL-MCNC: 9 MG/DL (ref 7–18)
CHLORIDE SERPL-SCNC: 106 MEQ/L (ref 98–107)
COLOR UR: (no result)
COMMENT (UR): (no result)
CULTURE IF INDICATED: (no result)
EOSINOPHIL # BLD: 0 TH/MM3 (ref 0–0.4)
EOSINOPHIL NFR BLD: 0.9 % (ref 0–4)
ERYTHROCYTE [DISTWIDTH] IN BLOOD BY AUTOMATED COUNT: 19.3 % (ref 11.6–17.2)
GFR SERPLBLD BASED ON 1.73 SQ M-ARVRAT: 74 ML/MIN (ref 89–?)
GLUCOSE UR STRIP-MCNC: (no result) MG/DL
HCO3 BLD-SCNC: 24.7 MEQ/L (ref 21–32)
HCT VFR BLD CALC: 35.8 % (ref 35–46)
HEMO FLAGS: (no result)
HGB UR QL STRIP: (no result)
KETONES UR STRIP-MCNC: (no result) MG/DL
LYMPHOCYTES # BLD AUTO: 0.8 TH/MM3 (ref 1–4.8)
LYMPHOCYTES NFR BLD AUTO: 23.7 % (ref 9–44)
MCH RBC QN AUTO: 27.5 PG (ref 27–34)
MCHC RBC AUTO-ENTMCNC: 32.4 % (ref 32–36)
MCV RBC AUTO: 85 FL (ref 80–100)
MONOCYTES NFR BLD: 7.5 % (ref 0–8)
MUCOUS THREADS #/AREA URNS LPF: (no result) /LPF
NEUTROPHILS # BLD AUTO: 2.2 TH/MM3 (ref 1.8–7.7)
NEUTROPHILS NFR BLD AUTO: 67.9 % (ref 16–70)
NITRITE UR QL STRIP: (no result)
PLATELET # BLD: 192 TH/MM3 (ref 150–450)
POTASSIUM SERPL-SCNC: 3.5 MEQ/L (ref 3.5–5.1)
RBC # BLD AUTO: 4.21 MIL/MM3 (ref 4–5.3)
SODIUM SERPL-SCNC: 139 MEQ/L (ref 136–145)
SP GR UR STRIP: 1.01 (ref 1–1.03)
SQUAMOUS #/AREA URNS HPF: 1 /HPF (ref 0–5)
WBC # BLD AUTO: 3.3 TH/MM3 (ref 4–11)

## 2017-04-30 PROCEDURE — 96361 HYDRATE IV INFUSION ADD-ON: CPT

## 2017-04-30 PROCEDURE — 96374 THER/PROPH/DIAG INJ IV PUSH: CPT

## 2017-04-30 PROCEDURE — 85025 COMPLETE CBC W/AUTO DIFF WBC: CPT

## 2017-04-30 PROCEDURE — 83690 ASSAY OF LIPASE: CPT

## 2017-04-30 PROCEDURE — 81001 URINALYSIS AUTO W/SCOPE: CPT

## 2017-04-30 PROCEDURE — 80053 COMPREHEN METABOLIC PANEL: CPT

## 2017-04-30 PROCEDURE — 96372 THER/PROPH/DIAG INJ SC/IM: CPT

## 2017-04-30 PROCEDURE — 99284 EMERGENCY DEPT VISIT MOD MDM: CPT

## 2017-04-30 NOTE — PD
HPI


Chief Complaint:  Abdominal Pain


Time Seen by Provider:  09:48


Travel History


International Travel<30 days:  No


Contact w/Intl Traveler<30days:  No


Traveled to known affect area:  No





History of Present Illness


HPI


Patient 56-year-old female presents emergency department for evaluation of 

acute on chronic abdominal pain in the epigastric region.  Patient states she's 

had a history of gastrectomy for recurrent aspiration pneumonia.  Patient 

states there is more severe pain this morning sharp in nature radiating to her 

back.  She is also been having some dry heaves.  She took a Dilaudid prior to 

arrival without relief.  She's also states that she thinks is dehydrated.  

Multiple prior admissions to this emergency department for similar.





PFSH


Past Medical History


Hx Anticoagulant Therapy:  No


Asthma:  No


Blood Disorders:  No


Anxiety:  Yes


Depression:  No


Heart Rhythm Problems:  No


Cancer:  Yes (kidney)


Cardiovascular Problems:  Yes (MI)


High Cholesterol:  No


Chemotherapy:  Yes (KIDNEY )


Chest Pain:  No


Congestive Heart Failure:  No


COPD:  No


Cerebrovascular Accident:  Yes


Diabetes:  No


Diminished Hearing:  No


Endocrine:  No


Gastrointestinal Disorders:  Yes


GERD:  No


Genitourinary:  Yes (RIGHT NEPHRECTOMY)


Headaches:  Yes


Hiatal Hernia:  Yes


Hypertension:  No


Immune Disorder:  No


Implanted Vascular Access Dvce:  No


Kidney Stones:  No


Musculoskeletal:  No


Neurologic:  No


Psychiatric:  No


Reproductive:  No


Respiratory:  No


Immunizations Current:  Yes


Migraines:  Yes


Myocardial Infarction:  Yes (2006)


Pneumonia:  Yes


Radiation Therapy:  No


Renal Failure:  No


Seizures:  Yes


Sleep Apnea:  No


Thyroid Disease:  No


Ulcer:  No


Tetanus Vaccination:  < 5 Years


Influenza Vaccination:  Yes


Pregnant?:  Not Pregnant


Menopausal:  Yes


:  1


Para:  1


Miscarriage:  0


:  0


Ectopic Pregnancy:  No


Ovarian Cysts:  No


Dilation and Curettage (D&C):  Yes


Tubal Ligation:  Yes





Past Surgical History


Abdominal Surgery:  Yes (total gastrectomy w/pouch , hernia repair)


Appendectomy:  Yes


Cardiac Surgery:  Yes (pt states an occulator was placed in her heart )


Cholecystectomy:  Yes


Gynecologic Surgery:  Yes


Hysterectomy:  Yes (ONLY TUBES TIED, NOT HYSTERECTOMY)


Thoracic Surgery:  No


Tonsillectomy:  Yes


Other Surgery:  Yes (tot gastrectomy)





Social History


Alcohol Use:  No


Tobacco Use:  No


Substance Use:  No





Allergies-Medications


(Allergen,Severity, Reaction):  


Coded Allergies:  


     Compazine (Verified  Allergy, Severe, SOB, 17)


     Gadolinium Derivatives (Verified  Allergy, Severe, RESPIRATORY DISTRESS, )


     Lobster (Verified  Allergy, Severe, Anaphylaxis, 17)


     Morphine (Verified  Allergy, Severe, Hives, 17)


 PT HAVING HIVES AND ITCHING TO ARM ABOVE IV SITE AFTER


 ADMINISTRATION OF MORPHINE


     Phenergan (Verified  Allergy, Severe, SOB, 17)


     Reglan (Verified  Allergy, Severe, SOB, 17)


     Toradol (Verified  Allergy, Severe, Shortness of Breath, 17)


     Zofran (Verified  Allergy, Severe, SOB, 17)


     Tigan (Verified  Allergy, Intermediate, sob, 17)


 difficult breathing


     Penicillin (Verified  Allergy, Mild, RASH, 17)


     Sulfa (Verified  Allergy, Mild, RASH, 17)


Uncoded Allergies:  


     GADOLINIUM (Adverse Reaction, Severe, PT STOPPED BREATHING WITH GADO, )


 PT STATES SHE STOPPED BREATHING AFTER HAVING GADO AT ANOTHER


 FACILITY. 17


 [facility]


Reported Meds & Prescriptions





Reported Meds & Active Scripts


Active


Diphenhydramine (Diphenhydramine HCl) 25 Mg Cap 25 Mg PO Q6H PRN


Reported


Ambien (Zolpidem Tartrate) 10 Mg Tab 10 Mg PO HS PRN


Dilaudid (Hydromorphone HCl) 4 Mg Tab 4 Mg PO Q6H PRN


Valium (Diazepam) 10 Mg Tab 10 Mg PO TID


Buspirone (Buspirone HCl) 7.5 Mg Tab 7.5 Mg PO BID


Cyanocobalamin Inj (Cyanocobalamin) 1,000 Mcg/Ml Inj 1,000 Mcg IM Q30D


Imipramine HCL (Imipramine HCl) 25 Mg Tab 25 Mg PO HS








Review of Systems


Except as stated in HPI:  all other systems reviewed are Neg





Physical Exam


Narrative


GENERAL: Well-developed, appears older than stated age, no obvious distress, 

very thin.


SKIN: Focused skin assessment warm/dry.


HEAD: Atraumatic. Normocephalic. 


EYES: Pupils equal and round. No scleral icterus. No injection or drainage. 


ENT: No nasal bleeding or discharge.  Mucous membranes pink and moist.


NECK: Trachea midline. No JVD. 


CARDIOVASCULAR: Regular rate and rhythm.  No murmur appreciated.


RESPIRATORY: No accessory muscle use. Clear to auscultation. Breath sounds 

equal bilaterally. 


GASTROINTESTINAL: Abdomen soft, minimally tender in the epigastric area, 

nondistended. Hepatic and splenic margins not palpable. 


MUSCULOSKELETAL: No obvious deformities. No clubbing.  No cyanosis.  No edema. 


NEUROLOGICAL: Awake and alert. No obvious cranial nerve deficits.  Motor 

grossly within normal limits. Normal speech.


PSYCHIATRIC: Appropriate mood and affect; insight and judgment normal.





Data


Data


Last Documented VS





Vital Signs








  Date Time  Temp Pulse Resp B/P Pulse Ox O2 Delivery O2 Flow Rate FiO2


 


17 12:51     100   


 


17 12:00  65 16 132/87  Room Air  


 


17 09:35 98.2       








Orders





 Complete Blood Count With Diff (17 09:49)


Comprehensive Metabolic Panel (17 09:49)


Lipase (17 09:49)


Urinalysis - C+S If Indicated (17 09:49)


Iv Access Insert/Monitor (17 09:49)


Ecg Monitoring (17 09:49)


Oximetry (17 09:49)


Sodium Chlor 0.9% 1000 Ml Inj (Ns 1000 M (17 09:49)


Sodium Chloride 0.9% Flush (Ns Flush) (17 10:00)


Dicyclomine Inj (Bentyl Inj) (17 10:00)


Diphenhydramine Inj (Benadryl Inj) (17 10:00)


Haloperidol Inj (Haldol Inj) (17 10:00)


Hydromorphone (Dilaudid) (17 12:30)





Labs





 Laboratory Tests








Test 17





 10:20 12:05


 


White Blood Count 3.3 TH/MM3 


 


Red Blood Count 4.21 MIL/MM3 


 


Hemoglobin 11.6 GM/DL 


 


Hematocrit 35.8 % 


 


Mean Corpuscular Volume 85.0 FL 


 


Mean Corpuscular Hemoglobin 27.5 PG 


 


Mean Corpuscular Hemoglobin 32.4 % 





Concent  


 


Red Cell Distribution Width 19.3 % 


 


Platelet Count 192 TH/MM3 


 


Mean Platelet Volume 9.9 FL 


 


Neutrophils (%) (Auto) 67.9 % 


 


Lymphocytes (%) (Auto) 23.7 % 


 


Monocytes (%) (Auto) 7.5 % 


 


Eosinophils (%) (Auto) 0.9 % 


 


Basophils (%) (Auto) 0.0 % 


 


Neutrophils # (Auto) 2.2 TH/MM3 


 


Lymphocytes # (Auto) 0.8 TH/MM3 


 


Monocytes # (Auto) 0.2 TH/MM3 


 


Eosinophils # (Auto) 0.0 TH/MM3 


 


Basophils # (Auto) 0.0 TH/MM3 


 


CBC Comment DIFF FINAL  


 


Differential Comment   


 


Sodium Level 139 MEQ/L 


 


Potassium Level 3.5 MEQ/L 


 


Chloride Level 106 MEQ/L 


 


Carbon Dioxide Level 24.7 MEQ/L 


 


Anion Gap 8 MEQ/L 


 


Blood Urea Nitrogen 9 MG/DL 


 


Creatinine 0.80 MG/DL 


 


Estimat Glomerular Filtration 74 ML/MIN 





Rate  


 


Random Glucose 89 MG/DL 


 


Calcium Level 9.2 MG/DL 


 


Total Bilirubin 0.2 MG/DL 


 


Aspartate Amino Transf 29 U/L 





(AST/SGOT)  


 


Alanine Aminotransferase 24 U/L 





(ALT/SGPT)  


 


Alkaline Phosphatase 95 U/L 


 


Total Protein 7.4 GM/DL 


 


Albumin 3.4 GM/DL 


 


Lipase 300 U/L 


 


Urine Color  LIGHT-YELLOW 


 


Urine Turbidity  CLEAR 


 


Urine pH  6.5 


 


Urine Specific Gravity  1.008 


 


Urine Protein  NEG mg/dL


 


Urine Glucose (UA)  NEG mg/dL


 


Urine Ketones  NEG mg/dL


 


Urine Occult Blood  NEG 


 


Urine Nitrite  NEG 


 


Urine Bilirubin  NEG 


 


Urine Urobilinogen  LESS THAN 2.0





  MG/DL


 


Urine Leukocyte Esterase  NEG 


 


Urine RBC  LESS THAN 1





  /hpf


 


Urine WBC  1 /hpf


 


Urine Squamous Epithelial  1 /hpf





Cells  


 


Urine Mucus  FEW /lpf


 


Microscopic Urinalysis Comment  CULT NOT





  INDICATED











MDM


Medical Decision Making


Medical Screen Exam Complete:  Yes


Emergency Medical Condition:  Yes


Differential Diagnosis


Acute on chronic abdominal pain, gastritis, gastroenteritis, acute abdomen 

highly unlikely,


Narrative Course


Patient was roomed in emergency department, she states she has a ride home and 

was given Bentyl Benadryl and Haldol.  His medicines are reported to work in 

the chart on prior visits this month.  The patient labs are reassuring 

including a CBC CMP and lipase as well as UA.  Patient's abdomen is benign.  

She appears quite comfortable.  The patient his called the nurse of multiple 

times threatening to leave AMA if she didn't get something stronger for pain.  

When I revisited her she is walking in the room and appears in no distress.  

She states that the medications that I prescribed her in the emergency 

department don't work for her and that the only thing that works for her is 

Dilaudid.  She asked for a dose IV.  At this point I don't believe the patient 

has indications for IV narcotics as her abdomen is benign and her initial 

workup is negative.  I offered a CAT scan of her abdomen and she is concerned 

about any additional pathology over her chronic pain and she declined this.  

She again asked for a dose of IV Dilaudid.  I would happily prescribe her a 

dose of by mouth Dilaudid and she stated no she needed IV Dilaudid and I said 

again I would not prescribe her IV Dilaudid.  She then agreed to have by mouth 

Dilaudid and stated that she would leave after that.  The patient does have a 

ride home.  I'll oblige with 2 mg by mouth Dilaudid and she was discharged.  

She emulating from the emergent department in no apparent distress.





Diagnosis





 Primary Impression:  


 Abdominal pain


 Qualified Code:  R10.84 - Generalized abdominal pain





***Additional Instructions:


Follow with her primary care provider and your pain management specialist.  You'

re welcome to return to the emergency department any time he feels necessary.


Disposition:  01 DISCHARGE HOME


Condition:  Stable








Kostas Jean MD 2017 09:53

## 2017-05-05 ENCOUNTER — HOSPITAL ENCOUNTER (EMERGENCY)
Dept: HOSPITAL 17 - NEPC | Age: 57
Discharge: HOME | End: 2017-05-05
Payer: COMMERCIAL

## 2017-05-05 VITALS — HEIGHT: 66 IN | BODY MASS INDEX: 23.03 KG/M2 | WEIGHT: 143.3 LBS

## 2017-05-05 VITALS
RESPIRATION RATE: 15 BRPM | HEART RATE: 86 BPM | OXYGEN SATURATION: 98 % | TEMPERATURE: 98.8 F | SYSTOLIC BLOOD PRESSURE: 195 MMHG | DIASTOLIC BLOOD PRESSURE: 108 MMHG

## 2017-05-05 VITALS — DIASTOLIC BLOOD PRESSURE: 80 MMHG | SYSTOLIC BLOOD PRESSURE: 150 MMHG

## 2017-05-05 DIAGNOSIS — I44.7: ICD-10-CM

## 2017-05-05 DIAGNOSIS — F19.20: ICD-10-CM

## 2017-05-05 DIAGNOSIS — R10.84: Primary | ICD-10-CM

## 2017-05-05 DIAGNOSIS — R94.31: ICD-10-CM

## 2017-05-05 LAB
ALP SERPL-CCNC: 107 U/L (ref 45–117)
ALT SERPL-CCNC: 62 U/L (ref 10–53)
ANION GAP SERPL CALC-SCNC: 15 MEQ/L (ref 5–15)
AST SERPL-CCNC: 74 U/L (ref 15–37)
BASOPHILS # BLD AUTO: 0 TH/MM3 (ref 0–0.2)
BASOPHILS NFR BLD: 0.1 % (ref 0–2)
BILIRUB SERPL-MCNC: 0.3 MG/DL (ref 0.2–1)
BUN SERPL-MCNC: 11 MG/DL (ref 7–18)
CHLORIDE SERPL-SCNC: 105 MEQ/L (ref 98–107)
EOSINOPHIL # BLD: 0 TH/MM3 (ref 0–0.4)
EOSINOPHIL NFR BLD: 0.6 % (ref 0–4)
ERYTHROCYTE [DISTWIDTH] IN BLOOD BY AUTOMATED COUNT: 18.7 % (ref 11.6–17.2)
GFR SERPLBLD BASED ON 1.73 SQ M-ARVRAT: 62 ML/MIN (ref 89–?)
HCO3 BLD-SCNC: 22.4 MEQ/L (ref 21–32)
HCT VFR BLD CALC: 40.7 % (ref 35–46)
HEMO FLAGS: (no result)
LYMPHOCYTES # BLD AUTO: 0.8 TH/MM3 (ref 1–4.8)
LYMPHOCYTES NFR BLD AUTO: 19.6 % (ref 9–44)
MCH RBC QN AUTO: 27.4 PG (ref 27–34)
MCHC RBC AUTO-ENTMCNC: 32.6 % (ref 32–36)
MCV RBC AUTO: 84 FL (ref 80–100)
MONOCYTES NFR BLD: 4.9 % (ref 0–8)
NEUTROPHILS # BLD AUTO: 3 TH/MM3 (ref 1.8–7.7)
NEUTROPHILS NFR BLD AUTO: 74.8 % (ref 16–70)
PLATELET # BLD: 188 TH/MM3 (ref 150–450)
POTASSIUM SERPL-SCNC: 3.8 MEQ/L (ref 3.5–5.1)
RBC # BLD AUTO: 4.84 MIL/MM3 (ref 4–5.3)
SODIUM SERPL-SCNC: 142 MEQ/L (ref 136–145)
WBC # BLD AUTO: 4 TH/MM3 (ref 4–11)

## 2017-05-05 PROCEDURE — 99285 EMERGENCY DEPT VISIT HI MDM: CPT

## 2017-05-05 PROCEDURE — 93005 ELECTROCARDIOGRAM TRACING: CPT

## 2017-05-05 PROCEDURE — 84484 ASSAY OF TROPONIN QUANT: CPT

## 2017-05-05 PROCEDURE — 80053 COMPREHEN METABOLIC PANEL: CPT

## 2017-05-05 PROCEDURE — 85025 COMPLETE CBC W/AUTO DIFF WBC: CPT

## 2017-05-05 NOTE — PD
HPI


Chief Complaint:  Chest Pain


Time Seen by Provider:  20:03


Travel History


International Travel<30 days:  No


Contact w/Intl Traveler<30days:  No


Traveled to known affect area:  No





History of Present Illness


HPI


Is a 56-year-old woman who presents to the emergency department with acute on 

chronic chest and abdominal pain.  Patient has recurrent, about weekly visits 

to the ED with similar symptoms.  She's had a gastrectomy in the past.  She 

reports a history of MI in the past as well.  Multiple negative workups 

recently.  She states, as she typically does, that pain is "worse than it has 

ever been".  She describes left-sided chest pain as well as diffuse abdominal 

pain.  She also describes copious vomiting.  She reports allergies to most 

medicines.  She typically asks for IV Dilaudid.





History


Past Medical History


*** Narrative Medical


Per patient


Previous gastrectomy


History of kidney CA


History of CVA


CAD, MI


Tetanus Vaccination:  < 5 Years


Influenza Vaccination:  Yes


Menopausal:  Yes


:  1


Para:  1


Dilation and Curettage (D&C):  Yes





Social History


Alcohol Use:  No


Tobacco Use:  No





Allergies-Medications


(Allergen,Severity, Reaction):  


Coded Allergies:  


     Compazine (Verified  Allergy, Severe, SOB, 17)


     Gadolinium Derivatives (Verified  Allergy, Severe, RESPIRATORY DISTRESS, 17)


     Lobster (Verified  Allergy, Severe, Anaphylaxis, 17)


     Morphine (Verified  Allergy, Severe, Hives, 17)


 PT HAVING HIVES AND ITCHING TO ARM ABOVE IV SITE AFTER


 ADMINISTRATION OF MORPHINE


     Phenergan (Verified  Allergy, Severe, SOB, 17)


     Reglan (Verified  Allergy, Severe, SOB, 17)


     Toradol (Verified  Allergy, Severe, Shortness of Breath, 17)


     Zofran (Verified  Allergy, Severe, SOB, 17)


     Tigan (Verified  Allergy, Intermediate, sob, 17)


 difficult breathing


     Penicillin (Verified  Allergy, Mild, RASH, 17)


     Sulfa (Verified  Allergy, Mild, RASH, 17)


Uncoded Allergies:  


     GADOLINIUM (Adverse Reaction, Severe, PT STOPPED BREATHING WITH GADO, )


 PT STATES SHE STOPPED BREATHING AFTER HAVING GADO AT ANOTHER


 FACILITY. 17


 [facility]


Reported Meds & Prescriptions





Reported Meds & Active Scripts


Active


Diphenhydramine (Diphenhydramine HCl) 25 Mg Cap 25 Mg PO Q6H PRN


Reported


Ambien (Zolpidem Tartrate) 10 Mg Tab 10 Mg PO HS PRN


Dilaudid (Hydromorphone HCl) 4 Mg Tab 4 Mg PO Q5H PRN


Valium (Diazepam) 10 Mg Tab 10 Mg PO TID


Buspirone (Buspirone HCl) 7.5 Mg Tab 7.5 Mg PO BID


Cyanocobalamin Inj (Cyanocobalamin) 1,000 Mcg/Ml Inj 1,000 Mcg IM Q30D


Imipramine HCL (Imipramine HCl) 25 Mg Tab 25 Mg PO HS








Review of Systems


Except as stated in HPI:  all other systems reviewed are Neg





Physical Exam


Narrative


GENERAL: Well-appearing 56-year-old woman, adjusting the bed in the room, no 

acute distress.  No vomiting.  No writhing.


SKIN: Focused skin assessment warm/dry.


NECK: Trachea midline. No JVD. 


CARDIOVASCULAR: Regular rate and rhythm.  No murmur appreciated.


RESPIRATORY: No accessory muscle use. Clear to auscultation. Breath sounds 

equal bilaterally. 


GASTROINTESTINAL: Abdomen flat and soft.  Mild upper abdominal tenderness.


MUSCULOSKELETAL: No obvious deformities.  No edema.


NEUROLOGICAL: Awake and alert. No obvious cranial nerve deficits.  Motor 

grossly within normal limits. Normal speech.





Data


Data


Last Documented VS





Vital Signs








  Date Time  Temp Pulse Resp B/P Pulse Ox O2 Delivery O2 Flow Rate FiO2


 


17 19:07 98.8 86 15 195/108 98 Room Air  








Orders





 Electrocardiogram (17 19:14)


Complete Blood Count With Diff (17 19:14)


Troponin I (17 19:14)


Comprehensive Metabolic Panel (17 20:03)


Haloperidol Inj (Haldol Inj) (17 20:15)


Diphenhydramine Inj (Benadryl Inj) (17 20:15)


Dicyclomine Inj (Bentyl Inj) (17 20:15)


Acetaminophen Inj (Ofirmev Inj) (17 20:15)


Sodium Chlorid 0.9% 500 Ml Inj (Ns 500 M (17 20:45)





Labs








 Laboratory Tests








Test 17





 20:10


 


White Blood Count 4.0 TH/MM3


 


Red Blood Count 4.84 MIL/MM3


 


Hemoglobin 13.3 GM/DL


 


Hematocrit 40.7 %


 


Mean Corpuscular Volume 84.0 FL


 


Mean Corpuscular Hemoglobin 27.4 PG


 


Mean Corpuscular Hemoglobin 32.6 %





Concent 


 


Red Cell Distribution Width 18.7 %


 


Platelet Count 188 TH/MM3


 


Mean Platelet Volume 10.1 FL


 


Neutrophils (%) (Auto) 74.8 %


 


Lymphocytes (%) (Auto) 19.6 %


 


Monocytes (%) (Auto) 4.9 %


 


Eosinophils (%) (Auto) 0.6 %


 


Basophils (%) (Auto) 0.1 %


 


Neutrophils # (Auto) 3.0 TH/MM3


 


Lymphocytes # (Auto) 0.8 TH/MM3


 


Monocytes # (Auto) 0.2 TH/MM3


 


Eosinophils # (Auto) 0.0 TH/MM3


 


Basophils # (Auto) 0.0 TH/MM3


 


CBC Comment DIFF FINAL 


 


Differential Comment  


 


Sodium Level 142 MEQ/L


 


Potassium Level 3.8 MEQ/L


 


Chloride Level 105 MEQ/L


 


Carbon Dioxide Level 22.4 MEQ/L


 


Anion Gap 15 MEQ/L


 


Blood Urea Nitrogen 11 MG/DL


 


Creatinine 0.93 MG/DL


 


Estimat Glomerular Filtration 62 ML/MIN





Rate 


 


Random Glucose 129 MG/DL


 


Calcium Level 9.7 MG/DL


 


Total Bilirubin 0.3 MG/DL


 


Aspartate Amino Transf 74 U/L





(AST/SGOT) 


 


Alanine Aminotransferase 62 U/L





(ALT/SGPT) 


 


Alkaline Phosphatase 107 U/L


 


Troponin I LESS THAN 0.02





 NG/ML


 


Total Protein 8.6 GM/DL


 


Albumin 4.2 GM/DL














[facility]


Medical Decision Making


Medical Screen Exam Complete:  Yes


Emergency Medical Condition:  Yes


Interpretation(s)


Review of EKG: Normal sinus rhythm at rate of 77, left bundle branch block, 

compared to previous EKG from 2017, no significant change.





LABS:


CBC is unremarkable.


CMP remarkable for mildly elevated AST and ALT, slightly up from baseline, low 

protein little bit elevated as well.


Troponin negative.


Differential Diagnosis


Acute on chronic abdominal pain, gastritis, gastroparesis, narcotic bowel 

syndrome, other


Narrative Course


Medical decision-making





56-year-old with acute on chronic abdominal pain and chest pain.  Patient looks 

well.  No vomiting here.  She is well-known to the emergency department.  Avoid 

opiates.  Outpatient follow-up.





Diagnosis





 Primary Impression:  


 Chronic abdominal pain


 Additional Impression:  


 Chronic narcotic dependence





***Additional Instructions:


Continue current medications.





Follow-up with your primary doctor in the next one to 2 days.





Return to the emergency department for any new or worsening symptoms.


***Med/Other Pt SpecificInfo:  Prescription(s) given


Scripts


Diphenhydramine 25 Mg Cap25 Mg PO Q6H PRN (NAUSEA OR VOMITING) #12 CAP  Ref 0


   Prov:Jesus Alberto Byrnes MD         17


Disposition:  01 DISCHARGE HOME


Condition:  Stable








Jesus Alberto Byrnes MD May 5, 2017 20:17

## 2017-05-06 NOTE — EKG
Date Performed: 05/05/2017       Time Performed: 19:28:15

 

PTAGE:      56 years

 

EKG:      Sinus rhythm 

 

 LEFT BUNDLE BRANCH BLOCK ABNORMAL ECG 

 

NO PREVIOUS TRACING            

 

DOCTOR:   Breana Bhatia  Interpretating Date/Time  05/06/2017 10:05:13

## 2017-06-27 ENCOUNTER — HOSPITAL ENCOUNTER (EMERGENCY)
Dept: HOSPITAL 17 - NEPE | Age: 57
LOS: 1 days | Discharge: HOME | End: 2017-06-28
Payer: COMMERCIAL

## 2017-06-27 VITALS
OXYGEN SATURATION: 100 % | HEART RATE: 69 BPM | DIASTOLIC BLOOD PRESSURE: 98 MMHG | SYSTOLIC BLOOD PRESSURE: 138 MMHG | RESPIRATION RATE: 16 BRPM

## 2017-06-27 VITALS — WEIGHT: 83.78 LBS | HEIGHT: 62 IN | BODY MASS INDEX: 15.42 KG/M2

## 2017-06-27 VITALS
DIASTOLIC BLOOD PRESSURE: 71 MMHG | TEMPERATURE: 98 F | OXYGEN SATURATION: 98 % | RESPIRATION RATE: 14 BRPM | SYSTOLIC BLOOD PRESSURE: 116 MMHG | HEART RATE: 83 BPM

## 2017-06-27 VITALS — RESPIRATION RATE: 18 BRPM

## 2017-06-27 VITALS — OXYGEN SATURATION: 95 %

## 2017-06-27 DIAGNOSIS — I25.2: ICD-10-CM

## 2017-06-27 DIAGNOSIS — Z86.73: ICD-10-CM

## 2017-06-27 DIAGNOSIS — Z79.899: ICD-10-CM

## 2017-06-27 DIAGNOSIS — R10.84: Primary | ICD-10-CM

## 2017-06-27 DIAGNOSIS — F11.20: ICD-10-CM

## 2017-06-27 DIAGNOSIS — R11.2: ICD-10-CM

## 2017-06-27 LAB
ALP SERPL-CCNC: 96 U/L (ref 45–117)
ALT SERPL-CCNC: 55 U/L (ref 10–53)
ANION GAP SERPL CALC-SCNC: 10 MEQ/L (ref 5–15)
AST SERPL-CCNC: 27 U/L (ref 15–37)
BASOPHILS # BLD AUTO: 0 TH/MM3 (ref 0–0.2)
BASOPHILS NFR BLD: 0.3 % (ref 0–2)
BILIRUB SERPL-MCNC: 0.2 MG/DL (ref 0.2–1)
BUN SERPL-MCNC: 15 MG/DL (ref 7–18)
CHLORIDE SERPL-SCNC: 107 MEQ/L (ref 98–107)
EOSINOPHIL # BLD: 0 TH/MM3 (ref 0–0.4)
EOSINOPHIL NFR BLD: 0.9 % (ref 0–4)
ERYTHROCYTE [DISTWIDTH] IN BLOOD BY AUTOMATED COUNT: 17.2 % (ref 11.6–17.2)
GFR SERPLBLD BASED ON 1.73 SQ M-ARVRAT: 73 ML/MIN (ref 89–?)
HCO3 BLD-SCNC: 24.9 MEQ/L (ref 21–32)
HCT VFR BLD CALC: 36.6 % (ref 35–46)
HEMO FLAGS: (no result)
LYMPHOCYTES # BLD AUTO: 1.7 TH/MM3 (ref 1–4.8)
LYMPHOCYTES NFR BLD AUTO: 39.5 % (ref 9–44)
MCH RBC QN AUTO: 27.9 PG (ref 27–34)
MCHC RBC AUTO-ENTMCNC: 32.5 % (ref 32–36)
MCV RBC AUTO: 85.9 FL (ref 80–100)
MONOCYTES NFR BLD: 7.9 % (ref 0–8)
NEUTROPHILS # BLD AUTO: 2.2 TH/MM3 (ref 1.8–7.7)
NEUTROPHILS NFR BLD AUTO: 51.4 % (ref 16–70)
PLAT MORPH BLD: (no result)
PLATELET # BLD: 170 TH/MM3 (ref 150–450)
PLATELET BLD QL SMEAR: NORMAL
POTASSIUM SERPL-SCNC: 3.9 MEQ/L (ref 3.5–5.1)
RBC # BLD AUTO: 4.26 MIL/MM3 (ref 4–5.3)
SCAN/DIFF: (no result)
SODIUM SERPL-SCNC: 142 MEQ/L (ref 136–145)
WBC # BLD AUTO: 4.3 TH/MM3 (ref 4–11)

## 2017-06-27 PROCEDURE — 96375 TX/PRO/DX INJ NEW DRUG ADDON: CPT

## 2017-06-27 PROCEDURE — 80053 COMPREHEN METABOLIC PANEL: CPT

## 2017-06-27 PROCEDURE — 93005 ELECTROCARDIOGRAM TRACING: CPT

## 2017-06-27 PROCEDURE — 74000: CPT

## 2017-06-27 PROCEDURE — 83690 ASSAY OF LIPASE: CPT

## 2017-06-27 PROCEDURE — 99285 EMERGENCY DEPT VISIT HI MDM: CPT

## 2017-06-27 PROCEDURE — 85025 COMPLETE CBC W/AUTO DIFF WBC: CPT

## 2017-06-27 PROCEDURE — 96372 THER/PROPH/DIAG INJ SC/IM: CPT

## 2017-06-27 PROCEDURE — 96374 THER/PROPH/DIAG INJ IV PUSH: CPT

## 2017-06-27 NOTE — PD
HPI


Chief Complaint:  GI Complaint


Time Seen by Provider:  19:48


Travel History


International Travel<30 days:  No


Contact w/Intl Traveler<30days:  No


Traveled to known affect area:  No





History of Present Illness


HPI


56-year-old female who is well-known to the emergency department for recurrent 

visits for abdominal pain.  Patient reports a history of CVA, seizures, MI, 

liver disease, anxiety, and presents to emergency department today for 

evaluation of worsening abdominal pain.  Patient has had multiple negative 

workups in the past.  She reports the pain is not new.  She states that 2 times 

today when she wiped after bowel movements, she noticed blood.  Stool was 

brown.  Denies any other recent illnesses, fever, or chills.  He states that he 

had emergency room she is chilled.  She states that she has been unable to keep 

any medications down, this includes her prescribed Dilaudid and she is 

requesting Dilaudid IV.  Patient also reports that she is in the process of 

changing her gastroenterologist because she feels that her current 

gastroenterologist "does nothing for her."  She states she is also in the 

process of changing her primary care provider because "nobody wants to help her.

"  She has no other symptoms reported this time.





[facility]


Past Medical History


Hx Anticoagulant Therapy:  No


Asthma:  No


Blood Disorders:  No


Anxiety:  Yes


Depression:  No


Heart Rhythm Problems:  No


Cancer:  Yes (kidney)


Cardiovascular Problems:  Yes (MI)


High Cholesterol:  No


Chemotherapy:  Yes (KIDNEY )


Chest Pain:  No


Congestive Heart Failure:  No


COPD:  No


Cerebrovascular Accident:  Yes


Diabetes:  No


Diminished Hearing:  No


Endocrine:  No


Gastrointestinal Disorders:  Yes


GERD:  No


Genitourinary:  Yes (RIGHT NEPHRECTOMY)


Headaches:  Yes


Hiatal Hernia:  Yes


Hypertension:  No


Immune Disorder:  No


Implanted Vascular Access Dvce:  No


Kidney Stones:  No


Musculoskeletal:  No


Neurologic:  No


Psychiatric:  No


Reproductive:  No


Respiratory:  No


Immunizations Current:  Yes


Migraines:  Yes


Myocardial Infarction:  Yes (2006)


Pneumonia:  Yes


Radiation Therapy:  No


Renal Failure:  No


Seizures:  Yes


Sleep Apnea:  No


Thyroid Disease:  No


Ulcer:  No


Menopausal:  Yes


:  1


Para:  1


Miscarriage:  0


:  0


Ectopic Pregnancy:  No


Ovarian Cysts:  No


Dilation and Curettage (D&C):  Yes


Tubal Ligation:  Yes





Past Surgical History


Abdominal Surgery:  Yes (total gastrectomy w/pouch , hernia repair)


Appendectomy:  Yes


Cardiac Surgery:  Yes (pt states an occulator was placed in her heart )


Cholecystectomy:  Yes


Gynecologic Surgery:  Yes


Hysterectomy:  Yes (ONLY TUBES TIED, NOT HYSTERECTOMY)


Thoracic Surgery:  No


Tonsillectomy:  Yes


Other Surgery:  Yes (tot gastrectomy)





Social History


Alcohol Use:  No


Tobacco Use:  No


Substance Use:  No





Allergies-Medications


(Allergen,Severity, Reaction):  


Coded Allergies:  


     Compazine (Verified  Allergy, Severe, SOB, 17)


     Gadolinium Derivatives (Verified  Allergy, Severe, RESPIRATORY DISTRESS, )


     Lobster (Verified  Allergy, Severe, Anaphylaxis, 17)


     Morphine (Verified  Allergy, Severe, Hives, 17)


 PT HAVING HIVES AND ITCHING TO ARM ABOVE IV SITE AFTER


 ADMINISTRATION OF MORPHINE


     Phenergan (Verified  Allergy, Severe, SOB, 17)


     Reglan (Verified  Allergy, Severe, SOB, 17)


     Toradol (Verified  Allergy, Severe, Shortness of Breath, 17)


     Zofran (Verified  Allergy, Severe, SOB, 17)


     Tigan (Verified  Allergy, Intermediate, sob, 17)


 difficult breathing


     Penicillin (Verified  Allergy, Mild, RASH, 17)


     Sulfa (Verified  Allergy, Mild, RASH, 17)


Uncoded Allergies:  


     GADOLINIUM (Adverse Reaction, Severe, PT STOPPED BREATHING WITH GADO, )


 PT STATES SHE STOPPED BREATHING AFTER HAVING GADO AT ANOTHER


 FACILITY. 17


 [facility]


Reported Meds & Prescriptions





Reported Meds & Active Scripts


Active


Diphenhydramine (Diphenhydramine HCl) 25 Mg Cap 25 Mg PO Q6H PRN


Reported


Ambien (Zolpidem Tartrate) 10 Mg Tab 10 Mg PO HS PRN


Dilaudid (Hydromorphone HCl) 4 Mg Tab 4 Mg PO Q5H PRN


Valium (Diazepam) 10 Mg Tab 10 Mg PO TID


Buspirone (Buspirone HCl) 7.5 Mg Tab 7.5 Mg PO BID


Cyanocobalamin Inj (Cyanocobalamin) 1,000 Mcg/Ml Inj 1,000 Mcg IM Q30D








Review of Systems


Except as stated in HPI:  all other systems reviewed are Neg





Physical Exam


Narrative


GENERAL: Thin female patient, lying in bed, in no acute distress


SKIN: Focused skin assessment warm/dry.


HEAD: Atraumatic. Normocephalic. 


EYES: Pupils equal and round. No scleral icterus. No injection or drainage. 


ENT: No nasal bleeding or discharge.  Mucous membranes pink and moist.


NECK: Trachea midline. No JVD. 


CARDIOVASCULAR: Regular rate and rhythm.  No murmur appreciated.


RESPIRATORY: No accessory muscle use. Clear to auscultation. Breath sounds 

equal bilaterally. 


GASTROINTESTINAL: Abdomen soft, non-tender, nondistended. Hepatic and splenic 

margins not palpable. 


RECTAL EXAM: No masses or tenderness, stool is brown.


MUSCULOSKELETAL: No obvious deformities. No clubbing.  No cyanosis.  No edema. 


NEUROLOGICAL: Awake and alert. No obvious cranial nerve deficits.  Motor 

grossly within normal limits. Normal speech.





Data


Data


Last Documented VS





Vital Signs








  Date Time  Temp Pulse Resp B/P Pulse Ox O2 Delivery O2 Flow Rate FiO2


 


17 23:09   18     


 


17 22:50  69  138/98 100   


 


17 20:14      Room Air  


 


17 16:35 98.0       








Orders





 Complete Blood Count With Diff (17 20:04)


Comprehensive Metabolic Panel (17 20:04)


Lipase (17 20:04)


Urinalysis - C+S If Indicated (17 20:04)


Iv Access Insert/Monitor (17 20:04)


Ecg Monitoring (17 20:04)


Oximetry (17 20:04)


Sodium Chlor 0.9% 1000 Ml Inj (Ns 1000 M (17 20:04)


Sodium Chloride 0.9% Flush (Ns Flush) (17 20:15)


Electrocardiogram (17 20:04)


Abdomen, Upright Only (17 20:04)


Vascular Access Team Consult/P PRN (17 20:12)


Vascular Poc Ultrasound (17 )


Haloperidol Inj (Haldol Inj) (17 22:00)


Diphenhydramine Inj (Benadryl Inj) (17 22:00)


Dicyclomine Inj (Bentyl Inj) (17 22:00)


Acetaminophen Inj (Ofirmev Inj) (17 22:00)





Labs





 Laboratory Tests








Test 17





 20:45


 


White Blood Count 4.3 TH/MM3


 


Red Blood Count 4.26 MIL/MM3


 


Hemoglobin 11.9 GM/DL


 


Hematocrit 36.6 %


 


Mean Corpuscular Volume 85.9 FL


 


Mean Corpuscular Hemoglobin 27.9 PG


 


Mean Corpuscular Hemoglobin 32.5 %





Concent 


 


Red Cell Distribution Width 17.2 %


 


Platelet Count 170 TH/MM3


 


Mean Platelet Volume 9.9 FL


 


Neutrophils (%) (Auto) 51.4 %


 


Lymphocytes (%) (Auto) 39.5 %


 


Monocytes (%) (Auto) 7.9 %


 


Eosinophils (%) (Auto) 0.9 %


 


Basophils (%) (Auto) 0.3 %


 


Neutrophils # (Auto) 2.2 TH/MM3


 


Lymphocytes # (Auto) 1.7 TH/MM3


 


Monocytes # (Auto) 0.3 TH/MM3


 


Eosinophils # (Auto) 0.0 TH/MM3


 


Basophils # (Auto) 0.0 TH/MM3


 


CBC Comment AUTO DIFF 


 


Differential Comment AUTO DIFF





 CONFIRMED


 


Platelet Estimate NORMAL 


 


Platelet Morphology Comment CLUMPED 


 


Sodium Level 142 MEQ/L


 


Potassium Level 3.9 MEQ/L


 


Chloride Level 107 MEQ/L


 


Carbon Dioxide Level 24.9 MEQ/L


 


Anion Gap 10 MEQ/L


 


Blood Urea Nitrogen 15 MG/DL


 


Creatinine 0.81 MG/DL


 


Estimat Glomerular Filtration 73 ML/MIN





Rate 


 


Random Glucose 78 MG/DL


 


Calcium Level 9.0 MG/DL


 


Total Bilirubin 0.2 MG/DL


 


Aspartate Amino Transf 27 U/L





(AST/SGOT) 


 


Alanine Aminotransferase 55 U/L





(ALT/SGPT) 


 


Alkaline Phosphatase 96 U/L


 


Total Protein 7.4 GM/DL


 


Albumin 4.0 GM/DL


 


Lipase 302 U/L











[facility]


Medical Decision Making


Medical Screen Exam Complete:  Yes


Emergency Medical Condition:  Yes


Medical Record Reviewed:  Yes


Differential Diagnosis


Narcotic pain versus chronic abdominal pain versus gastroparesis versus 

gastroenteritis versus nausea and vomiting versus electrolyte abnormality 

versus dehydration versus UTI


Narrative Course


56 year-old female presents to emergency department for evaluation.  Patient is 

requesting Dilaudid by name for her abdominal pain.  I explained to the patient 

that I would not be giving her IV Dilaudid and we would move forward with non-

opiate pain control.  She tells me that she is disappointed that her feelings 

and pain are not being taken into consideration and that she really needs the 

Dilaudid.  I encouraged her to try non-opiate pain control and explained her 

again I would not be giving any additional opiate medication.  Lab work is 

without acute concern.  No abnormalities identified on upright abdominal x-ray.





2330 patient is resting comfortably with her eyes closed in the bed.  Her vital 

signs are stable.  Patient will be discharged home at this time





[facility]


Internal Pos. & Neg. Controls:  Passed


Fecal Specimen Occult Blood:  Negative





Diagnosis





 Primary Impression:  


 Abdominal pain


 Qualified Code:  R10.84 - Generalized abdominal pain


 Additional Impressions:  


 Chronic narcotic dependence


 Nausea & vomiting


 Qualified Code:  R11.2 - Non-intractable vomiting with nausea, unspecified 

vomiting type


Referrals:  


Gastroenterologist





Pain Management





Primary Care Physician


Patient Instructions:  Chronic Abdominal Pain (ED), General Instructions





***Additional Instructions:


Follow-up with your primary care provider, gastroenterologist, and pain 

management physicians


Continue medications as already prescribed


Return immediately with any acute worsening symptoms.


***Med/Other Pt SpecificInfo:  No Change to Meds


Disposition:  01 DISCHARGE HOME


Condition:  Stable








Adenike Dobson 2017 19:48

## 2017-06-27 NOTE — RADRPT
EXAM DATE/TIME:  06/27/2017 20:32 

 

[facility] COMPARISON:     

CT ABDOMEN & PELVIS W CONTRAST, December 29, 2016, 21:03.  ABDOMEN UPRIGHT ONLY, Alethea 15, 2016, 12:42
.

 

                     

INDICATIONS :     

Abdominal pain.

                     

 

MEDICAL HISTORY :            

Cerebrovascular disease. Seizures. Cardiovascular disease Kidney cancer.   

 

SURGICAL HISTORY :        

Appendectomy. Cholecystectomy.Hysterectomy.

 

ENCOUNTER:     

Initial                                        

 

ACUITY:     

1 day      

 

PAIN SCORE:     

5/10

 

LOCATION:     

Bilateral  abdomen

 

FINDINGS:     

A single erect view of the abdomen demonstrates the lower lungs to be clear.  There is surgical bowel
 staples seen in the lower esophageal region and in the left upper quadrant. Bowel vilma are seen i
n the left lower quadrant. Hemoclips in the left upper quadrant and throughout the right abdomen. The
re is a metallic device likely related to closure device seen over the right side of the heart. No ev
idence of free intraperitoneal gas.  The visualized bowel loops are unremarkable. There is degenerati
ve change of the lower lumbar spine.

 

CONCLUSION:     

An acute abnormality is not seen on this plain film examination.

 

 

 

 Prince Ellison MD on June 27, 2017 at 21:00           

Board Certified Radiologist.

 This report was verified electronically.

## 2017-06-27 NOTE — PD
Physical Exam


Date Seen by Provider:  Jun 27, 2017


Time Seen by Provider:  16:55





Data


Data


Last Documented VS





Vital Signs








  Date Time  Temp Pulse Resp B/P Pulse Ox O2 Delivery O2 Flow Rate FiO2


 


6/27/17 16:35 98.0 83 14 116/71 98   











MDM


Supervised Visit with DELLA:  No


Narrative Course


57 YO F with PMH of chronic abdominal pain with complaint of  abdominal pain, 

nausea, retching.


Multiple visits with same complaint.





Vitals reviewed.


Patient seen in triage, awaiting bed placement.








Mayela Neff Jun 27, 2017 16:58

## 2017-06-28 NOTE — EKG
Date Performed: 06/27/2017       Time Performed: 20:34:05

 

PTAGE:      56 years

 

EKG:      Sinus rhythm 

 

 INTRAVENTRICULAR CONDUCTION DELAY ANTEROSEPTAL MYOCARDIAL INFARCTION ABNORMAL ECG

 

PREVIOUS TRACING       : 05/05/2017 19.28 Compared to prior tracing no significant change

 

DOCTOR:   Melvin Gannon  Interpretating Date/Time  06/28/2017 23:18:58

## 2017-07-09 ENCOUNTER — HOSPITAL ENCOUNTER (EMERGENCY)
Dept: HOSPITAL 17 - NEPC | Age: 57
Discharge: HOME | End: 2017-07-09
Payer: COMMERCIAL

## 2017-07-09 VITALS — OXYGEN SATURATION: 100 % | RESPIRATION RATE: 16 BRPM

## 2017-07-09 VITALS
RESPIRATION RATE: 15 BRPM | TEMPERATURE: 98.3 F | OXYGEN SATURATION: 98 % | SYSTOLIC BLOOD PRESSURE: 176 MMHG | HEART RATE: 65 BPM | DIASTOLIC BLOOD PRESSURE: 85 MMHG

## 2017-07-09 DIAGNOSIS — R56.9: ICD-10-CM

## 2017-07-09 DIAGNOSIS — R94.31: ICD-10-CM

## 2017-07-09 DIAGNOSIS — F41.9: ICD-10-CM

## 2017-07-09 DIAGNOSIS — I25.2: ICD-10-CM

## 2017-07-09 DIAGNOSIS — Z86.73: ICD-10-CM

## 2017-07-09 DIAGNOSIS — Z79.899: ICD-10-CM

## 2017-07-09 DIAGNOSIS — J40: Primary | ICD-10-CM

## 2017-07-09 DIAGNOSIS — I44.7: ICD-10-CM

## 2017-07-09 DIAGNOSIS — R00.1: ICD-10-CM

## 2017-07-09 PROCEDURE — 71020: CPT

## 2017-07-09 PROCEDURE — 93005 ELECTROCARDIOGRAM TRACING: CPT

## 2017-07-09 NOTE — RADRPT
EXAM DATE/TIME:  07/09/2017 10:23 

 

[facility] COMPARISON:     

CHEST PA & LAT, December 29, 2016, 18:06.

 

                     

INDICATIONS :     

Cough.

                     

 

MEDICAL HISTORY :            

Cerebrovascular disease. Seizures. Cardiovascular diseaseKidney cancer   

 

SURGICAL HISTORY :     

Appendectomy. Cholecystectomy. Hysterectomy. 

 

ENCOUNTER:     

Initial                                        

 

ACUITY:     

3 days      

 

PAIN SCORE:     

0/10

 

LOCATION:     

Bilateral chest 

 

FINDINGS:     

PA and lateral views of the chest demonstrate the lungs to be symmetrically aerated without evidence 
of mass, infiltrate or effusion.  The cardiomediastinal contours are unremarkable.  Osseous structure
s are intact.

 

CONCLUSION:     

Normal examination. Surgical clips scattered throughout the upper abdomen  

 

 

 

 Jesus Alberto Trevino MD on July 09, 2017 at 10:31           

Board Certified Radiologist.

 This report was verified electronically.

## 2017-07-09 NOTE — PD
HPI


Chief Complaint:  Cardiac Complaint


Time Seen by Provider:  09:57


Travel History


International Travel<30 days:  No


Contact w/Intl Traveler<30days:  No


Traveled to known affect area:  No





History of Present Illness


HPI


Patient is a 56-year-old female who presents to emergency room with complaints 

of cough for the past 3 days.  The past 3 days, she has had a productive cough, 

reports that she has had a fever temperature of 101.  Patient reports that 

when she coughs, she is coughing up bile.  Reports that her chest does feel 

congested and reports "my chest hurts me when I cough."  Patient denies sick 

contacts.  NO recent travels. Reports that she develops pneumonia often and 

reports, "this feels like I have pneumonia."  Patient is a nonsmoker





PFSH


Past Medical History


Hx Anticoagulant Therapy:  No


Asthma:  No


Blood Disorders:  No


Anxiety:  Yes


Depression:  No


Heart Rhythm Problems:  No


Cancer:  Yes (kidney)


Cardiovascular Problems:  Yes (MI)


High Cholesterol:  No


Chemotherapy:  Yes


Chest Pain:  No


Congestive Heart Failure:  No


COPD:  No


Cerebrovascular Accident:  Yes


Diabetes:  No


Diminished Hearing:  No


Endocrine:  No


Gastrointestinal Disorders:  Yes


GERD:  No


Genitourinary:  Yes (RIGHT NEPHRECTOMY)


Headaches:  Yes


Hiatal Hernia:  Yes


Hypertension:  No


Immune Disorder:  No


Implanted Vascular Access Dvce:  No


Kidney Stones:  No


Musculoskeletal:  No


Neurologic:  No


Psychiatric:  Yes


Reproductive:  No


Respiratory:  No


Immunizations Current:  Yes


Migraines:  Yes


Myocardial Infarction:  Yes ()


Pneumonia:  Yes


Radiation Therapy:  No


Renal Failure:  No


Seizures:  Yes


Sleep Apnea:  No


Thyroid Disease:  No


Ulcer:  No


Tetanus Vaccination:  < 5 Years


Influenza Vaccination:  Yes


Menopausal:  Yes


:  1


Para:  1


Miscarriage:  0


:  0


Ectopic Pregnancy:  No


Ovarian Cysts:  No


Dilation and Curettage (D&C):  Yes


Tubal Ligation:  Yes





Past Surgical History


Abdominal Surgery:  Yes (total gastrectomy w/pouch , hernia repair)


Appendectomy:  Yes


Cardiac Surgery:  Yes


Cholecystectomy:  Yes


Genitourinary Surgery:  Yes (RIGHT NEPHRECTOMY)


Hysterectomy:  Yes (ONLY TUBES TIED, NOT HYSTERECTOMY)


Thoracic Surgery:  No


Tonsillectomy:  Yes


Other Surgery:  Yes (tot gastrectomy)





Social History


Alcohol Use:  No


Tobacco Use:  No


Substance Use:  No





Allergies-Medications


(Allergen,Severity, Reaction):  


Coded Allergies:  


     Compazine (Verified  Allergy, Severe, Shortness of Breath, 17)


     Gadolinium Derivatives (Verified  Allergy, Severe, Anaphylaxis, 17)


     Lobster (Verified  Allergy, Severe, Anaphylaxis, 17)


     Morphine (Verified  Allergy, Severe, Hives, 17)


     Penicillin (Verified  Allergy, Severe, Rash, 17)


     Phenergan (Verified  Allergy, Severe, Shortness of Breath, 17)


     Reglan (Verified  Allergy, Severe, Shortness of Breath, 17)


     Sulfa (Verified  Allergy, Severe, Rash, 17)


     Tigan (Verified  Allergy, Severe, Shortness of Breath, 17)


     Toradol (Verified  Allergy, Severe, Shortness of Breath, 17)


     Zofran (Verified  Allergy, Severe, Shortness of Breath, 17)


Reported Meds & Prescriptions





Reported Meds & Active Scripts


Active


Doxycycline Hyclate 100 Mg Cap 100 Mg PO BID


Diphenhydramine (Diphenhydramine HCl) 25 Mg Cap 25 Mg PO Q6H PRN


Reported


Ambien (Zolpidem Tartrate) 10 Mg Tab 10 Mg PO HS PRN


Dilaudid (Hydromorphone HCl) 4 Mg Tab 6 Mg PO 5 TIMES A DAY PRN


Valium (Diazepam) 10 Mg Tab 10 Mg PO TID


Buspirone (Buspirone HCl) 7.5 Mg Tab 7.5 Mg PO BID


Cyanocobalamin Inj (Cyanocobalamin) 1,000 Mcg/Ml Inj 1,000 Mcg IM Q30D








Review of Systems


General / Constitutional:  No: Fever


Eyes:  No: Visual changes


HENT:  No: Headaches


Cardiovascular:  No: Chest Pain or Discomfort


Respiratory:  Positive: Cough, Shortness of Breath


Gastrointestinal:  No: Abdominal Pain


Genitourinary:  No: Dysuria


Musculoskeletal:  No: Pain


Skin:  No Rash


Neurologic:  No: Weakness


Psychiatric:  No: Depression


Endocrine:  No: Polydipsia


Hematologic/Lymphatic:  No: Easy Bruising





Physical Exam


Narrative


GENERAL: Nad


SKIN: Focused skin assessment warm/dry.


HEAD: Atraumatic. Normocephalic. 


EYES: Pupils equal and round. No scleral icterus. No injection or drainage. 


ENT: No nasal bleeding or discharge.  Mucous membranes pink and moist.


NECK: Trachea midline. No JVD. 


CARDIOVASCULAR: Regular rate and rhythm.  No murmur appreciated.


RESPIRATORY: No accessory muscle use. Clear to auscultation. Breath sounds 

equal bilaterally. No wheezing/rhonchi or rales on exam


GASTROINTESTINAL: Abdomen soft, non-tender, nondistended. Hepatic and splenic 

margins not palpable. 


MUSCULOSKELETAL: No obvious deformities. No clubbing.  No cyanosis.  No edema. 


NEUROLOGICAL: Awake and alert. No obvious cranial nerve deficits.  Motor 

grossly within normal limits. Normal speech.


PSYCHIATRIC: Appropriate mood and affect; insight and judgment normal.





Data


Data


Last Documented VS





Vital Signs








  Date Time  Temp Pulse Resp B/P Pulse Ox O2 Delivery O2 Flow Rate FiO2


 


17 10:14   16  100 Room Air  


 


17 09:34 98.3 65  176/85    








Orders





 Electrocardiogram (17 09:57)


Ecg Monitoring (17 09:57)


Oximetry (17 09:57)


Chest, Pa & Lat (17 09:57)


Doxycycline (Vibramycin) (17 10:45)








MDM


Medical Decision Making


Medical Screen Exam Complete:  Yes


Emergency Medical Condition:  Yes


Interpretation(s)


EKG at 1006: Sinus mike at 58bpm, qt/qtc: 434/432, no acute st or t wave 

changes, lbbb, ekg similar to previous ekg








Vital Signs








  Date Time  Temp Pulse Resp B/P Pulse Ox O2 Delivery O2 Flow Rate FiO2


 


17 10:14   16  100 Room Air  


 


17 09:47   16  98 Room Air  


 


17 09:34 98.3 65 15 176/85 98   








Differential Diagnosis


Differential includes pneumonia, viral syndrome, bronchitis, ACS though unlikely


Narrative Course


56 year old female who presents to emergency room with complaints of cough and 

fever for the past 3 days.  Reports that when she coughs, her chest hurts, 

reports that her chest pain feels like a sharp and stabbing pain to her chest 

exacerbated with cough. Patient reports that symptoms are similar to when she 

has pneumonia in the past.   EKG obtained - patient with no acute ekg changes - 

EKG similar to her past ekg's.





VSS








Vital Signs








  Date Time  Temp Pulse Resp B/P Pulse Ox O2 Delivery O2 Flow Rate FiO2


 


17 10:14   16  100 Room Air  


 


17 09:47   16  98 Room Air  


 


17 09:34 98.3 65 15 176/85 98   








Patient with no SIRS criteria, she is afebrile and not tachycardic or hypoxic





xray of chest ordered to evaluate for possible pneumonia








Last Impressions








Chest X-Ray [id number] Signed





Impressions: 





 Service Date/Time:  , 2017 10:23 - CONCLUSION:  Normal 





 examination. Surgical clips scattered throughout the upper abdomen       

Jesus Alberto Trevino MD 





Patient with acute bronchitis, no evidence of pneumonia on her x-ray.  Vital 

signs are stable. Plan to have patient follow up with primary care doctor and 

will have her return to emergency room as needed





Diagnosis





 Primary Impression:  


 Bronchitis


Patient Instructions:  General Instructions





***Additional Instructions:


Please follow up with your primary care doctor in 24-48 hours





Take all medications as prescribed





Return to ER as needed


***Med/Other Pt SpecificInfo:  Prescription(s) given


Scripts


Doxycycline Hyclate 100 Mg Vpp143 Mg PO BID  #20 CAP  Ref 0


   Prov:Jannie Costa DO         17


Disposition:  01 DISCHARGE HOME


Condition:  Stable








Jannie Costa DO 2017 10:28

## 2017-07-09 NOTE — EKG
Date Performed: 07/09/2017       Time Performed: 10:06:01

 

PTAGE:      56 years

 

EKG:      SINUS BRADYCARDIA LEFT BUNDLE BRANCH BLOCK ABNORMAL ECG

 

PREVIOUS TRACING       : 06/27/2017 20.34 Compared to prior tracing no significant change

 

DOCTOR:   Luiz Rojas  Interpretating Date/Time  07/09/2017 12:12:09

## 2017-07-30 ENCOUNTER — HOSPITAL ENCOUNTER (EMERGENCY)
Dept: HOSPITAL 17 - NEPC | Age: 57
Discharge: HOME | End: 2017-07-30
Payer: COMMERCIAL

## 2017-07-30 VITALS
RESPIRATION RATE: 20 BRPM | TEMPERATURE: 97.4 F | DIASTOLIC BLOOD PRESSURE: 74 MMHG | HEART RATE: 70 BPM | SYSTOLIC BLOOD PRESSURE: 113 MMHG | OXYGEN SATURATION: 100 %

## 2017-07-30 VITALS — HEIGHT: 62 IN | BODY MASS INDEX: 16.23 KG/M2 | WEIGHT: 88.18 LBS

## 2017-07-30 DIAGNOSIS — G89.29: ICD-10-CM

## 2017-07-30 DIAGNOSIS — R10.13: Primary | ICD-10-CM

## 2017-07-30 PROCEDURE — 99281 EMR DPT VST MAYX REQ PHY/QHP: CPT

## 2017-07-30 NOTE — PD
HPI


Chief Complaint:  Abdominal Pain


Time Seen by Provider:  13:53


Travel History


International Travel<30 days:  No


Contact w/Intl Traveler<30days:  No


Traveled to known affect area:  No





History of Present Illness


HPI


Patient is seen with technician Renetta.  Patient has long-standing history of 

chronic abdominal pain that hurts her on a daily basis.  She takes around-the-

clock Dilaudid.  She comes in complaining of abdominal pain and a burning 

sensation in her upper abdomen.  Denies vomiting or diarrhea or fever.  Pain is 

no different than usual except for the burning sensation.  Symptoms severity is 

reported as moderate





PFSH


Past Medical History


Hx Anticoagulant Therapy:  No


Asthma:  No


Blood Disorders:  No


Anxiety:  Yes


Depression:  No


Heart Rhythm Problems:  No


Cancer:  Yes (kidney)


Cardiovascular Problems:  Yes (HTN)


High Cholesterol:  No


Chemotherapy:  Yes


Chest Pain:  No


Congestive Heart Failure:  No


COPD:  No


Cerebrovascular Accident:  Yes


Diabetes:  No


Diminished Hearing:  No


Endocrine:  No


Gastrointestinal Disorders:  Yes


GERD:  No


Genitourinary:  Yes (RIGHT NEPHRECTOMY)


Headaches:  Yes


Hiatal Hernia:  Yes


Hypertension:  No


Immune Disorder:  No


Implanted Vascular Access Dvce:  No


Kidney Stones:  No


Musculoskeletal:  No


Neurologic:  No


Psychiatric:  Yes


Reproductive:  No


Respiratory:  No


Immunizations Current:  Yes


Migraines:  Yes


Myocardial Infarction:  Yes ()


Pneumonia:  Yes


Radiation Therapy:  No


Renal Failure:  No


Seizures:  Yes


Sleep Apnea:  No


Thyroid Disease:  No


Ulcer:  No


Menopausal:  Yes


:  1


Para:  1


Miscarriage:  0


:  0


Ectopic Pregnancy:  No


Ovarian Cysts:  No


Dilation and Curettage (D&C):  Yes


Tubal Ligation:  Yes





Past Surgical History


Abdominal Surgery:  Yes (total gastrectomy w/pouch , hernia repair)


Appendectomy:  Yes


Cardiac Surgery:  Yes


Cholecystectomy:  Yes


Genitourinary Surgery:  Yes (RIGHT NEPHRECTOMY)


Hysterectomy:  Yes (ONLY TUBES TIED, NOT HYSTERECTOMY)


Thoracic Surgery:  No


Tonsillectomy:  Yes


Other Surgery:  Yes (tot gastrectomy)





Social History


Alcohol Use:  No


Tobacco Use:  No


Substance Use:  No





Allergies-Medications


(Allergen,Severity, Reaction):  


Coded Allergies:  


     Compazine (Verified  Allergy, Severe, Shortness of Breath, 17)


     Gadolinium Derivatives (Verified  Allergy, Severe, Anaphylaxis, 17)


     Lobster (Verified  Allergy, Severe, Anaphylaxis, 17)


     Morphine (Verified  Allergy, Severe, Hives, 17)


     Penicillin (Verified  Allergy, Severe, Rash, 17)


     Phenergan (Verified  Allergy, Severe, Shortness of Breath, 17)


     Reglan (Verified  Allergy, Severe, Shortness of Breath, 17)


     Sulfa (Verified  Allergy, Severe, Rash, 17)


     Tigan (Verified  Allergy, Severe, Shortness of Breath, 17)


     Toradol (Verified  Allergy, Severe, Shortness of Breath, 17)


     Zofran (Verified  Allergy, Severe, Shortness of Breath, 17)


Reported Meds & Prescriptions





Reported Meds & Active Scripts


Active


Doxycycline Hyclate 100 Mg Cap 100 Mg PO BID


Diphenhydramine (Diphenhydramine HCl) 25 Mg Cap 25 Mg PO Q6H PRN


Reported


Ambien (Zolpidem Tartrate) 10 Mg Tab 10 Mg PO HS PRN


Dilaudid (Hydromorphone HCl) 4 Mg Tab 6 Mg PO 5 TIMES A DAY PRN


Valium (Diazepam) 10 Mg Tab 10 Mg PO TID


Buspirone (Buspirone HCl) 7.5 Mg Tab 7.5 Mg PO BID


Cyanocobalamin Inj (Cyanocobalamin) 1,000 Mcg/Ml Inj 1,000 Mcg IM Q30D








Review of Systems


General / Constitutional:  No: Fever


HENT:  No: Headaches


Cardiovascular:  No: Chest Pain or Discomfort


Respiratory:  No: Cough





Physical Exam


Narrative


GASTROINTESTINAL:  Abdomen soft, non-tender, nondistended. Positive bowel 

sounds.  No hepato-splenomegaly, or palpable masses. No guarding.





RESPIRATORY: Respiratory effort unlabored, no retractions or use of accessory 

muscles.  Breath sounds are clear and symmetric.





CARDIOVASCULAR: Regular rate and rhythm without murmur.  Extremities showed no 

edema or varicosities.





SKIN: Focused skin assessment reveals no rash or ulcers. Skin is warm and dry.  

Palpation shows no induration or nodules.





Data


Data


Last Documented VS





Vital Signs








  Date Time  Temp Pulse Resp B/P Pulse Ox O2 Delivery O2 Flow Rate FiO2


 


17 13:33 97.4 70 20 113/74 100 Room Air  








Orders





 Al-Mag Hy-Si 40-40-4 Mg/Ml Liq (Mag-Al P (17 14:30)


Lidocaine 2% Viscous (Xylocaine 2% Visco (17 14:30)








[facility]


Medical Decision Making


Medical Screen Exam Complete:  Yes


Emergency Medical Condition:  Yes


Medical Record Reviewed:  Yes


Differential Diagnosis


Chronic abdominal pain, acute on chronic abdominal pain: Dyspepsia: Peptic ulcer


Narrative Course


I have reviewed the patient's electronic medical record.  This is the patient's 

22nd visit to the emergency room this year alone, almost all for abdominal pain





Patient is had multiple negative workups in the past.  She follows with GI 

physician monthly.





She is a soft benign nontender abdomen and normal vital signs


She does not look like she is in any pain


She continually hounds me for IV Dilaudid.


I don't think that is appropriate medical care





I did give her Maalox and viscous lidocaine comminution for her dyspepsia 

symptoms


Recommend she call her GI physician tomorrow for follow-up





Diagnosis





 Primary Impression:  


 Abdominal pain


 Qualified Code:  R10.84 - Generalized abdominal pain


 Additional Impression:  


 Dyspepsia





***Additional Instructions:


The patient was advised to follow up with their physician and return if they 

worsen.


***Med/Other Pt SpecificInfo:  Other


Disposition:  01 DISCHARGE HOME


Condition:  Stable








Reji Tello MD 2017 14:30

## 2017-07-31 ENCOUNTER — HOSPITAL ENCOUNTER (OUTPATIENT)
Dept: HOSPITAL 17 - NEPD | Age: 57
Setting detail: OBSERVATION
LOS: 2 days | Discharge: HOME | End: 2017-08-02
Attending: SPECIALIST | Admitting: SPECIALIST
Payer: COMMERCIAL

## 2017-07-31 VITALS
TEMPERATURE: 99.1 F | SYSTOLIC BLOOD PRESSURE: 138 MMHG | DIASTOLIC BLOOD PRESSURE: 83 MMHG | RESPIRATION RATE: 18 BRPM | HEART RATE: 54 BPM | OXYGEN SATURATION: 98 %

## 2017-07-31 VITALS
SYSTOLIC BLOOD PRESSURE: 134 MMHG | RESPIRATION RATE: 16 BRPM | DIASTOLIC BLOOD PRESSURE: 74 MMHG | OXYGEN SATURATION: 98 % | HEART RATE: 73 BPM

## 2017-07-31 VITALS — DIASTOLIC BLOOD PRESSURE: 82 MMHG | HEART RATE: 60 BPM | RESPIRATION RATE: 22 BRPM | SYSTOLIC BLOOD PRESSURE: 173 MMHG

## 2017-07-31 VITALS — OXYGEN SATURATION: 98 %

## 2017-07-31 VITALS
SYSTOLIC BLOOD PRESSURE: 136 MMHG | DIASTOLIC BLOOD PRESSURE: 75 MMHG | TEMPERATURE: 98.8 F | HEART RATE: 75 BPM | RESPIRATION RATE: 17 BRPM | OXYGEN SATURATION: 99 %

## 2017-07-31 VITALS
SYSTOLIC BLOOD PRESSURE: 137 MMHG | DIASTOLIC BLOOD PRESSURE: 85 MMHG | OXYGEN SATURATION: 98 % | TEMPERATURE: 97.7 F | HEART RATE: 56 BPM | RESPIRATION RATE: 15 BRPM

## 2017-07-31 VITALS — HEIGHT: 62 IN | BODY MASS INDEX: 16.23 KG/M2 | WEIGHT: 88.18 LBS

## 2017-07-31 VITALS
DIASTOLIC BLOOD PRESSURE: 72 MMHG | HEART RATE: 66 BPM | OXYGEN SATURATION: 97 % | RESPIRATION RATE: 20 BRPM | TEMPERATURE: 99.1 F | SYSTOLIC BLOOD PRESSURE: 124 MMHG

## 2017-07-31 VITALS — TEMPERATURE: 99.4 F

## 2017-07-31 DIAGNOSIS — G89.4: ICD-10-CM

## 2017-07-31 DIAGNOSIS — K21.9: ICD-10-CM

## 2017-07-31 DIAGNOSIS — R00.1: ICD-10-CM

## 2017-07-31 DIAGNOSIS — Z86.73: ICD-10-CM

## 2017-07-31 DIAGNOSIS — I25.10: ICD-10-CM

## 2017-07-31 DIAGNOSIS — I44.7: ICD-10-CM

## 2017-07-31 DIAGNOSIS — F41.9: ICD-10-CM

## 2017-07-31 DIAGNOSIS — E46: ICD-10-CM

## 2017-07-31 DIAGNOSIS — K31.84: ICD-10-CM

## 2017-07-31 DIAGNOSIS — Z90.3: ICD-10-CM

## 2017-07-31 DIAGNOSIS — R19.7: ICD-10-CM

## 2017-07-31 DIAGNOSIS — R94.31: ICD-10-CM

## 2017-07-31 DIAGNOSIS — R06.00: ICD-10-CM

## 2017-07-31 DIAGNOSIS — Z85.528: ICD-10-CM

## 2017-07-31 DIAGNOSIS — I49.8: ICD-10-CM

## 2017-07-31 DIAGNOSIS — R63.4: ICD-10-CM

## 2017-07-31 DIAGNOSIS — R74.8: ICD-10-CM

## 2017-07-31 DIAGNOSIS — I25.2: ICD-10-CM

## 2017-07-31 DIAGNOSIS — Z98.51: ICD-10-CM

## 2017-07-31 DIAGNOSIS — R10.84: Primary | ICD-10-CM

## 2017-07-31 DIAGNOSIS — Z79.891: ICD-10-CM

## 2017-07-31 DIAGNOSIS — F11.20: ICD-10-CM

## 2017-07-31 DIAGNOSIS — Z90.5: ICD-10-CM

## 2017-07-31 LAB
ALP SERPL-CCNC: 101 U/L (ref 45–117)
ALT SERPL-CCNC: 59 U/L (ref 10–53)
ANION GAP SERPL CALC-SCNC: 8 MEQ/L (ref 5–15)
APTT BLD: 26.8 SEC (ref 24.3–30.1)
AST SERPL-CCNC: 59 U/L (ref 15–37)
BASOPHILS # BLD AUTO: 0 TH/MM3 (ref 0–0.2)
BASOPHILS NFR BLD: 0 % (ref 0–2)
BILIRUB SERPL-MCNC: 0.2 MG/DL (ref 0.2–1)
BUN SERPL-MCNC: 13 MG/DL (ref 7–18)
CHLORIDE SERPL-SCNC: 104 MEQ/L (ref 98–107)
EOSINOPHIL # BLD: 0 TH/MM3 (ref 0–0.4)
EOSINOPHIL NFR BLD: 0.5 % (ref 0–4)
ERYTHROCYTE [DISTWIDTH] IN BLOOD BY AUTOMATED COUNT: 16.5 % (ref 11.6–17.2)
GFR SERPLBLD BASED ON 1.73 SQ M-ARVRAT: 82 ML/MIN (ref 89–?)
HCO3 BLD-SCNC: 26 MEQ/L (ref 21–32)
HCT VFR BLD CALC: 34.7 % (ref 35–46)
HEMO FLAGS: (no result)
INR PPP: 1 RATIO
LYMPHOCYTES # BLD AUTO: 0.6 TH/MM3 (ref 1–4.8)
LYMPHOCYTES NFR BLD AUTO: 13.6 % (ref 9–44)
MCH RBC QN AUTO: 28.4 PG (ref 27–34)
MCHC RBC AUTO-ENTMCNC: 33.2 % (ref 32–36)
MCV RBC AUTO: 85.7 FL (ref 80–100)
MONOCYTES NFR BLD: 4.9 % (ref 0–8)
NEUTROPHILS # BLD AUTO: 3.3 TH/MM3 (ref 1.8–7.7)
NEUTROPHILS NFR BLD AUTO: 81 % (ref 16–70)
PLATELET # BLD: 193 TH/MM3 (ref 150–450)
POTASSIUM SERPL-SCNC: 4.4 MEQ/L (ref 3.5–5.1)
PROTHROMBIN TIME: 10.6 SEC (ref 9.8–11.6)
RBC # BLD AUTO: 4.05 MIL/MM3 (ref 4–5.3)
SODIUM SERPL-SCNC: 138 MEQ/L (ref 136–145)
WBC # BLD AUTO: 4.1 TH/MM3 (ref 4–11)

## 2017-07-31 PROCEDURE — 74000: CPT

## 2017-07-31 PROCEDURE — 96365 THER/PROPH/DIAG IV INF INIT: CPT

## 2017-07-31 PROCEDURE — 96361 HYDRATE IV INFUSION ADD-ON: CPT

## 2017-07-31 PROCEDURE — 85025 COMPLETE CBC W/AUTO DIFF WBC: CPT

## 2017-07-31 PROCEDURE — 96372 THER/PROPH/DIAG INJ SC/IM: CPT

## 2017-07-31 PROCEDURE — 96366 THER/PROPH/DIAG IV INF ADDON: CPT

## 2017-07-31 PROCEDURE — 83690 ASSAY OF LIPASE: CPT

## 2017-07-31 PROCEDURE — 83605 ASSAY OF LACTIC ACID: CPT

## 2017-07-31 PROCEDURE — 85730 THROMBOPLASTIN TIME PARTIAL: CPT

## 2017-07-31 PROCEDURE — 76937 US GUIDE VASCULAR ACCESS: CPT

## 2017-07-31 PROCEDURE — G0378 HOSPITAL OBSERVATION PER HR: HCPCS

## 2017-07-31 PROCEDURE — 85610 PROTHROMBIN TIME: CPT

## 2017-07-31 PROCEDURE — 82948 REAGENT STRIP/BLOOD GLUCOSE: CPT

## 2017-07-31 PROCEDURE — 80053 COMPREHEN METABOLIC PANEL: CPT

## 2017-07-31 PROCEDURE — 93005 ELECTROCARDIOGRAM TRACING: CPT

## 2017-07-31 PROCEDURE — 74177 CT ABD & PELVIS W/CONTRAST: CPT

## 2017-07-31 PROCEDURE — 96376 TX/PRO/DX INJ SAME DRUG ADON: CPT

## 2017-07-31 PROCEDURE — 96375 TX/PRO/DX INJ NEW DRUG ADDON: CPT

## 2017-07-31 PROCEDURE — 80076 HEPATIC FUNCTION PANEL: CPT

## 2017-07-31 PROCEDURE — 99285 EMERGENCY DEPT VISIT HI MDM: CPT

## 2017-07-31 RX ADMIN — FAMOTIDINE SCH MG: 10 INJECTION, SOLUTION INTRAVENOUS at 20:46

## 2017-07-31 RX ADMIN — PHENYTOIN SODIUM SCH MLS/HR: 50 INJECTION INTRAMUSCULAR; INTRAVENOUS at 17:13

## 2017-07-31 RX ADMIN — STANDARDIZED SENNA CONCENTRATE AND DOCUSATE SODIUM SCH TAB: 8.6; 5 TABLET, FILM COATED ORAL at 20:46

## 2017-07-31 RX ADMIN — Medication SCH ML: at 20:47

## 2017-07-31 RX ADMIN — STANDARDIZED SENNA CONCENTRATE AND DOCUSATE SODIUM SCH TAB: 8.6; 5 TABLET, FILM COATED ORAL at 21:00

## 2017-07-31 RX ADMIN — PHENYTOIN SODIUM SCH MLS/HR: 50 INJECTION INTRAMUSCULAR; INTRAVENOUS at 23:09

## 2017-07-31 RX ADMIN — HEPARIN SODIUM SCH UNITS: 10000 INJECTION, SOLUTION INTRAVENOUS; SUBCUTANEOUS at 17:13

## 2017-07-31 NOTE — RADRPT
EXAM DATE/TIME:  07/31/2017 11:53 

 

[facility] COMPARISON:     

ABDOMEN UPRIGHT ONLY, June 27, 2017, 20:32.

 

 

INDICATIONS :     

Left abdomen pain .

                      

 

IV CONTRAST:     

75 cc Omnipaque 350 (iohexol) IV 

 

 

ORAL CONTRAST:      

No oral contrast ingested.

                      

 

RADIATION DOSE:     

4.49 CTDIvol (mGy) 

 

 

MEDICAL HISTORY :     

Cardiovascular disease. Hernia, hiatal. Seizures.Kidney cancer

 

SURGICAL HISTORY :      

Hysterectomy. Appendectomy.Cholecystectomy.RT nephrectomy,gastrectomy

 

ENCOUNTER:      

Initial

 

ACUITY:      

1 day

 

PAIN SCALE:      

7/10

 

LOCATION:       

Left  Abdomen

 

TECHNIQUE:     

Volumetric scanning of the abdomen and pelvis was performed.  Using automated exposure control and ad
justment of the mA and/or kV according to patient size, radiation dose was kept as low as reasonably 
achievable to obtain optimal diagnostic quality images.  DICOM format image data is available electro
nically for review and comparison.  

 

FINDINGS:     

CT Abdomen: The liver, spleen, pancreas, left kidney, adrenals are unremarkable. The right kidney is 
absent surgically. There are couple loops of small bowel slightly prominent in size with maximum diam
eter of the 3.3 cm probably some degree of ileus and extensive postsurgical changes are present in th
e abdomen and findings most likely represent an ileus. There is no evidence for any appreciable patho
logical adenopathy, free fluid. Mild left lung base atelectasis and/or infiltrate is seen.  

 

CT pelvis: There is no evidence for mass, abscess formation, or any significant adenopathy within the
 pelvis.  

 

 

CONCLUSION:     Probable ileus and the left lung base atelectasis and/or infiltrate. 

 

 

 LARA Monk MD on July 31, 2017 at 12:27           

Board Certified Radiologist.

 This report was verified electronically.

## 2017-07-31 NOTE — HHI.HP
HPI


Service


[facility]


Primary Care Physician


No Primary Care Physician


Admission Diagnosis


Ileus, Chronic abdominal pain


Diagnoses:  


Chief Complaint:  


abdominal pain


Travel History


International Travel<30 Days:  No


Contact w/Intl Traveler <30 Da:  No


Traveled to Known Affected Are:  No


History of Present Illness


This is a 56-year-old female with significant past medical history of chronic 

abdominal pain with extensive GI workup, gastroparesis, previous pancreatitis, 

chronic opioid use, CAD, history of kidney cancer post-nephrectomy, history of 

embolic strokes, anxiety.  Patient has had multiple admissions, so far this 

year she has visited the emergency room at least 22 times.  She was in the 

emergency room yesterday with complaint of abdominal pain, at that time she was 

treated with Dilaudid and discharged in stable condition.  She returns today 

with increasing abdominal pain, it is worse than her usual pain.  Patient takes 

by mouth Dilaudid at home however this has not relieved pain.  Indicates she's 

been nauseous but is not able to vomit because of her gastric surgery.  

Indicates she had a bowel movement today that was formed and brown however 

yesterday she had diarrhea.  Denies any fever, no chills.  Indicates that she's 

been trying to eat as she only weighed 80 pounds.  She has been following up 

with a gastroenterologist as outpatient but is not able to recall the name.  

During prior admissions, she was referred to a tertiary Center for further 

evaluation but she has not done so.  In the emergency room, patient was 

evaluated.  Laboratory workup was essentially unremarkable except for mild 

elevation in AST and ALT of 59.  Lipase was 198.  CT of the abdomen showed 

probable ileus and left lung base atelectasis or infiltrate.  Patient was 

requesting Dilaudid, she was counseled extensively by the emergency room 

physician that based on the findings at the continuation of narcotics is 

contraindicated and that she will not be given any.  Patient complaining of 

nausea, she has allergies to Compazine, Reglan, Tigan, and Zofran.  States that 

she develops difficulty breathing.  She requested Benadryl for nausea and this 

was given.  At this time, she is very anxious, again concerned about her by 

mouth Dilaudid and when she can be restarted on it.  She's also requesting her 

Valium as this also helps with her nausea.  Patient is admitted for further 

evaluation and treatment.








Review of Systems


Constitutional:  COMPLAINS OF: Weight loss,  DENIES: Diaphoretic episodes, 

Fatigue, Fever, Weight gain, Chills, Dizziness, Change in appetite, Night Sweats


Endocrine:  DENIES: Abnorml menstrual pattern, Heat/cold intolerance, Polydipsia

, Polyuria, Polyphagia


Eyes:  DENIES: Blurred vision, Diplopia, Eye inflammation, Eye pain, Vision loss

, Photosensitivity, Double Vision


Ears, nose, mouth, throat:  DENIES: Tinnitus, Hearing loss, Vertigo, Nasal 

discharge, Oral lesions, Throat pain, Hoarseness, Ear Pain, Running Nose, 

Epistaxis, Sinus Pain, Toothache, Odynophagia


Respiratory:  DENIES: Apneas, Cough, Snoring, Wheezing, Hemoptysis, Sputum 

production, Shortness of breath


Cardiovascular:  DENIES: Chest pain, Palpitations, Syncope, Dyspnea on Exertion

, PND, Lower Extremity Edema, Orthopnea, Claudication


Gastrointestinal:  COMPLAINS OF: Abdominal pain, Nausea,  DENIES: Black stools, 

Bloody stools, Constipation, Diarrhea, Vomiting, Difficulty Swallowing, Anorexia


Genitourinary:  DENIES: Abnormal vaginal bleeding, Dysmenorrhea, Dyspareunia, 

Sexual dysfunction, Urinary frequency, Urinary incontinence, Urgency, Hematuria

, Dysuria, Nocturia, Vaginal discharge


Musculoskeletal:  DENIES: Joint pain, Muscle aches, Stiffness, Joint Swelling, 

Back pain, Neck pain


Integumentary:  DENIES: Abnormal pigmentation, Pruritus, Rash, Nail changes, 

Breast masses, Breast skin changes, Nipple discharge


Hematologic/lymphatic:  DENIES: Bruising, Lymphadenopathy


Immunologic/allergic:  DENIES: Eczema, Urticaria


Neurologic:  DENIES: Abnormal gait, Headache, Localized weakness, Paresthesias, 

Seizures, Speech Problems, Tremor, Poor Balance


Psychiatric:  DENIES: Anxiety, Confusion, Mood changes, Depression, 

Hallucinations, Agitation, Suicidal Ideation, Homicidal Ideation, Delusions





Past Family Social History


Past Medical History


1.   Anxiety.


2.   History of kidney cancer, status post nephrectomy.


3.   History of MI in 2006.


4.   History of tubal ligation in the past.


5.  Pancreatitis


6.   Chronic abd. pain with extensive GI work up


7.  Malnutrition


8.  Weight loss


9.  Anxiety


10.  Chronic opioid use. 


11.  Gastroparesis


12. Questionable seizures


13. PFO which led to embolic strokes


Past Surgical History


1.  Right nephrectomy


2.  Tubal ligation


3.  Gastrectomy with pouch


4.  Hernia repair


5.  Appendectomy


6.  Cholecystectomy


7.  Hysterectomy


8.  Tonsillectomy


9.  Fundoplication


10. PFO closure with 


11. EGD/colonoscopy


Reported Medications





Reported Meds & Active Scripts


Active


Reported


Ambien (Zolpidem Tartrate) 10 Mg Tab 10 Mg PO HS PRN


Dilaudid (Hydromorphone HCl) 4 Mg Tab 6 Mg PO 5 TIMES A DAY PRN


Valium (Diazepam) 10 Mg Tab 10 Mg PO TID


Buspirone (Buspirone HCl) 7.5 Mg Tab 7.5 Mg PO BID


Cyanocobalamin Inj (Cyanocobalamin) 1,000 Mcg/Ml Inj 1,000 Mcg IM Q30D


Allergies:  


Coded Allergies:  


     Compazine (Verified  Allergy, Severe, Shortness of Breath, 7/31/17)


     Gadolinium Derivatives (Verified  Allergy, Severe, Anaphylaxis, 7/31/17)


     Lobster (Verified  Allergy, Severe, Anaphylaxis, 7/31/17)


     Morphine (Verified  Allergy, Severe, Hives, 7/31/17)


     Penicillin (Verified  Allergy, Severe, Rash, 7/31/17)


     Phenergan (Verified  Allergy, Severe, Shortness of Breath, 7/31/17)


     Reglan (Verified  Allergy, Severe, Shortness of Breath, 7/31/17)


     Sulfa (Verified  Allergy, Severe, Rash, 7/31/17)


     Tigan (Verified  Allergy, Severe, Shortness of Breath, 7/31/17)


     Toradol (Verified  Allergy, Severe, Shortness of Breath, 7/31/17)


     Zofran (Verified  Allergy, Severe, Shortness of Breath, 7/31/17)


Active Ordered Medications





 Inpatient Medications


Acetaminophen (Tylenol) 650 mg Q4H  PRN PO TEMP > 100.4;  Start 7/31/17 at 13:45


Al Hydrox/Mg Hydrox/Simethicone (Mag-Al Plus Susp Liq) 30 ml ONCE  ONCE PO ;  

Start 7/31/17 at 10:15;  Stop 7/31/17 at 10:16;  Status DC


Bisacodyl (Dulcolax Supp) 10 mg DAILY  PRN RECTAL SEVERE CONSITIPATION;  Start 7 /31/17 at 13:45


Diphenhydramine HCl 25 mg 25 mg ONCE  ONCE IV PUSH ;  Start 7/31/17 at 13:00;  

Stop 7/31/17 at 13:01;  Status DC


Heparin Sodium (Porcine) (Heparin Inj) 5,000 units Q12H SQ ;  Start 7/31/17 at 

15:00


Lactulose (Lactulose Liq) 30 ml DAILY  PRN PO SEVERE CONSITIPATION;  Start 7/31/ 17 at 13:45


Lidocaine HCl 15 ml 15 ml ONCE  ONCE PO ;  Start 7/31/17 at 10:15;  Stop 7/31/ 17 at 10:16;  Status DC


Magnesium Hydroxide (Milk Of Magnesia Liq) 30 ml Q12H  PRN PO MILD - MODERATE 

CONSTIPATION;  Start 7/31/17 at 13:45


Ondansetron HCl (Zofran Inj) 4 mg Q6H  PRN IVP NAUSEA OR VOMITING;  Start 7/31/ 17 at 13:45;  Stop 7/31/17 at 14:10;  Status DC


Senna/Docusate Sodium (Evonne-Colace) 1 tab BID PO ;  Start 7/31/17 at 21:00


Sennosides (Senokot) 17.2 mg Q12H  PRN PO MODERATE - SEVERE CONSTIPATION;  

Start 7/31/17 at 13:45


Sodium Chloride (NS 1000 ml Inj) 1,000 ml @  100 mls/hr Q10H IV ;  Start 7/31/ 17 at 13:37


Sodium Chloride ( ml Inj) 500 ml @  500 mls/hr BOLUS  ONCE IV  Last 

administered on 7/31/17at 10:51;  Start 7/31/17 at 10:15;  Stop 7/31/17 at 11:14

;  Status DC


Sodium Chloride (NS Flush) 2 ml BID IV FLUSH ;  Start 7/31/17 at 21:00


Family History


Mother had MS and COPD.  Father had COPD.


Social History


 lives with 


No ETOH


no smoking


no illegal drug use.





Physical Exam


Vital Signs





 Vital Signs








  Date Time  Temp Pulse Resp B/P Pulse Ox O2 Delivery O2 Flow Rate FiO2


 


7/31/17 17:05 99.4       


 


7/31/17 17:04  73 16 134/74 98   


 


7/31/17 15:16 98.8 75 17 136/75 99 Room Air  


 


7/31/17 11:25  60 22 173/82  Room Air  


 


7/31/17 10:35     98 Room Air  


 


7/31/17 09:55 97.7 56 15 137/85 98   








Physical Exam


GENERAL: This is a malnourished, thin female. 


SKIN: No rashes, ecchymoses or lesions. Cool and dry.


HEAD: Atraumatic. Normocephalic. No temporal or scalp tenderness.


EYES: Pupils equal round and reactive. Extraocular motions intact. No scleral 

icterus. No injection or drainage. 


ENT: Nose without bleeding, purulent drainage or septal hematoma. Throat 

without erythema, tonsillar hypertrophy or exudate. Uvula midline. Airway 

patent.


NECK: Trachea midline. No JVD or lymphadenopathy. Supple, nontender, no 

meningeal signs.


CARDIOVASCULAR: Regular rate and rhythm without murmurs, gallops, or rubs. 


RESPIRATORY: Clear to auscultation. Breath sounds equal bilaterally. No wheezes

, rales, or rhonchi.  


GASTROINTESTINAL: Abdomen soft,diffusely tender, nondistended.  Normoactive 

bowel sounds 4.  No hepato-splenomegaly, or palpable masses. No guarding.


MUSCULOSKELETAL: Extremities without clubbing, cyanosis, or edema. No joint 

tenderness, effusion, or edema noted. No calf tenderness. Negative Homans sign 

bilaterally.


NEUROLOGICAL: Awake, alert oriented 3.  No focal deficits.


Laboratory





Laboratory Tests








Test 7/31/17





 10:39


 


White Blood Count 4.1 


 


Red Blood Count 4.05 


 


Hemoglobin 11.5 


 


Hematocrit 34.7 


 


Mean Corpuscular Volume 85.7 


 


Mean Corpuscular Hemoglobin 28.4 


 


Mean Corpuscular Hemoglobin 33.2 





Concent 


 


Red Cell Distribution Width 16.5 


 


Platelet Count 193 


 


Mean Platelet Volume 9.6 


 


Neutrophils (%) (Auto) 81.0 


 


Lymphocytes (%) (Auto) 13.6 


 


Monocytes (%) (Auto) 4.9 


 


Eosinophils (%) (Auto) 0.5 


 


Basophils (%) (Auto) 0.0 


 


Neutrophils # (Auto) 3.3 


 


Lymphocytes # (Auto) 0.6 


 


Monocytes # (Auto) 0.2 


 


Eosinophils # (Auto) 0.0 


 


Basophils # (Auto) 0.0 


 


CBC Comment DIFF FINAL 


 


Differential Comment  


 


Prothrombin Time 10.6 


 


Prothromb Time International 1.0 





Ratio 


 


Activated Partial 26.8 





Thromboplast Time 


 


Sodium Level 138 


 


Potassium Level 4.4 


 


Chloride Level 104 


 


Carbon Dioxide Level 26.0 


 


Anion Gap 8 


 


Blood Urea Nitrogen 13 


 


Creatinine 0.73 


 


Estimat Glomerular Filtration 82 





Rate 


 


Random Glucose 94 


 


Lactic Acid Level 1.9 


 


Calcium Level 9.3 


 


Total Bilirubin 0.2 


 


Aspartate Amino Transf 59 





(AST/SGOT) 


 


Alanine Aminotransferase 59 





(ALT/SGPT) 


 


Alkaline Phosphatase 101 


 


Total Protein 7.2 


 


Albumin 3.6 


 


Lipase 198 








Result Diagram:  


7/31/17 1039                                                                   

             7/31/17 1039





Imaging





Last Impressions








Abdomen/Pelvis CT 7/31/17 1013 Signed





Impressions: 





 Service Date/Time:  Monday, July 31, 2017 11:53 - CONCLUSION: Probable ileus 

and 





 the left lung base atelectasis and/or infiltrate.     LARA Monk MD 











Assessment and Plan


Problem List:  


(1) Abdominal pain


(2) Ileus


(3) Chronic pain


(4) Chronic narcotic dependence


(5) Gastroparesis


(6) History of CVA (cerebrovascular accident)


(7) GERD (gastroesophageal reflux disease)


(8) Malnourished


(9) CAD (coronary artery disease)


(10) Hx of hiatal hernia


(11) History of gastrectomy


(12) hx right nephrectomy 


(13) Hx of renal cell cancer


(14) Gastroenteritis


(15) Elevated liver enzymes


Assessment and Plan


Admit to Dr. Estrella





56-year-old female with history of chronic abdominal pain and multiple 

admissions and ER visits.  Presents today with increasing abdominal pain, 

nausea.  CT of the abdomen findings of probable ileus.


-Keep patient nothing by mouth


-Continue with normal saline at 100 hour


-Continue with Benadryl as needed for nausea


-Repeat KUB in the morning


-If worsening findings, we'll consult surgery.





Acute on chronic abdominal pain, chronic narcotic use


Drug-seeking behavior


-Patient has been counseled extensively about her use of narcotics and 

worsening of her abdominal pain, ileus and gastroparesis.


-At this time, no narcotics will be initiated.





History of gastrectomy, hiatal hernia, gastroparesis


Recommend that she continue to follow with GI as outpatient


-Pepcid 20 mg IV twice a day will be initiated





Elevated liver enzymes,


-Repeat LFTs in the morning





Heparin for DVT prophylaxis


Pepcid for GI prophylaxis


Plan of care has been discussed with the patient, attending and registered 

nurse.  Further management of the patient will be dependent on the hospital 

course





This patient was seen by myself and Dr. Estrella, this H&P is written on his behalf





Problem Qualifiers





(1) Abdominal pain:  


Qualified Code:  R10.84 - Generalized abdominal pain


(2) Chronic pain:  


Qualified Code:  G89.4 - Chronic pain syndrome


(3) GERD (gastroesophageal reflux disease):  


Qualified Code:  K21.9 - Gastroesophageal reflux disease, esophagitis presence 

not specified


(4) CAD (coronary artery disease):  


Qualified Code:  I25.10 - Coronary artery disease involving native coronary 

artery of native heart without angina pectoris





Nikia Simental Jul 31, 2017 17:15

## 2017-07-31 NOTE — EKG
Date Performed: 07/31/2017       Time Performed: 10:37:35

 

PTAGE:      56 years

 

EKG:      SINUS BRADYCARDIA WITH SINUS ARRHYTHMIA LEFT BUNDLE BRANCH BLOCK ABNORMAL ECG Compared to p
rior tracing no significant change 

 

 PREVIOUS TRACING            : 07/09/2017 10.06

 

DOCTOR:   Jesus Russell  Interpretating Date/Time  07/31/2017 14:12:42

## 2017-07-31 NOTE — PD
HPI


Chief Complaint:  GI Complaint


Time Seen by Provider:  10:13


Travel History


International Travel<30 days:  No


Contact w/Intl Traveler<30days:  No


Traveled to known affect area:  No





History of Present Illness


HPI


Patient is a 56-year-old female well known to this department for repeat 

presentations for acute on chronic abdominal pain.  Patient was here yesterday 

for the same thing according to the provider's documentation the patient was 

very persistent for Dilaudid which she did not receive.  Patient states she's 

been taking her by mouth Dilaudid at home without significant relief.  She 

states the symptoms have been going on ever since she had her gastrectomy some 

years ago.  She states that her current flares been going on for approximately 

3 days.  States mild nausea and she has been bringing up bile.  States watery 

diarrhea and think she is dehydrated.  States that sharp and she feels like 

"there is a hole in my stomach".





PFSH


Past Medical History


Hx Anticoagulant Therapy:  No


Asthma:  No


Blood Disorders:  No


Anxiety:  Yes


Depression:  No


Heart Rhythm Problems:  No


Cancer:  Yes (KIDNEY)


Cardiovascular Problems:  Yes


High Cholesterol:  No


Chemotherapy:  Yes


Chest Pain:  No


Congestive Heart Failure:  No


COPD:  No


Cerebrovascular Accident:  Yes


Diabetes:  No


Diminished Hearing:  No


Endocrine:  No


Gastrointestinal Disorders:  Yes


GERD:  No


Genitourinary:  Yes


Headaches:  Yes


Hiatal Hernia:  Yes


Hypertension:  No


Immune Disorder:  No


Implanted Vascular Access Dvce:  No


Kidney Stones:  No


Musculoskeletal:  No


Neurologic:  No


Psychiatric:  Yes


Reproductive:  No


Respiratory:  No


Immunizations Current:  Yes


Migraines:  Yes


Myocardial Infarction:  Yes ()


Pneumonia:  Yes


Radiation Therapy:  No


Renal Failure:  No


Seizures:  Yes


Sleep Apnea:  No


Thyroid Disease:  No


Ulcer:  No


Menopausal:  Yes


:  1


Para:  1


Miscarriage:  0


:  0


Ectopic Pregnancy:  No


Ovarian Cysts:  No


Dilation and Curettage (D&C):  Yes


Tubal Ligation:  Yes





Past Surgical History


Abdominal Surgery:  Yes (total gastrectomy w/pouch , hernia repair)


Appendectomy:  Yes


Cardiac Surgery:  Yes


Cholecystectomy:  Yes


Genitourinary Surgery:  Yes (RIGHT NEPHRECTOMY)


Hysterectomy:  Yes (ONLY TUBES TIED, NOT HYSTERECTOMY)


Thoracic Surgery:  No


Tonsillectomy:  Yes


Other Surgery:  Yes





Social History


Alcohol Use:  No


Tobacco Use:  No


Substance Use:  No





Allergies-Medications


(Allergen,Severity, Reaction):  


Coded Allergies:  


     Compazine (Verified  Allergy, Severe, Shortness of Breath, 17)


     Gadolinium Derivatives (Verified  Allergy, Severe, Anaphylaxis, 17)


     Lobster (Verified  Allergy, Severe, Anaphylaxis, 17)


     Morphine (Verified  Allergy, Severe, Hives, 17)


     Penicillin (Verified  Allergy, Severe, Rash, 17)


     Phenergan (Verified  Allergy, Severe, Shortness of Breath, 17)


     Reglan (Verified  Allergy, Severe, Shortness of Breath, 17)


     Sulfa (Verified  Allergy, Severe, Rash, 17)


     Tigan (Verified  Allergy, Severe, Shortness of Breath, 17)


     Toradol (Verified  Allergy, Severe, Shortness of Breath, 17)


     Zofran (Verified  Allergy, Severe, Shortness of Breath, 17)


Reported Meds & Prescriptions





Reported Meds & Active Scripts


Active


Reported


Ambien (Zolpidem Tartrate) 10 Mg Tab 10 Mg PO HS PRN


Dilaudid (Hydromorphone HCl) 4 Mg Tab 6 Mg PO 5 TIMES A DAY PRN


Valium (Diazepam) 10 Mg Tab 10 Mg PO TID


Buspirone (Buspirone HCl) 7.5 Mg Tab 7.5 Mg PO BID


Cyanocobalamin Inj (Cyanocobalamin) 1,000 Mcg/Ml Inj 1,000 Mcg IM Q30D








Review of Systems


Except as stated in HPI:  all other systems reviewed are Neg





Physical Exam


Narrative


GENERAL: Well-developed, thin sitting very comfortably in a stretcher in no 

obvious distress.


SKIN: Focused skin assessment warm/dry.


HEAD: Atraumatic. Normocephalic. 


EYES: Pupils equal and round. No scleral icterus. No injection or drainage. 


ENT: No nasal bleeding or discharge.  Mucous membranes pink and moist.


NECK: Trachea midline. No JVD. 


CARDIOVASCULAR: Regular rate and rhythm.  No murmur appreciated.


RESPIRATORY: No accessory muscle use. Clear to auscultation. Breath sounds 

equal bilaterally. 


GASTROINTESTINAL: Abdomen soft, non-tender, nondistended. Hepatic and splenic 

margins not palpable.  No peritoneal signs.  No rebound no percussive 

tenderness.


MUSCULOSKELETAL: No obvious deformities. No clubbing.  No cyanosis.  No edema. 


NEUROLOGICAL: Awake and alert. No obvious cranial nerve deficits.  Motor 

grossly within normal limits. Normal speech.


PSYCHIATRIC: Appropriate mood and affect; insight and judgment normal.





Data


Data


Last Documented VS





Vital Signs








  Date Time  Temp Pulse Resp B/P Pulse Ox O2 Delivery O2 Flow Rate FiO2


 


17 11:25  60 22 173/82  Room Air  


 


17 10:35     98   


 


17 09:55 97.7       








Orders





 Complete Blood Count With Diff (17 10:13)


Comprehensive Metabolic Panel (17 10:13)


Lipase (17 10:13)


Lactic Acid (17 10:13)


Prothrombin Time / Inr (Pt) (17 10:13)


Act Partial Throm Time (Ptt) (17 10:13)


Ct Abd/Pel W Iv Contrast(Rout) (17 10:13)


Iv Access Insert/Monitor (17 10:13)


Ecg Monitoring (17 10:13)


Oximetry (17 10:13)


Sodium Chloride 0.9% Flush (Ns Flush) (17 10:15)


Electrocardiogram (17 10:13)


Al-Mag Hy-Si 40-40-4 Mg/Ml Liq (Mag-Al P (17 10:15)


Lidocaine 2% Viscous (Xylocaine 2% Visco (17 10:15)


Sodium Chlorid 0.9% 500 Ml Inj (Ns 500 M (17 10:15)


Oral Contrast - Adult (17 10:20)


Iohexol 350 Inj (Omnipaque 350 Inj) (17 12:19)


Diphenhydramine Inj (Benadryl Inj) (17 13:00)


Admit Order (Ed Use Only) (17 )


Vital Signs (Adult) Q4H (17 13:37)


Activity Oob Ad Farida (17 13:37)


Diet Npo (17 Lunch)


Sodium Chlor 0.9% 1000 Ml Inj (Ns 1000 M (17 13:37)


Sodium Chloride 0.9% Flush (Ns Flush) (17 13:45)


Sodium Chloride 0.9% Flush (Ns Flush) (17 21:00)


Acetaminophen (Tylenol) (17 13:45)


Ondansetron Inj (Zofran Inj) (17 13:45)


Heparin Inj (Heparin Inj) (17 15:00)


Scd Bilateral/Knee High NATALIA.BID (17 13:37)


Docusate Sodium-Senna (Evonne-Colace) (17 21:00)


Magnesium Hydroxide Liq (Milk Of Magnesi (17 13:45)


Sennosides (Senokot) (17 13:45)


Bisacodyl Supp (Dulcolax Supp) (17 13:45)


Lactulose Liq (Lactulose Liq) (17 13:45)





Labs





 Laboratory Tests








Test 17





 10:39


 


White Blood Count 4.1 TH/MM3


 


Red Blood Count 4.05 MIL/MM3


 


Hemoglobin 11.5 GM/DL


 


Hematocrit 34.7 %


 


Mean Corpuscular Volume 85.7 FL


 


Mean Corpuscular Hemoglobin 28.4 PG


 


Mean Corpuscular Hemoglobin 33.2 %





Concent 


 


Red Cell Distribution Width 16.5 %


 


Platelet Count 193 TH/MM3


 


Mean Platelet Volume 9.6 FL


 


Neutrophils (%) (Auto) 81.0 %


 


Lymphocytes (%) (Auto) 13.6 %


 


Monocytes (%) (Auto) 4.9 %


 


Eosinophils (%) (Auto) 0.5 %


 


Basophils (%) (Auto) 0.0 %


 


Neutrophils # (Auto) 3.3 TH/MM3


 


Lymphocytes # (Auto) 0.6 TH/MM3


 


Monocytes # (Auto) 0.2 TH/MM3


 


Eosinophils # (Auto) 0.0 TH/MM3


 


Basophils # (Auto) 0.0 TH/MM3


 


CBC Comment DIFF FINAL 


 


Differential Comment  


 


Prothrombin Time 10.6 SEC


 


Prothromb Time International 1.0 RATIO





Ratio 


 


Activated Partial 26.8 SEC





Thromboplast Time 


 


Sodium Level 138 MEQ/L


 


Potassium Level 4.4 MEQ/L


 


Chloride Level 104 MEQ/L


 


Carbon Dioxide Level 26.0 MEQ/L


 


Anion Gap 8 MEQ/L


 


Blood Urea Nitrogen 13 MG/DL


 


Creatinine 0.73 MG/DL


 


Estimat Glomerular Filtration 82 ML/MIN





Rate 


 


Random Glucose 94 MG/DL


 


Lactic Acid Level 1.9 mmol/L


 


Calcium Level 9.3 MG/DL


 


Total Bilirubin 0.2 MG/DL


 


Aspartate Amino Transf 59 U/L





(AST/SGOT) 


 


Alanine Aminotransferase 59 U/L





(ALT/SGPT) 


 


Alkaline Phosphatase 101 U/L


 


Total Protein 7.2 GM/DL


 


Albumin 3.6 GM/DL


 


Lipase 198 U/L











MDM


Medical Decision Making


Medical Screen Exam Complete:  Yes


Emergency Medical Condition:  Yes


Interpretation(s)


EKG shows sinus bradycardia left bundle-branch block, negative Sgargossa's 

criteria.  Intervals otherwise within normal limits.  This is an abnormal EKG.  

No change from 2017.


Differential Diagnosis


Acute on chronic abdominal pain, ileus, bowel obstruction, acute abdomen highly 

unlikely.


Narrative Course


Patient roomed in the emergency Department, GI cocktail was ordered.  Multiple 

allergies to medications.  Patient requesting Dilaudid on arrival.  Discussed 

that given its her second presentation in 2 days for the same pain she states 

the intense that I would recommend a CAT scan of her abdomen and basic labs to 

exclude medical emergency.  She was offered a GI cocktail and she adamantly 

declined.  She is also refusing to take her oral contrast.  We'll continue to 

evaluate in the emergency department.  She appears comfortable I think that IV 

Dilaudid is not indicated at this time.








Last 24 hours Impressions








Abdomen/Pelvis CT 17 1013 Signed





Impressions: 





 Service Date/Time:  Monday, 2017 11:53 - CONCLUSION: Probable ileus 

and 





 the left lung base atelectasis and/or infiltrate.     LARA Monk MD 





Distress results with the patient and recommended admission to the hospital for 

ileus.  Patient has been asked me for Dilaudid on a fairly regular and 

recurrent basis in the emergency department.  I discussed with her that given 

her ileus narcotics are fairly contraindicated.  She has several times asked me 

to make an exception for her and treat her chronic pain.  I discussed with her 

that I would not be making any exceptions for her and treating her as medically 

appropriate.  At this time she is calm and comfortable and given her ileus 

opiate-induced ileus needs to be considered and opiates are contraindicated 

this was explained to her several times that if she is admitted to the hospital 

she should not expect to have any narcotic pain medicine while in the hospital.

  She verbalized understanding and agreement and wanted to be admission to the 

hospital.  She requests something for nausea and I discussed that she has 

multiple allergies to nausea medicine including Zofran and Reglan and Phenergan 

and Compazine.  She requested Benadryl and I think this is reasonable for her.  

A dose was ordered.  She was discussed with the [facility] hospitalist PA for 

admission and they are agreeable.





Diagnosis





 Primary Impression:  


 Ileus





Admitting Information


Admitting Physician Requests:  Observation


Condition:  Stable








Kostas Jean MD 2017 10:15

## 2017-08-01 VITALS
HEART RATE: 60 BPM | RESPIRATION RATE: 20 BRPM | SYSTOLIC BLOOD PRESSURE: 127 MMHG | TEMPERATURE: 98.3 F | OXYGEN SATURATION: 97 % | DIASTOLIC BLOOD PRESSURE: 76 MMHG

## 2017-08-01 VITALS
TEMPERATURE: 98.4 F | DIASTOLIC BLOOD PRESSURE: 77 MMHG | RESPIRATION RATE: 18 BRPM | HEART RATE: 55 BPM | SYSTOLIC BLOOD PRESSURE: 131 MMHG | OXYGEN SATURATION: 98 %

## 2017-08-01 VITALS
DIASTOLIC BLOOD PRESSURE: 83 MMHG | HEART RATE: 58 BPM | OXYGEN SATURATION: 98 % | TEMPERATURE: 98.4 F | SYSTOLIC BLOOD PRESSURE: 131 MMHG | RESPIRATION RATE: 18 BRPM

## 2017-08-01 VITALS
HEART RATE: 58 BPM | RESPIRATION RATE: 18 BRPM | OXYGEN SATURATION: 97 % | SYSTOLIC BLOOD PRESSURE: 138 MMHG | TEMPERATURE: 99.2 F | DIASTOLIC BLOOD PRESSURE: 76 MMHG

## 2017-08-01 VITALS
OXYGEN SATURATION: 98 % | DIASTOLIC BLOOD PRESSURE: 86 MMHG | SYSTOLIC BLOOD PRESSURE: 126 MMHG | TEMPERATURE: 98.5 F | RESPIRATION RATE: 20 BRPM | HEART RATE: 64 BPM

## 2017-08-01 VITALS
TEMPERATURE: 97.6 F | DIASTOLIC BLOOD PRESSURE: 79 MMHG | HEART RATE: 61 BPM | SYSTOLIC BLOOD PRESSURE: 133 MMHG | RESPIRATION RATE: 18 BRPM | OXYGEN SATURATION: 98 %

## 2017-08-01 LAB
ALP SERPL-CCNC: 82 U/L (ref 45–117)
ALT SERPL-CCNC: 37 U/L (ref 10–53)
AST SERPL-CCNC: 18 U/L (ref 15–37)
BILIRUB INDIRECT SERPL-MCNC: 0.1 MG/DL (ref 0–0.8)
BILIRUB SERPL-MCNC: 0.2 MG/DL (ref 0.2–1)

## 2017-08-01 RX ADMIN — ZOLPIDEM TARTRATE PRN MG: 10 TABLET, FILM COATED ORAL at 23:05

## 2017-08-01 RX ADMIN — PHENYTOIN SODIUM SCH MLS/HR: 50 INJECTION INTRAMUSCULAR; INTRAVENOUS at 09:43

## 2017-08-01 RX ADMIN — HEPARIN SODIUM SCH UNITS: 10000 INJECTION, SOLUTION INTRAVENOUS; SUBCUTANEOUS at 02:34

## 2017-08-01 RX ADMIN — Medication SCH ML: at 21:00

## 2017-08-01 RX ADMIN — FAMOTIDINE SCH MG: 10 INJECTION, SOLUTION INTRAVENOUS at 20:33

## 2017-08-01 RX ADMIN — ZOLPIDEM TARTRATE PRN MG: 10 TABLET, FILM COATED ORAL at 00:08

## 2017-08-01 RX ADMIN — STANDARDIZED SENNA CONCENTRATE AND DOCUSATE SODIUM SCH TAB: 8.6; 5 TABLET, FILM COATED ORAL at 20:37

## 2017-08-01 RX ADMIN — Medication SCH ML: at 08:55

## 2017-08-01 RX ADMIN — DIAZEPAM PRN MG: 10 TABLET ORAL at 09:43

## 2017-08-01 RX ADMIN — STANDARDIZED SENNA CONCENTRATE AND DOCUSATE SODIUM SCH TAB: 8.6; 5 TABLET, FILM COATED ORAL at 08:54

## 2017-08-01 RX ADMIN — STANDARDIZED SENNA CONCENTRATE AND DOCUSATE SODIUM SCH TAB: 8.6; 5 TABLET, FILM COATED ORAL at 20:33

## 2017-08-01 RX ADMIN — HEPARIN SODIUM SCH UNITS: 10000 INJECTION, SOLUTION INTRAVENOUS; SUBCUTANEOUS at 16:13

## 2017-08-01 RX ADMIN — DIAZEPAM PRN MG: 10 TABLET ORAL at 16:18

## 2017-08-01 RX ADMIN — FAMOTIDINE SCH MG: 10 INJECTION, SOLUTION INTRAVENOUS at 08:55

## 2017-08-01 RX ADMIN — PHENYTOIN SODIUM SCH MLS/HR: 50 INJECTION INTRAMUSCULAR; INTRAVENOUS at 17:40

## 2017-08-01 NOTE — RADRPT
EXAM DATE/TIME:  08/01/2017 06:46 

 

[facility] COMPARISON:     

CT ABDOMEN & PELVIS W CONTRAST, July 31, 2017, 11:53.

 

                     

INDICATIONS :     

Ileus. Abdominal pain. 

                     

 

MEDICAL HISTORY :            

Cardiovascular disease. Hernia, hiatal. Seizures.Kidney cancer   

 

SURGICAL HISTORY :        

Hysterectomy. Appendectomy.Cholecystectomy.RT nephrectomy,gastrectomy

 

ENCOUNTER:     

Initial                                        

 

ACUITY:     

2 days      

 

PAIN SCORE:     

7/10

 

LOCATION:       

abdomen. 

 

FINDINGS:     

Spine views of the abdomen demonstrate air throughout the colon without evidence of abnormal dilation
. No evidence of free air. Multiple surgical clips overlying the left upper quadrant and right upper 
quadrant as well as the right pelvis secondary to prior right nephrectomy. The lung bases are clear.

 

.

CONCLUSION:     

Air within the colon may reflect ileus. No evidence of bowel obstruction. No evidence of free air..

 

 

 

 Elinor Hough MD on August 01, 2017 at 6:54           

Board Certified Radiologist.

 This report was verified electronically.

## 2017-08-01 NOTE — HHI.DCPOC
Discharge Care Plan


Diagnosis:  


(1) Abdominal pain


Your Health Problems Are:     Appetite Changes


         Irregular Bowel Function


Goals to Promote Your Health


* To prevent worsening of your condition and complications


* To maintain your health at the optimal level


Directions to Meet Your Goals


*** Take your medications as prescribed


*** Follow your dietary instruction


*** Follow activity as directed








*** Keep your appointments as scheduled


*** Take your immunizations and boosters as scheduled


*** If your symptoms worsen call your PCP, if no PCP go to Urgent Care Center 

or Emergency Room***


*** Smoking is Dangerous to Your Health. Avoid second hand smoke***


***Call the 24-hour hour crisis hotline for domestic abuse at 1-229.397.7585***








Nikia Simental Aug 1, 2017 07:36

## 2017-08-01 NOTE — HHI.PR
Subjective


Remarks


per nursing, pt. constantly requesting Ativan and pain meds


"when I am going to be restarted on my medications"


no n/v


diffuse abd pain


wants to take liquids


d/w narcotic use, states "that is not a conversation I want to have"


"I'm trying to wean myself off from Dilaudid"





Objective


Objective Results


-





 Vital Signs








  Date Time  Temp Pulse Resp B/P Pulse Ox O2 Delivery O2 Flow Rate FiO2


 


8/1/17 07:11 98.3 60 20 127/76 97   


 


8/1/17 04:08 98.4 55 18 131/77 98   


 


7/31/17 23:48 99.1 54 18 138/83 98   


 


7/31/17 20:03 99.1 66 20 124/72 97   


 


7/31/17 17:05 99.4       


 


7/31/17 17:04  73 16 134/74 98   


 


7/31/17 15:16 98.8 75 17 136/75 99 Room Air  


 


7/31/17 11:25  60 22 173/82  Room Air  


 


7/31/17 10:35     98 Room Air  


 


7/31/17 09:55 97.7 56 15 137/85 98   








Result Diagram:  


7/31/17 1039                                                                   

             7/31/17 1039





Imaging





Last Impressions








Abdomen/Pelvis CT 7/31/17 1013 Signed





Impressions: 





 Service Date/Time:  Monday, July 31, 2017 11:53 - CONCLUSION: Probable ileus 

and 





 the left lung base atelectasis and/or infiltrate.     LARA Monk MD 








Other Results





Laboratory Tests








Test 7/31/17





 10:39


 


White Blood Count 4.1 


 


Red Blood Count 4.05 


 


Hemoglobin 11.5 


 


Hematocrit 34.7 


 


Mean Corpuscular Volume 85.7 


 


Mean Corpuscular Hemoglobin 28.4 


 


Mean Corpuscular Hemoglobin 33.2 





Concent 


 


Red Cell Distribution Width 16.5 


 


Platelet Count 193 


 


Mean Platelet Volume 9.6 


 


Neutrophils (%) (Auto) 81.0 


 


Lymphocytes (%) (Auto) 13.6 


 


Monocytes (%) (Auto) 4.9 


 


Eosinophils (%) (Auto) 0.5 


 


Basophils (%) (Auto) 0.0 


 


Neutrophils # (Auto) 3.3 


 


Lymphocytes # (Auto) 0.6 


 


Monocytes # (Auto) 0.2 


 


Eosinophils # (Auto) 0.0 


 


Basophils # (Auto) 0.0 


 


CBC Comment DIFF FINAL 


 


Differential Comment  


 


Prothrombin Time 10.6 


 


Prothromb Time International 1.0 





Ratio 


 


Activated Partial 26.8 





Thromboplast Time 


 


Sodium Level 138 


 


Potassium Level 4.4 


 


Chloride Level 104 


 


Carbon Dioxide Level 26.0 


 


Anion Gap 8 


 


Blood Urea Nitrogen 13 


 


Creatinine 0.73 


 


Estimat Glomerular Filtration 82 





Rate 


 


Random Glucose 94 


 


Lactic Acid Level 1.9 


 


Calcium Level 9.3 


 


Total Bilirubin 0.2 


 


Aspartate Amino Transf 59 





(AST/SGOT) 


 


Alanine Aminotransferase 59 





(ALT/SGPT) 


 


Alkaline Phosphatase 101 


 


Total Protein 7.2 


 


Albumin 3.6 


 


Lipase 198 











ROS


General:  No: Fatigue, Weakness


HEENT:  No: Sore Throat, Dysphagia


Cardiac:  No: Chest Pain, Edema, Palpitations


Pulmonary:  No: Cough, SOB, Wheezing


GI:  Abdominal Pain


/GYN:  No: Dysuria, Urgency


Neuro/MS:  No: Lightheaded, Confusion


Psych:  No: Anxiety, Depression


Skin:  No: Itching, Rash





Physical Exam


Physical Exam


GENERAL: This is a malnourished, thin female. 


SKIN: No rashes, ecchymoses or lesions. Cool and dry.


HEAD: Atraumatic. Normocephalic. No temporal or scalp tenderness.


EYES: Pupils equal round and reactive. Extraocular motions intact. No scleral 

icterus. No injection or drainage. 


ENT: Nose without bleeding, purulent drainage or septal hematoma. Throat 

without erythema, tonsillar hypertrophy or exudate. Uvula midline. Airway 

patent.


NECK: Trachea midline. No JVD or lymphadenopathy. Supple, nontender, no 

meningeal signs.


CARDIOVASCULAR: Regular rate and rhythm without murmurs, gallops, or rubs. 


RESPIRATORY: Clear to auscultation. Breath sounds equal bilaterally. No wheezes

, rales, or rhonchi.  


GASTROINTESTINAL: Abdomen soft,diffusely tender, nondistended.  Normoactive 

bowel sounds 4.  No hepato-splenomegaly, or palpable masses. No guarding.


MUSCULOSKELETAL: Extremities without clubbing, cyanosis, or edema. No joint 

tenderness, effusion, or edema noted. No calf tenderness. Negative Homans sign 

bilaterally.


NEUROLOGICAL: Awake, alert oriented 3.  No focal deficits.


Urinary Catheter:  No


Vascular Central Line Catheter:  No





A/P


Diagnosis:  


(1) Abdominal pain


(2) Ileus


(3) Chronic pain


(4) Chronic narcotic dependence


(5) Gastroparesis


(6) History of CVA (cerebrovascular accident)


(7) GERD (gastroesophageal reflux disease)


(8) Malnourished


(9) CAD (coronary artery disease)


(10) Hx of hiatal hernia


(11) History of gastrectomy


(12) hx right nephrectomy 


(13) Hx of renal cell cancer


(14) Gastroenteritis


(15) Elevated liver enzymes


Assessment and Plan


56-year-old female with history of chronic abdominal pain and multiple 

admissions and ER visits.  Presents today with increasing abdominal pain, 

nausea.  CT of the abdomen findings of probable ileus.


-Continue with normal saline at 100 hour


-Continue with Benadryl as needed for nausea


-Repeat KUB improved 


-no n/v, wants to eat. Will start clear liquid diet. If she tolerates well, 

plan to dc later 





Acute on chronic abdominal pain, chronic narcotic use


Drug-seeking behavior


-Patient has been counseled extensively about her use of narcotics and 

worsening of her abdominal pain, ileus and gastroparesis.


-At this time, no narcotics will be initiated. 


-pt can benefit from rehab/counseling services, she doesn't appear willing to 

try. 





History of gastrectomy, hiatal hernia, gastroparesis


Recommend that she continue to follow with GI as outpatient


-Pepcid 20 mg IV twice a day





Elevated liver enzymes,


-Repeat LFTs  pending 





Heparin for DVT prophylaxis


Pepcid for GI prophylaxis


poss dc today if she can tolerate diet





D/W RN


D/W Dr. Estrella


D/W pt 


This patient was seen by myself and Dr. Estrella, this note is written on his 

behalf





Problem Qualifiers





(1) Abdominal pain:  


Qualified Code:  R10.84 - Generalized abdominal pain


(2) Chronic pain:  


Qualified Code:  G89.4 - Chronic pain syndrome


(3) GERD (gastroesophageal reflux disease):  


Qualified Code:  K21.9 - Gastroesophageal reflux disease, esophagitis presence 

not specified


(4) CAD (coronary artery disease):  


Qualified Code:  I25.10 - Coronary artery disease involving native coronary 

artery of native heart without angina pectoris





Nikia Simental Aug 1, 2017 07:40

## 2017-08-02 VITALS
HEART RATE: 57 BPM | DIASTOLIC BLOOD PRESSURE: 89 MMHG | TEMPERATURE: 97.5 F | RESPIRATION RATE: 18 BRPM | SYSTOLIC BLOOD PRESSURE: 142 MMHG | OXYGEN SATURATION: 98 %

## 2017-08-02 VITALS
SYSTOLIC BLOOD PRESSURE: 133 MMHG | TEMPERATURE: 98.3 F | DIASTOLIC BLOOD PRESSURE: 80 MMHG | RESPIRATION RATE: 20 BRPM | OXYGEN SATURATION: 97 % | HEART RATE: 61 BPM

## 2017-08-02 VITALS
TEMPERATURE: 98.9 F | RESPIRATION RATE: 20 BRPM | SYSTOLIC BLOOD PRESSURE: 129 MMHG | HEART RATE: 66 BPM | OXYGEN SATURATION: 97 % | DIASTOLIC BLOOD PRESSURE: 82 MMHG

## 2017-08-02 RX ADMIN — Medication SCH ML: at 08:26

## 2017-08-02 RX ADMIN — DIAZEPAM PRN MG: 10 TABLET ORAL at 00:09

## 2017-08-02 RX ADMIN — DIAZEPAM PRN MG: 10 TABLET ORAL at 08:26

## 2017-08-02 RX ADMIN — DIAZEPAM PRN MG: 10 TABLET ORAL at 16:40

## 2017-08-02 RX ADMIN — PHENYTOIN SODIUM SCH MLS/HR: 50 INJECTION INTRAMUSCULAR; INTRAVENOUS at 03:38

## 2017-08-02 RX ADMIN — FAMOTIDINE SCH MG: 10 INJECTION, SOLUTION INTRAVENOUS at 08:26

## 2017-08-02 NOTE — HHI.DS
Discharge Summary


Admission Date


Jul 31, 2017 at 13:38


Discharge Date:  Aug 2, 2017


Admitting Diagnosis


Ileus, Chronic abdominal pain





(1) Abdominal pain


(2) Ileus


(3) Chronic pain


(4) Chronic narcotic dependence


(5) Gastroparesis


(6) History of CVA (cerebrovascular accident)


(7) GERD (gastroesophageal reflux disease)


(8) Malnourished


(9) CAD (coronary artery disease)


(10) Hx of hiatal hernia


(11) History of gastrectomy


(12) hx right nephrectomy 


(13) Hx of renal cell cancer


(14) Gastroenteritis


(15) Elevated liver enzymes


CBC/BMP:  


7/31/17 1039                                                                   

             7/31/17 1039





Significant Findings





Laboratory Tests








Test 7/31/17 8/1/17





 10:39 14:20


 


Hemoglobin 11.5 GM/DL 





 (11.6-15.3) 


 


Hematocrit 34.7 % 





 (35.0-46.0) 


 


Neutrophils (%) (Auto) 81.0 % 





 (16.0-70.0) 


 


Lymphocytes # (Auto) 0.6 TH/MM3 





 (1.0-4.8) 


 


Estimat Glomerular Filtration 82 ML/MIN (>89) 





Rate  


 


Aspartate Amino Transf 59 U/L (15-37) 





(AST/SGOT)  


 


Alanine Aminotransferase 59 U/L (10-53) 





(ALT/SGPT)  


 


Total Protein  5.9 GM/DL





  (6.4-8.2)


 


Albumin  3.0 GM/DL





  (3.4-5.0)








Imaging





Last Impressions








Abdomen X-Ray 8/1/17 0600 Signed





Impressions: 





 Service Date/Time:  Tuesday, August 1, 2017 06:46 - CONCLUSION:  Air within 

the 





 colon may reflect ileus. No evidence of bowel obstruction. No evidence of free 





 air..     Elinor Hough MD 


 


Abdomen/Pelvis CT 7/31/17 1013 Signed





Impressions: 





 Service Date/Time:  Monday, July 31, 2017 11:53 - CONCLUSION: Probable ileus 

and 





 the left lung base atelectasis and/or infiltrate.     LARA Monk MD 








Hospital Course


This is a 56-year-old female with significant past medical history of chronic 

abdominal pain with extensive GI workup, gastroparesis, previous pancreatitis, 

chronic opioid use, CAD, history of kidney cancer post-nephrectomy, history of 

embolic strokes, anxiety.  Patient has had multiple admissions, so far this 

year she has visited the emergency room at least 22 times.  She was in the 

emergency room yesterday with complaint of abdominal pain, at that time she was 

treated with Dilaudid and discharged in stable condition.  She returns today 

with increasing abdominal pain, it is worse than her usual pain.  Patient takes 

by mouth Dilaudid at home however this has not relieved pain.  Indicates she's 

been nauseous but is not able to vomit because of her gastric surgery.  

Indicates she had a bowel movement today that was formed and brown however 

yesterday she had diarrhea.  Denies any fever, no chills.  Indicates that she's 

been trying to eat as she only weighed 80 pounds.  She has been following up 

with a gastroenterologist as outpatient but is not able to recall the name.  

During prior admissions, she was referred to a tertiary Center for further 

evaluation but she has not done so.  In the emergency room, patient was 

evaluated.  Laboratory workup was essentially unremarkable except for mild 

elevation in AST and ALT of 59.  Lipase was 198.  CT of the abdomen showed 

probable ileus and left lung base atelectasis or infiltrate.  Patient was 

requesting Dilaudid, she was counseled extensively by the emergency room 

physician that based on the findings at the continuation of narcotics is 

contraindicated and that she will not be given any.  Patient complaining of 

nausea, she has allergies to Compazine, Reglan, Tigan, and Zofran.  States that 

she develops difficulty breathing.  She requested Benadryl for nausea and this 

was given. At time of examination, she was very anxious, again concerned about 

her by mouth Dilaudid and when she can be restarted on it.  She was also 

requesting her Valium as this also helps with her nausea.  Patient was admitted 

for further evaluation and treatment.





(1) Abdominal pain


(2) Ileus


(3) Chronic pain


(4) Chronic narcotic dependence


(5) Gastroparesis


(6) History of CVA (cerebrovascular accident)


(7) GERD (gastroesophageal reflux disease)


(8) Malnourished


(9) CAD (coronary artery disease)


(10) Hx of hiatal hernia


(11) History of gastrectomy


(12) hx right nephrectomy 


(13) Hx of renal cell cancer


(14) Gastroenteritis


(15) Elevated liver enzymes


During the course of the hospitalization, the following took place:





56-year-old female with history of chronic abdominal pain and multiple 

admissions and ER visits.  Presented  with increasing abdominal pain, nausea.  

CT of the abdomen findings of probable ileus.


-Patient was admitted, put on IV fluids.  Her narcotics were initiated.


-Continued with Benadryl and Valdium as needed for nausea


-Repeat KUB 8/1 improved 


-Diet was advanced, patient tolerated well.  No nausea, no vomiting.





Acute on chronic abdominal pain, chronic narcotic use


Drug-seeking behavior


-Patient was counseled extensively about her use of narcotics and worsening of 

her abdominal pain, ileus and gastroparesis.


-no narcotics will be initiated. 


-pt can benefit from rehab/counseling services, she did not appear willing to 

try and did not want to speak about it


-pt f/u as OP with pain management.  Indicates that pain management  

recommended pain pump but she was afraid to undergo procedure.








History of gastrectomy, hiatal hernia, gastroparesis


Recommended that she continue to follow with GI as outpatient.  Also discussed 

with patient the need to follow up at tertiary center such as [facility] or 

[facility].


-Pepcid 20 mg IV twice a day was given








Elevated liver enzymes,


-LFTs were followed, normal. 





Malnourished


pt. weight is 80 Lbs


-enc PO intake, small meals


-in the past she was offered EES but compliance was questionable and may 

interact with meds. 


-tolerated diet well.








Heparin for DVT prophylaxis


Pepcid for GI prophylaxis


Pt. stabilized, questionable ileus resolved.No N/V


Discharged home and instructed to:  


f/u GI, pain management, PCP


Diet-as tolerated


Activity-as tolerated


Pt Condition on Discharge:  Stable


Discharge Disposition:  Discharge Home


Discharge Instructions


DIET: Follow Instructions for:  As Tolerated, No Restrictions


Activities you can perform:  Weight Bearing as Nu


Follow up Referrals:  


Gastroenterology


PCP Follow-up





Continued Medications:  


Buspirone (Buspirone) 7.5 Mg Tab


30 MG PO TID Anxiety Ref 0 TAB


Cyanocobalamin Inj (Cyanocobalamin Inj) 1,000 Mcg/Ml Inj


1000 MCG IM Q30D #1 Ref 0 VIAL


Diazepam (Valium) 10 Mg Tab


10 MG PO TID Nausea/Vomiting Ref 0 TAB


Docusate Sodium (Dulcolax Stool Softener) 100 Mg Cap


100 MG PO HS Constipation #60 Ref 0 CAP


Hydromorphone (Dilaudid) 4 Mg Tab


6 MG PO 5 TIMES A DAY PRN Pain Management Ref 0 TAB


Zolpidem (Ambien) 10 Mg Tab


10 MG PO HS PRN INSOMNIA Ref 0 TAB











Nikia Simental Aug 2, 2017 16:36

## 2017-08-02 NOTE — HHI.PR
Subjective


Remarks


pt. concerned about getting her medications Valium


constantly requesting Dilaudid


per nursing slept most of the night


some dry heaves 


had liquids yesterday, feels ready to try regular foods, wants to pick from menu


states that pain management doctor wants to insert pain pump, she is afraid


supposed to have some type of MRI of abdomen


has not followed up at [facility], only sees GI locally but can't recall name


was told by PCP that she may have to be put on TPN at home 


doesn't want peg tube as this was offered before.





Objective


Objective Results


-





 Vital Signs








  Date Time  Temp Pulse Resp B/P Pulse Ox O2 Delivery O2 Flow Rate FiO2


 


8/2/17 07:38 98.3 61 20 133/80 97   


 


8/2/17 03:52 97.5 57 18 142/89 98   


 


8/1/17 23:43 97.6 61 18 133/79 98   


 


8/1/17 19:18 98.5 64 20 126/86 98   


 


8/1/17 15:29 99.2 58 18 138/76 97   


 


8/1/17 11:15 98.4 58 18 131/83 98   








 I/O








 8/1/17 8/1/17 8/1/17 8/2/17 8/2/17 8/2/17





 07:00 15:00 23:00 07:00 15:00 23:00


 


Intake Total  450 ml 300 ml 1150 ml  


 


Balance  450 ml 300 ml 1150 ml  


 


      


 


Intake IV Total  450 ml 300 ml 1150 ml  


 


# Voids  2 1   








Result Diagram:  


7/31/17 1039                                                                   

             7/31/17 1039





Imaging





Last Impressions








Abdomen/Pelvis CT 7/31/17 1013 Signed





Impressions: 





 Service Date/Time:  Monday, July 31, 2017 11:53 - CONCLUSION: Probable ileus 

and 





 the left lung base atelectasis and/or infiltrate.     LARA Monk MD 








Other Results





Laboratory Tests








Test 8/1/17





 14:20


 


Total Bilirubin 0.2 


 


Direct Bilirubin LESS THAN 0.1 


 


Indirect Bilirubin 0.1 


 


Aspartate Amino Transf 18 





(AST/SGOT) 


 


Alanine Aminotransferase 37 





(ALT/SGPT) 


 


Alkaline Phosphatase 82 


 


Total Protein 5.9 


 


Albumin 3.0 











ROS


General:  No: Fatigue, Weakness


HEENT:  No: Sore Throat, Dysphagia


Cardiac:  No: Chest Pain, Edema, Palpitations


Pulmonary:  No: Cough, SOB, Wheezing, Other


GI:  Abdominal Pain, N/V,  No: BM, Diarrhea, Other


/GYN:  No: Dysuria, Urgency, Other


Neuro/MS:  No: Lightheaded, Confusion


Psych:  Anxiety,  No: Depression, Other


Skin:  No: Itching, Rash





Physical Exam


Physical Exam


GENERAL: This is a malnourished, thin female. 


SKIN: No rashes, ecchymoses or lesions. Cool and dry.


HEAD: Atraumatic. Normocephalic. No temporal or scalp tenderness.


EYES: Pupils equal round and reactive. Extraocular motions intact. No scleral 

icterus. No injection or drainage. 


ENT: Nose without bleeding, purulent drainage or septal hematoma. Throat 

without erythema, tonsillar hypertrophy or exudate. Uvula midline. Airway 

patent.


NECK: Trachea midline. No JVD or lymphadenopathy. Supple, nontender, no 

meningeal signs.


CARDIOVASCULAR: Regular rate and rhythm without murmurs, gallops, or rubs. 


RESPIRATORY: Clear to auscultation. Breath sounds equal bilaterally. No wheezes

, rales, or rhonchi.  


GASTROINTESTINAL: Abdomen soft,diffusely tender, nondistended.  Normoactive 

bowel sounds 4.  No hepato-splenomegaly, or palpable masses. No guarding.


MUSCULOSKELETAL: Extremities without clubbing, cyanosis, or edema. No joint 

tenderness, effusion, or edema noted. No calf tenderness. Negative Homans sign 

bilaterally.


NEUROLOGICAL: Awake, alert oriented 3.  No focal deficits.


Urinary Catheter:  No


Vascular Central Line Catheter:  No





A/P


Diagnosis:  


(1) Abdominal pain


(2) Ileus


(3) Chronic pain


(4) Chronic narcotic dependence


(5) Gastroparesis


(6) History of CVA (cerebrovascular accident)


(7) GERD (gastroesophageal reflux disease)


(8) Malnourished


(9) CAD (coronary artery disease)


(10) Hx of hiatal hernia


(11) History of gastrectomy


(12) hx right nephrectomy 


(13) Hx of renal cell cancer


(14) Gastroenteritis


(15) Elevated liver enzymes


Assessment and Plan


56-year-old female with history of chronic abdominal pain and multiple 

admissions and ER visits.  Presents today with increasing abdominal pain, 

nausea.  CT of the abdomen findings of probable ileus.


-dc IVF 


-Continue with Benadryl and Valdium as needed for nausea


-Repeat KUB 8/1 improved 


-will adv diet





Acute on chronic abdominal pain, chronic narcotic use


Drug-seeking behavior


-Patient has been counseled extensively about her use of narcotics and 

worsening of her abdominal pain, ileus and gastroparesis.


-At this time, no narcotics will be initiated. 


-pt can benefit from rehab/counseling services, she doesn't appear willing to 

try. 


-pt f/u as OP with pain management








History of gastrectomy, hiatal hernia, gastroparesis


Recommend that she continue to follow with GI as outpatient


-Pepcid 20 mg IV twice a day





Elevated liver enzymes,


-LFTs normal now





Malnourished


pt. weight is 80 Lbs


-enc PO intake, small meals


-in the past she was offered EES but compliance was questionable and may 

interact with meds. 


-Will adv to regular diet, she wants to eat. 





Heparin for DVT prophylaxis


Pepcid for GI prophylaxis


plan to dc today,  can't  until 5pm 


f/u GI, pain management, PCP


Diet-as tolerated


Activity-as tolerated 





D/W RN


D/W Dr. Estrella


D/W pt 


This patient was seen by myself and Dr. Estrella, this note is written on his 

behalf





Problem Qualifiers





(1) Abdominal pain:  


Qualified Code:  R10.84 - Generalized abdominal pain


(2) Chronic pain:  


Qualified Code:  G89.4 - Chronic pain syndrome


(3) GERD (gastroesophageal reflux disease):  


Qualified Code:  K21.9 - Gastroesophageal reflux disease, esophagitis presence 

not specified


(4) CAD (coronary artery disease):  


Qualified Code:  I25.10 - Coronary artery disease involving native coronary 

artery of native heart without angina pectoris





Nikia Simental Aug 2, 2017 08:25

## 2017-08-03 ENCOUNTER — HOSPITAL ENCOUNTER (OUTPATIENT)
Dept: HOSPITAL 17 - NEPE | Age: 57
Setting detail: OBSERVATION
LOS: 2 days | Discharge: HOME | End: 2017-08-05
Attending: SPECIALIST | Admitting: SPECIALIST
Payer: COMMERCIAL

## 2017-08-03 VITALS — WEIGHT: 80.47 LBS | HEIGHT: 62 IN | BODY MASS INDEX: 14.81 KG/M2

## 2017-08-03 DIAGNOSIS — B96.89: ICD-10-CM

## 2017-08-03 DIAGNOSIS — R10.9: Primary | ICD-10-CM

## 2017-08-03 DIAGNOSIS — K21.9: ICD-10-CM

## 2017-08-03 DIAGNOSIS — Z85.528: ICD-10-CM

## 2017-08-03 DIAGNOSIS — G43.909: ICD-10-CM

## 2017-08-03 DIAGNOSIS — F11.20: ICD-10-CM

## 2017-08-03 DIAGNOSIS — N39.0: ICD-10-CM

## 2017-08-03 DIAGNOSIS — Z86.73: ICD-10-CM

## 2017-08-03 DIAGNOSIS — I25.10: ICD-10-CM

## 2017-08-03 DIAGNOSIS — Z76.5: ICD-10-CM

## 2017-08-03 DIAGNOSIS — I25.2: ICD-10-CM

## 2017-08-03 DIAGNOSIS — K31.84: ICD-10-CM

## 2017-08-03 DIAGNOSIS — R11.2: ICD-10-CM

## 2017-08-03 DIAGNOSIS — G89.29: ICD-10-CM

## 2017-08-03 PROCEDURE — 96365 THER/PROPH/DIAG IV INF INIT: CPT

## 2017-08-03 PROCEDURE — 83690 ASSAY OF LIPASE: CPT

## 2017-08-03 PROCEDURE — 80053 COMPREHEN METABOLIC PANEL: CPT

## 2017-08-03 PROCEDURE — 87086 URINE CULTURE/COLONY COUNT: CPT

## 2017-08-03 PROCEDURE — 74000: CPT

## 2017-08-03 PROCEDURE — 99285 EMERGENCY DEPT VISIT HI MDM: CPT

## 2017-08-03 PROCEDURE — 96366 THER/PROPH/DIAG IV INF ADDON: CPT

## 2017-08-03 PROCEDURE — G0378 HOSPITAL OBSERVATION PER HR: HCPCS

## 2017-08-03 PROCEDURE — 96376 TX/PRO/DX INJ SAME DRUG ADON: CPT

## 2017-08-03 PROCEDURE — 81001 URINALYSIS AUTO W/SCOPE: CPT

## 2017-08-03 PROCEDURE — 96375 TX/PRO/DX INJ NEW DRUG ADDON: CPT

## 2017-08-03 PROCEDURE — 96372 THER/PROPH/DIAG INJ SC/IM: CPT

## 2017-08-03 PROCEDURE — 85025 COMPLETE CBC W/AUTO DIFF WBC: CPT

## 2017-08-04 VITALS
SYSTOLIC BLOOD PRESSURE: 129 MMHG | TEMPERATURE: 98 F | HEART RATE: 64 BPM | DIASTOLIC BLOOD PRESSURE: 74 MMHG | RESPIRATION RATE: 16 BRPM | OXYGEN SATURATION: 99 %

## 2017-08-04 VITALS
RESPIRATION RATE: 16 BRPM | DIASTOLIC BLOOD PRESSURE: 67 MMHG | SYSTOLIC BLOOD PRESSURE: 108 MMHG | HEART RATE: 70 BPM | TEMPERATURE: 98.9 F | OXYGEN SATURATION: 96 %

## 2017-08-04 VITALS
DIASTOLIC BLOOD PRESSURE: 67 MMHG | OXYGEN SATURATION: 96 % | TEMPERATURE: 98.4 F | RESPIRATION RATE: 18 BRPM | SYSTOLIC BLOOD PRESSURE: 116 MMHG | HEART RATE: 91 BPM

## 2017-08-04 VITALS
RESPIRATION RATE: 16 BRPM | OXYGEN SATURATION: 98 % | TEMPERATURE: 98 F | DIASTOLIC BLOOD PRESSURE: 82 MMHG | HEART RATE: 78 BPM | SYSTOLIC BLOOD PRESSURE: 138 MMHG

## 2017-08-04 VITALS
DIASTOLIC BLOOD PRESSURE: 81 MMHG | RESPIRATION RATE: 16 BRPM | HEART RATE: 74 BPM | OXYGEN SATURATION: 95 % | SYSTOLIC BLOOD PRESSURE: 130 MMHG

## 2017-08-04 VITALS
DIASTOLIC BLOOD PRESSURE: 58 MMHG | RESPIRATION RATE: 18 BRPM | OXYGEN SATURATION: 96 % | HEART RATE: 71 BPM | TEMPERATURE: 98.2 F | SYSTOLIC BLOOD PRESSURE: 110 MMHG

## 2017-08-04 VITALS — DIASTOLIC BLOOD PRESSURE: 64 MMHG | SYSTOLIC BLOOD PRESSURE: 140 MMHG

## 2017-08-04 LAB
ALP SERPL-CCNC: 101 U/L (ref 45–117)
ALT SERPL-CCNC: 46 U/L (ref 10–53)
ANION GAP SERPL CALC-SCNC: 10 MEQ/L (ref 5–15)
AST SERPL-CCNC: 25 U/L (ref 15–37)
BACTERIA #/AREA URNS HPF: (no result) /HPF
BASOPHILS # BLD AUTO: 0.1 TH/MM3 (ref 0–0.2)
BASOPHILS NFR BLD: 1.6 % (ref 0–2)
BILIRUB SERPL-MCNC: 0.2 MG/DL (ref 0.2–1)
BUN SERPL-MCNC: 11 MG/DL (ref 7–18)
CHLORIDE SERPL-SCNC: 105 MEQ/L (ref 98–107)
COLOR UR: YELLOW
COMMENT (UR): (no result)
CULTURE IF INDICATED: (no result)
EOSINOPHIL # BLD: 0 TH/MM3 (ref 0–0.4)
EOSINOPHIL NFR BLD: 0.7 % (ref 0–4)
ERYTHROCYTE [DISTWIDTH] IN BLOOD BY AUTOMATED COUNT: 16.5 % (ref 11.6–17.2)
GFR SERPLBLD BASED ON 1.73 SQ M-ARVRAT: 66 ML/MIN (ref 89–?)
GLUCOSE UR STRIP-MCNC: (no result) MG/DL
HCO3 BLD-SCNC: 28.4 MEQ/L (ref 21–32)
HCT VFR BLD CALC: 37.7 % (ref 35–46)
HEMO FLAGS: (no result)
HGB UR QL STRIP: (no result)
KETONES UR STRIP-MCNC: (no result) MG/DL
LYMPHOCYTES # BLD AUTO: 1.3 TH/MM3 (ref 1–4.8)
LYMPHOCYTES NFR BLD AUTO: 29.3 % (ref 9–44)
MCH RBC QN AUTO: 27.8 PG (ref 27–34)
MCHC RBC AUTO-ENTMCNC: 32.1 % (ref 32–36)
MCV RBC AUTO: 86.5 FL (ref 80–100)
MONOCYTES NFR BLD: 7.9 % (ref 0–8)
MUCOUS THREADS #/AREA URNS LPF: (no result) /LPF
NEUTROPHILS # BLD AUTO: 2.6 TH/MM3 (ref 1.8–7.7)
NEUTROPHILS NFR BLD AUTO: 60.5 % (ref 16–70)
NITRITE UR QL STRIP: (no result)
PLATELET # BLD: 202 TH/MM3 (ref 150–450)
POTASSIUM SERPL-SCNC: 3.5 MEQ/L (ref 3.5–5.1)
RBC # BLD AUTO: 4.35 MIL/MM3 (ref 4–5.3)
SODIUM SERPL-SCNC: 143 MEQ/L (ref 136–145)
SP GR UR STRIP: 1.01 (ref 1–1.03)
SQUAMOUS #/AREA URNS HPF: 11 /HPF (ref 0–5)
TRANS CELLS #/AREA URNS HPF: 1 /HPF
WBC # BLD AUTO: 4.3 TH/MM3 (ref 4–11)

## 2017-08-04 RX ADMIN — Medication SCH ML: at 07:51

## 2017-08-04 RX ADMIN — PHENYTOIN SODIUM SCH MLS/HR: 50 INJECTION INTRAMUSCULAR; INTRAVENOUS at 02:23

## 2017-08-04 RX ADMIN — Medication SCH ML: at 22:49

## 2017-08-04 RX ADMIN — DIAZEPAM SCH MG: 10 TABLET ORAL at 09:54

## 2017-08-04 RX ADMIN — ENOXAPARIN SODIUM SCH MG: 40 INJECTION SUBCUTANEOUS at 05:50

## 2017-08-04 RX ADMIN — DIAZEPAM SCH MG: 10 TABLET ORAL at 18:48

## 2017-08-04 RX ADMIN — DIAZEPAM SCH MG: 10 TABLET ORAL at 13:45

## 2017-08-04 RX ADMIN — SERTRALINE HYDROCHLORIDE SCH MG: 50 TABLET, FILM COATED ORAL at 09:52

## 2017-08-04 RX ADMIN — PHENYTOIN SODIUM SCH MLS/HR: 50 INJECTION INTRAMUSCULAR; INTRAVENOUS at 05:50

## 2017-08-04 NOTE — PD
HPI


Chief Complaint:  Abdominal Pain


Time Seen by Provider:  01:49


Travel History


International Travel<30 days:  No


Contact w/Intl Traveler<30days:  No


Traveled to known affect area:  No





History of Present Illness


HPI


This is a 56-year-old female with history chronic abdominal pain, status post 

gastrectomy,, presents today with complaints of continued abdominal pain with 

nausea and vomiting.  The patient was admitted for 3 days and discharged 

yesterday from the hospital.  She states she was doing okay at that time 

however today the symptoms started to recur.  She denies any fevers, chills.  

She states that at the time of her last admission, she had an ileus that had 

resolved.  There are no other complaints time of my examination.





PFSH


Past Medical History


Hx Anticoagulant Therapy:  No


Asthma:  No


Blood Disorders:  No


Anxiety:  Yes


Depression:  No


Heart Rhythm Problems:  No


Cancer:  Yes (KIDNEY)


Cardiovascular Problems:  Yes (STEMI )


High Cholesterol:  No


Chemotherapy:  No


Chest Pain:  No


Congestive Heart Failure:  No


COPD:  No


Cerebrovascular Accident:  Yes


Diabetes:  No


Diminished Hearing:  No


Endocrine:  No


Gastrointestinal Disorders:  Yes


GERD:  No


Genitourinary:  Yes (kidney cancer, removed R kidney )


Headaches:  Yes


Hiatal Hernia:  Yes


Hypertension:  No


Immune Disorder:  No


Implanted Vascular Access Dvce:  No


Kidney Stones:  No


Musculoskeletal:  No


Neurologic:  Yes (seizures )


Psychiatric:  Yes


Reproductive:  No


Respiratory:  No


Immunizations Current:  Yes


Migraines:  Yes


Myocardial Infarction:  Yes ()


Pneumonia:  Yes


Radiation Therapy:  No


Renal Failure:  No


Seizures:  Yes


Sleep Apnea:  No


Thyroid Disease:  No


Ulcer:  No


Pregnant?:  Not Pregnant


Menopausal:  Yes


:  1


Para:  1


Miscarriage:  0


:  0


Ectopic Pregnancy:  No


Ovarian Cysts:  No


Tubal Ligation:  Yes





Past Surgical History


Abdominal Surgery:  Yes (total gastrectomy w/pouch , hernia repair)


Appendectomy:  Yes


Cardiac Surgery:  Yes


Cholecystectomy:  Yes


Genitourinary Surgery:  Yes (RIGHT NEPHRECTOMY)


Thoracic Surgery:  No


Tonsillectomy:  Yes


Other Surgery:  Yes





Social History


Alcohol Use:  No


Tobacco Use:  No


Substance Use:  No





Allergies-Medications


(Allergen,Severity, Reaction):  


Coded Allergies:  


     Compazine (Verified  Allergy, Severe, Shortness of Breath, 17)


     Gadolinium Derivatives (Verified  Allergy, Severe, Anaphylaxis, 17)


     Lobster (Verified  Allergy, Severe, Anaphylaxis, 17)


     Morphine (Verified  Allergy, Severe, Hives, 17)


     Penicillin (Verified  Allergy, Severe, Rash, 17)


     Phenergan (Verified  Allergy, Severe, Shortness of Breath, 17)


     Reglan (Verified  Allergy, Severe, Shortness of Breath, 17)


     Sulfa (Verified  Allergy, Severe, Rash, 17)


     Tigan (Verified  Allergy, Severe, Shortness of Breath, 17)


     Toradol (Verified  Allergy, Severe, Shortness of Breath, 17)


     Zofran (Verified  Allergy, Severe, Shortness of Breath, 17)


Reported Meds & Prescriptions





Reported Meds & Active Scripts


Active


Reported


Fentanyl Patch 72 HR (Fentanyl) 25 Mcg/Hr Patch 25 Mcg T-DERMAL Q72H


Zoloft (Sertraline HCl) 50 Mg Tab 50 Mg PO DAILY


Dulcolax Stool Softener (Docusate Sodium) 100 Mg Cap 100 Mg PO HS


Ambien (Zolpidem Tartrate) 10 Mg Tab 10 Mg PO HS PRN


Dilaudid (Hydromorphone HCl) 4 Mg Tab 6 Mg PO 5 TIMES A DAY PRN


Valium (Diazepam) 10 Mg Tab 10 Mg PO TID


Buspirone (Buspirone HCl) 7.5 Mg Tab 30 Mg PO TID


Cyanocobalamin Inj (Cyanocobalamin) 1,000 Mcg/Ml Inj 1,000 Mcg IM Q30D








Review of Systems


Except as stated in HPI:  all other systems reviewed are Neg


General / Constitutional:  No: Fever, Chills


HENT:  No: Headaches, Neck Pain


Cardiovascular:  No: Chest Pain or Discomfort, Palpitations


Respiratory:  No: Cough, Shortness of Breath


Gastrointestinal:  Positive: Nausea, Vomiting, Abdominal Pain,  No: Diarrhea


Genitourinary:  No: Frequency, Dysuria


Musculoskeletal:  No: Weakness, Pain


Neurologic:  No: Weakness, Headache





Physical Exam


Narrative


GENERAL: Well-nourished, well-developed patient, in no acute respiratory 

distress.


SKIN: Focused skin assessment warm/dry.


HEAD: Normocephalic/atraumatic.


EYES: No scleral icterus. No injection or drainage. 


NECK: Supple, trachea midline. No JVD or lymphadenopathy.


CARDIOVASCULAR: Regular rate and rhythm without murmurs, gallops, or rubs. 


RESPIRATORY: Breath sounds equal bilaterally. No accessory muscle use.


GASTROINTESTINAL: Abdomen thin, soft, nondistended.  She had subjective 

discomfort to the periumbilical area.  No rebound or guarding.


MUSCULOSKELETAL: No cyanosis, or edema. 


NEUROLOGICAL: Awake and alert. Cranial nerves II through XII intact.  Motor 

grossly within normal limits. Five out of 5 muscle strength in all muscle 

groups.  Normal speech.





Data


Data


Last Documented VS





Vital Signs








  Date Time  Temp Pulse Resp B/P Pulse Ox O2 Delivery O2 Flow Rate FiO2


 


17 00:02 98.0 78 16 138/82 98 Room Air  








Orders





 Complete Blood Count With Diff (17 01:49)


Comprehensive Metabolic Panel (17 01:49)


Lipase (17 01:49)


Urinalysis - C+S If Indicated (17 01:49)


Iv Access Insert/Monitor (17 01:49)


Ecg Monitoring (17 01:49)


Oximetry (17 01:49)


Sodium Chlor 0.9% 1000 Ml Inj (Ns 1000 M (17 01:49)


Sodium Chloride 0.9% Flush (Ns Flush) (17 02:00)


Diphenhydramine Inj (Benadryl Inj) (17 02:00)


Diazepam (Valium) (17 02:00)


Abdomen, Upright Only (17 01:53)


Hydromorphone Pf Inj (Dilaudid Pf Inj) (17 03:30)


Diphenhydramine Inj (Benadryl Inj) (17 03:30)


Admit Order (Ed Use Only) (17 03:37)





Labs








 Laboratory Tests








Test 17





 02:20


 


White Blood Count 4.3 TH/MM3


 


Red Blood Count 4.35 MIL/MM3


 


Hemoglobin 12.1 GM/DL


 


Hematocrit 37.7 %


 


Mean Corpuscular Volume 86.5 FL


 


Mean Corpuscular Hemoglobin 27.8 PG


 


Mean Corpuscular Hemoglobin 32.1 %





Concent 


 


Red Cell Distribution Width 16.5 %


 


Platelet Count 202 TH/MM3


 


Mean Platelet Volume 9.3 FL


 


Neutrophils (%) (Auto) 60.5 %


 


Lymphocytes (%) (Auto) 29.3 %


 


Monocytes (%) (Auto) 7.9 %


 


Eosinophils (%) (Auto) 0.7 %


 


Basophils (%) (Auto) 1.6 %


 


Neutrophils # (Auto) 2.6 TH/MM3


 


Lymphocytes # (Auto) 1.3 TH/MM3


 


Monocytes # (Auto) 0.3 TH/MM3


 


Eosinophils # (Auto) 0.0 TH/MM3


 


Basophils # (Auto) 0.1 TH/MM3


 


CBC Comment DIFF FINAL 


 


Differential Comment  


 


Sodium Level 143 MEQ/L


 


Potassium Level 3.5 MEQ/L


 


Chloride Level 105 MEQ/L


 


Carbon Dioxide Level 28.4 MEQ/L


 


Anion Gap 10 MEQ/L


 


Blood Urea Nitrogen 11 MG/DL


 


Creatinine 0.89 MG/DL


 


Estimat Glomerular Filtration 66 ML/MIN





Rate 


 


Random Glucose 82 MG/DL


 


Calcium Level 9.2 MG/DL


 


Total Bilirubin 0.2 MG/DL


 


Aspartate Amino Transf 25 U/L





(AST/SGOT) 


 


Alanine Aminotransferase 46 U/L





(ALT/SGPT) 


 


Alkaline Phosphatase 101 U/L


 


Total Protein 7.4 GM/DL


 


Albumin 3.8 GM/DL


 


Lipase 160 U/L














[facility]


Medical Decision Making


Medical Screen Exam Complete:  Yes


Emergency Medical Condition:  Yes


Differential Diagnosis


Recurrent ileus versus acute exacerbation of chronic abdominal pain versus 

gastroenteritis versus dehydration


Narrative Course


56-year-old female with a history of gastrectomy, chronic abdominal pain, 

chronic nausea vomiting, who presents today with exacerbation of her pain and 

nausea vomiting.  The patient was dismissed discharged 2 days ago for the same 

thing.  Laboratory tests show no evidence of acute findings.  The patient be 

admitted for observation and possible GI consult as she has multiple visits for 

the same thing.  I discussed the case with Prince Mcnair covering for Dr. Estrella.

  She'll be placed under observation status.





Diagnosis





 Primary Impression:  


 Intractable nausea and vomiting


 Additional Impression:  


 acute on chronic abdominal pain.








Devon Brunson MD Aug 4, 2017 01:59

## 2017-08-04 NOTE — RADRPT
EXAM DATE/TIME:  08/04/2017 02:34 

 

[facility] COMPARISON:     

ABDOMEN UPRIGHT ONLY, June 27, 2017, 20:32.

 

EXTERNAL COMPARISON :       

                     

 

INDICATIONS :     

Patient complains of abdominal pain and some vomiting. Evaluate for ileus.

                     

 

MEDICAL HISTORY :            

Cardiovascular disease. Hernia, hiatal. Seizures. Kidney cancer.   

 

SURGICAL HISTORY :     

Nephrectomy, right.   Hysterectomy. Appendectomy.Cholecystectomy. Gastrectomy. 

 

ENCOUNTER:     

Initial                                        

 

ACUITY:     

3 days      

 

PAIN SCORE:     

8/10

 

LOCATION:       

Abdomen

 

FINDINGS:     

The bowel gas pattern appears normal. No free air is identified. No organomegaly is evident. There ar
e numerous surgical clips in the right side of the abdomen. No free air is identified.

 

CONCLUSION:     

1. No evidence of ileus or obstruction

 

 

 

 Benito Gomez MD on August 04, 2017 at 3:09           

Board Certified Radiologist.

 This report was verified electronically.

## 2017-08-04 NOTE — HHI.DCPOC
Discharge Care Plan


Diagnosis:  


(1) Abdominal pain


Your Health Problems Are:     Appetite Changes


Goals to Promote Your Health


* To prevent worsening of your condition and complications


* To maintain your health at the optimal level


Directions to Meet Your Goals


*** Take your medications as prescribed


*** Follow your dietary instruction


*** Follow activity as directed








*** Keep your appointments as scheduled


*** Take your immunizations and boosters as scheduled


*** If your symptoms worsen call your PCP, if no PCP go to Urgent Care Center 

or Emergency Room***


*** Smoking is Dangerous to Your Health. Avoid second hand smoke***


***Call the 24-hour hour crisis hotline for domestic abuse at 1-273.135.3793***








Nikia Simental Aug 4, 2017 08:15

## 2017-08-04 NOTE — HHI.HP
HPI


Service


[facility]


Primary Care Physician


Unknown


Admission Diagnosis


Intractable nausea and vomiting, acute exacerbaton of abdominal pain


Diagnoses:  


Chief Complaint:  


n/v, chronic abd pain


Travel History


International Travel<30 Days:  No


Contact w/Intl Traveler <30 Da:  No


Traveled to Known Affected Are:  No


History of Present Illness


This is a 56-year-old female with significant past medical history of chronic 

abdominal pain with extensive GI workup, gastroparesis, previous pancreatitis, 

chronic opioid use, CAD, history of kidney cancer post-nephrectomy, history of 

embolic strokes, anxiety.  Patient has had multiple admissions, is 22 times 

this year.  She was actually admitted from 7/31/2017 to 8/2/2017 for recurrent 

abdominal pain and possible ileus.  Condition improved, repeat x-ray was stable 

and she was discharged home.  Patient returns back to the emergency room with 

intractable nausea vomiting, indicates that she doesn't vomit and she only 

produces bile color emesis.  Denies any fever, no chills.  States she is not 

eating much.  Denies any chest pain, no shortness of breath.  In the emergency 

room, patient was evaluated.  Laboratory workup was unremarkable.  Abdominal x-

ray did not show any evidence of obstruction or ileus.  Urinalysis positive for 

rare bacteria, large leukocyte esterase.  Patient was given IV fluids and 

started on Rocephin.  She received Benadryl for nausea as she is allergic to 

all other antiemetics.  She received 1 mg of IV Dilaudid.  At this time, 

patient is very concerned about her medications.  She is again requesting her 

Dilaudid and is afraid that she will go into withdrawal.  Indicates that she 

does have a prescription and that it is provided by her PCP.  She's also 

requesting Valium.  States that she is also on a fentanyl patch although it was 

not included on her prior medication reconciliation list, he states that she 

forgot to do so.  Patient has no nausea, indicates that she is ready to eat.  

Patient has ongoing chronic abdominal pain for which she's had extensive GI 

workup.  Indicates that she is due to see her gastroenterologist next week for 

a CT of the abdomen.  Patient is admitted for further evaluation and treatment.





Review of Systems


Constitutional:  DENIES: Diaphoretic episodes, Fatigue, Fever, Weight gain, 

Weight loss, Chills, Dizziness, Change in appetite, Night Sweats


Endocrine:  DENIES: Abnorml menstrual pattern, Heat/cold intolerance, Polydipsia

, Polyuria, Polyphagia


Eyes:  DENIES: Blurred vision, Diplopia, Eye inflammation, Eye pain, Vision loss

, Photosensitivity, Double Vision


Ears, nose, mouth, throat:  DENIES: Tinnitus, Hearing loss, Vertigo, Nasal 

discharge, Oral lesions, Throat pain, Hoarseness, Ear Pain, Running Nose, 

Epistaxis, Sinus Pain, Toothache, Odynophagia


Respiratory:  DENIES: Apneas, Cough, Snoring, Wheezing, Hemoptysis, Sputum 

production, Shortness of breath


Cardiovascular:  DENIES: Chest pain, Palpitations, Syncope, Dyspnea on Exertion

, PND, Lower Extremity Edema, Orthopnea, Claudication


Gastrointestinal:  COMPLAINS OF: Abdominal pain, Nausea, Vomiting,  DENIES: 

Black stools, Bloody stools, Constipation, Diarrhea, Difficulty Swallowing, 

Anorexia


Genitourinary:  DENIES: Abnormal vaginal bleeding, Dysmenorrhea, Dyspareunia, 

Sexual dysfunction, Urinary frequency, Urinary incontinence, Urgency, Hematuria

, Dysuria, Nocturia, Vaginal discharge


Musculoskeletal:  DENIES: Joint pain, Muscle aches, Stiffness, Joint Swelling, 

Back pain, Neck pain


Integumentary:  DENIES: Abnormal pigmentation, Pruritus, Rash, Nail changes, 

Breast masses, Breast skin changes, Nipple discharge


Hematologic/lymphatic:  DENIES: Bruising, Lymphadenopathy


Immunologic/allergic:  DENIES: Eczema, Urticaria


Neurologic:  DENIES: Abnormal gait, Headache, Localized weakness, Paresthesias, 

Seizures, Speech Problems, Tremor, Poor Balance


Psychiatric:  COMPLAINS OF: Anxiety, Depression,  DENIES: Confusion, Mood 

changes, Hallucinations, Agitation, Suicidal Ideation, Homicidal Ideation, 

Delusions





Past Family Social History


Past Medical History


1.   Anxiety.


2.   History of kidney cancer, status post nephrectomy.


3.   History of MI in 2006.


4.   History of tubal ligation in the past.


5.  Pancreatitis


6.   Chronic abd. pain with extensive GI work up


7.  Malnutrition


8.  Weight loss


9.  Anxiety


10.  Chronic opioid use. 


11.  Gastroparesis


12. Questionable seizures


13. PFO which led to embolic strokes


Past Surgical History


1.  Right nephrectomy


2.  Tubal ligation


3.  Gastrectomy with pouch


4.  Hernia repair


5.  Appendectomy


6.  Cholecystectomy


7.  Hysterectomy


8.  Tonsillectomy


9.  Fundoplication


10. PFO closure with 


11. EGD/colonoscopy


Reported Medications





Reported Meds & Active Scripts


Active


Reported


Fentanyl Patch 72 HR (Fentanyl) 25 Mcg/Hr Patch 25 Mcg T-DERMAL Q72H


Zoloft (Sertraline HCl) 50 Mg Tab 50 Mg PO DAILY


Dulcolax Stool Softener (Docusate Sodium) 100 Mg Cap 100 Mg PO HS


Ambien (Zolpidem Tartrate) 10 Mg Tab 10 Mg PO HS PRN


Dilaudid (Hydromorphone HCl) 4 Mg Tab 6 Mg PO 5 TIMES A DAY PRN


Valium (Diazepam) 10 Mg Tab 10 Mg PO TID


Buspirone (Buspirone HCl) 7.5 Mg Tab 30 Mg PO TID


Cyanocobalamin Inj (Cyanocobalamin) 1,000 Mcg/Ml Inj 1,000 Mcg IM Q30D


Allergies:  


Coded Allergies:  


     Compazine (Verified  Allergy, Severe, Shortness of Breath, 7/31/17)


     Gadolinium Derivatives (Verified  Allergy, Severe, Anaphylaxis, 7/31/17)


     Lobster (Verified  Allergy, Severe, Anaphylaxis, 7/31/17)


     Morphine (Verified  Allergy, Severe, Hives, 7/31/17)


     Penicillin (Verified  Allergy, Severe, Rash, 7/31/17)


     Phenergan (Verified  Allergy, Severe, Shortness of Breath, 7/31/17)


     Reglan (Verified  Allergy, Severe, Shortness of Breath, 7/31/17)


     Sulfa (Verified  Allergy, Severe, Rash, 7/31/17)


     Tigan (Verified  Allergy, Severe, Shortness of Breath, 7/31/17)


     Toradol (Verified  Allergy, Severe, Shortness of Breath, 7/31/17)


     Zofran (Verified  Allergy, Severe, Shortness of Breath, 7/31/17)


Active Ordered Medications





 Inpatient Medications


Ceftriaxone Sodium/Sodium Chloride (Rocephin Inj/NS Inj) 100 ml @  200 mls/hr 

ONCE  ONCE IV  Last administered on 8/4/17at 05:50;  Start 8/4/17 at 05:45;  

Stop 8/4/17 at 06:14;  Status DC


Diazepam (Valium) 10 mg ONCE  ONCE PO  Last administered on 8/4/17at 02:23;  

Start 8/4/17 at 02:00;  Stop 8/4/17 at 02:01;  Status DC


Diphenhydramine HCl (Benadryl Inj) 25 mg ONCE  ONCE IV PUSH  Last administered 

on 8/4/17at 02:23;  Start 8/4/17 at 02:00;  Stop 8/4/17 at 02:01;  Status DC


Diphenhydramine HCl 25 mg 25 mg Q4H  PRN IV PUSH nausea;  Start 8/4/17 at 04:45


Enoxaparin Sodium (Lovenox Inj) 40 mg Q24H SQ  Last administered on 8/4/17at 05:

50;  Start 8/4/17 at 04:45


Hydromorphone HCl 1 mg 1 mg ONCE  ONCE IVS  Last administered on 8/4/17at 03:28

;  Start 8/4/17 at 03:30;  Stop 8/4/17 at 03:31;  Status DC


Sodium Chloride (NS 1000 ml Inj) 1,000 ml @  100 mls/hr Q10H IV ;  Start 8/4/17 

at 04:33;  Stop 8/4/17 at 04:37;  Status DC


Sodium Chloride (NS Flush) 2 ml BID IV FLUSH ;  Start 8/4/17 at 09:00


Family History


Mother had MS and COPD.  Father had COPD.


Social History


 lives with 


No ETOH


no smoking


no illegal drug use.





Physical Exam


Vital Signs





 Vital Signs








  Date Time  Temp Pulse Resp B/P Pulse Ox O2 Delivery O2 Flow Rate FiO2


 


8/4/17 06:00  80 16 140/64 98   


 


8/4/17 04:00  74 16 130/81 95 Room Air  


 


8/4/17 00:02 98.0 78 16 138/82 98 Room Air  








Physical Exam


GENERAL: This is a malnourished, thin female. 


SKIN: No rashes, ecchymoses or lesions. Cool and dry.


HEAD: Atraumatic. Normocephalic. No temporal or scalp tenderness.


EYES: Pupils equal round and reactive. Extraocular motions intact. No scleral 

icterus. No injection or drainage. 


ENT: Nose without bleeding, purulent drainage or septal hematoma. Throat 

without erythema, tonsillar hypertrophy or exudate. Uvula midline. Airway 

patent.


NECK: Trachea midline. No JVD or lymphadenopathy. Supple, nontender, no 

meningeal signs.


CARDIOVASCULAR: Regular rate and rhythm without murmurs, gallops, or rubs. 


RESPIRATORY: Clear to auscultation. Breath sounds equal bilaterally. No wheezes

, rales, or rhonchi.  


GASTROINTESTINAL: Abdomen soft,diffusely tender, nondistended.  Normoactive 

bowel sounds 4.  No hepato-splenomegaly, or palpable masses. No guarding.


MUSCULOSKELETAL: Extremities without clubbing, cyanosis, or edema. No joint 

tenderness, effusion, or edema noted. No calf tenderness. Negative Homans sign 

bilaterally.


NEUROLOGICAL: Awake, alert oriented 3.  No focal deficits.


Laboratory





Laboratory Tests








Test 8/4/17 8/4/17





 02:20 04:50


 


White Blood Count 4.3  


 


Red Blood Count 4.35  


 


Hemoglobin 12.1  


 


Hematocrit 37.7  


 


Mean Corpuscular Volume 86.5  


 


Mean Corpuscular Hemoglobin 27.8  


 


Mean Corpuscular Hemoglobin 32.1  





Concent  


 


Red Cell Distribution Width 16.5  


 


Platelet Count 202  


 


Mean Platelet Volume 9.3  


 


Neutrophils (%) (Auto) 60.5  


 


Lymphocytes (%) (Auto) 29.3  


 


Monocytes (%) (Auto) 7.9  


 


Eosinophils (%) (Auto) 0.7  


 


Basophils (%) (Auto) 1.6  


 


Neutrophils # (Auto) 2.6  


 


Lymphocytes # (Auto) 1.3  


 


Monocytes # (Auto) 0.3  


 


Eosinophils # (Auto) 0.0  


 


Basophils # (Auto) 0.1  


 


CBC Comment DIFF FINAL  


 


Differential Comment   


 


Sodium Level 143  


 


Potassium Level 3.5  


 


Chloride Level 105  


 


Carbon Dioxide Level 28.4  


 


Anion Gap 10  


 


Blood Urea Nitrogen 11  


 


Creatinine 0.89  


 


Estimat Glomerular Filtration 66  





Rate  


 


Random Glucose 82  


 


Calcium Level 9.2  


 


Total Bilirubin 0.2  


 


Aspartate Amino Transf 25  





(AST/SGOT)  


 


Alanine Aminotransferase 46  





(ALT/SGPT)  


 


Alkaline Phosphatase 101  


 


Total Protein 7.4  


 


Albumin 3.8  


 


Lipase 160  


 


Urine Color  YELLOW 


 


Urine Turbidity  HAZY 


 


Urine pH  6.5 


 


Urine Specific Gravity  1.014 


 


Urine Protein  TRACE 


 


Urine Glucose (UA)  NEG 


 


Urine Ketones  NEG 


 


Urine Occult Blood  TRACE 


 


Urine Nitrite  NEG 


 


Urine Bilirubin  NEG 


 


Urine Urobilinogen  LESS THAN 2.0 


 


Urine Leukocyte Esterase  LARGE 


 


Urine RBC  8 


 


Urine WBC  57 


 


Urine Squamous Epithelial  11 





Cells  


 


Urine Transitional Epithelial  1 





Cells  


 


Urine Bacteria  RARE 


 


Urine Mucus  FEW 


 


Urine Yeast (Budding)  OCC 


 


Microscopic Urinalysis Comment  CULTURE





  INDICATED














 Date/Time Procedure Status





Source Growth 


 


 8/4/17 04:50 Urine Culture Received





Urine Random Urine Pending 








Result Diagram:  


8/4/17 0220 8/4/17 0220





Imaging





Last Impressions








Abdomen X-Ray 8/4/17 0153 Signed





Impressions: 





 Service Date/Time:  Friday, August 4, 2017 02:34 - CONCLUSION:  1. No evidence 





 of ileus or obstruction     Benito Gomez MD 











Assessment and Plan


Problem List:  


(1) Chronic abdominal pain


(2) UTI (urinary tract infection)


(3) Chronic narcotic dependence


(4) Hx of hiatal hernia


(5) hx right nephrectomy 


(6) Hx of renal cell cancer


(7) History of fundoplication


(8) GERD (gastroesophageal reflux disease)


(9) Gastroparesis


Assessment and Plan


Admit to Dr. Estrella





56-year-old female with history of chronic abdominal pain and multiple 

admissions and ER visits.  Discharged on 8/2, presents again with chronic 

abdominal pain, intractable nausea, and vomiting. Emesis consisint of bile. 

Found with UTI, abd xray without acute findings. 


-wants to eat, will put on regular diet


--Continue with normal saline at 100 hour


-Valium as needed for nausea, allergic to all other antiemetics. 


-continue with Rocephin, follow UC








Acute on chronic abdominal pain, chronic narcotic use


Drug-seeking behavior


-patient again requesting PO Dilaudid, concerned she will go through 

withdrawals. States her narcotics prescribed by PCP. Has been referred to pain 

management.


-Dilaudid 4 mg po x 1 now


-Has a Fentanyl patch on, this is to continue 





History of gastrectomy, hiatal hernia, gastroparesis


Recommend that she continue to follow with GI as outpatient








Lovenox for DVT prophylaxis


Plan of care has been discussed with the patient, attending and registered 

nurse.  Further management of the patient will be dependent on the hospital 

course





We will re-evaluate this afternoon, if she is tolerating diet and urine culture 

back, pt. is to be discharged





This patient was seen by myself and Dr. Estrella, this H&P is written on his behalf





Problem Qualifiers





(1) UTI (urinary tract infection):  


Qualified Code:  N30.00 - Acute cystitis without hematuria


(2) GERD (gastroesophageal reflux disease):  


Qualified Code:  K21.9 - Gastroesophageal reflux disease, esophagitis presence 

not specified





Nikia Simental Aug 4, 2017 07:53

## 2017-08-05 ENCOUNTER — HOSPITAL ENCOUNTER (EMERGENCY)
Dept: HOSPITAL 17 - NEPC | Age: 57
LOS: 1 days | Discharge: HOME | End: 2017-08-06
Payer: COMMERCIAL

## 2017-08-05 VITALS
OXYGEN SATURATION: 96 % | RESPIRATION RATE: 16 BRPM | DIASTOLIC BLOOD PRESSURE: 102 MMHG | TEMPERATURE: 99.1 F | HEART RATE: 94 BPM | SYSTOLIC BLOOD PRESSURE: 194 MMHG

## 2017-08-05 VITALS
RESPIRATION RATE: 18 BRPM | TEMPERATURE: 98.2 F | SYSTOLIC BLOOD PRESSURE: 129 MMHG | OXYGEN SATURATION: 96 % | HEART RATE: 68 BPM | DIASTOLIC BLOOD PRESSURE: 75 MMHG

## 2017-08-05 VITALS
HEART RATE: 58 BPM | DIASTOLIC BLOOD PRESSURE: 81 MMHG | TEMPERATURE: 98.3 F | RESPIRATION RATE: 16 BRPM | SYSTOLIC BLOOD PRESSURE: 142 MMHG | OXYGEN SATURATION: 98 %

## 2017-08-05 VITALS — HEIGHT: 63 IN | BODY MASS INDEX: 14.45 KG/M2 | WEIGHT: 81.57 LBS

## 2017-08-05 VITALS
DIASTOLIC BLOOD PRESSURE: 81 MMHG | OXYGEN SATURATION: 98 % | RESPIRATION RATE: 18 BRPM | SYSTOLIC BLOOD PRESSURE: 156 MMHG | TEMPERATURE: 98.1 F | HEART RATE: 71 BPM

## 2017-08-05 DIAGNOSIS — Z85.528: ICD-10-CM

## 2017-08-05 DIAGNOSIS — R10.9: Primary | ICD-10-CM

## 2017-08-05 DIAGNOSIS — Z86.59: ICD-10-CM

## 2017-08-05 DIAGNOSIS — Z86.69: ICD-10-CM

## 2017-08-05 DIAGNOSIS — Z87.448: ICD-10-CM

## 2017-08-05 DIAGNOSIS — I25.2: ICD-10-CM

## 2017-08-05 DIAGNOSIS — Z87.19: ICD-10-CM

## 2017-08-05 DIAGNOSIS — R11.2: ICD-10-CM

## 2017-08-05 LAB
ALP SERPL-CCNC: 107 U/L (ref 45–117)
ALT SERPL-CCNC: 46 U/L (ref 10–53)
ANION GAP SERPL CALC-SCNC: 6 MEQ/L (ref 5–15)
AST SERPL-CCNC: 31 U/L (ref 15–37)
BASOPHILS # BLD AUTO: 0 TH/MM3 (ref 0–0.2)
BASOPHILS NFR BLD: 0.1 % (ref 0–2)
BILIRUB SERPL-MCNC: 0.2 MG/DL (ref 0.2–1)
BUN SERPL-MCNC: 13 MG/DL (ref 7–18)
CHLORIDE SERPL-SCNC: 101 MEQ/L (ref 98–107)
EOSINOPHIL # BLD: 0 TH/MM3 (ref 0–0.4)
EOSINOPHIL NFR BLD: 0.5 % (ref 0–4)
ERYTHROCYTE [DISTWIDTH] IN BLOOD BY AUTOMATED COUNT: 16.8 % (ref 11.6–17.2)
GFR SERPLBLD BASED ON 1.73 SQ M-ARVRAT: 63 ML/MIN (ref 89–?)
HCO3 BLD-SCNC: 30.4 MEQ/L (ref 21–32)
HCT VFR BLD CALC: 37.4 % (ref 35–46)
HEMO FLAGS: (no result)
LYMPHOCYTES # BLD AUTO: 0.9 TH/MM3 (ref 1–4.8)
LYMPHOCYTES NFR BLD AUTO: 17.5 % (ref 9–44)
MCH RBC QN AUTO: 27.6 PG (ref 27–34)
MCHC RBC AUTO-ENTMCNC: 32.2 % (ref 32–36)
MCV RBC AUTO: 85.7 FL (ref 80–100)
MONOCYTES NFR BLD: 5.3 % (ref 0–8)
NEUTROPHILS # BLD AUTO: 3.8 TH/MM3 (ref 1.8–7.7)
NEUTROPHILS NFR BLD AUTO: 76.6 % (ref 16–70)
PLATELET # BLD: 192 TH/MM3 (ref 150–450)
POTASSIUM SERPL-SCNC: 3.8 MEQ/L (ref 3.5–5.1)
RBC # BLD AUTO: 4.36 MIL/MM3 (ref 4–5.3)
SODIUM SERPL-SCNC: 137 MEQ/L (ref 136–145)
WBC # BLD AUTO: 4.9 TH/MM3 (ref 4–11)

## 2017-08-05 PROCEDURE — 96361 HYDRATE IV INFUSION ADD-ON: CPT

## 2017-08-05 PROCEDURE — 83690 ASSAY OF LIPASE: CPT

## 2017-08-05 PROCEDURE — 85025 COMPLETE CBC W/AUTO DIFF WBC: CPT

## 2017-08-05 PROCEDURE — 80053 COMPREHEN METABOLIC PANEL: CPT

## 2017-08-05 PROCEDURE — 96374 THER/PROPH/DIAG INJ IV PUSH: CPT

## 2017-08-05 PROCEDURE — 96376 TX/PRO/DX INJ SAME DRUG ADON: CPT

## 2017-08-05 RX ADMIN — DIAZEPAM SCH MG: 10 TABLET ORAL at 10:18

## 2017-08-05 RX ADMIN — ENOXAPARIN SODIUM SCH MG: 40 INJECTION SUBCUTANEOUS at 04:00

## 2017-08-05 RX ADMIN — Medication SCH ML: at 10:18

## 2017-08-05 RX ADMIN — SERTRALINE HYDROCHLORIDE SCH MG: 50 TABLET, FILM COATED ORAL at 10:18

## 2017-08-05 NOTE — PD
HPI


Chief Complaint:  GI Complaint


Time Seen by Provider:  21:59


Travel History


International Travel<30 days:  No


Contact w/Intl Traveler<30days:  No


Traveled to known affect area:  No





History of Present Illness


HPI


66-year-old female returns to the ER complaining of abdominal pain and 

vomiting.  She is admitted to the hospital yesterday and was discharged today.  

After returning home her nausea vomiting abdominal pain returned leading to her 

ER evaluation today.  Evidently she uses Valium and Dilaudid for her nausea 

vomiting and abdominal pain which at home was not helpful.  Here in the ER her 

complaints include her request for antiemetics and pain medication although due 

to reported allergies only benzodiazepines, Dilaudid and fentanyl can be used 

to treat her symptoms.  Location gastrointestinal.  Timing constant.





PFSH


Past Medical History


Hx Anticoagulant Therapy:  No


Asthma:  No


Blood Disorders:  No


Anxiety:  Yes


Depression:  No


Heart Rhythm Problems:  No


Cancer:  Yes (KIDNEY)


Cardiovascular Problems:  Yes (STEMI )


High Cholesterol:  No


Chemotherapy:  No


Chest Pain:  No


Congestive Heart Failure:  No


COPD:  No


Cerebrovascular Accident:  Yes


Diabetes:  No


Diminished Hearing:  No


Endocrine:  No


Gastrointestinal Disorders:  Yes


GERD:  No


Genitourinary:  Yes (kidney cancer, removed R kidney )


Headaches:  Yes


Hiatal Hernia:  Yes


Hypertension:  No


Immune Disorder:  No


Implanted Vascular Access Dvce:  No


Kidney Stones:  No


Musculoskeletal:  No


Neurologic:  Yes (seizures )


Psychiatric:  Yes


Reproductive:  No


Respiratory:  No


Immunizations Current:  Yes


Migraines:  Yes


Myocardial Infarction:  Yes ()


Pneumonia:  Yes


Radiation Therapy:  No


Renal Failure:  No


Seizures:  Yes


Sleep Apnea:  No


Thyroid Disease:  No


Ulcer:  No


Tetanus Vaccination:  < 5 Years


Influenza Vaccination:  Yes


Pregnant?:  Not Pregnant


Menopausal:  Yes


:  1


Para:  1


Miscarriage:  0


:  0


Ectopic Pregnancy:  No


Ovarian Cysts:  No


Tubal Ligation:  Yes





Past Surgical History


Abdominal Surgery:  Yes (total gastrectomy w/pouch , hernia repair)


Appendectomy:  Yes


Cardiac Surgery:  Yes


Cholecystectomy:  Yes


Genitourinary Surgery:  Yes (RIGHT NEPHRECTOMY)


Thoracic Surgery:  No


Tonsillectomy:  Yes


Other Surgery:  Yes





Social History


Alcohol Use:  No


Tobacco Use:  No


Substance Use:  No





Allergies-Medications


(Allergen,Severity, Reaction):  


Coded Allergies:  


     Compazine (Verified  Allergy, Severe, Shortness of Breath, 17)


     Gadolinium Derivatives (Verified  Allergy, Severe, Anaphylaxis, 17)


     Lobster (Verified  Allergy, Severe, Anaphylaxis, 17)


     Morphine (Verified  Allergy, Severe, Hives, 17)


     Penicillin (Verified  Allergy, Severe, Rash, 17)


     Phenergan (Verified  Allergy, Severe, Shortness of Breath, 17)


     Reglan (Verified  Allergy, Severe, Shortness of Breath, 17)


     Sulfa (Verified  Allergy, Severe, Rash, 17)


     Tigan (Verified  Allergy, Severe, Shortness of Breath, 17)


     Toradol (Verified  Allergy, Severe, Shortness of Breath, 17)


     Zofran (Verified  Allergy, Severe, Shortness of Breath, 17)


Reported Meds & Prescriptions





Reported Meds & Active Scripts


Active


Cefuroxime (Cefuroxime Axetil) 500 Mg Tab 500 Mg PO BID


Reported


Fentanyl Patch 72 HR (Fentanyl) 25 Mcg/Hr Patch 25 Mcg T-DERMAL Q72H


Zoloft (Sertraline HCl) 50 Mg Tab 50 Mg PO DAILY


Dulcolax Stool Softener (Docusate Sodium) 100 Mg Cap 100 Mg PO HS


Ambien (Zolpidem Tartrate) 10 Mg Tab 10 Mg PO HS PRN


Dilaudid (Hydromorphone HCl) 4 Mg Tab 6 Mg PO 5 TIMES A DAY PRN


Valium (Diazepam) 10 Mg Tab 10 Mg PO TID


Buspirone (Buspirone HCl) 7.5 Mg Tab 30 Mg PO TID


Cyanocobalamin Inj (Cyanocobalamin) 1,000 Mcg/Ml Inj 1,000 Mcg IM Q30D








Review of Systems


Except as stated in HPI:  all other systems reviewed are Neg


General / Constitutional:  No: Fever





Physical Exam


Narrative


GENERAL: 56-year-old female then mild to moderate distress


SKIN: Warm and dry.


HEAD: Atraumatic. Normocephalic. 


EYES: Pupils equal and round. No scleral icterus. No injection or drainage. 


ENT: No nasal bleeding or discharge.  Mucous membranes pink and moist.


NECK: Trachea midline. No JVD. 


CARDIOVASCULAR: Regular rate and rhythm.  


RESPIRATORY: No accessory muscle use. Clear to auscultation. Breath sounds 

equal bilaterally. 


GASTROINTESTINAL: Soft.  Diffuse nonspecific tenderness.


MUSCULOSKELETAL: Extremities without clubbing, cyanosis, or edema. No obvious 

deformities. 


NEUROLOGICAL: Awake and alert. No obvious cranial nerve deficits.  Motor 

grossly within normal limits. Five out of 5 muscle strength in the arms and 

legs.  Normal speech.


PSYCHIATRIC: Appropriate mood and affect; insight and judgment normal.





Data


Data


Last Documented VS





Vital Signs








  Date Time  Temp Pulse Resp B/P Pulse Ox O2 Delivery O2 Flow Rate FiO2


 


17 20:41 99.1 94 16 194/102 96 Room Air  





Vital signs reviewed


Orders





 Complete Blood Count With Diff (17 22:11)


Comprehensive Metabolic Panel (17 22:11)


Lipase (17 22:11)


Iv Access Insert/Monitor (17 22:11)


Ecg Monitoring (17 22:11)


Oximetry (17 22:11)


Sodium Chlor 0.9% 1000 Ml Inj (Ns 1000 M (17 22:11)


Sodium Chloride 0.9% Flush (Ns Flush) (17 22:15)


Al-Mag Hy-Si 40-40-4 Mg/Ml Liq (Mag-Al P (17 22:15)


Lidocaine 2% Viscous (Xylocaine 2% Visco (17 22:15)


Diphenhydramine Inj (Benadryl Inj) (17 23:00)


Al-Mag Hy-Si 40-40-4 Mg/Ml Liq (Mag-Al P (17 00:00)


Lidocaine 2% Viscous (Xylocaine 2% Visco (17 00:00)


Diphenhydramine Inj (Benadryl Inj) (17 00:00)





Labs





 Laboratory Tests








Test 17





 22:53


 


White Blood Count 4.9 TH/MM3


 


Red Blood Count 4.36 MIL/MM3


 


Hemoglobin 12.0 GM/DL


 


Hematocrit 37.4 %


 


Mean Corpuscular Volume 85.7 FL


 


Mean Corpuscular Hemoglobin 27.6 PG


 


Mean Corpuscular Hemoglobin 32.2 %





Concent 


 


Red Cell Distribution Width 16.8 %


 


Platelet Count 192 TH/MM3


 


Mean Platelet Volume 9.5 FL


 


Neutrophils (%) (Auto) 76.6 %


 


Lymphocytes (%) (Auto) 17.5 %


 


Monocytes (%) (Auto) 5.3 %


 


Eosinophils (%) (Auto) 0.5 %


 


Basophils (%) (Auto) 0.1 %


 


Neutrophils # (Auto) 3.8 TH/MM3


 


Lymphocytes # (Auto) 0.9 TH/MM3


 


Monocytes # (Auto) 0.3 TH/MM3


 


Eosinophils # (Auto) 0.0 TH/MM3


 


Basophils # (Auto) 0.0 TH/MM3


 


CBC Comment DIFF FINAL 


 


Differential Comment  


 


Sodium Level 137 MEQ/L


 


Potassium Level 3.8 MEQ/L


 


Chloride Level 101 MEQ/L


 


Carbon Dioxide Level 30.4 MEQ/L


 


Anion Gap 6 MEQ/L


 


Blood Urea Nitrogen 13 MG/DL


 


Creatinine 0.92 MG/DL


 


Estimat Glomerular Filtration 63 ML/MIN





Rate 


 


Random Glucose 111 MG/DL


 


Calcium Level 9.1 MG/DL


 


Total Bilirubin 0.2 MG/DL


 


Aspartate Amino Transf 31 U/L





(AST/SGOT) 


 


Alanine Aminotransferase 46 U/L





(ALT/SGPT) 


 


Alkaline Phosphatase 107 U/L


 


Total Protein 7.9 GM/DL


 


Albumin 4.2 GM/DL


 


Lipase 213 U/L











MDM


Medical Decision Making


Medical Screen Exam Complete:  Yes


Emergency Medical Condition:  Yes


Medical Record Reviewed:  Yes


Differential Diagnosis


Gastritis, pancreatitis, appendicitis, acute cholecystitis, ascending 

cholangitis, AAA, perforated viscous, mesenteric ischemia, hepatitis, cystitis, 

hydronephrosis/hydroureter/nephroureter calculus, mesenteric adenitis, biliary 

colic


Narrative Course


CBC & BMP Diagram


17 22:53








LFTs normal


Lipase normal





The patient is resting comfortably and feels better, is alert and in no 

distress. The patients results and examination findings were discussed.  The 

repeat examination is unremarkable and benign. The history, exam, diagnostic 

testing, and current condition do not suggest any significant pathology to 

warrant further testing, continued ED treatment, admission, or surgical 

evaluation at this point. The vital signs have been stable. The patient does 

not have uncontrollable pain, intractable vomiting, or other significant 

symptoms. The patient's condition is stable and appropriate for discharge. The 

patient will pursue further outpatient evaluation with a primary care physician 

or other designated or consulting physician as indicated in the discharge 

instructions. The patient expressed understanding and was agreeable with this 

plan.





Diagnosis





 Primary Impression:  


 Abdominal pain


 Qualified Code:  R10.9 - Abdominal pain, unspecified abdominal location


 Additional Impression:  


 Nausea & vomiting


 Qualified Code:  R11.2 - Nausea and vomiting, intractability of vomiting not 

specified, unspecified vomiting type


Referrals:  


Primary Care Physician


2 days





***Additional Instructions:


You have a choice when it comes to health care, and we are glad that you chose 

[facility]. Hopefully, we have met your expectations on today's visit.  You 

are welcome to return to [facility] at any time, as we are committed to 

meeting the health care needs of our community.


***Med/Other Pt SpecificInfo:  No Meds Exist/No RX given


Disposition:  01 DISCHARGE HOME


Condition:  Jefferson Scales MD Aug 5, 2017 22:55

## 2017-08-05 NOTE — HHI.PR
Subjective


Remarks


Complaining of migraine


Nauseous, no vomiting


Was able to tolerate regular diet yesterday


Indicated she had diarrhea last night, 5 episodes, none observed by staff.


No fever


No chest pain


Patient asking for Dilaudid


Wants to know if she can be discharged later today





Objective


Objective Results


-





 Vital Signs








  Date Time  Temp Pulse Resp B/P Pulse Ox O2 Delivery O2 Flow Rate FiO2


 


8/5/17 07:49 98.3 58 16 142/81 98   


 


8/5/17 03:46 98.2 68 18 129/75 96   


 


8/4/17 19:26 98.4 91 18 116/67 96   


 


8/4/17 16:00 98.9 70 16 108/67 96   


 


8/4/17 12:50 98.2 71 18 110/58 96   








Result Diagram:  


8/4/17 0220 8/4/17 0220





Imaging





Last Impressions








Abdomen X-Ray 8/4/17 0153 Signed





Impressions: 





 Service Date/Time:  Friday, August 4, 2017 02:34 - CONCLUSION:  1. No evidence 





 of ileus or obstruction     Benito Gomez MD 








Other Results











 Date/Time Procedure Status





Source Growth 


 


 8/4/17 04:50 Urine Culture Received





Urine Random Urine Pending 











ROS


General:  Other (12 point review of systems completed, negative except as noted 

above, unreliable)


HEENT:  No: Sore Throat, Dysphagia


Cardiac:  No: Chest Pain, Edema, Palpitations


Pulmonary:  No: Cough, SOB, Wheezing


GI:  Abdominal Pain, Diarrhea (none observed by RN ), N/V (no vomiting )


/GYN:  No: Dysuria, Urgency


Neuro/MS:  Other (migraine)


Psych:  No: Anxiety, Depression


Skin:  No: Itching, Rash





Physical Exam


Physical Exam


GENERAL: This is a malnourished, thin female. 


SKIN: No rashes, ecchymoses or lesions. Cool and dry.


HEAD: Atraumatic. Normocephalic. No temporal or scalp tenderness.


EYES: Pupils equal round and reactive. Extraocular motions intact. No scleral 

icterus. No injection or drainage. 


ENT: Nose without bleeding, purulent drainage or septal hematoma. Throat 

without erythema, tonsillar hypertrophy or exudate. Uvula midline. Airway 

patent.


NECK: Trachea midline. No JVD or lymphadenopathy. Supple, nontender, no 

meningeal signs.


CARDIOVASCULAR: Regular rate and rhythm without murmurs, gallops, or rubs. 


RESPIRATORY: Clear to auscultation. Breath sounds equal bilaterally. No wheezes

, rales, or rhonchi.  


GASTROINTESTINAL: Abdomen soft,diffusely tender, nondistended.  Normoactive 

bowel sounds 4.  No hepato-splenomegaly, or palpable masses. No guarding.


MUSCULOSKELETAL: Extremities without clubbing, cyanosis, or edema. No joint 

tenderness, effusion, or edema noted. No calf tenderness. Negative Homans sign 

bilaterally.


NEUROLOGICAL: Awake, alert oriented 3.  No focal deficits.


Urinary Catheter:  No


Vascular Central Line Catheter:  No





A/P


Diagnosis:  


(1) Chronic abdominal pain


(2) UTI (urinary tract infection)


(3) Chronic narcotic dependence


(4) Hx of hiatal hernia


(5) hx right nephrectomy 


(6) Hx of renal cell cancer


(7) History of fundoplication


(8) GERD (gastroesophageal reflux disease)


(9) Gastroparesis


Assessment and Plan





56-year-old female with history of chronic abdominal pain and multiple 

admissions and ER visits.  Discharged on 8/2, presents again with chronic 

abdominal pain, intractable nausea, and vomiting. Emesis consisint of bile. 

Found with UTI, abd xray without acute findings. 


-wants to eat, will put on regular diet


-Discontinue IV fluids


-Valium as needed for nausea, allergic to all other antiemetics. 


-continue with Rocephin, follow UC-pending








Acute on chronic abdominal pain, chronic narcotic use


Drug-seeking behavior


-patient again requesting PO Dilaudid, concerned she will go through 

withdrawals. States her narcotics prescribed by PCP. Has been referred to pain 

management.


-Has a Fentanyl patch on, this is to continue 


-Patient asking for Dilaudid again, none will be given





Migraine


Imitrex 100 mg by mouth 1 has been ordered


-We'll receive Fioricet





History of gastrectomy, hiatal hernia, gastroparesis


Recommend that she continue to follow with GI as outpatient-attending spoke to 

her PCP yesterday, her gastroenterologist is Dr. Chung.  Patient is due to have 

a CTA of abdomen.








Lovenox for DVT prophylaxis





Follow up on urine cultures, once sensitivity is noted patient will likely be 

discharged on oral antibiotics





Discussed with RN


Discussed with patient


Discussed with Dr. Estrella


This patient was seen by myself and Dr. Estrella, this note is written on his 

behalf





Problem Qualifiers





(1) UTI (urinary tract infection):  


Qualified Code:  N30.00 - Acute cystitis without hematuria


(2) GERD (gastroesophageal reflux disease):  


Qualified Code:  K21.9 - Gastroesophageal reflux disease, esophagitis presence 

not specified





Nikia Simental Aug 5, 2017 08:51

## 2017-08-12 ENCOUNTER — HOSPITAL ENCOUNTER (EMERGENCY)
Dept: HOSPITAL 17 - NEPE | Age: 57
Discharge: HOME | End: 2017-08-12
Payer: COMMERCIAL

## 2017-08-12 ENCOUNTER — HOSPITAL ENCOUNTER (EMERGENCY)
Dept: HOSPITAL 17 - NEPD | Age: 57
Discharge: HOME | End: 2017-08-12
Payer: COMMERCIAL

## 2017-08-12 VITALS
RESPIRATION RATE: 23 BRPM | DIASTOLIC BLOOD PRESSURE: 91 MMHG | TEMPERATURE: 99 F | OXYGEN SATURATION: 100 % | HEART RATE: 65 BPM | SYSTOLIC BLOOD PRESSURE: 213 MMHG

## 2017-08-12 VITALS
HEART RATE: 65 BPM | DIASTOLIC BLOOD PRESSURE: 163 MMHG | TEMPERATURE: 98.5 F | RESPIRATION RATE: 24 BRPM | SYSTOLIC BLOOD PRESSURE: 169 MMHG

## 2017-08-12 VITALS
SYSTOLIC BLOOD PRESSURE: 162 MMHG | DIASTOLIC BLOOD PRESSURE: 92 MMHG | RESPIRATION RATE: 20 BRPM | OXYGEN SATURATION: 100 % | HEART RATE: 68 BPM

## 2017-08-12 VITALS — HEIGHT: 62 IN | BODY MASS INDEX: 14.81 KG/M2 | WEIGHT: 80.47 LBS

## 2017-08-12 VITALS
DIASTOLIC BLOOD PRESSURE: 114 MMHG | RESPIRATION RATE: 20 BRPM | HEART RATE: 68 BPM | TEMPERATURE: 100.3 F | SYSTOLIC BLOOD PRESSURE: 191 MMHG | OXYGEN SATURATION: 99 %

## 2017-08-12 DIAGNOSIS — T40.2X5A: ICD-10-CM

## 2017-08-12 DIAGNOSIS — Z86.73: ICD-10-CM

## 2017-08-12 DIAGNOSIS — I25.2: ICD-10-CM

## 2017-08-12 DIAGNOSIS — R50.9: ICD-10-CM

## 2017-08-12 DIAGNOSIS — R10.84: Primary | ICD-10-CM

## 2017-08-12 DIAGNOSIS — Z85.528: ICD-10-CM

## 2017-08-12 DIAGNOSIS — F41.9: ICD-10-CM

## 2017-08-12 DIAGNOSIS — R56.9: ICD-10-CM

## 2017-08-12 DIAGNOSIS — E87.6: ICD-10-CM

## 2017-08-12 DIAGNOSIS — R10.9: Primary | ICD-10-CM

## 2017-08-12 DIAGNOSIS — R11.2: ICD-10-CM

## 2017-08-12 DIAGNOSIS — F11.20: ICD-10-CM

## 2017-08-12 DIAGNOSIS — Z79.899: ICD-10-CM

## 2017-08-12 LAB
ALP SERPL-CCNC: 86 U/L (ref 45–117)
ALT SERPL-CCNC: 49 U/L (ref 10–53)
ANION GAP SERPL CALC-SCNC: 10 MEQ/L (ref 5–15)
APTT BLD: 26.3 SEC (ref 24.3–30.1)
AST SERPL-CCNC: 43 U/L (ref 15–37)
BILIRUB SERPL-MCNC: 0.3 MG/DL (ref 0.2–1)
BUN SERPL-MCNC: 8 MG/DL (ref 7–18)
CHLORIDE SERPL-SCNC: 104 MEQ/L (ref 98–107)
ERYTHROCYTE [DISTWIDTH] IN BLOOD BY AUTOMATED COUNT: 16.9 % (ref 11.6–17.2)
GFR SERPLBLD BASED ON 1.73 SQ M-ARVRAT: 96 ML/MIN (ref 89–?)
HCO3 BLD-SCNC: 26.1 MEQ/L (ref 21–32)
HCT VFR BLD CALC: 30.5 % (ref 35–46)
INR PPP: 1 RATIO
MCH RBC QN AUTO: 28.8 PG (ref 27–34)
MCHC RBC AUTO-ENTMCNC: 34.3 % (ref 32–36)
MCV RBC AUTO: 83.9 FL (ref 80–100)
PLATELET # BLD: 183 TH/MM3 (ref 150–450)
POTASSIUM SERPL-SCNC: 3.2 MEQ/L (ref 3.5–5.1)
PROTHROMBIN TIME: 10.8 SEC (ref 9.8–11.6)
RBC # BLD AUTO: 3.64 MIL/MM3 (ref 4–5.3)
REVIEW FLAG: (no result)
SODIUM SERPL-SCNC: 140 MEQ/L (ref 136–145)
WBC # BLD AUTO: 5.2 TH/MM3 (ref 4–11)

## 2017-08-12 PROCEDURE — 85027 COMPLETE CBC AUTOMATED: CPT

## 2017-08-12 PROCEDURE — 87040 BLOOD CULTURE FOR BACTERIA: CPT

## 2017-08-12 PROCEDURE — 86900 BLOOD TYPING SEROLOGIC ABO: CPT

## 2017-08-12 PROCEDURE — 96372 THER/PROPH/DIAG INJ SC/IM: CPT

## 2017-08-12 PROCEDURE — 83605 ASSAY OF LACTIC ACID: CPT

## 2017-08-12 PROCEDURE — 85610 PROTHROMBIN TIME: CPT

## 2017-08-12 PROCEDURE — 86902 BLOOD TYPE ANTIGEN DONOR EA: CPT

## 2017-08-12 PROCEDURE — 74020: CPT

## 2017-08-12 PROCEDURE — 86077 PHYS BLOOD BANK SERV XMATCH: CPT

## 2017-08-12 PROCEDURE — 86870 RBC ANTIBODY IDENTIFICATION: CPT

## 2017-08-12 PROCEDURE — 86850 RBC ANTIBODY SCREEN: CPT

## 2017-08-12 PROCEDURE — 86901 BLOOD TYPING SEROLOGIC RH(D): CPT

## 2017-08-12 PROCEDURE — 83690 ASSAY OF LIPASE: CPT

## 2017-08-12 PROCEDURE — 80053 COMPREHEN METABOLIC PANEL: CPT

## 2017-08-12 PROCEDURE — 85730 THROMBOPLASTIN TIME PARTIAL: CPT

## 2017-08-12 PROCEDURE — 99284 EMERGENCY DEPT VISIT MOD MDM: CPT

## 2017-08-12 RX ADMIN — POTASSIUM CHLORIDE ONE MEQ: 20 TABLET, EXTENDED RELEASE ORAL at 18:59

## 2017-08-12 RX ADMIN — POTASSIUM CHLORIDE ONE MEQ: 20 TABLET, EXTENDED RELEASE ORAL at 18:55

## 2017-08-12 NOTE — PD
HPI


Chief Complaint:  Abdominal Pain


Time Seen by Provider:  16:56


Travel History


International Travel<30 days:  No


Contact w/Intl Traveler<30days:  No


Traveled to known affect area:  No





History of Present Illness


HPI


56 years old female complains of abdominal pain with nausea vomiting.  Patient 

has been seen to the emergency room multiple times in the past with same 

problem.  Workup has been negative.  Patient has been seen by GI specialist 

without clear etiology all abdominal pain with nausea vomiting.  Patient was 

seen in emergency room this morning and discharged home.  Patient was given GI 

cocktail solution and Benadryl prior to discharge.  Patient states that she had 

persistent pain he came back this afternoon.  Patient denies any headache.  

Patient denies any sore throat coughing congestion.  Patient denies any chest 

pain or shortness of breath.  Patient states the pain cramping pain and sharp 

pain diffuse over the abdomen.  Patient denies any pain radiation.  Patient 

denies any dysuria or frequency.  Patient denies any vaginal discharge or 

bleeding.  Patient denies any fever chills.





PFSH


Past Medical History


Hx Anticoagulant Therapy:  No


Asthma:  No


Blood Disorders:  No


Anxiety:  Yes


Depression:  No


Heart Rhythm Problems:  No


Cancer:  Yes (KIDNEY)


Cardiovascular Problems:  Yes (MI )


High Cholesterol:  No


Chemotherapy:  No


Chest Pain:  No


Congestive Heart Failure:  No


COPD:  No


Cerebrovascular Accident:  Yes ()


Diabetes:  No


Diminished Hearing:  No


Endocrine:  No


Gastrointestinal Disorders:  Yes


GERD:  No


Genitourinary:  Yes (kidney cancer, removed R kidney )


Headaches:  Yes


Hiatal Hernia:  Yes


Hypertension:  No


Immune Disorder:  No


Implanted Vascular Access Dvce:  No


Kidney Stones:  No


Musculoskeletal:  No


Neurologic:  Yes (seizures )


Psychiatric:  Yes


Reproductive:  No


Respiratory:  No


Immunizations Current:  Yes


Migraines:  Yes


Myocardial Infarction:  Yes ()


Pneumonia:  Yes


Radiation Therapy:  No


Renal Failure:  No


Seizures:  Yes


Sleep Apnea:  No


Thyroid Disease:  No


Ulcer:  No


Menopausal:  Yes


:  1


Para:  1


Miscarriage:  0


:  0


Ectopic Pregnancy:  No


Ovarian Cysts:  No


Tubal Ligation:  Yes





Past Surgical History


Abdominal Surgery:  Yes (total gastrectomy w/pouch , hernia repair)


Appendectomy:  Yes


Cardiac Surgery:  Yes (REPAIRED HOLE IN HEART)


Cholecystectomy:  Yes


Genitourinary Surgery:  Yes (RIGHT NEPHRECTOMY)


Hysterectomy:  No


Thoracic Surgery:  No


Tonsillectomy:  Yes


Other Surgery:  Yes





Social History


Alcohol Use:  No


Tobacco Use:  No


Substance Use:  No





Allergies-Medications


(Allergen,Severity, Reaction):  


Coded Allergies:  


     Compazine (Verified  Allergy, Severe, Shortness of Breath, 17)


     Gadolinium Derivatives (Verified  Allergy, Severe, Anaphylaxis, 17)


     Lobster (Verified  Allergy, Severe, Anaphylaxis, 17)


     Morphine (Verified  Allergy, Severe, Hives, 17)


     Penicillin (Verified  Allergy, Severe, Rash, 17)


     Phenergan (Verified  Allergy, Severe, Shortness of Breath, 17)


     Reglan (Verified  Allergy, Severe, Shortness of Breath, 17)


     Sulfa (Verified  Allergy, Severe, Rash, 17)


     Tigan (Verified  Allergy, Severe, Shortness of Breath, 17)


     Toradol (Verified  Allergy, Severe, Shortness of Breath, 17)


     Zofran (Verified  Allergy, Severe, Shortness of Breath, 17)


Reported Meds & Prescriptions





Reported Meds & Active Scripts


Active


Reported


Fentanyl Patch 72 HR (Fentanyl) 25 Mcg/Hr Patch 25 Mcg T-DERMAL Q72H


Zoloft (Sertraline HCl) 50 Mg Tab 50 Mg PO DAILY


Dulcolax Stool Softener (Docusate Sodium) 100 Mg Cap 100 Mg PO HS


Ambien (Zolpidem Tartrate) 10 Mg Tab 10 Mg PO HS PRN


Dilaudid (Hydromorphone HCl) 4 Mg Tab 6 Mg PO 5 TIMES A DAY PRN


Valium (Diazepam) 10 Mg Tab 10 Mg PO TID


Buspirone (Buspirone HCl) 7.5 Mg Tab 30 Mg PO TID


Cyanocobalamin Inj (Cyanocobalamin) 1,000 Mcg/Ml Inj 1,000 Mcg IM Q30D








Review of Systems


General / Constitutional:  No: Fever


Eyes:  No: Visual changes


HENT:  No: Headaches


Cardiovascular:  No: Chest Pain or Discomfort


Respiratory:  No: Shortness of Breath


Gastrointestinal:  Positive: Nausea, Vomiting, Abdominal Pain


Genitourinary:  No: Dysuria


Musculoskeletal:  No: Pain


Skin:  No Rash


Neurologic:  No: Weakness


Psychiatric:  No: Depression


Endocrine:  No: Polydipsia


Hematologic/Lymphatic:  No: Easy Bruising





Physical Exam


Narrative


GENERAL: Well-nourished, well-developed patient.


SKIN: Focused skin assessment warm/dry.


HEAD: Normocephalic.


EYES: No scleral icterus. No injection or drainage. 


NECK: Supple, trachea midline. No JVD or lymphadenopathy.


CARDIOVASCULAR: Regular rate and rhythm without murmurs, gallops, or rubs. 


RESPIRATORY: Breath sounds equal bilaterally. No accessory muscle use.


GASTROINTESTINAL: Abdomen soft,  nondistended.  Patient has mild diffuse 

tenderness over the abdomen.  No rebound tenderness.  No mass.


MUSCULOSKELETAL: No cyanosis, or edema. 


BACK: Nontender without obvious deformity. No CVA tenderness. 


Neurologic exam normal.





Data


Data


Last Documented VS





Vital Signs








  Date Time  Temp Pulse Resp B/P Pulse Ox O2 Delivery O2 Flow Rate FiO2


 


17 17:25  68 20 162/92 100 Room Air  


 


17 16:52 99.0       








Orders





 Cbc No Diff, Includes Plts (17 17:20)


Lactic Acid (17 17:20)


Blood Culture (17 17:20)


Type And Screen (17 17:20)


Coag Profile (17 17:20)


Urinalysis - C+S If Indicated (17 17:24)


Diphenhydramine Inj (Benadryl Inj) (17 17:30)


Abdomen, Flat & Upright (17 17:27)


Comprehensive Metabolic Panel (17 17:35)


Lipase (17 17:35)


Dicyclomine Inj (Bentyl Inj) (17 18:00)





Labs








 Laboratory Tests








Test 17





 17:25


 


White Blood Count 5.2 TH/MM3


 


Red Blood Count 3.64 MIL/MM3


 


Hemoglobin 10.5 GM/DL


 


Hematocrit 30.5 %


 


Mean Corpuscular Volume 83.9 FL


 


Mean Corpuscular Hemoglobin 28.8 PG


 


Mean Corpuscular Hemoglobin 34.3 %





Concent 


 


Red Cell Distribution Width 16.9 %


 


Platelet Count 183 TH/MM3


 


Mean Platelet Volume 10.0 FL


 


Prothrombin Time 10.8 SEC


 


Prothromb Time International 1.0 RATIO





Ratio 


 


Activated Partial 26.3 SEC





Thromboplast Time 


 


Sodium Level 140 MEQ/L


 


Potassium Level 3.2 MEQ/L


 


Chloride Level 104 MEQ/L


 


Carbon Dioxide Level 26.1 MEQ/L


 


Anion Gap 10 MEQ/L


 


Blood Urea Nitrogen 8 MG/DL


 


Creatinine 0.64 MG/DL


 


Estimat Glomerular Filtration 96 ML/MIN





Rate 


 


Random Glucose 110 MG/DL


 


Lactic Acid Level 1.1 mmol/L


 


Calcium Level 9.2 MG/DL


 


Total Bilirubin 0.3 MG/DL


 


Aspartate Amino Transf 43 U/L





(AST/SGOT) 


 


Alanine Aminotransferase 49 U/L





(ALT/SGPT) 


 


Alkaline Phosphatase 86 U/L


 


Total Protein 7.0 GM/DL


 


Albumin 3.5 GM/DL


 


Lipase 193 U/L














MDM


Medical Decision Making


Medical Screen Exam Complete:  Yes


Emergency Medical Condition:  Yes


Interpretation(s)





Last Impressions








Abdomen X-Ray 17 [id number] Signed





Impressions: 





 Service Date/Time:  Saturday, 2017 17:43 - CONCLUSION:  Nonspecific 





 gas pattern with air-fluid levels identified in nondistended loops of small 





 bowel.  No evidence of pathologic distention or free air..     Chava Lutz MD 





1848 PM.  CBC within normal limit.  Potassium 3.2.  Lactic  Acid 1.1.


Differential Diagnosis


Differential diagnosis including recurrent abdominal pain, electrolyte imbalance

, dehydration, pancreatitis, gastritis, colitis, UTI, pyelonephritis.


Narrative Course


56 years old female with recurrent abdominal pain with nausea vomiting.  

Patient has low-grade fever upon presentation.





Diagnosis





 Primary Impression:  


 Recurrent abdominal pain


 Additional Impression:  


 Hypokalemia


Patient Instructions:  General Instructions





***Additional Instructions:


Continue with medications as directed.  Potassium as directed.  Follow-up with 

personal physician and GI specialist.  Return if worse.


***Med/Other Pt SpecificInfo:  Prescription(s) given


Scripts


Potassium Chloride ER 10 Meq Cap10 Meq PO DAILY  #7 CAP  Ref 0


   Prov:Marito Zamudio MD         17


Disposition:  01 DISCHARGE HOME


Condition:  Stable








Marito Zamudio MD Aug 12, 2017 17:40

## 2017-08-12 NOTE — PD
HPI


Chief Complaint:  Abdominal Pain


Time Seen by Provider:  08:51


Travel History


International Travel<30 days:  No


Contact w/Intl Traveler<30days:  No


Traveled to known affect area:  No





History of Present Illness


HPI


The patient was seen and examined in the presence of the nurse.  This patient 

complains of abdominal pain.  She has chronic daily abdominal pain for years.  

She is a very frequent visitor.  This is her 35th visit in the last one year's 

time.  I saw her last month for the same thing.  Symptom  severity is 

reportedly moderate.  No alleviating factors.





PFSH


Past Medical History


Hx Anticoagulant Therapy:  No


Asthma:  No


Blood Disorders:  No


Anxiety:  Yes


Depression:  No


Heart Rhythm Problems:  No


Cancer:  Yes (KIDNEY)


Cardiovascular Problems:  Yes (MI )


High Cholesterol:  No


Chemotherapy:  No


Chest Pain:  No


Congestive Heart Failure:  No


COPD:  No


Cerebrovascular Accident:  Yes ()


Diabetes:  No


Diminished Hearing:  No


Endocrine:  No


Gastrointestinal Disorders:  Yes


GERD:  No


Genitourinary:  Yes (kidney cancer, removed R kidney )


Headaches:  Yes


Hiatal Hernia:  Yes


Hypertension:  No


Immune Disorder:  No


Implanted Vascular Access Dvce:  No


Kidney Stones:  No


Musculoskeletal:  No


Neurologic:  Yes (seizures )


Psychiatric:  Yes


Reproductive:  No


Respiratory:  No


Immunizations Current:  Yes


Migraines:  Yes


Myocardial Infarction:  Yes ()


Pneumonia:  Yes


Radiation Therapy:  No


Renal Failure:  No


Seizures:  Yes


Sleep Apnea:  No


Thyroid Disease:  No


Ulcer:  No


Pregnant?:  Not Pregnant


Menopausal:  Yes


:  1


Para:  1


Miscarriage:  0


:  0


Ectopic Pregnancy:  No


Ovarian Cysts:  No


Tubal Ligation:  Yes





Past Surgical History


Abdominal Surgery:  Yes (total gastrectomy w/pouch , hernia repair)


Appendectomy:  Yes


Cardiac Surgery:  Yes (REPAIRED HOLE IN HEART)


Cholecystectomy:  Yes


Genitourinary Surgery:  Yes (RIGHT NEPHRECTOMY)


Hysterectomy:  No


Thoracic Surgery:  No


Tonsillectomy:  Yes


Other Surgery:  Yes





Social History


Alcohol Use:  No


Tobacco Use:  No


Substance Use:  No





Allergies-Medications


(Allergen,Severity, Reaction):  


Coded Allergies:  


     Compazine (Verified  Allergy, Severe, Shortness of Breath, 17)


     Gadolinium Derivatives (Verified  Allergy, Severe, Anaphylaxis, 17)


     Lobster (Verified  Allergy, Severe, Anaphylaxis, 17)


     Morphine (Verified  Allergy, Severe, Hives, 17)


     Penicillin (Verified  Allergy, Severe, Rash, 17)


     Phenergan (Verified  Allergy, Severe, Shortness of Breath, 17)


     Reglan (Verified  Allergy, Severe, Shortness of Breath, 17)


     Sulfa (Verified  Allergy, Severe, Rash, 17)


     Tigan (Verified  Allergy, Severe, Shortness of Breath, 17)


     Toradol (Verified  Allergy, Severe, Shortness of Breath, 17)


     Zofran (Verified  Allergy, Severe, Shortness of Breath, 17)


Reported Meds & Prescriptions





Reported Meds & Active Scripts


Active


Reported


Fentanyl Patch 72 HR (Fentanyl) 25 Mcg/Hr Patch 25 Mcg T-DERMAL Q72H


Zoloft (Sertraline HCl) 50 Mg Tab 50 Mg PO DAILY


Dulcolax Stool Softener (Docusate Sodium) 100 Mg Cap 100 Mg PO HS


Ambien (Zolpidem Tartrate) 10 Mg Tab 10 Mg PO HS PRN


Dilaudid (Hydromorphone HCl) 4 Mg Tab 6 Mg PO 5 TIMES A DAY PRN


Valium (Diazepam) 10 Mg Tab 10 Mg PO TID


Buspirone (Buspirone HCl) 7.5 Mg Tab 30 Mg PO TID


Cyanocobalamin Inj (Cyanocobalamin) 1,000 Mcg/Ml Inj 1,000 Mcg IM Q30D








Review of Systems


General / Constitutional:  No: Fever


HENT:  No: Headaches


Respiratory:  No: Cough


Gastrointestinal:  Positive: Nausea, Abdominal Pain





Physical Exam


Narrative


GASTROINTESTINAL:  Abdomen soft, non-tender, nondistended. Positive bowel 

sounds.  No hepato-splenomegaly, or palpable masses. No guarding. 





SKIN: Focused skin assessment reveals no rash or ulcers. Skin is warm and dry.  

Palpation shows no induration or nodules.  Normal turgor





CARDIOVASCULAR: Regular rate and rhythm without murmur.  Extremities showed no 

edema or varicosities.





Moist mucous membranes





Data


Data


Last Documented VS





Vital Signs








  Date Time  Temp Pulse Resp B/P Pulse Ox O2 Delivery O2 Flow Rate FiO2


 


17 08:15 98.5 65 24 169/163  Room Air  








Orders





 Lidocaine 2% Viscous (Xylocaine 2% Visco (17 11:00)


Al-Mag Hy-Si 40-40-4 Mg/Ml Liq (Mag-Al P (17 11:00)


Diphenhydramine Inj (Benadryl Inj) (17 11:00)








MDM


Medical Decision Making


Medical Screen Exam Complete:  Yes


Emergency Medical Condition:  Yes


Medical Record Reviewed:  Yes


Differential Diagnosis


Chronic abdominal pain, narcotic ileus, narcotic seeking behavior


Narrative Course


I have reviewed the patient's electronic medical record.  I saw this patient 

last month for the same thing, she's been seen 3 or 4 times since then for the 

same thing





Patient is not dehydrated


Has a heart rate of 60 and moist.  His membranes are normal turgor





Soft benign nontender abdomen





She's had multiple negative workups here.  I don't feel emergent studies are 

indicated.  sHe requested GI cocktail which I provided as well as an 

intramuscular dose of Benadryl.





Diagnosis





 Primary Impression:  


 Abdominal pain


 Qualified Code:  R10.84 - Generalized abdominal pain


 Additional Impression:  


 Chronic narcotic dependence





***Additional Instructions:


The patient was advised to follow up with their physician and return if they 

worsen.


***Med/Other Pt SpecificInfo:  Other


Disposition:  01 DISCHARGE HOME


Condition:  Stable








Reji Tello MD Aug 12, 2017 11:04

## 2017-08-12 NOTE — RADRPT
EXAM DATE/TIME:  08/12/2017 17:43 

 

[facility] COMPARISON:     

ABDOMEN FLAT & UPRIGHT, April 04, 2017, 8:30.

 

                     

INDICATIONS :     

Abdominal pain.

                     

 

MEDICAL HISTORY :            

Cardiovascular disease. Hernia, hiatal. Seizures.Kidney cancer   

 

SURGICAL HISTORY :        

Hysterectomy. Appendectomy.Cholecystectomy.RT nephrectomy,gastrectomy

 

ENCOUNTER:     

Initial                                        

 

ACUITY:     

1 day      

 

PAIN SCORE:     

10/10

 

LOCATION:     

Bilateral  abdomen.

 

FINDINGS:     

Supine and upright views of the abdomen were performed.  The abdominal bowel gas pattern is nonspecif
ic with air-filled loops of small bowel containing air-fluid levels. There is no evidence of patholog
ic distention. No abnormal masses, calcifications, or organomegaly is seen. Postsurgical clips are no
wilmer. The visualized lower lungs are clear.  No evidence of free intraperitoneal gas.  The osseous str
uctures are unremarkable.

 

CONCLUSION:     

Nonspecific gas pattern with air-fluid levels identified in nondistended loops of small bowel.

 

No evidence of pathologic distention or free air..

 

 

 

 Chava Lutz MD on August 12, 2017 at 17:54           

Board Certified Radiologist.

 This report was verified electronically.

## 2017-08-13 ENCOUNTER — HOSPITAL ENCOUNTER (EMERGENCY)
Dept: HOSPITAL 17 - NEPE | Age: 57
Discharge: HOME | End: 2017-08-13
Payer: COMMERCIAL

## 2017-08-13 VITALS — WEIGHT: 80.47 LBS | BODY MASS INDEX: 14.81 KG/M2 | HEIGHT: 62 IN

## 2017-08-13 VITALS
RESPIRATION RATE: 18 BRPM | TEMPERATURE: 99.1 F | OXYGEN SATURATION: 100 % | HEART RATE: 90 BPM | SYSTOLIC BLOOD PRESSURE: 174 MMHG | DIASTOLIC BLOOD PRESSURE: 106 MMHG

## 2017-08-13 DIAGNOSIS — R10.9: Primary | ICD-10-CM

## 2017-08-13 PROCEDURE — 99284 EMERGENCY DEPT VISIT MOD MDM: CPT

## 2017-08-13 PROCEDURE — 96372 THER/PROPH/DIAG INJ SC/IM: CPT

## 2017-08-13 NOTE — PD
HPI


Chief Complaint:  Abdominal Pain


Time Seen by Provider:  07:36


Travel History


International Travel<30 days:  No


Contact w/Intl Traveler<30days:  No


Traveled to known affect area:  No





History of Present Illness


HPI


56-year-old woman, chronic recurrent abdominal pain, 1 ounce of department and 

to me.  She presents emergency room complaining that she's been more sick for 

the past 3 days, trouble vomiting, unable to keep food down, not able to keep 

her Valium or Dilaudid down.  She initially told me that she spoke her GI 

doctor was sent to the emergency department because she needs TPN.  She then 

told me that she actually hasn't talked to her GI doctor in 3 weeks.  She 

states she hasn't really been having any bowel movements.  She still having 

fluttering chest pain.





History


Past Medical History


*** Narrative Medical


Per patient


Previous gastrectomy


History of kidney CA


History of CVA


CAD, MI


Menopausal:  Yes


:  1


Para:  1





Social History


Alcohol Use:  No


Tobacco Use:  No





Allergies-Medications


(Allergen,Severity, Reaction):  


Coded Allergies:  


     Compazine (Verified  Allergy, Severe, Shortness of Breath, 17)


     Gadolinium Derivatives (Verified  Allergy, Severe, Anaphylaxis, 17)


     Lobster (Verified  Allergy, Severe, Anaphylaxis, 17)


     Morphine (Verified  Allergy, Severe, Hives, 17)


     Penicillin (Verified  Allergy, Severe, Rash, 17)


     Phenergan (Verified  Allergy, Severe, Shortness of Breath, 17)


     Reglan (Verified  Allergy, Severe, Shortness of Breath, 17)


     Sulfa (Verified  Allergy, Severe, Rash, 17)


     Tigan (Verified  Allergy, Severe, Shortness of Breath, 17)


     Toradol (Verified  Allergy, Severe, Shortness of Breath, 17)


     Zofran (Verified  Allergy, Severe, Shortness of Breath, 17)


Reported Meds & Prescriptions





Reported Meds & Active Scripts


Active


Potassium Chloride ER (Potassium Chloride) 10 Meq Cap 10 Meq PO DAILY


Reported


Fentanyl Patch 72 HR (Fentanyl) 25 Mcg/Hr Patch 25 Mcg T-DERMAL Q72H


Zoloft (Sertraline HCl) 50 Mg Tab 50 Mg PO DAILY


Dulcolax Stool Softener (Docusate Sodium) 100 Mg Cap 100 Mg PO HS


Ambien (Zolpidem Tartrate) 10 Mg Tab 10 Mg PO HS PRN


Dilaudid (Hydromorphone HCl) 4 Mg Tab 6 Mg PO 5 TIMES A DAY PRN


Valium (Diazepam) 10 Mg Tab 10 Mg PO TID


Buspirone (Buspirone HCl) 7.5 Mg Tab 30 Mg PO TID


Cyanocobalamin Inj (Cyanocobalamin) 1,000 Mcg/Ml Inj 1,000 Mcg IM Q30D








Review of Systems


Except as stated in HPI:  all other systems reviewed are Neg





Physical Exam


Narrative


GENERAL: 56-year-old woman, thin, no acute distress. 


SKIN: Focused skin assessment warm/dry.


CARDIOVASCULAR: Regular rate and rhythm.  No murmur appreciated.


RESPIRATORY: No accessory muscle use. Clear to auscultation. Breath sounds 

equal bilaterally. 


GASTROINTESTINAL: Abdomen scaphoid and flat.  Moderate diffuse tenderness.


MUSCULOSKELETAL: No obvious deformities.  No edema.


NEUROLOGICAL: Awake and alert. No obvious cranial nerve deficits.  Motor 

grossly within normal limits. Normal speech.


PSYCHIATRIC: Appropriate mood and affect; insight and judgment normal.





Data


Data


Last Documented VS





Vital Signs








  Date Time  Temp Pulse Resp B/P Pulse Ox O2 Delivery O2 Flow Rate FiO2


 


17 06:45 99.1 90 18 174/106 100 Room Air  








Orders








 Dicyclomine Inj (Bentyl Inj) (17 08:30)


Haloperidol Inj (Haldol Inj) (17 08:30)











MDM


Medical Decision Making


Medical Screen Exam Complete:  Yes


Emergency Medical Condition:  Yes


Differential Diagnosis


Drug-seeking, chronic abdominal pain, obstruction, pancreatitis, dehydration, 

other


Narrative Course


Medical decision making





56-year-old woman recurrent ED visits, here for the third time in 24 hours.  

She appears to be some degree of opiate withdrawal.  She states multiple 

allergies to many different medications including all noncontrolled pain 

medicines and nausea medicines.  We'll give her a dose of Bentyl and IM Haldol, 

outpatient follow-up.





Diagnosis





 Primary Impression:  


 Recurrent abdominal pain





***Additional Instructions:


Follow-up with her primary doctor in the next 2-4 days.





Return to the emergency department for any new or worsening symptoms.


***Med/Other Pt SpecificInfo:  No Change to Meds


Disposition:  01 DISCHARGE HOME


Condition:  Stable








Jesus Alberto Byrnes MD Aug 13, 2017 08:03

## 2017-08-14 ENCOUNTER — HOSPITAL ENCOUNTER (OUTPATIENT)
Dept: HOSPITAL 17 - NEPE | Age: 57
Setting detail: OBSERVATION
LOS: 3 days | Discharge: HOME | End: 2017-08-17
Attending: SPECIALIST | Admitting: SPECIALIST
Payer: COMMERCIAL

## 2017-08-14 VITALS
HEART RATE: 97 BPM | OXYGEN SATURATION: 96 % | TEMPERATURE: 99.3 F | RESPIRATION RATE: 20 BRPM | SYSTOLIC BLOOD PRESSURE: 178 MMHG | DIASTOLIC BLOOD PRESSURE: 104 MMHG

## 2017-08-14 VITALS — WEIGHT: 77.16 LBS | HEIGHT: 62 IN | BODY MASS INDEX: 14.2 KG/M2

## 2017-08-14 VITALS — OXYGEN SATURATION: 98 % | RESPIRATION RATE: 14 BRPM

## 2017-08-14 DIAGNOSIS — Z86.73: ICD-10-CM

## 2017-08-14 DIAGNOSIS — E87.6: ICD-10-CM

## 2017-08-14 DIAGNOSIS — I25.10: ICD-10-CM

## 2017-08-14 DIAGNOSIS — R11.2: ICD-10-CM

## 2017-08-14 DIAGNOSIS — K31.84: Primary | ICD-10-CM

## 2017-08-14 DIAGNOSIS — D63.8: ICD-10-CM

## 2017-08-14 DIAGNOSIS — E43: ICD-10-CM

## 2017-08-14 DIAGNOSIS — I25.2: ICD-10-CM

## 2017-08-14 DIAGNOSIS — Z87.11: ICD-10-CM

## 2017-08-14 DIAGNOSIS — Z79.891: ICD-10-CM

## 2017-08-14 DIAGNOSIS — Z85.528: ICD-10-CM

## 2017-08-14 DIAGNOSIS — G89.29: ICD-10-CM

## 2017-08-14 DIAGNOSIS — F41.9: ICD-10-CM

## 2017-08-14 DIAGNOSIS — R10.13: ICD-10-CM

## 2017-08-14 DIAGNOSIS — K21.9: ICD-10-CM

## 2017-08-14 LAB
ANION GAP SERPL CALC-SCNC: 9 MEQ/L (ref 5–15)
BASOPHILS # BLD AUTO: 0 TH/MM3 (ref 0–0.2)
BASOPHILS NFR BLD: 0.1 % (ref 0–2)
BUN SERPL-MCNC: 14 MG/DL (ref 7–18)
CHLORIDE SERPL-SCNC: 100 MEQ/L (ref 98–107)
EOSINOPHIL # BLD: 0 TH/MM3 (ref 0–0.4)
EOSINOPHIL NFR BLD: 0 % (ref 0–4)
ERYTHROCYTE [DISTWIDTH] IN BLOOD BY AUTOMATED COUNT: 16.8 % (ref 11.6–17.2)
GFR SERPLBLD BASED ON 1.73 SQ M-ARVRAT: 88 ML/MIN (ref 89–?)
HCO3 BLD-SCNC: 27.4 MEQ/L (ref 21–32)
HCT VFR BLD CALC: 34.4 % (ref 35–46)
HEMO FLAGS: (no result)
LYMPHOCYTES # BLD AUTO: 1.6 TH/MM3 (ref 1–4.8)
LYMPHOCYTES NFR BLD AUTO: 30.2 % (ref 9–44)
MCH RBC QN AUTO: 28.2 PG (ref 27–34)
MCHC RBC AUTO-ENTMCNC: 33.7 % (ref 32–36)
MCV RBC AUTO: 83.8 FL (ref 80–100)
MONOCYTES NFR BLD: 12 % (ref 0–8)
NEUTROPHILS # BLD AUTO: 3.1 TH/MM3 (ref 1.8–7.7)
NEUTROPHILS NFR BLD AUTO: 57.7 % (ref 16–70)
PLATELET # BLD: 248 TH/MM3 (ref 150–450)
POTASSIUM SERPL-SCNC: 2.9 MEQ/L (ref 3.5–5.1)
RBC # BLD AUTO: 4.1 MIL/MM3 (ref 4–5.3)
SODIUM SERPL-SCNC: 136 MEQ/L (ref 136–145)
WBC # BLD AUTO: 5.3 TH/MM3 (ref 4–11)

## 2017-08-14 PROCEDURE — 76937 US GUIDE VASCULAR ACCESS: CPT

## 2017-08-14 PROCEDURE — 96365 THER/PROPH/DIAG IV INF INIT: CPT

## 2017-08-14 PROCEDURE — 36569 INSJ PICC 5 YR+ W/O IMAGING: CPT

## 2017-08-14 PROCEDURE — 85730 THROMBOPLASTIN TIME PARTIAL: CPT

## 2017-08-14 PROCEDURE — 99285 EMERGENCY DEPT VISIT HI MDM: CPT

## 2017-08-14 PROCEDURE — 80048 BASIC METABOLIC PNL TOTAL CA: CPT

## 2017-08-14 PROCEDURE — 88305 TISSUE EXAM BY PATHOLOGIST: CPT

## 2017-08-14 PROCEDURE — 88312 SPECIAL STAINS GROUP 1: CPT

## 2017-08-14 PROCEDURE — 84100 ASSAY OF PHOSPHORUS: CPT

## 2017-08-14 PROCEDURE — 00740: CPT

## 2017-08-14 PROCEDURE — 96372 THER/PROPH/DIAG INJ SC/IM: CPT

## 2017-08-14 PROCEDURE — 71010: CPT

## 2017-08-14 PROCEDURE — 85027 COMPLETE CBC AUTOMATED: CPT

## 2017-08-14 PROCEDURE — 85610 PROTHROMBIN TIME: CPT

## 2017-08-14 PROCEDURE — G0378 HOSPITAL OBSERVATION PER HR: HCPCS

## 2017-08-14 PROCEDURE — 96366 THER/PROPH/DIAG IV INF ADDON: CPT

## 2017-08-14 PROCEDURE — 96375 TX/PRO/DX INJ NEW DRUG ADDON: CPT

## 2017-08-14 PROCEDURE — 83735 ASSAY OF MAGNESIUM: CPT

## 2017-08-14 PROCEDURE — 85025 COMPLETE CBC W/AUTO DIFF WBC: CPT

## 2017-08-14 PROCEDURE — 74174 CTA ABD&PLVS W/CONTRAST: CPT

## 2017-08-14 PROCEDURE — 43239 EGD BIOPSY SINGLE/MULTIPLE: CPT

## 2017-08-14 NOTE — PD
HPI


Chief Complaint:  Abdominal Pain


Time Seen by Provider:  18:12


Travel History


International Travel<30 days:  No


Contact w/Intl Traveler<30days:  No


Traveled to known affect area:  No





History of Present Illness


HPI


This is a 56 year old female who has a history of chronic abdominal pain and 

opiate dependence who presents to the emergency department reporting that she 

was sent in by Dr. Chung her gastroenterologist who is requesting a CTA and a 

PICC line.  Pt. reports that she has been having increasing abdominal pain 

since Friday, constant, moderate severity, associated with nausea and inability 

to eat.





PFSH


Past Medical History


Hx Anticoagulant Therapy:  No


Asthma:  No


Blood Disorders:  No


Anxiety:  Yes


Depression:  No


Heart Rhythm Problems:  No


Cancer:  Yes (KIDNEY)


Cardiovascular Problems:  Yes


High Cholesterol:  No


Chemotherapy:  No


Chest Pain:  No


Congestive Heart Failure:  No


COPD:  No


Cerebrovascular Accident:  Yes


Diabetes:  No


Diminished Hearing:  No


Endocrine:  No


Gastrointestinal Disorders:  Yes


GERD:  No


Genitourinary:  Yes (kidney cancer, removed R kidney )


Headaches:  Yes


Hiatal Hernia:  Yes


Hypertension:  No


Immune Disorder:  No


Implanted Vascular Access Dvce:  No


Kidney Stones:  No


Musculoskeletal:  No


Neurologic:  Yes (seizures )


Psychiatric:  Yes


Reproductive:  No


Respiratory:  No


Immunizations Current:  Yes


Migraines:  Yes


Myocardial Infarction:  Yes ()


Pneumonia:  Yes


Radiation Therapy:  No


Renal Failure:  No


Seizures:  Yes


Sleep Apnea:  No


Thyroid Disease:  No


Ulcer:  No


Menopausal:  Yes


:  1


Para:  1


Miscarriage:  0


:  0


Ectopic Pregnancy:  No


Ovarian Cysts:  No


Tubal Ligation:  Yes





Past Surgical History


Abdominal Surgery:  Yes (total gastrectomy w/pouch , hernia repair)


Appendectomy:  Yes


Cardiac Surgery:  Yes (REPAIRED HOLE IN HEART)


Cholecystectomy:  Yes


Genitourinary Surgery:  Yes (RIGHT NEPHRECTOMY)


Hysterectomy:  No


Thoracic Surgery:  No


Tonsillectomy:  Yes


Other Surgery:  Yes





Social History


Alcohol Use:  No


Tobacco Use:  No


Substance Use:  No





Allergies-Medications


(Allergen,Severity, Reaction):  


Coded Allergies:  


     Compazine (Verified  Allergy, Severe, Shortness of Breath, 17)


     Gadolinium Derivatives (Verified  Allergy, Severe, Anaphylaxis, 17)


     Lobster (Verified  Allergy, Severe, Anaphylaxis, 17)


     Morphine (Verified  Allergy, Severe, Hives, 17)


     Penicillin (Verified  Allergy, Severe, Rash, 17)


     Phenergan (Verified  Allergy, Severe, Shortness of Breath, 17)


     Reglan (Verified  Allergy, Severe, Shortness of Breath, 17)


     Sulfa (Verified  Allergy, Severe, Rash, 17)


     Tigan (Verified  Allergy, Severe, Shortness of Breath, 17)


     Toradol (Verified  Allergy, Severe, Shortness of Breath, 17)


     Zofran (Verified  Allergy, Severe, Shortness of Breath, 17)


Reported Meds & Prescriptions





Reported Meds & Active Scripts


Active


Potassium Chloride ER (Potassium Chloride) 10 Meq Cap 10 Meq PO DAILY


Reported


Fentanyl Patch 72 HR (Fentanyl) 25 Mcg/Hr Patch 25 Mcg T-DERMAL Q72H


Zoloft (Sertraline HCl) 50 Mg Tab 50 Mg PO DAILY


Dulcolax Stool Softener (Docusate Sodium) 100 Mg Cap 100 Mg PO HS


Ambien (Zolpidem Tartrate) 10 Mg Tab 10 Mg PO HS PRN


Dilaudid (Hydromorphone HCl) 4 Mg Tab 6 Mg PO 5 TIMES A DAY PRN


Valium (Diazepam) 10 Mg Tab 10 Mg PO TID


Buspirone (Buspirone HCl) 7.5 Mg Tab 30 Mg PO TID


Cyanocobalamin Inj (Cyanocobalamin) 1,000 Mcg/Ml Inj 1,000 Mcg IM Q30D








Review of Systems


Except as stated in HPI:  all other systems reviewed are Neg





Physical Exam


Narrative


GENERAL: no acute distress, frail and thin


SKIN: Warm and dry.


HEAD: Normocephalic.


EYES: No scleral icterus. No injection or drainage. 


NECK: Supple, trachea midline. No JVD or lymphadenopathy.


CARDIOVASCULAR: Regular rate and rhythm without murmurs, gallops, or rubs. 


RESPIRATORY: Breath sounds equal bilaterally. No accessory muscle use.


GASTROINTESTINAL: Abdomen soft, diffusely tender to palpation with no rebound/

guarding


MUSCULOSKELETAL: No cyanosis, or edema. 


BACK: Nontender without obvious deformity. No CVA tenderness.








Data


Data


Last Documented VS





Vital Signs








  Date Time  Temp Pulse Resp B/P Pulse Ox O2 Delivery O2 Flow Rate FiO2


 


17 17:20 99.3 97 20 178/104 96 Room Air  








Orders





 Complete Blood Count With Diff (17 18:22)


Basic Metabolic Panel (Bmp) (17 18:22)


^ Insert Iv (17 18:22)


Vascular Access Team Consult/P PRN (17 18:26)


Vascular Poc Ultrasound (17 )








MDM


Medical Decision Making


Medical Screen Exam Complete:  Yes


Emergency Medical Condition:  Yes


Interpretation(s)


temperature is 99.3


mild tachycardia, hypertension


Differential Diagnosis


mesenteric ischemia, chronic abdominal pain, malnutrition, dehydration


Narrative Course


This is a 56 year old female who is well known to our emergency department 

presenting to the emergency department with worsening chronic abdominal pain. 

She evidently saw Dr. Chung immediately prior to arrival and he requested she 

come to the [facility] department for admission for a PICC line and CTA.  Labs 

were obtained. I spoke to Dr. Yu who was on call for Dr. Chung and 

confirmed this plan.








Lorena Villanueva MD Aug 14, 2017 18:46

## 2017-08-14 NOTE — RADRPT
EXAM DATE/TIME:  08/14/2017 21:13 

 

[facility] COMPARISON:     

CT ABDOMEN & PELVIS W CONTRAST, July 31, 2017, 11:53.

 

 

INDICATIONS :     

Medial abdominal pain with vomiting.

                      

 

IV CONTRAST:     

50 cc Omnipaque 350 (iohexol) IV 

 

 

ORAL CONTRAST:      

No oral contrast ingested.

                      

 

RADIATION DOSE:     

6.42 CTDIvol (mGy) 

 

 

MEDICAL HISTORY :     

Myocardial infarction. Cardiovascular disease Seizures.Hiatal hernia. Kidney cancer. 

 

SURGICAL HISTORY :      

Nephrectomy, right. Total gastrectomy.

 

ENCOUNTER:      

Initial

 

ACUITY:      

3 days

 

PAIN SCALE:      

8/10

 

LOCATION:        

medial abdomen

 

TECHNIQUE:     

Volumetric scanning was performed using a multi-row detector CT scanner.  The data was post processed
 with a variety of visualization algorithms including full volume maximum intensity projection, multi
-planar sliding thin slab reformation, curved planar reformation, and surface rendering techniques.  
Using automated exposure control and adjustment of the mA and/or kV according to patient size, radiat
ion dose was kept as low as reasonably achievable to obtain optimal diagnostic quality images.  DICOM
 format image data is available electronically for review and comparison.  

 

FINDINGS:     

Abdominal aorta and iliac arteries are mildly tortuous. No aneurysm. There is no evidence of arterial
 thrombosis within the abdomen or pelvis. Visualized portions of arteries within both upper thighs al
so within normal limits.

 

     Nephrectomy changes are again seen on the right. No acute solid organ abnormality demonstrated. 
There is no obstruction or inflammatory change of the gastrointestinal tract. There is no free fluid 
or lymphadenopathy.

 

     Trace atelectasis left lung base, improved.

 

CONCLUSION:     

No acute abnormality. Previous nephrectomy on the right.

 

 

 

 Prince Justice MD on August 14, 2017 at 22:43           

Board Certified Radiologist.

 This report was verified electronically.

## 2017-08-14 NOTE — PD
Physical Exam


Time Seen by Provider:  17:34


Narrative


55yo F sent by Dr. Chung, gastroenterologist, for abd pain and dry heaving 

since Friday.  TMAX 100.8.  





Patient seen in triage.  VS reviewed.  Awaiting bed placement.





Data


Data


Last Documented VS





Vital Signs








  Date Time  Temp Pulse Resp B/P Pulse Ox O2 Delivery O2 Flow Rate FiO2


 


8/14/17 17:20 99.3 97 20 178/104 96 Room Air  











MDM


Supervised Visit with DELLA:  Irene Souza Aug 14, 2017 17:35

## 2017-08-14 NOTE — PD
Data


Data


Last Documented VS





Vital Signs








  Date Time  Temp Pulse Resp B/P Pulse Ox O2 Delivery O2 Flow Rate FiO2


 


8/14/17 19:30   14  98 Room Air  


 


8/14/17 17:20 99.3 97  178/104    








Orders





 Complete Blood Count With Diff (8/14/17 18:22)


Basic Metabolic Panel (Bmp) (8/14/17 18:22)


^ Insert Iv (8/14/17 18:22)


Cta Abd/Pel W Iv Contrast W 3d (8/14/17 )


Iv Access Insert/Monitor (8/14/17 19:53)


Ecg Monitoring (8/14/17 19:53)


Oximetry (8/14/17 19:53)


Sodium Chloride 0.9% Flush (Ns Flush) (8/14/17 20:00)


Dicyclomine (Bentyl) (8/14/17 20:00)


Al-Mag Hy-Si 40-40-4 Mg/Ml Liq (Mag-Al P (8/14/17 20:00)


Lidocaine 2% Viscous (Xylocaine 2% Visco (8/14/17 20:00)


Dicyclomine Inj (Bentyl Inj) (8/14/17 20:15)


Potassium Chlor 20 Meq Premix (Kcl 20 Me (8/14/17 21:00)


Iohexol 350 Inj (Omnipaque 350 Inj) (8/14/17 21:22)


Diphenhydramine (Benadryl) (8/14/17 22:15)


Sodium Chlorid 0.9% 500 Ml Inj (Ns 500 M (8/14/17 22:15)


Admit Order (Ed Use Only) (8/14/17 )


Consult Gastroenterology (8/14/17 )





Labs








 Laboratory Tests








Test 8/14/17





 19:30


 


White Blood Count 5.3 TH/MM3


 


Red Blood Count 4.10 MIL/MM3


 


Hemoglobin 11.6 GM/DL


 


Hematocrit 34.4 %


 


Mean Corpuscular Volume 83.8 FL


 


Mean Corpuscular Hemoglobin 28.2 PG


 


Mean Corpuscular Hemoglobin 33.7 %





Concent 


 


Red Cell Distribution Width 16.8 %


 


Platelet Count 248 TH/MM3


 


Mean Platelet Volume 9.7 FL


 


Neutrophils (%) (Auto) 57.7 %


 


Lymphocytes (%) (Auto) 30.2 %


 


Monocytes (%) (Auto) 12.0 %


 


Eosinophils (%) (Auto) 0.0 %


 


Basophils (%) (Auto) 0.1 %


 


Neutrophils # (Auto) 3.1 TH/MM3


 


Lymphocytes # (Auto) 1.6 TH/MM3


 


Monocytes # (Auto) 0.6 TH/MM3


 


Eosinophils # (Auto) 0.0 TH/MM3


 


Basophils # (Auto) 0.0 TH/MM3


 


CBC Comment DIFF FINAL 


 


Differential Comment  


 


Sodium Level 136 MEQ/L


 


Potassium Level 2.9 MEQ/L


 


Chloride Level 100 MEQ/L


 


Carbon Dioxide Level 27.4 MEQ/L


 


Anion Gap 9 MEQ/L


 


Blood Urea Nitrogen 14 MG/DL


 


Creatinine 0.69 MG/DL


 


Estimat Glomerular Filtration 88 ML/MIN





Rate 


 


Random Glucose 108 MG/DL


 


Calcium Level 9.1 MG/DL














MDM


Supervised Visit with DELLA:  Yes


Narrative Course


Patient care assumed from Dr. Lorena Echeverria at 1900.  This is a 56-year-old 

female well-known to me from previous ER visits presents emergency department 

for acute on chronic abdominal pain.  The patient reports her 75 pound weight 

loss but he was to be the same weight as I saw her several months ago.  The 

patient's new GI doctor Dr. Chung who is concerned that she is not getting 

enough nutrition want to have her admitted to the hospital for PICC line and 

TPN and a CTA of her abdomen.  The patient is requesting something for nausea 

and states the only thing that works for her is benzodiazepines.  She has 

multiple allergies to medications including Phenergan and Reglan Toradol Zofran 

and Compazine.  She will be given Bentyl as well as a GI cocktail.  Awaiting 

for her CAT scan results at this time.








Last 24 hours Impressions








Abdomen/Pelvis CT 8/14/17 0000 Signed





Impressions: 





 Service Date/Time:  Monday, August 14, 2017 21:13 - CONCLUSION:  No acute 





 abnormality. Previous nephrectomy on the right.     Prince Justice MD 





My examination the patient appears at the baseline I have come to known her at.

  She requested something for nausea and stated that she was allergic to 

everything.  She requested Benadryl.  I have obliged her with a dose by mouth.  

She also requested fluids because she only has one kidney and received IV 

contrast.  She was given a 500 cc bolus.  Labs reviewed and significant for 

some mild hypokalemia which was being replaced.  Patient was discussed with 

[facility]


Diagnosis





 Primary Impression:  


 Abdominal pain





Admitting Information


Admitting Physician Requests:  Observation


Condition:  Stable








Kostas Jean MD Aug 14, 2017 19:44

## 2017-08-15 VITALS
SYSTOLIC BLOOD PRESSURE: 153 MMHG | TEMPERATURE: 98 F | HEART RATE: 71 BPM | OXYGEN SATURATION: 97 % | RESPIRATION RATE: 16 BRPM | DIASTOLIC BLOOD PRESSURE: 94 MMHG

## 2017-08-15 VITALS
RESPIRATION RATE: 16 BRPM | HEART RATE: 67 BPM | SYSTOLIC BLOOD PRESSURE: 131 MMHG | TEMPERATURE: 98.9 F | OXYGEN SATURATION: 98 % | DIASTOLIC BLOOD PRESSURE: 82 MMHG

## 2017-08-15 VITALS
HEART RATE: 72 BPM | TEMPERATURE: 98.6 F | RESPIRATION RATE: 18 BRPM | SYSTOLIC BLOOD PRESSURE: 127 MMHG | OXYGEN SATURATION: 98 % | DIASTOLIC BLOOD PRESSURE: 78 MMHG

## 2017-08-15 VITALS
OXYGEN SATURATION: 98 % | SYSTOLIC BLOOD PRESSURE: 125 MMHG | HEART RATE: 98 BPM | RESPIRATION RATE: 20 BRPM | DIASTOLIC BLOOD PRESSURE: 71 MMHG | TEMPERATURE: 98.4 F

## 2017-08-15 VITALS
OXYGEN SATURATION: 98 % | HEART RATE: 72 BPM | RESPIRATION RATE: 16 BRPM | DIASTOLIC BLOOD PRESSURE: 92 MMHG | SYSTOLIC BLOOD PRESSURE: 149 MMHG

## 2017-08-15 LAB
ANION GAP SERPL CALC-SCNC: 8 MEQ/L (ref 5–15)
BUN SERPL-MCNC: 12 MG/DL (ref 7–18)
CHLORIDE SERPL-SCNC: 101 MEQ/L (ref 98–107)
ERYTHROCYTE [DISTWIDTH] IN BLOOD BY AUTOMATED COUNT: 16.6 % (ref 11.6–17.2)
GFR SERPLBLD BASED ON 1.73 SQ M-ARVRAT: 101 ML/MIN (ref 89–?)
HCO3 BLD-SCNC: 26.3 MEQ/L (ref 21–32)
HCT VFR BLD CALC: 32.1 % (ref 35–46)
MAGNESIUM SERPL-MCNC: 2.2 MG/DL (ref 1.5–2.5)
MCH RBC QN AUTO: 28.1 PG (ref 27–34)
MCHC RBC AUTO-ENTMCNC: 33.6 % (ref 32–36)
MCV RBC AUTO: 83.6 FL (ref 80–100)
PLATELET # BLD: 211 TH/MM3 (ref 150–450)
POTASSIUM SERPL-SCNC: 3.6 MEQ/L (ref 3.5–5.1)
RBC # BLD AUTO: 3.84 MIL/MM3 (ref 4–5.3)
REVIEW FLAG: (no result)
SODIUM SERPL-SCNC: 135 MEQ/L (ref 136–145)
WBC # BLD AUTO: 4.2 TH/MM3 (ref 4–11)

## 2017-08-15 RX ADMIN — DICYCLOMINE HYDROCHLORIDE PRN MG: 20 TABLET ORAL at 04:12

## 2017-08-15 RX ADMIN — Medication SCH ML: at 08:51

## 2017-08-15 RX ADMIN — SERTRALINE HYDROCHLORIDE SCH MG: 50 TABLET, FILM COATED ORAL at 10:38

## 2017-08-15 RX ADMIN — POTASSIUM CHLORIDE, DEXTROSE MONOHYDRATE AND SODIUM CHLORIDE SCH MLS/HR: 150; 5; 450 INJECTION, SOLUTION INTRAVENOUS at 00:29

## 2017-08-15 RX ADMIN — DIAZEPAM SCH MG: 10 TABLET ORAL at 17:38

## 2017-08-15 RX ADMIN — ZOLPIDEM TARTRATE PRN MG: 10 TABLET, FILM COATED ORAL at 20:45

## 2017-08-15 RX ADMIN — POTASSIUM CHLORIDE, DEXTROSE MONOHYDRATE AND SODIUM CHLORIDE SCH MLS/HR: 150; 5; 450 INJECTION, SOLUTION INTRAVENOUS at 20:04

## 2017-08-15 RX ADMIN — DIAZEPAM SCH MG: 10 TABLET ORAL at 13:37

## 2017-08-15 RX ADMIN — POTASSIUM CHLORIDE, DEXTROSE MONOHYDRATE AND SODIUM CHLORIDE SCH MLS/HR: 150; 5; 450 INJECTION, SOLUTION INTRAVENOUS at 10:04

## 2017-08-15 RX ADMIN — DICYCLOMINE HYDROCHLORIDE PRN MG: 20 TABLET ORAL at 13:37

## 2017-08-15 RX ADMIN — POTASSIUM CHLORIDE SCH MEQ: 750 CAPSULE, EXTENDED RELEASE ORAL at 10:38

## 2017-08-15 RX ADMIN — FAMOTIDINE SCH MG: 20 TABLET, FILM COATED ORAL at 20:45

## 2017-08-15 RX ADMIN — HEPARIN SODIUM SCH UNITS: 10000 INJECTION, SOLUTION INTRAVENOUS; SUBCUTANEOUS at 20:46

## 2017-08-15 RX ADMIN — HEPARIN SODIUM SCH UNITS: 10000 INJECTION, SOLUTION INTRAVENOUS; SUBCUTANEOUS at 08:51

## 2017-08-15 RX ADMIN — FAMOTIDINE SCH MG: 20 TABLET, FILM COATED ORAL at 08:50

## 2017-08-15 RX ADMIN — Medication SCH ML: at 20:48

## 2017-08-15 NOTE — HHI.FF
Face to Face Verification


Diagnosis:  


(1) Malnourished


(2) Gastroparesis


(3) Recurrent abdominal pain


Home Health Nursing


Order:     Medical education


      Nursing assessment with vital signs


      IV medication administration


Instructions:


[facility] TPN AT HOME








Order: To Evaluate:  Support services








I have seen patient Bridget Stout on 8/15/17. My clinical findings support 

the need for the requested home health care services because:


Deconditioned w/ increased weakness


Need for psychosocial assistance


Injectable med education/admin








I certify that my clinical findings support that this patient is homebound 

because:


Impaired cognitive ability/safety


Need for psychosocial assistance








Nikia Simental [facility] Aug 15, 2017 09:41

## 2017-08-15 NOTE — RADRPT
EXAM DATE/TIME:  08/15/2017 13:18 

 

[facility] COMPARISON:     

ABDOMEN FLAT & UPRIGHT, August 12, 2017, 17:43.

 

                     

INDICATIONS :     

Picc line placement.

                     

 

MEDICAL HISTORY :     

Myocardial infarction.          

 

SURGICAL HISTORY :        

kidney surgery and had a blood clot that went through a hole in her heart. occulator to take care of 
hole in the heart.

 

ENCOUNTER:     

Initial                                        

 

ACUITY:     

3 days      

 

PAIN SCORE:     

0/10

 

LOCATION:     

Bilateral chest 

 

FINDINGS:     

The exam demonstrates a PICC which enters from the right side the catheter tip is in appropriate loca
tion at the atriocaval junction.

 

The heart is at the upper limits of normal in size. There chronic interstitial changes. The lungs are
 otherwise clear. The visualized bony structures are grossly intact.

 

CONCLUSION:     

1. PICC in satisfactory position.

 

 

 

 Jefferson Ellis MD on August 15, 2017 at 13:43           

Board Certified Radiologist.

 This report was verified electronically.

## 2017-08-15 NOTE — HHI.HP
HPI


Service


[facility]


Primary Care Physician


No Primary Care Physician


Admission Diagnosis


Acute on chronic abdominal pain


Diagnoses:  


Chief Complaint:  


abdominal pain, weight loss, nausea


Travel History


International Travel<30 Days:  No


Contact w/Intl Traveler <30 Da:  No


Traveled to Known Affected Are:  No


History of Present Illness


This is a 56-year-old female with significant past medical history of chronic 

abdominal pain with extensive GI workup, gastroparesis, previous pancreatitis, 

chronic opioid use, CAD, history of kidney cancer post-nephrectomy, history of 

embolic strokes, anxiety.  Patient has had multiple admissions and ER visits.  

She was last admitted under our services 8/4-8/5 for the same.  According to 

the patient, she recently established herself with a new gastroenterologist, 

Dr. Naylor.  She was sent to see him on Friday and he was concerned that she 

continues to lose weight and is not getting enough nutrition.  She has visited 

the emergency room several times in the last couple days.  She again presented 

yesterday at the request of Dr. Naylor because of her ongoing weight loss and 

inability to eat as well as the need for a CTA and a PICC line.  Patient 

indicates that she has dry heaves, she's not able to vomit however she is able 

to produce bile color gastric emesis.  She has only been able to eat very small 

amounts of liquids.  She does have loose stools.  No blood in the stool, no 

blood in the emesis.  She complains of diffuse abdominal pain that is 

controlled with oral Dilaudid.  She has multiple allergies to antiemetics and 

the only thing that works is benzodiazepines.  Patient was evaluated in the 

emergency room, laboratory workup was significant for hypokalemia, potassium 

2.9.  CT of the abdomen and pelvis was completed which did not reveal any 

significant findings.  Complaining of pain, she still nauseous.  She is very 

concerned about her Dilaudid and whether she will go through withdrawals if she 

doesn't continue to take them here.  Laboratory workup currently standing.  

Patient is admitted for further evaluation and treatment.





Review of Systems


Constitutional:  COMPLAINS OF: Fatigue, Weight loss,  DENIES: Diaphoretic 

episodes, Fever, Weight gain, Chills, Dizziness, Change in appetite, Night 

Sweats


Endocrine:  DENIES: Abnorml menstrual pattern, Heat/cold intolerance, Polydipsia

, Polyuria, Polyphagia


Eyes:  DENIES: Blurred vision, Diplopia, Eye inflammation, Eye pain, Vision loss

, Photosensitivity, Double Vision


Ears, nose, mouth, throat:  DENIES: Tinnitus, Hearing loss, Vertigo, Nasal 

discharge, Oral lesions, Throat pain, Hoarseness, Ear Pain, Running Nose, 

Epistaxis, Sinus Pain, Toothache, Odynophagia


Respiratory:  DENIES: Apneas, Cough, Snoring, Wheezing, Hemoptysis, Sputum 

production, Shortness of breath


Cardiovascular:  DENIES: Chest pain, Palpitations, Syncope, Dyspnea on Exertion

, PND, Lower Extremity Edema, Orthopnea, Claudication


Gastrointestinal:  COMPLAINS OF: Abdominal pain, Diarrhea, Nausea, Vomiting, 

Anorexia,  DENIES: Black stools, Bloody stools, Constipation, Difficulty 

Swallowing


Genitourinary:  DENIES: Abnormal vaginal bleeding, Dysmenorrhea, Dyspareunia, 

Sexual dysfunction, Urinary frequency, Urinary incontinence, Urgency, Hematuria

, Dysuria, Nocturia, Vaginal discharge


Musculoskeletal:  DENIES: Joint pain, Muscle aches, Stiffness, Joint Swelling, 

Back pain, Neck pain


Integumentary:  DENIES: Abnormal pigmentation, Pruritus, Rash, Nail changes, 

Breast masses, Breast skin changes, Nipple discharge


Hematologic/lymphatic:  DENIES: Bruising, Lymphadenopathy


Immunologic/allergic:  DENIES: Eczema, Urticaria


Neurologic:  DENIES: Abnormal gait, Headache, Localized weakness, Paresthesias, 

Seizures, Speech Problems, Tremor, Poor Balance


Psychiatric:  COMPLAINS OF: Anxiety,  DENIES: Confusion, Mood changes, 

Depression, Hallucinations, Agitation, Suicidal Ideation, Homicidal Ideation, 

Delusions





Past Family Social History


Past Medical History


1.   Anxiety.


2.   History of kidney cancer, status post nephrectomy.


3.   History of MI in 2006.


4.   History of tubal ligation in the past.


5.  Pancreatitis


6.   Chronic abd. pain with extensive GI work up


7.  Malnutrition


8.  Weight loss


9.  Anxiety


10.  Chronic opioid use. 


11.  Gastroparesis


12. Questionable seizures


13. PFO which led to embolic strokes


Past Surgical History


1.  Right nephrectomy


2.  Tubal ligation


3.  Gastrectomy with pouch


4.  Hernia repair


5.  Appendectomy


6.  Cholecystectomy


7.  Hysterectomy


8.  Tonsillectomy


9.  Fundoplication


10. PFO closure with 


11. EGD/colonoscopy


Reported Medications





Reported Meds & Active Scripts


Active


Potassium Chloride ER (Potassium Chloride) 10 Meq Cap 10 Meq PO DAILY


Reported


Fentanyl Patch 72 HR (Fentanyl) 25 Mcg/Hr Patch 25 Mcg T-DERMAL Q72H


Zoloft (Sertraline HCl) 50 Mg Tab 50 Mg PO DAILY


Dulcolax Stool Softener (Docusate Sodium) 100 Mg Cap 100 Mg PO HS


Ambien (Zolpidem Tartrate) 10 Mg Tab 10 Mg PO HS PRN


Dilaudid (Hydromorphone HCl) 4 Mg Tab 6 Mg PO 5 TIMES A DAY PRN


Valium (Diazepam) 10 Mg Tab 10 Mg PO TID


Buspirone (Buspirone HCl) 7.5 Mg Tab 30 Mg PO TID


Cyanocobalamin Inj (Cyanocobalamin) 1,000 Mcg/Ml Inj 1,000 Mcg IM Q30D


Allergies:  


Coded Allergies:  


     Compazine (Verified  Allergy, Severe, Shortness of Breath, 8/14/17)


     Gadolinium Derivatives (Verified  Allergy, Severe, Anaphylaxis, 8/14/17)


     Lobster (Verified  Allergy, Severe, Anaphylaxis, 8/14/17)


     Morphine (Verified  Allergy, Severe, Hives, 8/14/17)


     Penicillin (Verified  Allergy, Severe, Rash, 8/14/17)


     Phenergan (Verified  Allergy, Severe, Shortness of Breath, 8/14/17)


     Reglan (Verified  Allergy, Severe, Shortness of Breath, 8/14/17)


     Sulfa (Verified  Allergy, Severe, Rash, 8/14/17)


     Tigan (Verified  Allergy, Severe, Shortness of Breath, 8/14/17)


     Toradol (Verified  Allergy, Severe, Shortness of Breath, 8/14/17)


     Zofran (Verified  Allergy, Severe, Shortness of Breath, 8/14/17)


Active Ordered Medications





 Inpatient Medications


Acetaminophen (Tylenol) 650 mg Q6H  PRN PO PAIN SCALE 1 TO 2;  Start 8/15/17 at 

00:15


Al Hydrox/Mg Hydrox/Simethicone (Mag-Al Plus Susp Liq) 30 ml ONCE  ONCE PO  

Last administered on 8/14/17at 20:08;  Start 8/14/17 at 20:00;  Stop 8/14/17 at 

20:01;  Status DC


Bisacodyl (Dulcolax Supp) 10 mg DAILY  PRN RECTAL SEVERE CONSITIPATION;  Start 8

/15/17 at 00:15


Dicyclomine HCl (Bentyl) 20 mg TID  PRN PO abd pain Last administered on 8/15/

17at 04:12;  Start 8/15/17 at 00:15


Dicyclomine HCl 20 mg 20 mg ONCE  ONCE IM  Last administered on 8/14/17at 20:45

;  Start 8/14/17 at 20:15;  Stop 8/14/17 at 20:16;  Status DC


Diphenhydramine HCl 25 mg 25 mg ONCE  ONCE PO  Last administered on 8/14/17at 22

:14;  Start 8/14/17 at 22:15;  Stop 8/14/17 at 22:16;  Status DC


Famotidine (Pepcid) 10 mg BID PO  Last administered on 8/15/17at 08:50;  Start 8

/15/17 at 09:00


Heparin Sodium (Porcine) (Heparin Inj) 5,000 units Q12HR SQ  Last administered 

on 8/15/17at 08:51;  Start 8/15/17 at 09:00


Lidocaine HCl (Xylocaine 2% Viscous) 15 ml ONCE  ONCE PO  Last administered on 8 /14/17at 20:09;  Start 8/14/17 at 20:00;  Stop 8/14/17 at 20:01;  Status DC


Magnesium Hydroxide (Milk Of Magnesia Liq) 30 ml Q12H  PRN PO MILD - MODERATE 

CONSTIPATION;  Start 8/15/17 at 00:15


Potassium Chloride/Dextrose/ Sod Cl (D5-1/2 NS + KCl 20 Meq Inj) 1,000 ml @  

100 mls/hr Q10H IV  Last administered on 8/15/17at 00:29;  Start 8/15/17 at 00:

04


Potassium Chloride (KCl 20 Meq Premix Inj) 100 ml @  50 mls/hr ONCE  ONCE IV  

Last administered on 8/14/17at 21:25;  Start 8/14/17 at 21:00;  Stop 8/14/17 at 

22:59;  Status DC


Scopolamine (Transderm-Scop 1.5 Mg Patch.72 Hr) 1 patch Q72H  PRN T-DERMAL 

NAUSEA Last administered on 8/15/17at 06:01;  Start 8/15/17 at 05:30


Sennosides (Senokot) 17.2 mg Q12H  PRN PO MODERATE - SEVERE CONSTIPATION;  

Start 8/15/17 at 00:15


Sodium Chloride (NS Flush) 2 ml BID IV FLUSH ;  Start 8/15/17 at 09:00


Family History


Mother had MS and COPD.  Father had COPD.


Social History


 lives with 


No ETOH


no smoking


no illegal drug use.





Physical Exam


Vital Signs





 Vital Signs








  Date Time  Temp Pulse Resp B/P Pulse Ox O2 Delivery O2 Flow Rate FiO2


 


8/15/17 07:22 98.9 67 16 131/82 98   


 


8/15/17 04:05 98.6 72 18 127/78 98   


 


8/15/17 01:30  72 16 149/92 98 Room Air  


 


8/14/17 19:30   14  98 Room Air  


 


8/14/17 19:12   18     


 


8/14/17 17:20 99.3 97 20 178/104 96 Room Air  








Physical Exam


GENERAL: This is a cachectic, female. 


SKIN: No rashes, ecchymoses or lesions. Cool and dry. Poor skin turgor. 


HEAD: Atraumatic. Normocephalic. No temporal or scalp tenderness.


EYES: Pupils equal round and reactive. Extraocular motions intact. No scleral 

icterus. No injection or drainage. 


ENT: Nose without bleeding, purulent drainage or septal hematoma. Throat 

without erythema, tonsillar hypertrophy or exudate. Uvula midline. Airway 

patent.


NECK: Trachea midline. No JVD or lymphadenopathy. Supple, nontender, no 

meningeal signs.


CARDIOVASCULAR: Regular rate and rhythm without murmurs, gallops, or rubs. 


RESPIRATORY: Clear to auscultation. Breath sounds equal bilaterally. No wheezes

, rales, or rhonchi.  


GASTROINTESTINAL: Abdomen concave, diffuse tenderness, nondistended. No hepato-

splenomegaly, or palpable masses. No guarding.


MUSCULOSKELETAL: Extremities without clubbing, cyanosis, or edema. No joint 

tenderness, effusion, or edema noted. No calf tenderness. Negative Homans sign 

bilaterally.


NEUROLOGICAL: Awake and alert. Cranial nerves II through XII intact.  Motor and 

sensory grossly within normal limits. Five out of 5 muscle strength in all 

muscle groups.  Normal speech.


Laboratory





Laboratory Tests








Test 8/14/17





 19:30


 


White Blood Count 5.3 


 


Red Blood Count 4.10 


 


Hemoglobin 11.6 


 


Hematocrit 34.4 


 


Mean Corpuscular Volume 83.8 


 


Mean Corpuscular Hemoglobin 28.2 


 


Mean Corpuscular Hemoglobin 33.7 





Concent 


 


Red Cell Distribution Width 16.8 


 


Platelet Count 248 


 


Mean Platelet Volume 9.7 


 


Neutrophils (%) (Auto) 57.7 


 


Lymphocytes (%) (Auto) 30.2 


 


Monocytes (%) (Auto) 12.0 


 


Eosinophils (%) (Auto) 0.0 


 


Basophils (%) (Auto) 0.1 


 


Neutrophils # (Auto) 3.1 


 


Lymphocytes # (Auto) 1.6 


 


Monocytes # (Auto) 0.6 


 


Eosinophils # (Auto) 0.0 


 


Basophils # (Auto) 0.0 


 


CBC Comment DIFF FINAL 


 


Differential Comment  


 


Sodium Level 136 


 


Potassium Level 2.9 


 


Chloride Level 100 


 


Carbon Dioxide Level 27.4 


 


Anion Gap 9 


 


Blood Urea Nitrogen 14 


 


Creatinine 0.69 


 


Estimat Glomerular Filtration 88 





Rate 


 


Random Glucose 108 


 


Calcium Level 9.1 








Result Diagram:  


8/14/17 1930 8/14/17 1930





Imaging





Last Impressions








Abdomen/Pelvis CT 8/14/17 0000 Signed





Impressions: 





 Service Date/Time:  Monday, August 14, 2017 21:13 - CONCLUSION:  No acute 





 abnormality. Previous nephrectomy on the right.     Prince Justice MD 











Assessment and Plan


Problem List:  


(1) Chronic abdominal pain


(2) Nausea & vomiting


(3) Gastroparesis


(4) GERD (gastroesophageal reflux disease)


(5) Recurrent abdominal pain


(6) Hypokalemia


(7) Chronic narcotic dependence


(8) hx right nephrectomy 


(9) Hx of renal cell cancer


(10) Hx of hiatal hernia


(11) History of fundoplication


(12) History of gastrectomy


(13) History of CVA (cerebrovascular accident)


(14) Malnourished


Assessment and Plan


Admit to Dr. Torres





56-year-old female with history of chronic abdominal pain, with multiple 

admissions and ER visits.  Recently discharged on Discharged on 8/2, presents 

again with recurrent abdominal pain, intractable nausea, vomiting.  CT of the 

abdomen and pelvis did not reveal any acute findings.  Severely malnourished 

and underweight.


-Continue with IV fluids


Continue with potassium replacement


Gastroenterology has been consulted, we will wait for their input


Continue clear liquid diet


Continue with Valium as needed for nausea


We will defer starting narcotics at this time.





Hypokalemia


- potassium has been replaced, labs pending





Chronic narcotic dependence


We will hold off on starting narcotics at this time





History of gastrectomy, hiatal hernia, gastroparesis


Recommend that she continue to follow with GI as outpatient





History of nephrectomy


Renal function stable


Continue to monitor renal function closely


-Treated with IV fluids








Heparin for DVT prophylaxis


Pepcid for GI prophylaxis


Home medications reviewed, initiated as indicated





Plan of care has been discussed with the patient, attending and registered 

nurse.  Further management of the patient will be dependent on the hospital 

course








This patient was seen by myself and Dr. Torres, this H&P is written on his 

behalf





Problem Qualifiers





(1) Nausea & vomiting:  





(2) GERD (gastroesophageal reflux disease):  


Qualified Code:  K21.9 - Gastroesophageal reflux disease, esophagitis presence 

not specified


(3) Malnourished:  


Qualified Code:  E43 - Severe protein-calorie malnutrition





Nikia Simental Aug 15, 2017 09:20

## 2017-08-15 NOTE — MB
cc:

LYDIA HARVEY

****

 

 

DATE OF CONSULTATION

8/15/2017

 

DATE OF BIRTH

1960

 

REASON FOR CONSULTATION

Abdominal pain, weight loss, dry heaves, nausea.

 

HISTORY OF PRESENT ILLNESS

A 56-year-old female who has significant past medical history with chronic

abdominal pain.  She has had GI evaluation in the past including endoscopy by

Dr. Davila.  I do not have those reports available at this time.  She has a

history of gastroparesis, previous pancreatitis, chronic opioid use, history of

right renal carcinoma post nephrectomy, history of embolic strokes, severe

anxiety and previous subtotal to total gastrectomy years ago for apparent

gastric perforation possibly related to a previous type of fundoplication

surgeries.  She has had multiple admissions and hospitalization and ER visits.

She has been seen by the undersigned recently in the office.  She was seen

about three weeks ago.  Weight was about 84 pounds.  She dropped down to 77

pounds, unable to eat with failure to thrive.  She has chronic upper abdominal

pain.  She is allergic to multiple antiemetic medications.  She was seen again

in the office feeling quite weak, dehydrated, unable to eat with dry heaves

requesting further evaluation as an inpatient and we directed her to the

emergency room for admission as she will probably require TPN via PICC line

insertion that we have ordered.  She had a CT scan of the abdomen and pelvis on

admission which did not reveal any acute pathology.  Postsurgical changes have

been noted.  She does have a history of adhesions as well.  Potassium was noted

to be 2.9 and that is being replaced.  She does complain of pain and is still

nauseated with dry heaves.  She is requesting diazepam which does help in this

situation and this will be managed by her medical team at this point.

 

Her hemoglobin and white count was stable.  White count was normal, hemoglobin

was 11.7.  The rest her electrolytes were stable.

 

I did request also that she should have a CT angiogram during this admission to

rule out vascular disease causing chronic abdominal pain and weight loss.

 

PAST HISTORY

Was mentioned above in part, she has a history of:

1.   MI

2.   Tube ligation

3.   Gastroparesis

4.   Appendectomy

5.   Cholecystectomy

6.   Previous EGD, colonoscopy Dr. Davila

 

MEDICATIONS

Include:

1.   Fentanyl patch

2.   Zoloft

3.   Dulcolax

4.   Ambien

5.   Dilaudid

6.   Valium

7.   Buspirone

 

ALLERGIES

COMPAZINE, MORPHINE, PENICILLIN, PHENERGN, REGLAN, SULFA, TIGAN, TORADOL AND

ZOFRAN.

 

SOCIAL HISTORY

She denies smoking or alcohol.

 

FAMILY HISTORY

Negative from a GI standpoint.

 

REVIEW OF SYSTEMS

A 12-point review of systems is as mentioned above.  She has had no GI

bleeding, fever or chills of late.

 

EXAMINATION

This is a cachectic-appearing chronically ill-appearing fragile female alert,

complaining of abdominal pain and nausea.

HEENT:  Exam extremely dry mucosa noted.  Normocephalic.  Sclerae anicteric.

NECK:  Supple.

CARDIAC:  S1-S2.

CHEST:  Clear.

ABDOMEN:  The abdomen is flat with diffuse tenderness more so in the epigastric

space.  She has no rebound, minimal guarding.  Bowel sounds were audible.

Scars are noted.

EXTREMITIES:  Extreme muscle wasting is noted.  No edema.  NEUROLOGIC:  Appears

intact.

 

IMPRESSION

1.   Profound weight loss

2. Malnutrition

3. Chronic nausea

4. Chronic abdominal pain

5. Failure to thrive

 

PLAN

Continue IV hydration.  We will proceed with EGD for further evaluation of her

upper GI complaints and pain.  The procedure risks and benefits were discussed

including risks of bleeding, sepsis, perforation, risks of anesthesia.

Analgesic therapy as per medical staff and medical team.  Would strongly

consider a  consultation with the pain service as well.  I have ordered for a

PICC line and will consult nutritional service for TPN requirements and

hopefully TPN after discharge.

 

 

 

 

                              _________________________________

                              MD ANGELICA Jose/JULES

D:  8/15/2017/9:43 AM

T:  8/15/2017/10:01 AM

Visit #:  [id number]

Job #:  [id number]

## 2017-08-16 VITALS
SYSTOLIC BLOOD PRESSURE: 119 MMHG | OXYGEN SATURATION: 98 % | HEART RATE: 59 BPM | DIASTOLIC BLOOD PRESSURE: 68 MMHG | TEMPERATURE: 98.2 F | RESPIRATION RATE: 16 BRPM

## 2017-08-16 VITALS
DIASTOLIC BLOOD PRESSURE: 61 MMHG | TEMPERATURE: 98.5 F | SYSTOLIC BLOOD PRESSURE: 110 MMHG | HEART RATE: 65 BPM | OXYGEN SATURATION: 99 % | RESPIRATION RATE: 16 BRPM

## 2017-08-16 VITALS
OXYGEN SATURATION: 100 % | HEART RATE: 76 BPM | DIASTOLIC BLOOD PRESSURE: 70 MMHG | SYSTOLIC BLOOD PRESSURE: 120 MMHG | TEMPERATURE: 98.5 F | RESPIRATION RATE: 16 BRPM

## 2017-08-16 VITALS
RESPIRATION RATE: 18 BRPM | TEMPERATURE: 98.2 F | SYSTOLIC BLOOD PRESSURE: 137 MMHG | HEART RATE: 70 BPM | OXYGEN SATURATION: 94 % | DIASTOLIC BLOOD PRESSURE: 81 MMHG

## 2017-08-16 VITALS
HEART RATE: 76 BPM | RESPIRATION RATE: 16 BRPM | SYSTOLIC BLOOD PRESSURE: 111 MMHG | TEMPERATURE: 98.5 F | OXYGEN SATURATION: 97 % | DIASTOLIC BLOOD PRESSURE: 66 MMHG

## 2017-08-16 LAB
ANION GAP SERPL CALC-SCNC: 10 MEQ/L (ref 5–15)
APTT BLD: 37 SEC (ref 24.3–30.1)
BUN SERPL-MCNC: 10 MG/DL (ref 7–18)
CHLORIDE SERPL-SCNC: 107 MEQ/L (ref 98–107)
GFR SERPLBLD BASED ON 1.73 SQ M-ARVRAT: 90 ML/MIN (ref 89–?)
HCO3 BLD-SCNC: 22.5 MEQ/L (ref 21–32)
INR PPP: 1 RATIO
POTASSIUM SERPL-SCNC: 4.3 MEQ/L (ref 3.5–5.1)
PROTHROMBIN TIME: 11.4 SEC (ref 9.8–11.6)
SODIUM SERPL-SCNC: 139 MEQ/L (ref 136–145)

## 2017-08-16 RX ADMIN — DIAZEPAM SCH MG: 10 TABLET ORAL at 18:06

## 2017-08-16 RX ADMIN — POTASSIUM CHLORIDE, DEXTROSE MONOHYDRATE AND SODIUM CHLORIDE SCH MLS/HR: 150; 5; 450 INJECTION, SOLUTION INTRAVENOUS at 05:59

## 2017-08-16 RX ADMIN — Medication SCH ML: at 09:00

## 2017-08-16 RX ADMIN — Medication SCH ML: at 10:50

## 2017-08-16 RX ADMIN — ZOLPIDEM TARTRATE PRN MG: 10 TABLET, FILM COATED ORAL at 22:38

## 2017-08-16 RX ADMIN — HEPARIN SODIUM SCH UNITS: 10000 INJECTION, SOLUTION INTRAVENOUS; SUBCUTANEOUS at 10:48

## 2017-08-16 RX ADMIN — SUCRALFATE SCH GM: 1 SUSPENSION ORAL at 13:00

## 2017-08-16 RX ADMIN — SERTRALINE HYDROCHLORIDE SCH MG: 50 TABLET, FILM COATED ORAL at 10:48

## 2017-08-16 RX ADMIN — DICYCLOMINE HYDROCHLORIDE PRN MG: 20 TABLET ORAL at 10:56

## 2017-08-16 RX ADMIN — FAMOTIDINE SCH MG: 20 TABLET, FILM COATED ORAL at 10:47

## 2017-08-16 RX ADMIN — Medication SCH ML: at 22:35

## 2017-08-16 RX ADMIN — SUCRALFATE SCH GM: 1 SUSPENSION ORAL at 10:46

## 2017-08-16 RX ADMIN — HEPARIN SODIUM (PORCINE) LOCK FLUSH IV SOLN 100 UNIT/ML SCH UNITS: 100 SOLUTION at 10:50

## 2017-08-16 RX ADMIN — DIAZEPAM SCH MG: 10 TABLET ORAL at 10:44

## 2017-08-16 RX ADMIN — HEPARIN SODIUM SCH UNITS: 10000 INJECTION, SOLUTION INTRAVENOUS; SUBCUTANEOUS at 22:39

## 2017-08-16 RX ADMIN — SUCRALFATE SCH GM: 1 SUSPENSION ORAL at 18:06

## 2017-08-16 RX ADMIN — FAMOTIDINE SCH MG: 20 TABLET, FILM COATED ORAL at 22:38

## 2017-08-16 RX ADMIN — POTASSIUM CHLORIDE SCH MEQ: 750 CAPSULE, EXTENDED RELEASE ORAL at 09:00

## 2017-08-16 RX ADMIN — DIAZEPAM SCH MG: 10 TABLET ORAL at 13:00

## 2017-08-16 RX ADMIN — DICYCLOMINE HYDROCHLORIDE PRN MG: 20 TABLET ORAL at 18:05

## 2017-08-16 NOTE — HHI.GIFU
GI Follow-up Note


Consult Follow-up


Subjective:  Patient laying in bed comfortably.  patient does have a PICC line.

  Her CTA of the abdomen and pelvis was reviewed with her





Objective:


PHYSICAL EXAMINATION:


Vitals signs stable


No fever


.


CHEST:  Chest is clear to auscultation and percussion.


CARDIAC:  Regular rate and rhythm with no murmur gallop or rubs.


ABDOMEN:  Soft, nondistended, mild tenderness, no hepatosplenomegaly; bowel 

sounds are present in all four quadrants.


EXTREMITIES: No clubbing, cyanosis, or edema.


SKIN:  Normal; no rash; no jaundice.


CNS:  No focal deficits; alert and oriented times three.


Available Data (labs, X- Rays, Procedues) : 











ASSESSMENT/PLAN:





1. chronic pain with nausea and weight loss





PLAN:


1. Continue full liquid diet.  As an outpatient she could not tolerate real 

food.


2. once we can arrange home TPN she can be discharged to followup with Dr. Chung





It was a pleasure seeing Bridget Stout. Thank you for this consult.


Entered by: Ralph Barnes MD Aug 16, 2017 14:12

## 2017-08-16 NOTE — MR
cc:

LYDIA HARVEY M.D.

****

 

 

DATE: 08/15/2017

 

YOB: 1960

 

PROCEDURE

Upper endoscopy.

 

REASON FOR PROCEDURE

Evaluation of upper abdominal epigastric pain, persistent nausea, dry heaves,

significant weight loss.  Photographs and biopsies were taken.

 

PREMEDICATION

Administered by anesthesiology.

 

MONITORING

Monitoring was accomplished with pulse oximeter, EKG, blood pressure monitor.

 

PROCEDURE NOTE

After informed consent was obtained and the procedure risks and benefits were

explained including the risks of bleeding, sepsis, perforation, risk of

anesthesia, the patient was placed in left lateral position.  The videoscope

was inserted in the esophagus under direct visualization.  The esophagus

appeared to be unremarkable with a well-demarcated Z-line.  There was a very

short gastric remnant noted about 4 cm.  There was some minimal erythema

junction to the small bowel was present, two biopsies were taken here.  The

small bowel anastomosis appeared to be grossly normal.  A scope was passed to

as far as it could be passed.  The small bowel mucosa appeared to be grossly

normal. The scope was gradually withdrawn. Two small bowel biopsies were taken

as well for sampling.  The patient tolerated the procedure well and no

immediate complications were noted.

 

IMPRESSION

This examination revealed an ultra short gastric remnant with anastomosis to

the small bowel.  The gastric remnant appeared to be about 4 cm in length. Two

biopsies were taken in the gastric remnant and two biopsies were taken in the

small bowel as well for sampling and evaluation.

 

PLAN

Follow up biopsies taken today. I have started the patient on some Carafate

therapy as well.  We will initiate clear to full liquids and also initiate

hyperalimentation for profound weight loss.  The patient's abdominal pain may

be on the basis of

intra-abdominal adhesions from prior surgical procedures.  Poor motility may

also be responsible for her nausea secondary to  extensive surgery, etc. I have

discussed this with the patient.

 

Once TPN has been started, hopefully discharge with outpatient management.  The

patient may need a pain management consult as well.

 

 

                              _________________________________

                              MD ANGELICA Jose/SHANELL

D:  8/15/2017/4:33 PM

T:  8/16/2017/8:26 AM

Visit #:  [id number]

Job #:  [id number]

## 2017-08-16 NOTE — HHI.PR
Subjective


Remarks


c/o chronic abd pain, states that one time Dilaudid helped yesterday


slept well last night


not as nauseous


wants to eat


no fever


no acute changes overnight





Objective


Objective Results


-





 Vital Signs








  Date Time  Temp Pulse Resp B/P Pulse Ox O2 Delivery O2 Flow Rate FiO2


 


8/16/17 04:03 98.2 70 18 137/81 94   


 


8/15/17 20:01 98.4 98 20 125/71 98   


 


8/15/17 16:55 99.0 69 16 144/92 100   


 


8/15/17 16:28  66 16 139/84 100   


 


8/15/17 14:49        


 


8/15/17 11:43 98.0 71 16 153/93 97   








 I/O








 8/15/17 8/15/17 8/15/17 8/16/17 8/16/17 8/16/17





 07:00 15:00 23:00 07:00 15:00 23:00


 


Intake Total   15 ml   


 


Balance   15 ml   


 


      


 


Intake Oral   15 ml   








Result Diagram:  


8/15/17 1205                                                                   

             8/15/17 1205





Imaging





Last Impressions








Abdomen/Pelvis CT 8/14/17 0000 Signed





Impressions: 





 Service Date/Time:  Monday, August 14, 2017 21:13 - CONCLUSION:  No acute 





 abnormality. Previous nephrectomy on the right.     Prince Justice MD 








Other Results





Laboratory Tests








Test 8/15/17





 12:05


 


White Blood Count 4.2 


 


Red Blood Count 3.84 


 


Hemoglobin 10.8 


 


Hematocrit 32.1 


 


Mean Corpuscular Volume 83.6 


 


Mean Corpuscular Hemoglobin 28.1 


 


Mean Corpuscular Hemoglobin 33.6 





Concent 


 


Red Cell Distribution Width 16.6 


 


Platelet Count 211 


 


Mean Platelet Volume 9.3 


 


Sodium Level 135 


 


Potassium Level 3.6 


 


Chloride Level 101 


 


Carbon Dioxide Level 26.3 


 


Anion Gap 8 


 


Blood Urea Nitrogen 12 


 


Creatinine 0.61 


 


Estimat Glomerular Filtration 101 





Rate 


 


Random Glucose 109 


 


Calcium Level 8.7 


 


Phosphorus Level 2.7 


 


Magnesium Level 2.2 











ROS


General:  Weakness


HEENT:  No: Sore Throat, Dysphagia


Cardiac:  No: Chest Pain, Edema, Palpitations


Pulmonary:  No: Cough, SOB, Wheezing


GI:  N/V (nausea )


/GYN:  No: Dysuria, Urgency


Neuro/MS:  No: Lightheaded, Confusion


Psych:  Anxiety


Skin:  No: Itching, Rash





Physical Exam


Physical Exam


PHYSICAL EXAMINATION


GENERAL:  This is a well-developed, well-nourished   female 


who appears to be in no acute distress.  She  is alert and awake, [].  


HEAD:  Normocephalic without any lesion or mass noted.  Facial features


appear symmetric.


EYES:  [place], Normal eye movement, [] Icterus. [] Conj congestion.


OROPHARYNGEAL:  Oropharynx without erythema or edema.


MOUTH/THROAT:  Tongue midline []. Buccal mucosa is moist [].


NECK:  Supple.  No nuchal rigidity or lymphadenopathy.


Trachea midline without deviation.  Thyroid not palpable, no bruits


appreciated.


CARDIAC:  Regular rhythm, regular rate, S1 and S2 are heard.  Murmur []; no 

gallops or rubs.


LUNGS:  Clear to auscultation bilaterally.  [] wheeze, [] rhonchi or [] rale.  

No


use of accessory muscles on inspiration or expiration.


ABDOMEN:  Soft, nontender, no organomegaly or masses.  Bowel sounds are


heard in all four quadrants.  No rebound.  No guarding.


EXTREMITIES:  [] edema.  Pulses equal bilateral.  [] cyanosis.


NEUROLOGICAL:  Patient mood and affect appropriate.  Cranial nerves II


through XII grossly intact.  Muscle strength 5/5 in the upper and lower


extremities bilaterally.  Deep tendon reflexes are 2+ in the upper and


lower extremities bilaterally.


SKIN:Warm and moist


PSYCH: Mood and affect appropriate


Urinary Catheter:  No


Vascular Central Line Catheter:  Yes


Assessment to:  Continue


Date of Insertion:  Aug 15, 2017


Line:  PICC





A/P


Diagnosis:  


(1) Chronic abdominal pain


(2) Nausea & vomiting


(3) Gastroparesis


(4) GERD (gastroesophageal reflux disease)


(5) Recurrent abdominal pain


(6) Hypokalemia


(7) Chronic narcotic dependence


(8) hx right nephrectomy 


(9) Hx of renal cell cancer


(10) Hx of hiatal hernia


(11) History of fundoplication


(12) History of gastrectomy


(13) History of CVA (cerebrovascular accident)


(14) Malnourished


Assessment and Plan


56-year-old female with history of chronic abdominal pain, with multiple 

admissions and ER visits.  Recently discharged on Discharged on 8/2, presents 

again with recurrent abdominal pain, intractable nausea, vomiting.  CT of the 

abdomen and pelvis did not reveal any acute findings.  





Severely malnourished and underweight.


History of gastrectomy, hiatal hernia, gastroparesis


Failure to thrive 


-DC IVF after TPN is started


Appreciate GI input, d/w Dr. Chung yesterday


-S/P EGD 8/15, bx done, no acute findings


-plan is for angiogram VANESSA r/o ischemic etiology today 


Continue with Valium as needed for nausea


-Start TPN today, has PICC in place





Chronic abd pain


Chronic narcotic dependence


-Dilaudid 2 mg PO x 1 today


-continue with Fentanyl patch 





Hypokalemia


-replaced 





History of nephrectomy


Renal function stable


Continue to monitor renal function closely





Heparin for DVT prophylaxis


Pepcid for GI prophylaxis


CM for DC planning, arrange TPN for home





Poss dc tomorrow after work up completed





D/W RN


D/W Dr. Torres


D/W pt


This patient was seen by myself and Dr. Torres, this note is written on his 

behalf





Problem Qualifiers





(1) Nausea & vomiting:  





(2) GERD (gastroesophageal reflux disease):  


Qualified Code:  K21.9 - Gastroesophageal reflux disease, esophagitis presence 

not specified


(3) Malnourished:  


Qualified Code:  E43 - Severe protein-calorie malnutrition





Nikia Simental Aug 16, 2017 08:16

## 2017-08-16 NOTE — PD.CONS
Consult


Service


Palliative Care


Consult Requested By


Hola


Primary Care Physician


Sveta Primary Care Physician


Reason for Consultation


   a.  To assist with evaluation and management of symptoms including: 

epigastric/abdominal pain


   b.  To assist medical decision maker(s) with: better understanding of 

current medical conditions; weighing benefits/burdens of medical treatment 

options; making        


        medical treatment decisions.





HPI


History of Present Illness


56-year-old female with past medical history of chronic abdominal pain, 

gastroparesis, chronic opioid use previous pancreatitis, chronic opioid use, 

history of kidney cancer post-nephrectomy, CAD, failure to thrive, with 

multiple ER visits presented to the hospital on 8/14/2017 for abdominal pain.  

Patient was only recently discharged on 8/2/2017 for abdominal pain.  Per pt, 

she has had abdominal pain since 2003, when she a gastrectomy.  Per pt it was 

performed in [facility].  Per pt, she had it done due to "bad reflux" and 

multiple aspiration pneumonia.  She stated since then it has been "downhill."  

She states she has multiple adhesions.  She has other complication in the past 

including MI, TIA, and CVA where she went into a "Coma," and was intubated in 

the icu.  She had moved to [place] about a year ago and now lives in [place].  She endorse she just have a new primary care physician in which does 

not recall the name.


 Patient was sent over by a gastroenterologist for abdominal pain and dry 

heaving, there was reported to be happening for 5 days.  Patient also has a 

Temp of 100.8.  He was also associated nausea and decreased appetite.  In the ER

:





Vitals: 99.3, pulse is 97, blood pressure is 170/104, pulse ox 96% on room air


Sodium is 136, potassium 2.9, course 100, bicarbonate is 27.4, BUN is 14, 

creatinine 0.69


WBC 5.3, H&H 11.6 and 34.4, platelets 240


Abdominal CT shows no acute abnormality.  Previous nephrectomy on the right.





Hospitalists was consulted to manage patient.  Patient was placed on IV fluids, 

given potassium replacement, GI was consulted.





GI evaluated patient, and noted the patients weight has dropped from 84 pounds 

277 pounds unable to eat with failure to thrive.  Patient has chronic upper 

abdominal pain.  GI noted that patients scans have been negative, and that she 

has a history of adhesion.  Palliative care was consulted to manage pain and 

review goals of care.  Patient had EGD yesterday.





Pt pain is mid epigastric area and radiates to the back.  Pt state pain is 

excruciating, sharp.  Pt did not have any vomiting this morning.


Spoke to her about goals of care.  Reviewed with her clinical condition and her 

GI history, and now she is at the point of TPN.  When I ask her what her hopes 

are.  She states she hopes pain is controlled and that the TPN can help her 

gain back some wt.  I told her she has been battling her GI condition for years

, and I worried it will not get any better, and only get worse.  I also review 

with her TPN is no way as good as good as being able to eat on your own, and in 

general life support is there to buy time, and not a replacement.  Reviewed 

with her given her decline, have she ever considered hospice?


She said no.  She stated "Doc, I am a fighter.  I survived strokes, MI, 

coma.... I want to continue to fight."


I spoke to her about her renal cancer, in which she had a right nephrectomy.  

She states she is due for a PET scan.  I asked why she had never followed up 

with her Renal Cancer all theses years?   She said "I am just overwhelmed, and 

had not been feeling well."   She state she want to establish with her new 

physician, and get back on track with renal cancer.


Overall at this point in time, she is not amenable to transition to comfort 

measure only, and to continue to "fight."





She decline to complete any advace directives.   She also did not want me to 

discuss her hospitalization with her .  When I ask why? She asked, 

because it is my decision, and I believe [employer] protects me for that.  I say 

that is fine, I just wanted to make sure the hospital have someone to contact, 

should something like a stroke would happen again, in which she was in a coma.  

She did state she is okay with  to make medical decisions should she be 

not capacitated to make medical decision.


Function/Cognitive Trajectory


Multiple hospitalization, has been losing wt, now on TPN.





Review of Systems


ROS Limitations:  Clinical Condition


Constitutional:  COMPLAINS OF: Fatigue, Weight loss


Endocrine:  DENIES: Heat/cold intolerance, Polyuria


Ears, nose, mouth, throat:  DENIES: Throat pain, Ear Pain


Gastrointestinal:  COMPLAINS OF: Nausea, Vomiting, Anorexia


Musculoskeletal:  COMPLAINS OF: Back pain


Psychiatric:  COMPLAINS OF: Anxiety





Past Family Social History


Coded Allergies:  


     Sulfa (Sulfonamide Antibiotics) (Unverified  Allergy, Severe, Rash, 8/15/17

)


     gadobenic acid (Unverified  Allergy, Severe, Anaphylaxis, 8/15/17)


     gadodiamide (Unverified  Allergy, Severe, Anaphylaxis, 8/15/17)


     gadoteridol (Unverified  Allergy, Severe, Anaphylaxis, 8/15/17)


     ketorolac (Unverified  Allergy, Severe, Shortness of Breath, 8/15/17)


     metoclopramide (Unverified  Allergy, Severe, Shortness of Breath, 8/15/17)


     morphine (Unverified  Allergy, Severe, Hives, 8/15/17)


     ondansetron (Unverified  Allergy, Severe, Shortness of Breath, 8/15/17)


     penicillin G (Unverified  Allergy, Severe, Rash, 8/15/17)


     prochlorperazine (Unverified  Allergy, Severe, Shortness of Breath, 8/15/17

)


     promethazine (Unverified  Allergy, Severe, Shortness of Breath, 8/15/17)


     shellfish derived (Unverified  Allergy, Severe, Anaphylaxis, 8/15/17)


     trimethobenzamide (Unverified  Allergy, Severe, Shortness of Breath, 8/15/

17)


Past Medical History


Anxiety.


History of kidney cancer, status post nephrectomy.


   History of MI in 2006.


   History of tubal ligation in the past.


  Pancreatitis


   Chronic abd. pain with extensive GI work up


  Malnutrition


  Weight loss


  Anxiety


  Chronic opioid use. 


  Gastroparesis


 Questionable seizures


 PFO which led to embolic strokes


Past Surgical History


Tubal ligation, appendectomy, cholecystectomy, previous EGD, colonoscopy, right 

nephrectomy, hernia repair, fundoplication


Reported Medications


Fentanyl patch, - she said previously her fentanyl patch was at 75mcg q 3days.


Zoloft, Dulcolax,


 Ambien, Dilaudid, 


Valium, Bupirone





 Current Medications








 Medications


  (Trade)  Dose


 Ordered  Sig/Jermaine


 Route  Start Time


 Stop Time Status Last Admin


 


  (D5-1/2 NS + KCl


 20 Meq Inj)  1,000 ml @ 


 100 mls/hr  Q10H


 IV  8/15/17 00:04


 8/16/17 15:00  8/16/17 05:59


 


 


  (NS Flush)  2 ml  UNSCH  PRN


 IV FLUSH  8/15/17 00:15


     


 


 


  (NS Flush)  2 ml  BID


 IV FLUSH  8/15/17 09:00


     


 


 


  (Tylenol)  650 mg  Q4H  PRN


 PO  8/15/17 00:15


     


 


 


  (Heparin Inj)  5,000 units  Q12HR


 SQ  8/15/17 09:00


    8/16/17 10:48


 


 


  (Tylenol)  650 mg  Q6H  PRN


 PO  8/15/17 00:15


     


 


 


  (Milk Of


 Magnesia Liq)  30 ml  Q12H  PRN


 PO  8/15/17 00:15


     


 


 


  (Senokot)  17.2 mg  Q12H  PRN


 PO  8/15/17 00:15


     


 


 


  (Dulcolax Supp)  10 mg  DAILY  PRN


 RECTAL  8/15/17 00:15


     


 


 


  (Bentyl)  20 mg  TID  PRN


 PO  8/15/17 00:15


    8/16/17 10:56


 


 


  (Pepcid)  10 mg  BID


 PO  8/15/17 09:00


    8/16/17 10:47


 


 


  (Transderm-Scop


 1.5 Mg Patch.72


 Hr)  1 patch  Q72H  PRN


 T-DERMAL  8/15/17 05:30


    8/15/17 06:01


 


 


  (Valium)  10 mg  TID


 PO  8/15/17 13:00


    8/16/17 10:44


 


 


  (Duragesic  25


 Mcg  Patch.72 Hr)  25 patch  Q72H


 T-DERMAL  8/15/17 10:00


    8/15/17 10:40


 


 


  (KCl)  10 meq  DAILY


 PO  8/15/17 09:30


    8/15/17 10:38


 


 


  (Zoloft)  50 mg  DAILY


 PO  8/15/17 09:30


    8/16/17 10:48


 


 


  (Ambien)  10 mg  HS  PRN


 PO  8/15/17 09:30


    8/15/17 20:45


 


 


 Miscellaneous


 Information  1  Q3D


 T-DERMAL  8/18/17 10:00


     


 


 


  (Buspar)  30 mg  BID


 PO  8/15/17 21:00


    8/16/17 10:57


 


 


  (NS Flush)  See


 Protocol  DAILY


 IV FLUSH  8/16/17 09:00


    8/16/17 10:50


 


 


  (NS Flush)  See


 Protocol  UNSCH  PRN


 IV FLUSH  8/15/17 15:00


     


 


 


  (Heparin Central


 Flush)  See


 Protocol  DAILY


 IV FLUSH  8/16/17 09:00


    8/16/17 10:50


 


 


  (Heparin Central


 Flush)  See


 Protocol  UNSCH  PRN


 IV FLUSH  8/15/17 15:00


     


 


 


  (NS Flush)    UNSCH  PRN


 IV FLUSH  8/15/17 15:00


     


 


 


 Sucralfate 1 gm  1 gm  TID


 PO  8/16/17 09:00


    8/16/17 10:46


 


 


 Multivitamins 10


 ml/Folic Acid 1


 mg/Amino Acids/


 Electrolytes/


 Dextrose  2,010.2 ml


  @ 45 mls/hr  Q24H


 IV-CENTRAL  8/16/17 20:00


     


 


 


  (Liposyn Iii 20%


 Inj)  250 ml @ 


 10 mls/hr  Q24H


 IV-CENTRAL  8/16/17 20:00


     


 








Family History


Numerous family members have cancer, such as lung cancer.  None has kidney 

cancer


Substance Use


Tobacco:denies   


Alcohol:denies


Prescription med abuse: she denies.  "I don't get high from taking these 

medicines"


Illicits:denies


Psychosocial History


2nd marriage.  to Benito Stout.





Originally from [place], and was an .


Lived in [place] for 20 years.


Only been in [place] for 1 year.





Has one son from previous marriage, age 35.  Did not give name.


Living Will:  Never completed


Health Care Surrogate:  Never completed


Durable Power of :  Never completed





Physical Exam





 Vital Signs








  Date Time  Temp Pulse Resp B/P Pulse Ox O2 Delivery O2 Flow Rate FiO2


 


8/16/17 11:44 98.5 76 16 120/70 100   


 


8/16/17 08:45 98.2 59 16 119/68 98   


 


8/16/17 04:03 98.2 70 18 137/81 94   


 


8/15/17 20:01 98.4 98 20 125/71 98   


 


8/15/17 16:55 99.0 69 16 144/92 100   


 


8/15/17 16:28  66 16 139/84 100   


 


8/15/17 14:49        

















 8/15/17 8/16/17





 18:59 06:59


 


Intake Total 15 ml 


 


Balance 15 ml 


 


  


 


Intake Oral 15 ml 








Exam


CONSTITUTIONAL/GENERAL: This is a thin middle age lady, lying in bed, does not 

appear to be in too much pain at the moment.


SKIN: No jaundice, rashes, or lesions. Ecchymoses on upper extremities. No 

wounds seen anteriorly. Skin temperature appropriate. Not diaphoretic. 


HEAD: Atraumatic. Normocephalic.


EYES:No scleral icterus. No injection or drainage. Fundi not examined.


ENT: Hearing grossly normal. Nose without bleeding or purulent drainage. Throat 

without visible erythema, exudates, masses, or lesions.


NECK: Trachea midline. Supple, nontender. No palpable thyroid enlargement or 

nodularity. 


CARDIOVASCULAR: Regular rate and rhythm without murmurs, gallops, or rubs. No 

JVD. Peripheral pulses symmetric.


RESPIRATORY/CHEST: Symmetric, unlabored respirations. Clear to auscultation. 

Breath sounds equal bilaterally. No wheezes, rales, or rhonchi.  


GASTROINTESTINAL: Abdomen soft, some tenderness at epigastric region., 

nondistended. 


                               No hepato-splenomegaly, or palpable masses. No 

guarding. Bowel sounds present.


GENITOURINARY: Without palpable bladder distension. Ugalde catheter in place.


MUSCULOSKELETAL: Extremities without clubbing, cyanosis, or edema. No joint 

tenderness or effusion noted. No calf tenderness. No mottling or clubbing.


LYMPHATICS: No palpable cervical or supraclavicular adenopathy.


NEUROLOGICAL: Awake and alert. Motor and sensory grossly within normal limits. 

Follows commands. Cognitively sharp. Moves all extremities.


PSYCHIATRIC: No obvious anxiety/depression. no apparent hallucinations or other 

psychotic thought process.





Diagnostic Tests


Laboratory





Laboratory Tests








Test 8/14/17 8/15/17





 19:30 12:05


 


White Blood Count 5.3 TH/MM3 4.2 TH/MM3





 (4.0-11.0) (4.0-11.0)


 


Red Blood Count 4.10 MIL/MM3 3.84 MIL/MM3





 (4.00-5.30) (4.00-5.30)


 


Hemoglobin 11.6 GM/DL 10.8 GM/DL





 (11.6-15.3) (11.6-15.3)


 


Hematocrit 34.4 % 32.1 %





 (35.0-46.0) (35.0-46.0)


 


Mean Corpuscular Volume 83.8 FL 83.6 FL





 (80.0-100.0) (80.0-100.0)


 


Mean Corpuscular Hemoglobin 28.2 PG 28.1 PG





 (27.0-34.0) (27.0-34.0)


 


Mean Corpuscular Hemoglobin 33.7 % 33.6 %





Concent (32.0-36.0) (32.0-36.0)


 


Red Cell Distribution Width 16.8 % 16.6 %





 (11.6-17.2) (11.6-17.2)


 


Platelet Count 248 TH/MM3 211 TH/MM3





 (150-450) (150-450)


 


Mean Platelet Volume 9.7 FL 9.3 FL





 (7.0-11.0) (7.0-11.0)


 


Neutrophils (%) (Auto) 57.7 % 





 (16.0-70.0) 


 


Lymphocytes (%) (Auto) 30.2 % 





 (9.0-44.0) 


 


Monocytes (%) (Auto) 12.0 % 





 (0.0-8.0) 


 


Eosinophils (%) (Auto) 0.0 % (0.0-4.0) 


 


Basophils (%) (Auto) 0.1 % (0.0-2.0) 


 


Neutrophils # (Auto) 3.1 TH/MM3 





 (1.8-7.7) 


 


Lymphocytes # (Auto) 1.6 TH/MM3 





 (1.0-4.8) 


 


Monocytes # (Auto) 0.6 TH/MM3 





 (0-0.9) 


 


Eosinophils # (Auto) 0.0 TH/MM3 





 (0-0.4) 


 


Basophils # (Auto) 0.0 TH/MM3 





 (0-0.2) 


 


CBC Comment DIFF FINAL  


 


Differential Comment   


 


Sodium Level 136 MEQ/L 135 MEQ/L





 (136-145) (136-145)


 


Potassium Level 2.9 MEQ/L 3.6 MEQ/L





 (3.5-5.1) (3.5-5.1)


 


Chloride Level 100 MEQ/L 101 MEQ/L





 () ()


 


Carbon Dioxide Level 27.4 MEQ/L 26.3 MEQ/L





 (21.0-32.0) (21.0-32.0)


 


Anion Gap 9 MEQ/L (5-15) 8 MEQ/L (5-15)


 


Blood Urea Nitrogen 14 MG/DL (7-18) 12 MG/DL (7-18)


 


Creatinine 0.69 MG/DL 0.61 MG/DL





 (0.50-1.00) (0.50-1.00)


 


Estimat Glomerular Filtration 88 ML/MIN (>89) 101 ML/MIN





Rate  (>89)


 


Random Glucose 108 MG/ MG/DL





 () ()


 


Calcium Level 9.1 MG/DL 8.7 MG/DL





 (8.5-10.1) (8.5-10.1)


 


Phosphorus Level  2.7 MG/DL





  (2.5-4.9)


 


Magnesium Level  2.2 MG/DL





  (1.5-2.5)








Result Diagram:  


8/15/17 1205                                                                   

             8/15/17 1205





Imaging





Last Impressions








Chest X-Ray 8/15/17 0000 Signed





Impressions: 





 Service Date/Time:  Tuesday, August 15, 2017 13:18 - CONCLUSION:  1. PICC in 





 satisfactory position.     Jefferson Ellis MD 


 


Abdomen/Pelvis CT 8/14/17 0000 Signed





Impressions: 





 Service Date/Time:  Monday, August 14, 2017 21:13 - CONCLUSION:  No acute 





 abnormality. Previous nephrectomy on the right.     Prince Justice MD 











Patient/Family Conference


Present at Family Conference:


none.  Decline for me to speak with .


Family Conference Time (mins):  38


Family Conference Location:  Bedside


Issues Discussed:


* Palliative care role, purpose, approach


* Additional medical, psychosocial, and spiritual history


* Patients general health, functional status, and cognitive changes in the 

months leading up to the current hospitalization


* Patient/family understanding of the current medical problems


* Patient/family understanding of prognosis


* Patients goals of care as best understood from advance directives and/or 

conversations and/or values


* Current medical treatment options and benefits/burdens of those options


* Likely scenarios comparing ongoing aggressive care with a transition to 

comfort measures only


* Questions answered to the best of my ability


* Palliative care contact information provided





Assessment and Plan


Disease Oriented Problem List:  


(1) Malnourished


(2) Gastroparesis


(3) Hx of renal cell cancer


(4) Chronic abdominal pain


(5) Nausea and vomiting


Symptom Scale:  


Pertinent Non-Medical Issues


Psychosocial:


Spiritual:


Legal:


Ethical issues impacting care:


Important Contacts


Benito Stout  


Prognosis


56 year old with failure to thrive.  She continues to lose wt, has chronic 

abdominal pain, continue to have nausea and vomiting.  Guarded prognosis, and 

will continue to have multiple hospitalization.  At risk for decompensation.


Code Status:  Full Code


Plan


== Capacity- pt able to elaborate her medical hx.  She is A &O x 3 and is 

capacitated to make medical decision.


== Health Care Proxy- per [place] statues would be pt's current .   She 

declines designating health care surrogate, even though she had been intubated 

in the past.  She declines me to speak with  to have him involved in 

goals of care.  Should she be incapcitated, she did say, it was okay for 

 to make medical decisions then.





== pain- mainly abdominal and multifactorial- has hx of multiple bowel surgery, 

and may have adhesions.  There is a component of chronic pain and opiate 

tolerance.


     Review with pt the dilemma of putting someone on opiates, but at the same 

time causing gastroperesis.


      Brought up methadone, but currently she is not interested in that.  Her 

main focus seem to be on Dilaudid, which she says she takes dilaudid 4 mg 5 x a 

day.


      


      May consider increasing Fentanyl to 50 mcg q 3 days.  She endorse in the 

past she was on 75 mcg.


      


       If pain continue to be an issue, dilaudid 0.5 mg q 6 hours prn may be an 

option.





       Would encourage scheduling something like Senna S daily as pt is on 

chronic opiates.





== goals of care.Spoke to her about goals of care.  Reviewed with her clinical 

condition and her GI history, and now she is at the point of TPN.  When I ask 

her what her hopes are.  She states she hopes pain is controlled and that the 

TPN can help her gain back some wt.  I told her she has been battling her GI 

condition for years, and I worried it will not get any better, and only get 

worse.  I also review with her TPN is no way as good as good as being able to 

eat on your own, and in general life support is there to buy time, and not a 

replacement.  Reviewed with her given her decline, have she ever considered 

hospice?


She said no.  She stated "Doc, I am a fighter.  I survived strokes, MI, 

coma.... I want to continue to fight."


I spoke to her about her renal cancer, in which she had a right nephrectomy.  

She states she is due for a PET scan.  I asked why she had never followed up 

with her Renal Cancer all theses years?   She said "I am just overwhelmed, and 

had not been feeling well."   She state she want to establish with her new 

physician, and get back on track with renal cancer.


Overall at this point in time, she is not amenable to transition to comfort 

measure only, and to continue to "fight."








== Full code.





== Palliative Care will continue to follow, goals appear to be established.





Time Spent


Total Floor Time (mins):  67


Face to Face Time (mins):  45


>50% Counseling/Coord of Care:  Yes





Thank you for the opportunity to participate in the care of Ms. Stout.





Attestation


To help prompt me to consider important information that might be impacting 

today's encounter and assessment, information from prior notes written by 

myself or my colleagues may have been "brought forward" into today's note.  My 

signature on this note, however, is an attestation that I personally performed 

the exam, history, and/or decision-making noted today, and, unless otherwise 

indicated, the interactions with patient, family, and staff as well as the 

review of records all occurred today.  I also attest that the listed assessment 

and stated plan reflect my best clinical judgment today based on the 

combination of historical information, prior notes, and today's exam/ 

interactions.  When time spent is documented, it refers only to time spent 

today by the signer, or if indicated, combined time spent today by 

collaborating physician/nurse practitioner.








Benson Costa MD Aug 16, 2017 14:31

## 2017-08-17 VITALS
HEART RATE: 72 BPM | DIASTOLIC BLOOD PRESSURE: 67 MMHG | RESPIRATION RATE: 19 BRPM | SYSTOLIC BLOOD PRESSURE: 110 MMHG | OXYGEN SATURATION: 100 % | TEMPERATURE: 98.6 F

## 2017-08-17 VITALS
DIASTOLIC BLOOD PRESSURE: 76 MMHG | SYSTOLIC BLOOD PRESSURE: 124 MMHG | TEMPERATURE: 98.2 F | RESPIRATION RATE: 16 BRPM | HEART RATE: 71 BPM | OXYGEN SATURATION: 99 %

## 2017-08-17 VITALS
OXYGEN SATURATION: 98 % | HEART RATE: 78 BPM | TEMPERATURE: 98.2 F | RESPIRATION RATE: 16 BRPM | DIASTOLIC BLOOD PRESSURE: 78 MMHG | SYSTOLIC BLOOD PRESSURE: 126 MMHG

## 2017-08-17 VITALS
OXYGEN SATURATION: 97 % | RESPIRATION RATE: 18 BRPM | TEMPERATURE: 97.4 F | SYSTOLIC BLOOD PRESSURE: 107 MMHG | HEART RATE: 80 BPM | DIASTOLIC BLOOD PRESSURE: 69 MMHG

## 2017-08-17 RX ADMIN — FAMOTIDINE SCH MG: 20 TABLET, FILM COATED ORAL at 09:10

## 2017-08-17 RX ADMIN — Medication SCH ML: at 09:00

## 2017-08-17 RX ADMIN — DIAZEPAM SCH MG: 10 TABLET ORAL at 13:04

## 2017-08-17 RX ADMIN — HEPARIN SODIUM SCH UNITS: 10000 INJECTION, SOLUTION INTRAVENOUS; SUBCUTANEOUS at 09:10

## 2017-08-17 RX ADMIN — DIAZEPAM SCH MG: 10 TABLET ORAL at 09:09

## 2017-08-17 RX ADMIN — SUCRALFATE SCH GM: 1 SUSPENSION ORAL at 09:25

## 2017-08-17 RX ADMIN — POTASSIUM CHLORIDE SCH MEQ: 750 CAPSULE, EXTENDED RELEASE ORAL at 09:00

## 2017-08-17 RX ADMIN — DICYCLOMINE HYDROCHLORIDE PRN MG: 20 TABLET ORAL at 05:14

## 2017-08-17 RX ADMIN — SUCRALFATE SCH GM: 1 SUSPENSION ORAL at 13:04

## 2017-08-17 RX ADMIN — HEPARIN SODIUM (PORCINE) LOCK FLUSH IV SOLN 100 UNIT/ML SCH UNITS: 100 SOLUTION at 09:00

## 2017-08-17 RX ADMIN — SERTRALINE HYDROCHLORIDE SCH MG: 50 TABLET, FILM COATED ORAL at 09:09

## 2017-08-17 NOTE — HHI.GIFU
GI Follow-up Note


Consult Follow-up


Subjective:  Patient had nausea with a regular diet. Abd pain under control. 

TPN started





Objective:


PHYSICAL EXAMINATION:


Vitals signs stable


No fever





HEENT:  no jaundice.  Throat is clear.


NECK: Neck is supple, no JVD, no lymphadenopathy.


CHEST:  Chest is clear to auscultation and percussion.


CARDIAC:  Regular rate and rhythm with no murmur gallop or rubs.


ABDOMEN:  Soft, nondistended, nontender; no hepatosplenomegaly; bowel sounds 

are present in all four quadrants. Scars noted


EXTREMITIES: No edema.


CNS:   alert and oriented times three.


Available Data (labs, X- Rays, Procedues) : 





bx pending





ASSESSMENT/PLAN:





1. chronic abd pain 


2. Nausea


3. Weight loss





PLAN:


1. Pt wants to continue her current diet despite nausea. She understands she 

only has a small gastric remnant and liquids are sometimes better tolerated.


2. once we can arrange home TPN she can be discharged to followup with Dr. Chung


It was a pleasure seeing Bridget Stout. Thank you for this consult.


Entered by: Ralph Barnes MD Aug 17, 2017 07:01

## 2017-08-17 NOTE — HHI.DS
Discharge Summary


Admission Date


Aug 14, 2017 at 22:59


Discharge Date:  Aug 17, 2017


Admitting Diagnosis


Acute on chronic abdominal pain





(1) Chronic abdominal pain


Diagnosis:  Secondary (acute on chronic)





(2) Nausea & vomiting


Diagnosis:  Principal





(3) Gastroparesis


Diagnosis:  Secondary





(4) GERD (gastroesophageal reflux disease)


Diagnosis:  Secondary





(5) Recurrent abdominal pain


Diagnosis:  Principal





(6) Hypokalemia


Diagnosis:  Principal





(7) Chronic narcotic dependence


Diagnosis:  Secondary





(8) hx right nephrectomy 


Diagnosis:  Secondary





(9) Hx of renal cell cancer


Diagnosis:  Secondary





(10) Hx of hiatal hernia


Diagnosis:  Secondary





(11) History of fundoplication


Diagnosis:  Secondary





(12) History of gastrectomy


Diagnosis:  Secondary





(13) History of CVA (cerebrovascular accident)


Diagnosis:  Secondary





(14) Malnourished


Diagnosis:  Secondary





Brief History


This was a 56-year-old female with significant past medical history of chronic 

abdominal pain with extensive GI workup, gastroparesis, previous pancreatitis, 

chronic opioid use, CAD, history of kidney cancer post-nephrectomy, history of 

embolic strokes, anxiety.  Patient had multiple admissions and ER visits.  She 

was last admitted under our services 8/4-8/5 for the same.  Patient had 

recently established herself with new gastroenterologist, Dr. Naylor.  She was 

sent to see him on Friday and he was concerned that she continues to lose 

weight and is not getting enough nutrition.  She had visited the emergency room 

several times in the last couple days.  She again presented yesterday at the 

request of Dr. Naylor because of her ongoing weight loss and inability to eat 

as well as the need for a CTA and a PICC line.  Patient indicated that she has 

dry heaves, she's not able to vomit however she was able to produce bile color 

gastric emesis.  She had only been able to eat very small amounts of liquids.  

She does have loose stools.  No blood in the stool, no blood in the emesis.  

She complains of diffuse abdominal pain that was controlled with oral Dilaudid.

  She had multiple allergies to antiemetics and the only thing that works was 

benzodiazepines.


CBC/BMP:  


8/15/17 1205                                                                   

             8/16/17 1440





Significant Findings





Laboratory Tests








Test 8/14/17 8/15/17 8/16/17 8/16/17





 19:30 12:05 14:40 20:01


 


Hematocrit 34.4 % 32.1 %  





 (35.0-46.0) (35.0-46.0)  


 


Monocytes (%) (Auto) 12.0 %   





 (0.0-8.0)   


 


Potassium Level 2.9 MEQ/L   





 (3.5-5.1)   


 


Estimat Glomerular Filtration 88 ML/MIN (>89)   





Rate    


 


Random Glucose 108 MG/ MG/DL 63 MG/DL 





 () () () 


 


Red Blood Count  3.84 MIL/MM3  





  (4.00-5.30)  


 


Hemoglobin  10.8 GM/DL  





  (11.6-15.3)  


 


Sodium Level  135 MEQ/L  





  (136-145)  


 


Calcium Level   8.1 MG/DL 





   (8.5-10.1) 


 


Activated Partial    37.0 SEC





Thromboplast Time    (24.3-30.1)








Imaging





Last Impressions








Chest X-Ray 8/15/17 0000 Signed





Impressions: 





 Service Date/Time:  Tuesday, August 15, 2017 13:18 - CONCLUSION:  1. PICC in 





 satisfactory position.     Jefferson Ellis MD 


 


Abdomen/Pelvis CT 8/14/17 0000 Signed





Impressions: 





 Service Date/Time:  Monday, August 14, 2017 21:13 - CONCLUSION:  No acute 





 abnormality. Previous nephrectomy on the right.     Prince Justice MD 








PE at Discharge


GENERAL:  This is a thin, cathartic   female 


who appears to be in no acute distress.  She  is awake


HEAD:  Normocephalic   Facial features


appear symmetric.,  Thin sunken orbital was


OROPHARYNGEAL:  Oropharynx dry


NECK:  Supple thin


Trachea midline without deviation.  


CARDIAC:  Regular rhythm, regular rate, S1 and S2 are heard


LUNGS:  Clear to auscultation bilaterally.  Volumes low to mid, no cough


ABDOMEN:  Flat, no organomegaly or masses.  Bowel sounds minimal soft


EXTREMITIES:  no edema.  Extremities warm


NEUROLOGICAL:  Patient mood and affect flat


SKIN:Warm and dry


Hospital Course


 Patient was evaluated in the emergency room, laboratory workup was significant 

for hypokalemia, potassium 2.9.  CT of the abdomen and pelvis was completed 

which did not reveal any significant findings.  Complaining of pain, she was 

still nauseous.  She was very concerned about her Dilaudid and whether she will 

go through withdrawals if she doesn't continue to take them here.  Laboratory 

workup currently standing.  Patient was admitted for further evaluation and 

treatment.











These are the diagnoses that were used to treat this patient during this brief 

hospital stay


(1) Chronic abdominal pain


(2) Nausea & vomiting


(3) Gastroparesis


(4) GERD (gastroesophageal reflux disease)


(5) Recurrent abdominal pain


(6) Hypokalemia


(7) Chronic narcotic dependence


(8) hx right nephrectomy 


(9) Hx of renal cell cancer


(10) Hx of hiatal hernia


(11) History of fundoplication


(12) History of gastrectomy


(13) History of CVA (cerebrovascular accident)


(14) Malnourished





Vital signs reviewed and monitored every 4 hours and when necessary if 

warranted .trends are normal patient's afebrile


Vital signs reviewed anemia stable at 10.8, which is chronic secondary to her 

chronic disease


Bowel regimen monitored and treated with bowel regimen of stool softeners and 

or laxatives if warranted





Patient states abdominal pain is more controlled, acute on chronic, patient has 

IV TPN and lipids, states she is hoping to go home on these.  States she is 

hoping to gain her 25 pounds back she has lost but gives no plan how to 

accomplish.


GI consult, noted patient would tolerate liquids better than regular food, but 

patient requested to continue having a regular diet. 





Palliative care consult for possible failure to thrive, along with chronic 

illness, discussed briefly with patient her wishes, but appears to be rather 

vague and distant.  Patient continues to be full code full aggressive care per 

her wishes. 


Patient is basically her caregiver during the daytime  works 4 days a 

week currently full code full aggressive care continues.


Pain management, patient is concerned that she is not getting all the pain 

management that she was at home.  Is requesting more meds .  Discussed with Dr. Torres, medical management for her pain while in the hospital





DVT PPI prophylaxis throughout hospital stay





We'll monitor any nutrition as well as activity, and pain management needs per 

Dr. Torres


Discharge planning initiated today per Dr. Torres due to the patient's stable 

and chronic medical needs.  Plan is to send her home with TPN which will have 

to be weaned to a 12 hour treatment regimen per the patient's request.


Case management involved in discharge planning and appreciate input





Plan of care was discussed with medical team, nursing staff, case management 

throughout the course of her treatment


She was stable upon discharge at 5 PM with .


Pt Condition on Discharge:  Stable


Discharge Disposition:  Disch w/ Home Health Serv


Discharge Instructions


DIET: Follow Instructions for:  Heart Healthy Diet


Additional Diet Instructions:  


Use very small fat-free frequent meals,


Activities you can perform:  Regular-No Restrictions


Follow up Referrals:  


Gastroenterology with Urbano Chung MD


PCP Follow-up





Continued Medications:  


Buspirone (Buspirone) 7.5 Mg Tab


30 MG PO TID Anxiety Ref 0 TAB


Butalbital-Acetaminophen-Caffeine (Fioricet) -40 Mg Cap


1-2 CAP PO Q6H PRN HEADACHE Ref 0 CAP


Calcium Citrate-Vitamin D (Calcitrate) 315-250 Mg-Unit Tab


1 TAB PO QID Calcium Supplement Ref 0 TAB


Cholecalciferol (Vitamin D-1000) 1,000 Unit Tab


1000 UNITS PO DAILY Nutritional Supplement #1 Ref 0 BOTTLE


Cyanocobalamin Inj (Cyanocobalamin Inj) 1,000 Mcg/Ml Inj


1000 MCG IM Q30D #1 Ref 0 VIAL


Diazepam (Valium) 10 Mg Tab


10 MG PO TID Nausea/Vomiting Ref 0 TAB


Dicyclomine (Dicyclomine) 20 Mg Tab


40 MG PO TID Bowel Management #90 Ref 0 TAB


Docusate Sodium (Dulcolax Stool Softener) 100 Mg Cap


100 MG PO HS Constipation #60 Ref 0 CAP


Magnesium Oxide (Magnesium Oxide) 400 Mg Tab


400 MG PO BID Nutritional Supplement Ref 0 TAB


Potassium Chloride ER (Potassium Chloride ER) 10 Meq Cap


10 MEQ PO DAILY Electrolyte Replacement #7 Ref 0 CAP


Sertraline (Zoloft) 50 Mg Tab


50 MG PO DAILY #30 Ref 0 TAB


Sumatriptan (Sumatriptan) 100 Mg Tab


100 MG PO ONCE If a satisfactory response has not been obtained at 2 hours, a 

second dose may be administered PRN MIGRAINE HEADACHE Ref 0 TAB


Thiamine Inj (Thiamine Inj) 100 Mg/Ml Inj


100 MG IM WEEKLY VIAL


Vitamin E Mixed (Vitamin E) 400 Unit Tablet


1 TAB PO DAILY


Zolpidem (Ambien) 10 Mg Tab


10 MG PO HS PRN INSOMNIA Ref 0 TAB


 


Discontinued Medications:  


Fentanyl Patch 72 HR (Fentanyl Patch 72 HR) 25 Mcg/Hr Patch


25 MCG T-DERMAL Q72H Pain Management #10 Ref 0 PATCH


Hydromorphone (Dilaudid) 4 Mg Tab


6 MG PO 5 TIMES A DAY PRN Pain Management Ref 0 TAB











Julia Thompson Aug 17, 2017 17:18

## 2017-08-17 NOTE — HHI.PR
Subjective


Remarks


Patient is awake


Resting in the bed


Requesting more pain management for her chronic abdominal pain


Afebrile (Julia Thompson)





Objective


Objective Results


-





 Vital Signs








  Date Time  Temp Pulse Resp B/P Pulse Ox O2 Delivery O2 Flow Rate FiO2


 


8/17/17 07:53 98.2 71 16 124/76 99   


 


8/17/17 05:09 98.6 72 19 110/67 100   


 


8/17/17 01:55 97.4 80 18 107/69 97   


 


8/16/17 21:22 98.5 76 16 111/66 97   


 


8/16/17 17:02 98.5 65 16 110/61 99   


 


8/16/17 11:44 98.5 76 16 120/70 100   





 (Julia Thompson)


Result Diagram:  


8/15/17 1205                                                                   

             8/16/17 1440








ROS


General:  Fatigue, Weakness, Other (10 point ROS done positives noted)


GI:  Abdominal Pain (acute on chronic, 25 pound weight loss recent)


Neuro/MS:  Other (questionable understanding of the big picture, failure to 

thrive) (Julia Thompson)





Physical Exam


Physical Exam


PHYSICAL EXAMINATION


GENERAL:  This is a thin, cathartic   female 


who appears to be in no acute distress.  She  is awake


HEAD:  Normocephalic   Facial features


appear symmetric.,  Thin sunken orbital was


OROPHARYNGEAL:  Oropharynx dry


NECK:  Supple thin


Trachea midline without deviation.  


CARDIAC:  Regular rhythm, regular rate, S1 and S2 are heard


LUNGS:  Clear to auscultation bilaterally.  Volumes low to mid, no cough


ABDOMEN:  Flat, no organomegaly or masses.  Bowel sounds minimal soft


EXTREMITIES:  no edema.  Extremities warm


NEUROLOGICAL:  Patient mood and affect flat


SKIN:Warm and dry








 (Julia Thompson)





A/P


Assessment and Plan


(1) Chronic abdominal pain


(2) Nausea & vomiting


(3) Gastroparesis


(4) GERD (gastroesophageal reflux disease)


(5) Recurrent abdominal pain


(6) Hypokalemia


(7) Chronic narcotic dependence


(8) hx right nephrectomy 


(9) Hx of renal cell cancer


(10) Hx of hiatal hernia


(11) History of fundoplication


(12) History of gastrectomy


(13) History of CVA (cerebrovascular accident)


(14) Malnourished





Vital signs reviewed trends are normal patient's afebrile


Vital signs reviewed anemia stable at 10.8


Bowel regimen no BM in 2 days but patient has had minimal appetite and 25 pound 

weight weight loss, will continue to monitor





Patient states abdominal pain is more controlled, acute on chronic, patient has 

IV TPN and lipids, states she is hoping to go home on these.  States she is 

hoping to gain her 25 pounds back she has lost but gives no plan how to 

accomplish.


GI consult, noted patient would tolerate liquids better than regular food, but 

patient requested to continue having a regular diet. 





Palliative care consult for possible failure to thrive, along with chronic 

illness, discussed briefly with patient her wishes, but appears to be rather 

vague and distant.  


Encourage visits and supportive care from the palliative care team.  Patient is 

basically her caregiver during the daytime  works 4 days a week 

currently full code full aggressive care continues


Pain management, patient is concerned that she is not getting all the pain 

management that she was at home.  Is requesting more meds .  Reassured her that 

this would be discussed with Dr. Torres, 





DVT PPI prophylaxis





We'll monitor any nutrition as well as activity, and pain management needs per 

Dr. Torres


Discharge planning dependent on patient's wishes and palliative care team input

, probable within the next day or 2





Discussed with patient


Discussed with nurse


Is cusp with Dr. Torres, seen on his behalf (Julia Thompson)


Assessment and Plan


seen, examined by myself, Dr Torres, today


Discussed with patient Discussed with Nurse


Discussed with 


She Was Informed That Her Life Expectancy Is Likely Shortened If She Continues 

with Consumption of Narcotics


Discharge home on TPN


Follow-up with primary care physician, follow-up with GI


Discussed with mid level provider


The exam, history, and the medical decision-making described in the above note 

were completed with the assistance of the mid-level provider. I reviewed  the 

findings presented.  I attest that I had a face-to-face encounter with the 

patient on the same day, and personally performed and documented my assessment 

and findings in the medical record. (Yanna Torres MD)








Julia Thompson Aug 17, 2017 09:05


Yanna Torres MD Aug 17, 2017 18:52

## 2017-08-29 ENCOUNTER — HOSPITAL ENCOUNTER (EMERGENCY)
Dept: HOSPITAL 17 - NEPC | Age: 57
LOS: 1 days | Discharge: HOME | End: 2017-08-30
Payer: COMMERCIAL

## 2017-08-29 VITALS
SYSTOLIC BLOOD PRESSURE: 120 MMHG | DIASTOLIC BLOOD PRESSURE: 73 MMHG | OXYGEN SATURATION: 97 % | HEART RATE: 74 BPM | RESPIRATION RATE: 15 BRPM | TEMPERATURE: 98 F

## 2017-08-29 VITALS — BODY MASS INDEX: 16.23 KG/M2 | HEIGHT: 62 IN | WEIGHT: 88.18 LBS

## 2017-08-29 VITALS
OXYGEN SATURATION: 98 % | SYSTOLIC BLOOD PRESSURE: 120 MMHG | RESPIRATION RATE: 18 BRPM | DIASTOLIC BLOOD PRESSURE: 75 MMHG | HEART RATE: 68 BPM

## 2017-08-29 DIAGNOSIS — B96.1: ICD-10-CM

## 2017-08-29 DIAGNOSIS — Z79.899: ICD-10-CM

## 2017-08-29 DIAGNOSIS — D69.6: ICD-10-CM

## 2017-08-29 DIAGNOSIS — N39.0: ICD-10-CM

## 2017-08-29 DIAGNOSIS — F43.25: Primary | ICD-10-CM

## 2017-08-29 DIAGNOSIS — I10: ICD-10-CM

## 2017-08-29 DIAGNOSIS — D72.819: ICD-10-CM

## 2017-08-29 DIAGNOSIS — F41.9: ICD-10-CM

## 2017-08-29 DIAGNOSIS — K31.84: ICD-10-CM

## 2017-08-29 LAB
BACTERIA #/AREA URNS HPF: (no result) /HPF
COLOR UR: YELLOW
COMMENT (UR): (no result)
CULTURE IF INDICATED: (no result)
GLUCOSE UR STRIP-MCNC: (no result) MG/DL
HGB UR QL STRIP: (no result)
KETONES UR STRIP-MCNC: (no result) MG/DL
NITRITE UR QL STRIP: (no result)
SP GR UR STRIP: 1.02 (ref 1–1.03)
SQUAMOUS #/AREA URNS HPF: 1 /HPF (ref 0–5)

## 2017-08-29 PROCEDURE — 87077 CULTURE AEROBIC IDENTIFY: CPT

## 2017-08-29 PROCEDURE — 81001 URINALYSIS AUTO W/SCOPE: CPT

## 2017-08-29 PROCEDURE — 80048 BASIC METABOLIC PNL TOTAL CA: CPT

## 2017-08-29 PROCEDURE — 80307 DRUG TEST PRSMV CHEM ANLYZR: CPT

## 2017-08-29 PROCEDURE — 87186 SC STD MICRODIL/AGAR DIL: CPT

## 2017-08-29 PROCEDURE — 85025 COMPLETE CBC W/AUTO DIFF WBC: CPT

## 2017-08-29 PROCEDURE — 96374 THER/PROPH/DIAG INJ IV PUSH: CPT

## 2017-08-29 PROCEDURE — 87086 URINE CULTURE/COLONY COUNT: CPT

## 2017-08-29 NOTE — PD
HPI


Chief Complaint:  Cruz act


Time Seen by Provider:  19:38


Travel History


International Travel<30 days:  No


Contact w/Intl Traveler<30days:  No


Traveled to known affect area:  No





History of Present Illness


HPI


56-year-old female with history of abdominal pain, gastroparesis, gastrectomy, 

on TPN, presents to the emergency department for evaluation under a Baker act.  

Patient states that her  has been being rested with her over the last 

week or so.  She told him today that she cannot take it anymore.  She told him 

to get out.  When he returned he allegedly returned with the police at which 

time she was placed under Baker act.  Patient denies suicidal or homicidal 

ideations.  No other symptoms to report this time.





PFSH


Past Medical History


Hx Anticoagulant Therapy:  No


Asthma:  No


Blood Disorders:  No


Anxiety:  Yes


Depression:  No


Heart Rhythm Problems:  No


Cancer:  Yes (KIDNEY )


Cardiovascular Problems:  Yes


High Cholesterol:  No


Chemotherapy:  No


Chest Pain:  No


Congestive Heart Failure:  No


COPD:  No


Cerebrovascular Accident:  Yes


Diabetes:  No


Diminished Hearing:  No


Endocrine:  No


Gastrointestinal Disorders:  Yes


GERD:  No


Genitourinary:  Yes (OCCASSIONAL UTI)


Headaches:  Yes


Hiatal Hernia:  Yes


Hypertension:  No


Immune Disorder:  No


Implanted Vascular Access Dvce:  No


Kidney Stones:  No


Musculoskeletal:  No


Neurologic:  Yes (CVA )


Psychiatric:  No


Reproductive:  No


Respiratory:  No


Immunizations Current:  Yes


Migraines:  Yes


Myocardial Infarction:  Yes ()


Pneumonia:  Yes


Radiation Therapy:  No


Renal Failure:  No


Seizures:  Yes


Sleep Apnea:  No


Thyroid Disease:  No


Ulcer:  No


Menopausal:  Yes


:  1


Para:  1


Miscarriage:  0


:  0


Ectopic Pregnancy:  No


Ovarian Cysts:  No


Tubal Ligation:  Yes





Past Surgical History


Abdominal Surgery:  Yes (total gastrectomy w/pouch , hernia repair)


Appendectomy:  Yes


Cardiac Surgery:  Yes (REPAIRED HOLE IN HEART)


Cholecystectomy:  Yes


Genitourinary Surgery:  Yes (RIGHT NEPHRECTOMY)


Hysterectomy:  No


Thoracic Surgery:  No


Tonsillectomy:  Yes


Other Surgery:  Yes





Social History


Alcohol Use:  No


Tobacco Use:  No


Substance Use:  No





Allergies-Medications


(Allergen,Severity, Reaction):  


Coded Allergies:  


     Sulfa (Sulfonamide Antibiotics) (Unverified  Allergy, Severe, Rash, 8/15/17

)


     gadobenic acid (Unverified  Allergy, Severe, Anaphylaxis, 8/15/17)


     gadodiamide (Unverified  Allergy, Severe, Anaphylaxis, 8/15/17)


     gadoteridol (Unverified  Allergy, Severe, Anaphylaxis, 8/15/17)


     ketorolac (Unverified  Allergy, Severe, Shortness of Breath, 8/15/17)


     metoclopramide (Unverified  Allergy, Severe, Shortness of Breath, 8/15/17)


     morphine (Unverified  Allergy, Severe, Hives, 8/15/17)


     ondansetron (Unverified  Allergy, Severe, Shortness of Breath, 8/15/17)


     penicillin G (Unverified  Allergy, Severe, Rash, 8/15/17)


     prochlorperazine (Unverified  Allergy, Severe, Shortness of Breath, 8/15/17

)


     promethazine (Unverified  Allergy, Severe, Shortness of Breath, 8/15/17)


     shellfish derived (Unverified  Allergy, Severe, Anaphylaxis, 8/15/17)


     trimethobenzamide (Unverified  Allergy, Severe, Shortness of Breath, 8/15/

17)


Reported Meds & Prescriptions





Reported Meds & Active Scripts


Active


Potassium Chloride ER (Potassium Chloride) 10 Meq Cap 10 Meq PO DAILY


Reported


Vitamin E (Vitamin E Mixed) 400 Unit Tablet 1 Tab PO DAILY


Thiamine Inj (Thiamine HCl) 100 Mg/Ml Inj 100 Mg IM WEEKLY


Vitamin D-1000 (Cholecalciferol) 1,000 Unit Tab 1,000 Units PO DAILY


Magnesium Oxide 400 Mg Tab 400 Mg PO BID


Calcitrate (Calcium Citrate-Vitamin D) 315-250 Mg-Unit Tab 1 Tab PO QID


Dicyclomine (Dicyclomine HCl) 20 Mg Tab 40 Mg PO TID


Fioricet (Butalbital-Acetaminophen-Caffeine) -40 Mg Cap 1-2 Cap PO Q6H PRN


Sumatriptan (Sumatriptan Succinate) 100 Mg Tab 100 Mg PO ONCE PRN


     If a satisfactory response has not been obtained at 2 hours, a


     second


     dose may be administered


Zoloft (Sertraline HCl) 50 Mg Tab 50 Mg PO DAILY


Dulcolax Stool Softener (Docusate Sodium) 100 Mg Cap 100 Mg PO HS


Ambien (Zolpidem Tartrate) 10 Mg Tab 10 Mg PO HS PRN


Valium (Diazepam) 10 Mg Tab 10 Mg PO TID


Buspirone (Buspirone HCl) 7.5 Mg Tab 30 Mg PO TID


Cyanocobalamin Inj (Cyanocobalamin) 1,000 Mcg/Ml Inj 1,000 Mcg IM Q30D








Review of Systems


Except as stated in HPI:  all other systems reviewed are Neg





Physical Exam


Narrative


GENERAL: Thin female patient, lying in bed in no acute distress


SKIN: Focused skin assessment warm/dry.


HEAD: Normocephalic.


EYES: No scleral icterus. No injection or drainage. 


NECK: Supple, trachea midline. No JVD or lymphadenopathy.


CARDIOVASCULAR: Regular rate and rhythm without murmurs, gallops, or rubs. 


RESPIRATORY: Breath sounds equal bilaterally. No accessory muscle use.


GASTROINTESTINAL: Abdomen soft, non-tender, nondistended. 


MUSCULOSKELETAL: No cyanosis, or edema. 


BACK: Nontender without obvious deformity. No CVA tenderness.





Data


Data


Orders





 Orders


Complete Blood Count With Diff (17 19:17)


Basic Metabolic Panel (Bmp) (17 19:17)


Urinalysis - C+S If Indicated (17 19:17)


Electrocardiogram (17 19:17)


Psych Screen (17 19:17)


Drug Screen, Random Urine (17 19:17)


Alcohol (Ethanol) (17 19:17)








MDM


Medical Decision Making


Medical Screen Exam Complete:  Yes


Emergency Medical Condition:  Yes


Medical Record Reviewed:  Yes


Differential Diagnosis


Mood disorder versus personality disorder versus adjustment reaction disorder


Narrative Course


56-year-old female presents to the emergency department for evaluation under 

Baker act.  Patient appears without distress.  She denies suicidal homicidal 

ideations.  Pending no acute lab abnormality, patient is medically cleared to 

undergo psychiatric screening for further evaluation and disposition.


Mental health screening discussed with the patient. Psychiatric screen ordered.





Diagnosis





 Primary Impression:  


 Adjustment reaction


 Qualified Codes:  F43.23 - Adjustment disorder with mixed anxiety and 

depressed mood


Condition:  Stable











Adenike Dobson Aug 29, 2017 19:45

## 2017-08-29 NOTE — PD
HPI


Time Seen by Provider:  19:07


History of Present Illness


HPI


57 YO F presents to the ED for evaluation of





PFSH


Past Medical History


Hx Anticoagulant Therapy:  No


Asthma:  No


Blood Disorders:  No


Anxiety:  Yes


Depression:  No


Heart Rhythm Problems:  No


Cancer:  Yes (KIDNEY )


Cardiovascular Problems:  Yes


High Cholesterol:  No


Chemotherapy:  No


Chest Pain:  No


Congestive Heart Failure:  No


COPD:  No


Cerebrovascular Accident:  Yes


Diabetes:  No


Diminished Hearing:  No


Endocrine:  No


Gastrointestinal Disorders:  Yes


GERD:  No


Genitourinary:  Yes (OCCASSIONAL UTI)


Headaches:  Yes


Hiatal Hernia:  Yes


Hypertension:  No


Immune Disorder:  No


Implanted Vascular Access Dvce:  No


Kidney Stones:  No


Musculoskeletal:  No


Neurologic:  Yes (CVA )


Psychiatric:  No


Reproductive:  No


Respiratory:  No


Immunizations Current:  Yes


Migraines:  Yes


Myocardial Infarction:  Yes ()


Pneumonia:  Yes


Radiation Therapy:  No


Renal Failure:  No


Seizures:  Yes


Sleep Apnea:  No


Thyroid Disease:  No


Ulcer:  No


Menopausal:  Yes


:  1


Para:  1


Miscarriage:  0


:  0


Ectopic Pregnancy:  No


Ovarian Cysts:  No


Tubal Ligation:  Yes





Past Surgical History


Abdominal Surgery:  Yes (total gastrectomy w/pouch , hernia repair)


Appendectomy:  Yes


Cardiac Surgery:  Yes (REPAIRED HOLE IN HEART)


Cholecystectomy:  Yes


Genitourinary Surgery:  Yes (RIGHT NEPHRECTOMY)


Hysterectomy:  No


Thoracic Surgery:  No


Tonsillectomy:  Yes


Other Surgery:  Yes





Social History


Alcohol Use:  No


Tobacco Use:  No


Substance Use:  No





Allergies-Medications


(Allergen,Severity, Reaction):  


Coded Allergies:  


     Sulfa (Sulfonamide Antibiotics) (Unverified  Allergy, Severe, Rash, 8/15/17

)


     gadobenic acid (Unverified  Allergy, Severe, Anaphylaxis, 8/15/17)


     gadodiamide (Unverified  Allergy, Severe, Anaphylaxis, 8/15/17)


     gadoteridol (Unverified  Allergy, Severe, Anaphylaxis, 8/15/17)


     ketorolac (Unverified  Allergy, Severe, Shortness of Breath, 8/15/17)


     metoclopramide (Unverified  Allergy, Severe, Shortness of Breath, 8/15/17)


     morphine (Unverified  Allergy, Severe, Hives, 8/15/17)


     ondansetron (Unverified  Allergy, Severe, Shortness of Breath, 8/15/17)


     penicillin G (Unverified  Allergy, Severe, Rash, 8/15/17)


     prochlorperazine (Unverified  Allergy, Severe, Shortness of Breath, 8/15/17

)


     promethazine (Unverified  Allergy, Severe, Shortness of Breath, 8/15/17)


     shellfish derived (Unverified  Allergy, Severe, Anaphylaxis, 8/15/17)


     trimethobenzamide (Unverified  Allergy, Severe, Shortness of Breath, 8/15/

17)


Reported Meds & Prescriptions





Reported Meds & Active Scripts


Active


Potassium Chloride ER (Potassium Chloride) 10 Meq Cap 10 Meq PO DAILY


Reported


Vitamin E (Vitamin E Mixed) 400 Unit Tablet 1 Tab PO DAILY


Thiamine Inj (Thiamine HCl) 100 Mg/Ml Inj 100 Mg IM WEEKLY


Vitamin D-1000 (Cholecalciferol) 1,000 Unit Tab 1,000 Units PO DAILY


Magnesium Oxide 400 Mg Tab 400 Mg PO BID


Calcitrate (Calcium Citrate-Vitamin D) 315-250 Mg-Unit Tab 1 Tab PO QID


Dicyclomine (Dicyclomine HCl) 20 Mg Tab 40 Mg PO TID


Fioricet (Butalbital-Acetaminophen-Caffeine) -40 Mg Cap 1-2 Cap PO Q6H PRN


Sumatriptan (Sumatriptan Succinate) 100 Mg Tab 100 Mg PO ONCE PRN


     If a satisfactory response has not been obtained at 2 hours, a


     second


     dose may be administered


Zoloft (Sertraline HCl) 50 Mg Tab 50 Mg PO DAILY


Dulcolax Stool Softener (Docusate Sodium) 100 Mg Cap 100 Mg PO HS


Ambien (Zolpidem Tartrate) 10 Mg Tab 10 Mg PO HS PRN


Valium (Diazepam) 10 Mg Tab 10 Mg PO TID


Buspirone (Buspirone HCl) 7.5 Mg Tab 30 Mg PO TID


Cyanocobalamin Inj (Cyanocobalamin) 1,000 Mcg/Ml Inj 1,000 Mcg IM Q30D








Data


Data


Orders





 Orders


Complete Blood Count With Diff (17 19:17)


Basic Metabolic Panel (Bmp) (17 19:17)


Urinalysis - C+S If Indicated (17 19:17)


Electrocardiogram (17 19:17)


Psych Screen (17 19:17)


Drug Screen, Random Urine (17 19:17)


Alcohol (Ethanol) (17 19:17)














Mayela Neff Aug 29, 2017 19:07

## 2017-08-30 VITALS
DIASTOLIC BLOOD PRESSURE: 77 MMHG | RESPIRATION RATE: 18 BRPM | SYSTOLIC BLOOD PRESSURE: 127 MMHG | OXYGEN SATURATION: 100 % | HEART RATE: 78 BPM

## 2017-08-30 VITALS
OXYGEN SATURATION: 98 % | DIASTOLIC BLOOD PRESSURE: 66 MMHG | RESPIRATION RATE: 18 BRPM | HEART RATE: 66 BPM | SYSTOLIC BLOOD PRESSURE: 105 MMHG

## 2017-08-30 LAB
ANION GAP SERPL CALC-SCNC: 7 MEQ/L (ref 5–15)
BASOPHILS # BLD AUTO: 0 TH/MM3 (ref 0–0.2)
BASOPHILS NFR BLD: 0.3 % (ref 0–2)
BUN SERPL-MCNC: 23 MG/DL (ref 7–18)
CHLORIDE SERPL-SCNC: 107 MEQ/L (ref 98–107)
EOSINOPHIL # BLD: 0.1 TH/MM3 (ref 0–0.4)
EOSINOPHIL NFR BLD: 1.7 % (ref 0–4)
ERYTHROCYTE [DISTWIDTH] IN BLOOD BY AUTOMATED COUNT: 16.4 % (ref 11.6–17.2)
ETHANOL SERPL-MCNC: (no result) MG/DL (ref 0–5)
GFR SERPLBLD BASED ON 1.73 SQ M-ARVRAT: 85 ML/MIN (ref 89–?)
HCO3 BLD-SCNC: 27.4 MEQ/L (ref 21–32)
HCT VFR BLD CALC: 25.8 % (ref 35–46)
HEMO FLAGS: (no result)
LYMPHOCYTES # BLD AUTO: 1.6 TH/MM3 (ref 1–4.8)
LYMPHOCYTES NFR BLD AUTO: 41.7 % (ref 9–44)
MCH RBC QN AUTO: 27.7 PG (ref 27–34)
MCHC RBC AUTO-ENTMCNC: 32.6 % (ref 32–36)
MCV RBC AUTO: 85 FL (ref 80–100)
MONOCYTES NFR BLD: 10.4 % (ref 0–8)
NEUTROPHILS # BLD AUTO: 1.7 TH/MM3 (ref 1.8–7.7)
NEUTROPHILS NFR BLD AUTO: 45.9 % (ref 16–70)
PLATELET # BLD: 125 TH/MM3 (ref 150–450)
POTASSIUM SERPL-SCNC: 4.3 MEQ/L (ref 3.5–5.1)
RBC # BLD AUTO: 3.03 MIL/MM3 (ref 4–5.3)
SODIUM SERPL-SCNC: 141 MEQ/L (ref 136–145)
WBC # BLD AUTO: 3.7 TH/MM3 (ref 4–11)
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
## 2017-08-30 NOTE — PD
Physical Exam


Narrative


I, Dr. Malloy, have reviewed the advance practice practitioner's documentation and 

am in agreement, met with the patient face to face, made the diagnosis, and the 

medical decision making was done by me.  





*My assessment and Findings: Aggressive behavior





55yo F with gastroparesis, gastrectomy on TPN here under Baker Act.  I received 

sign out to follow up labs and medically clear patient.  Pt is manipulative and 

demanding medications.  I did agree to give her her usual 10mg of ambien.  

Abdomen is soft, NT/ND.  Labs reviewed, mild leukopenia at 3.7.  H/H  mildly 

decreased from baseline at 8.4/25.8.  Normal MCV.  Mild thrombocytopenia.  

Normal potassium at 4.3.  UA showed positive nitrite.  Pt given first dose of 

macrobid.  Vital signs are normal with no fever or tachycardia.  Glucose is 76.

  Pt wants to hang her TPN but we called pharmacy and they said that there is 

no one to make her TPN right now but we can start her on a D10 drip.  I ordered 

a D10W at 42cc/hr.  Repeat blood glucose is 80.  Pharmacy will be able to make 

her TPN in morning if needed and pt is admitted.  D10 drip stopped at 7am.    

Pt is medically clear for psych evaluation.  Pt given first dose of macrobid  

and prescribed antibiotics for UTI.





Data


Data


Last Documented VS





Vital Signs








  Date Time  Temp Pulse Resp B/P (MAP) Pulse Ox O2 Delivery O2 Flow Rate FiO2


 


8/30/17 16:06        


 


8/30/17 07:30  66 18  98 Room Air  


 


8/29/17 21:05 98.0       








Orders





 Orders


Complete Blood Count With Diff (8/29/17 19:17)


Basic Metabolic Panel (Bmp) (8/29/17 19:17)


Urinalysis - C+S If Indicated (8/29/17 19:17)


Psych Screen (8/29/17 19:17)


Drug Screen, Random Urine (8/29/17 19:17)


Alcohol (Ethanol) (8/29/17 19:17)


Urine Culture (8/29/17 21:26)


Nitrofurantoin Monohyd Macrocr (Macrobid (8/29/17 23:15)


Zolpidem (Ambien) (8/30/17 00:45)


Dextrose 10% Inj (D10w Inj) (8/30/17 01:15)


Blood Glucose (8/30/17 02:00)





Labs





Laboratory Tests








Test


  8/29/17


21:26 8/30/17


00:00


 


Urine Color YELLOW  


 


Urine Turbidity HAZY  


 


Urine pH 6.0  


 


Urine Specific Gravity 1.023  


 


Urine Protein NEG mg/dL  


 


Urine Glucose (UA) NEG mg/dL  


 


Urine Ketones NEG mg/dL  


 


Urine Occult Blood TRACE  


 


Urine Nitrite POS  


 


Urine Bilirubin NEG  


 


Urine Urobilinogen


  LESS THAN 2.0


MG/DL 


 


 


Urine Leukocyte Esterase MOD  


 


Urine RBC


  LESS THAN 1


/hpf 


 


 


Urine WBC 4 /hpf  


 


Urine Squamous Epithelial


Cells 1 /hpf 


  


 


 


Urine Amorphous Sediment RARE  


 


Urine Bacteria MANY /hpf  


 


Microscopic Urinalysis Comment


  CULTURE


INDICATED 


 


 


Urine Opiates Screen NEG  


 


Urine Barbiturates Screen POS  


 


Urine Amphetamines Screen NEG  


 


Urine Benzodiazepines Screen POS  


 


Urine Cocaine Screen NEG  


 


Urine Cannabinoids Screen NEG  


 


White Blood Count  3.7 TH/MM3 


 


Red Blood Count  3.03 MIL/MM3 


 


Hemoglobin  8.4 GM/DL 


 


Hematocrit  25.8 % 


 


Mean Corpuscular Volume  85.0 FL 


 


Mean Corpuscular Hemoglobin  27.7 PG 


 


Mean Corpuscular Hemoglobin


Concent 


  32.6 % 


 


 


Red Cell Distribution Width  16.4 % 


 


Platelet Count  125 TH/MM3 


 


Mean Platelet Volume  10.2 FL 


 


Neutrophils (%) (Auto)  45.9 % 


 


Lymphocytes (%) (Auto)  41.7 % 


 


Monocytes (%) (Auto)  10.4 % 


 


Eosinophils (%) (Auto)  1.7 % 


 


Basophils (%) (Auto)  0.3 % 


 


Neutrophils # (Auto)  1.7 TH/MM3 


 


Lymphocytes # (Auto)  1.6 TH/MM3 


 


Monocytes # (Auto)  0.4 TH/MM3 


 


Eosinophils # (Auto)  0.1 TH/MM3 


 


Basophils # (Auto)  0.0 TH/MM3 


 


CBC Comment  DIFF FINAL 


 


Differential Comment   


 


Blood Urea Nitrogen  23 MG/DL 


 


Creatinine  0.71 MG/DL 


 


Random Glucose  76 MG/DL 


 


Calcium Level  8.3 MG/DL 


 


Sodium Level  141 MEQ/L 


 


Potassium Level  4.3 MEQ/L 


 


Chloride Level  107 MEQ/L 


 


Carbon Dioxide Level  27.4 MEQ/L 


 


Anion Gap  7 MEQ/L 


 


Estimat Glomerular Filtration


Rate 


  85 ML/MIN 


 


 


Ethyl Alcohol Level


  


  LESS THAN 3


MG/DL











[facility]


Supervised Visit with DELLA:  Yes


Diagnosis





 Primary Impression:  


 Adjustment reaction


 Qualified Codes:  F43.23 - Adjustment disorder with mixed anxiety and 

depressed mood


 Additional Impression:  


 UTI (urinary tract infection)


 Qualified Codes:  N39.0 - Urinary tract infection, site not specified; R31.9 - 

Hematuria, unspecified


Patient Instructions:  General Instructions


***Med/Other Pt SpecificInfo:  Prescription(s) given


Scripts


Nitrofurantoin Monohydrate Macrocrystals (Macrobid) 100 Mg Cap


100 MG PO BID for Infection for 7 Days, CAP 0 Refills


   Prov: Adenike Dobson         8/30/17


Condition:  Stable











Destiney Malloy DO Aug 30, 2017 01:04

## 2017-08-31 ENCOUNTER — HOSPITAL ENCOUNTER (EMERGENCY)
Dept: HOSPITAL 17 - NEPD | Age: 57
Discharge: HOME | End: 2017-08-31
Payer: COMMERCIAL

## 2017-08-31 VITALS — BODY MASS INDEX: 20.28 KG/M2 | HEIGHT: 62 IN | WEIGHT: 110.23 LBS

## 2017-08-31 VITALS
RESPIRATION RATE: 20 BRPM | OXYGEN SATURATION: 98 % | DIASTOLIC BLOOD PRESSURE: 80 MMHG | HEART RATE: 75 BPM | SYSTOLIC BLOOD PRESSURE: 136 MMHG | TEMPERATURE: 98.5 F

## 2017-08-31 DIAGNOSIS — R11.0: ICD-10-CM

## 2017-08-31 DIAGNOSIS — R10.9: Primary | ICD-10-CM

## 2017-08-31 DIAGNOSIS — G89.29: ICD-10-CM

## 2017-08-31 DIAGNOSIS — Z79.899: ICD-10-CM

## 2017-08-31 PROCEDURE — 99281 EMR DPT VST MAYX REQ PHY/QHP: CPT
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
## 2017-11-25 ENCOUNTER — HOSPITAL ENCOUNTER (EMERGENCY)
Dept: HOSPITAL 17 - NEPC | Age: 57
Discharge: LEFT BEFORE DISCHARGE | End: 2017-11-25
Payer: COMMERCIAL

## 2017-11-25 VITALS
OXYGEN SATURATION: 98 % | RESPIRATION RATE: 14 BRPM | HEART RATE: 67 BPM | SYSTOLIC BLOOD PRESSURE: 161 MMHG | DIASTOLIC BLOOD PRESSURE: 84 MMHG

## 2017-11-25 VITALS
SYSTOLIC BLOOD PRESSURE: 107 MMHG | RESPIRATION RATE: 18 BRPM | DIASTOLIC BLOOD PRESSURE: 78 MMHG | HEART RATE: 63 BPM | OXYGEN SATURATION: 98 %

## 2017-11-25 VITALS
HEART RATE: 65 BPM | SYSTOLIC BLOOD PRESSURE: 148 MMHG | RESPIRATION RATE: 20 BRPM | DIASTOLIC BLOOD PRESSURE: 87 MMHG | OXYGEN SATURATION: 100 %

## 2017-11-25 VITALS — BODY MASS INDEX: 16.41 KG/M2 | WEIGHT: 92.59 LBS | HEIGHT: 63 IN

## 2017-11-25 VITALS
SYSTOLIC BLOOD PRESSURE: 169 MMHG | OXYGEN SATURATION: 100 % | HEART RATE: 62 BPM | RESPIRATION RATE: 18 BRPM | DIASTOLIC BLOOD PRESSURE: 101 MMHG

## 2017-11-25 VITALS — OXYGEN SATURATION: 100 %

## 2017-11-25 DIAGNOSIS — I10: ICD-10-CM

## 2017-11-25 DIAGNOSIS — F41.9: ICD-10-CM

## 2017-11-25 DIAGNOSIS — I25.2: ICD-10-CM

## 2017-11-25 DIAGNOSIS — G89.29: ICD-10-CM

## 2017-11-25 DIAGNOSIS — F32.9: ICD-10-CM

## 2017-11-25 DIAGNOSIS — R26.89: ICD-10-CM

## 2017-11-25 DIAGNOSIS — Z86.73: ICD-10-CM

## 2017-11-25 DIAGNOSIS — R10.9: Primary | ICD-10-CM

## 2017-11-25 DIAGNOSIS — R53.1: ICD-10-CM

## 2017-11-25 LAB
ALP SERPL-CCNC: 102 U/L (ref 45–117)
ALT SERPL-CCNC: 30 U/L (ref 10–53)
ANION GAP SERPL CALC-SCNC: 6 MEQ/L (ref 5–15)
AST SERPL-CCNC: 27 U/L (ref 15–37)
BASOPHILS # BLD AUTO: 0 TH/MM3 (ref 0–0.2)
BASOPHILS NFR BLD: 0.2 % (ref 0–2)
BILIRUB SERPL-MCNC: 0.2 MG/DL (ref 0.2–1)
BUN SERPL-MCNC: 14 MG/DL (ref 7–18)
CHLORIDE SERPL-SCNC: 108 MEQ/L (ref 98–107)
EOSINOPHIL # BLD: 0.1 TH/MM3 (ref 0–0.4)
EOSINOPHIL NFR BLD: 3.8 % (ref 0–4)
ERYTHROCYTE [DISTWIDTH] IN BLOOD BY AUTOMATED COUNT: 21.1 % (ref 11.6–17.2)
GFR SERPLBLD BASED ON 1.73 SQ M-ARVRAT: 65 ML/MIN (ref 89–?)
HCO3 BLD-SCNC: 28.1 MEQ/L (ref 21–32)
HCT VFR BLD CALC: 28.9 % (ref 35–46)
HEMO FLAGS: (no result)
LYMPHOCYTES # BLD AUTO: 1.3 TH/MM3 (ref 1–4.8)
LYMPHOCYTES NFR BLD AUTO: 47.8 % (ref 9–44)
MCH RBC QN AUTO: 26 PG (ref 27–34)
MCHC RBC AUTO-ENTMCNC: 32.2 % (ref 32–36)
MCV RBC AUTO: 80.6 FL (ref 80–100)
MONOCYTES NFR BLD: 12.8 % (ref 0–8)
NEUTROPHILS # BLD AUTO: 1 TH/MM3 (ref 1.8–7.7)
NEUTROPHILS NFR BLD AUTO: 35.4 % (ref 16–70)
PLATELET # BLD: 138 TH/MM3 (ref 150–450)
POTASSIUM SERPL-SCNC: 4.4 MEQ/L (ref 3.5–5.1)
RBC # BLD AUTO: 3.59 MIL/MM3 (ref 4–5.3)
SODIUM SERPL-SCNC: 142 MEQ/L (ref 136–145)
T4 FREE SERPL-MCNC: 0.95 NG/DL (ref 0.76–1.46)
VIT B12 SERPL-MCNC: 351 PG/ML (ref 193–986)
WBC # BLD AUTO: 2.7 TH/MM3 (ref 4–11)

## 2017-11-25 PROCEDURE — 80053 COMPREHEN METABOLIC PANEL: CPT

## 2017-11-25 PROCEDURE — 82607 VITAMIN B-12: CPT

## 2017-11-25 PROCEDURE — 72141 MRI NECK SPINE W/O DYE: CPT

## 2017-11-25 PROCEDURE — 85025 COMPLETE CBC W/AUTO DIFF WBC: CPT

## 2017-11-25 PROCEDURE — 84443 ASSAY THYROID STIM HORMONE: CPT

## 2017-11-25 PROCEDURE — 72146 MRI CHEST SPINE W/O DYE: CPT

## 2017-11-25 PROCEDURE — 99285 EMERGENCY DEPT VISIT HI MDM: CPT

## 2017-11-25 PROCEDURE — 82746 ASSAY OF FOLIC ACID SERUM: CPT

## 2017-11-25 PROCEDURE — 72148 MRI LUMBAR SPINE W/O DYE: CPT

## 2017-11-25 PROCEDURE — 82306 VITAMIN D 25 HYDROXY: CPT

## 2017-11-25 PROCEDURE — 84439 ASSAY OF FREE THYROXINE: CPT

## 2017-11-25 NOTE — RADRPT
EXAM DATE/TIME:  11/25/2017 07:48 

 

[facility] COMPARISON:     

No previous studies available for comparison.

       

 

 

INDICATIONS :     

Chronic back pain with bilateral lower extremity weakness.

                     

 

MEDICAL HISTORY :     

Myocardial infarction. Cerebrovascular disease.    

 

SURGICAL HISTORY :           

Gastrectomy, Right kidney removed.

 

ENCOUNTER:     

Initial

 

ACUITY:     

1 day

 

PAIN SCORE:     

3/10

 

LOCATION:       

Paraspinal 

 

TECHNIQUE:     

Multiplanar, multisequence MRI examination of the cervical spine was performed.

 

FINDINGS:     

Alignment is within normal limits. No significant stenosis at C2-3.

 

At C3-4 there is effacement of the thecal sac without cord impingement. Mild foraminal stenosis bilat
erally.

 

At C4-5 there is disc osteophyte complex effacing the thecal sac. No cord compression.

 

At C5-6 there is a broad-based posterior disc osteophyte complex resulting in mild AP canal stenosis 
and mild flattening of the cord with mild foraminal encroachment bilaterally.

 

At C6-7 and C7-T1 there is no significant stenosis.

 

CONCLUSION:     

1. At C5-6 there is a mild to moderate AP canal stenosis with mild flattening of the cord. Less sever
e degenerative change in the remainder of the cervical spine as above. No cord signal abnormality. Al
ignment within normal limits.

 

 

 

 Paul Cabrera MD on November 25, 2017 at 8:34           

Board Certified Radiologist.

 This report was verified electronically.

## 2017-11-25 NOTE — RADRPT
EXAM DATE/TIME:  11/25/2017 07:48 

 

[facility] COMPARISON:     

No previous studies available for comparison.

       

 

 

INDICATIONS :     

Chronic back pain with bilateral lower extremity weakness.

                     

 

MEDICAL HISTORY :     

Myocardial infarction. Cerebrovascular disease.    

 

SURGICAL HISTORY :           

Gastrectomy. Right kidney removed.

 

ENCOUNTER:     

Initial

 

ACUITY:     

1 day

 

PAIN SCORE:     

3/10

 

LOCATION:       

Paraspinal 

 

TECHNIQUE:     

Multiplanar multisequence MRI of the thoracic spine was performed.

 

FINDINGS:     

There is a mild kyphoscoliosis. Degenerative disc disease is present throughout the cervical spine es
pecially in the mid cervical spine with some posterior osteophytic ridging effacing the anterior thec
al sac. No significant cord compression. No cord edema. No direct nerve root compression identified.

 

CONCLUSION:     

1. Mild kyphoscoliosis with moderate degenerative disc disease in the mid thoracic spine effacing the
 anterior thecal sac. No significant cord compression and no cord signal abnormality. No acute fractu
re.

 

 

 

 Paul Cabrera MD on November 25, 2017 at 8:18           

Board Certified Radiologist.

 This report was verified electronically.

## 2017-11-25 NOTE — RADRPT
EXAM DATE/TIME:  11/25/2017 07:48 

 

[facility] COMPARISON:     

No previous studies available for comparison.

       

 

 

INDICATIONS :     

Cronic back pain with bilateral lower extremity weakness.

                     

 

MEDICAL HISTORY :     

Myocardial infarction. Cerebrovascular disease.    

 

SURGICAL HISTORY :           

Gastrectomy. Right kidney removed.

 

ENCOUNTER:     

Initial

 

ACUITY:     

1 day

 

PAIN SCORE:     

3/10

 

LOCATION:       

Paraspinal 

 

TECHNIQUE:     

Multiplanar multisequence MRI of the lumbar spine was performed without contrast.

 

FINDINGS:     

The most caudal appearing lumbar vertebra is numbered as L5.

 

VERTEBRAE:     

Homogeneous signal.  Normal alignment.

 

CONUS:     

Normal level and configuration.

 

T12-L1:  

The thecal sac has a normal diameter.  No evidence of disc bulge or protrusion.  The neural foramina 
are patent bilaterally.

 

L1-L2:  

The thecal sac has a normal diameter.  No evidence of disc bulge or protrusion.  The neural foramina 
are patent bilaterally.

 

L2-L3:  

The thecal sac has a normal diameter.  No evidence of disc bulge or protrusion.  The neural foramina 
are patent bilaterally.

 

L3-L4:  

The thecal sac has a normal diameter.  No evidence of disc bulge or protrusion.  The neural foramina 
are patent bilaterally.

 

L4-L5:  

The thecal sac has a normal diameter.  No evidence of disc bulge or protrusion.  The neural foramina 
are patent bilaterally.

 

L5-S1:  

The thecal sac has a normal diameter.  No evidence of disc bulge or protrusion.  The neural foramina 
are patent bilaterally.

 

CONCLUSION:     

1. No acute findings on MRI lumbar spine. No canal stenosis or significant nerve root compression. Mi
ld to moderate facet arthropathy in the lower lumbar spine.

 

 

 

 Paul Cabrera MD on November 25, 2017 at 8:45           

Board Certified Radiologist.

 This report was verified electronically.

## 2017-11-25 NOTE — PD
Physical Exam


Date Seen by Provider:  Nov 25, 2017


Time Seen by Provider:  07:00


Narrative


This patient was signed out to me by Dr. Jean.  The patient came man with 

chronic abdominal pain.  Patient also stating that she could not ambulate.  The 

patient is well-known to this emergency department has had multiple visits for 

the same thing.  The patient has negative workups.  She does have a history of 

gastrectomy is on TPN.  She is continuously requesting IVD narcotics.  Dr. Jean 

told her multiple times at this point we did not feel I V narcotics were 

beneficial as there is no indication or pathology that indicated that at this 

point.  The patient continued to state that she could not walk.  Dr. Jean 

ordered an MRI of the C-spine and T-spine and L-spine to rule out any cord 

abnormality.  These were pending at the time of sign out.





Data


Data


Last Documented VS





Vital Signs








  Date Time  Temp Pulse Resp B/P (MAP) Pulse Ox O2 Delivery O2 Flow Rate FiO2


 


11/25/17 10:09  65 20 148/87 (107) 100 Room Air  








Orders





 Orders


Complete Blood Count With Diff (11/25/17 04:19)


Comprehensive Metabolic Panel (11/25/17 04:19)


Iv Access Insert/Monitor (11/25/17 04:19)


Ecg Monitoring (11/25/17 04:19)


Oximetry (11/25/17 04:19)


Sodium Chloride 0.9% Flush (Ns Flush) (11/25/17 04:30)


Mri C Spine W/O Contrast (11/25/17 )


Mri L Spine W/O Contrast (11/25/17 )


Mri T Spine W/O Contrast (11/25/17 )


Vitamin D, 25-Hydroxy (11/25/17 10:03)


Thyroid Stimulating Hormone (11/25/17 10:03)


Free Thyroxine (T4) (11/25/17 10:03)


Vitamin B12 (11/25/17 10:03)


Folate, Serum (11/25/17 10:03)


Thiamine  (Vit B1) (Vitamin B1) (11/25/17 10:15)


Thiamine  (Vit B1) (Vitamin B1) (11/26/17 09:00)


Place In Observation (11/25/17 )


Vital Signs (Adult) Q4H (11/25/17 10:04)


Neuro Checks Q4H (11/25/17 10:04)


Activity Oob With Assistance (11/25/17 10:04)


Diet Regular Basic (11/25/17 Lunch)


Sodium Chlor 0.45% 1000 Ml Inj (1/2 Ns 1 (11/25/17 10:04)


Sodium Chloride 0.9% Flush (Ns Flush) (11/25/17 10:15)


Sodium Chloride 0.9% Flush (Ns Flush) (11/25/17 21:00)


Comprehensive Metabolic Panel (11/26/17 06:00)


Complete Blood Count With Diff (11/26/17 06:00)


Case Management Consult (11/25/17 10:04)


Scd Bilateral/Knee High NATALIA.BID (11/25/17 10:04)


Naloxone Inj (Narcan Inj) (11/25/17 10:15)


Magnesium Hydroxide Liq (Milk Of Magnesi (11/25/17 10:15)


Sennosides (Senokot) (11/25/17 10:15)


Bisacodyl Supp (Dulcolax Supp) (11/25/17 10:15)


Lactulose Liq (Lactulose Liq) (11/25/17 10:15)





Labs





Laboratory Tests








Test


  11/25/17


04:50


 


White Blood Count 2.7 TH/MM3 


 


Red Blood Count 3.59 MIL/MM3 


 


Hemoglobin 9.3 GM/DL 


 


Hematocrit 28.9 % 


 


Mean Corpuscular Volume 80.6 FL 


 


Mean Corpuscular Hemoglobin 26.0 PG 


 


Mean Corpuscular Hemoglobin


Concent 32.2 % 


 


 


Red Cell Distribution Width 21.1 % 


 


Platelet Count 138 TH/MM3 


 


Mean Platelet Volume 9.0 FL 


 


Neutrophils (%) (Auto) 35.4 % 


 


Lymphocytes (%) (Auto) 47.8 % 


 


Monocytes (%) (Auto) 12.8 % 


 


Eosinophils (%) (Auto) 3.8 % 


 


Basophils (%) (Auto) 0.2 % 


 


Neutrophils # (Auto) 1.0 TH/MM3 


 


Lymphocytes # (Auto) 1.3 TH/MM3 


 


Monocytes # (Auto) 0.4 TH/MM3 


 


Eosinophils # (Auto) 0.1 TH/MM3 


 


Basophils # (Auto) 0.0 TH/MM3 


 


CBC Comment DIFF FINAL 


 


Differential Comment  


 


Blood Urea Nitrogen 14 MG/DL 


 


Creatinine 0.90 MG/DL 


 


Random Glucose 76 MG/DL 


 


Total Protein 6.1 GM/DL 


 


Albumin 3.2 GM/DL 


 


Calcium Level 8.4 MG/DL 


 


Alkaline Phosphatase 102 U/L 


 


Aspartate Amino Transf


(AST/SGOT) 27 U/L 


 


 


Alanine Aminotransferase


(ALT/SGPT) 30 U/L 


 


 


Total Bilirubin 0.2 MG/DL 


 


Sodium Level 142 MEQ/L 


 


Potassium Level 4.4 MEQ/L 


 


Chloride Level 108 MEQ/L 


 


Carbon Dioxide Level 28.1 MEQ/L 


 


Anion Gap 6 MEQ/L 


 


Estimat Glomerular Filtration


Rate 65 ML/MIN 


 











[facility]


Medical Record Reviewed:  Yes


Supervised Visit with DELLA:  No


Narrative Course


Page signed out to me at change of shift.  We are awaiting the MRI results.  

MRI of C-spine and T-spine and L-spine show no evidence of acute cord 

compression.  There is effacement of the cord in the T and L-spine.  There is 

no evidence to explain the patient's reported inability to ambulate.  The 

patient has been observed moving in her bed without assistance however when the 

nursing staff, and she states she cannot move.  The patient has also requested 

multiple times for pain medication.  I have stated that I'm not comfortable 

giving IV narcotics for a condition that we do not know is the cause of her 

symptoms.  She was given to be admitted to the hospital.  The case was 

discussed with Dr. Bradford Clay, [facility].  When this 

information was passed onto the patient, she requested to sign out AGAINST 

MEDICAL ADVICE.





AMA: The risks of leaving against medical advice without further evaluation 

treatment were discussed with the patient.  These risks include cardiac 

dysfunction, cardiac dysrhythmia, possible heart attack, possible stroke or 

death.  The patient indicated understanding of these risks and appeared to have 

the capacity to make this decision.


Diagnosis





 Primary Impression:  


 Chronic abdominal pain


 Additional Impressions:  


 reported inability to walk.


 requesting narcotics.


Disposition:  07 AGAINST MEDICAL ADVICE


Condition:  Stable











Devon Brunson MD Nov 25, 2017 10:13

## 2017-11-25 NOTE — PD
HPI


Chief Complaint:  General Weakness


Time Seen by Provider:  03:39


Travel History


International Travel<30 days:  No


Contact w/Intl Traveler<30days:  No


Traveled to known affect area:  No





History of Present Illness


HPI


Patient is a 56-year-old female presents emergency department for evaluation of 

generalized weakness more particularly weakness in her lower extremities.  

Patient is well-known to me from previous ER visits.  She states that she was 

doing very well and has been here in a long time in the last visit was in 

August.  She is on TPN for history of chronic abdominal pain which is not well 

explained on multiple ER visits here.  The patient states that for the past 24 

hours she just had weakness of her lower extremities and inability to ambulate.

  She states it feels a little numbness and tingling but denies any back pain 

and headaches chest pain shortness of breath or abdominal pain at this time.





PFSH


Past Medical History


Hx Anticoagulant Therapy:  No


Asthma:  No


Blood Disorders:  No


Anxiety:  Yes


Depression:  Yes


Heart Rhythm Problems:  No


Cancer:  Yes (KIDNEY )


Cardiovascular Problems:  Yes


High Cholesterol:  No


Chemotherapy:  No


Chest Pain:  No


Congestive Heart Failure:  No


COPD:  No


Cerebrovascular Accident:  Yes


Diabetes:  No


Diminished Hearing:  No


Endocrine:  No


Gastrointestinal Disorders:  Yes (GASTRECTOMY, CHRONIC ABDOMINAL PAIN )


GERD:  No


Genitourinary:  Yes (OCCASSIONAL UTI)


Headaches:  Yes


Hiatal Hernia:  Yes


Hypertension:  Yes


Immune Disorder:  No


Implanted Vascular Access Dvce:  No


Kidney Stones:  No


Musculoskeletal:  No


Neurologic:  Yes (CVA )


Psychiatric:  No


Reproductive:  No


Respiratory:  No


Immunizations Current:  Yes


Migraines:  Yes


Myocardial Infarction:  Yes ()


Pneumonia:  Yes


Radiation Therapy:  No


Renal Failure:  No


Seizures:  Yes


Sleep Apnea:  No


Thyroid Disease:  No


Ulcer:  No


Influenza Vaccination:  Yes


Pregnant?:  Not Pregnant


Menopausal:  Yes


:  1


Para:  1


Miscarriage:  0


:  0


Ectopic Pregnancy:  No


Ovarian Cysts:  No


Dilation and Curettage (D&C):  Yes


Tubal Ligation:  Yes





Past Surgical History


Abdominal Surgery:  Yes (total gastrectomy w/pouch , hernia repair)


Appendectomy:  Yes


Cardiac Surgery:  Yes (REPAIRED HOLE IN HEART)


Cholecystectomy:  Yes


Genitourinary Surgery:  Yes (RIGHT NEPHRECTOMY)


Gynecologic Surgery:  Yes


Hysterectomy:  No


Thoracic Surgery:  No


Tonsillectomy:  Yes


Other Surgery:  Yes (GASTRECTOMY, RIGHT KIDNEY REMOVAL, HERNIA REPAIR, 

GALLBLADDER, TONSILS)





Social History


Alcohol Use:  No (PT DENIES)


Tobacco Use:  No (PT DENIES)


Substance Use:  No (PT DENIES)





Allergies-Medications


(Allergen,Severity, Reaction):  


Coded Allergies:  


     Sulfa (Sulfonamide Antibiotics) (Verified  Allergy, Severe, Rash, 17)


     gadobenic acid (Verified  Allergy, Severe, Anaphylaxis, 17)


     gadodiamide (Verified  Allergy, Severe, Anaphylaxis, 17)


     gadoteridol (Verified  Allergy, Severe, Anaphylaxis, 17)


     ketorolac (Verified  Allergy, Severe, Shortness of Breath, 17)


     metoclopramide (Verified  Allergy, Severe, Shortness of Breath, 17)


     morphine (Verified  Allergy, Severe, Hives, 17)


     ondansetron (Verified  Allergy, Severe, Shortness of Breath, 17)


     penicillin G (Verified  Allergy, Severe, Rash, 17)


     prochlorperazine (Verified  Allergy, Severe, Shortness of Breath, 17)


     promethazine (Verified  Allergy, Severe, Shortness of Breath, 17)


     shellfish derived (Verified  Allergy, Severe, Anaphylaxis, 17)


     trimethobenzamide (Verified  Allergy, Severe, Shortness of Breath, 17

)


Reported Meds & Prescriptions





Reported Meds & Active Scripts


Active


Potassium Chloride ER (Potassium Chloride) 10 Meq Cap 10 Meq PO DAILY


Reported


Oxycontin (Oxycodone HCl) 10 Mg Tab 10 Mg PO Q8HR


Vitamin E (Vitamin E Mixed) 400 Unit Tablet 1 Tab PO DAILY


Vitamin D-1000 (Cholecalciferol) 1,000 Unit Tab 1,000 Units PO DAILY


Magnesium Oxide 400 Mg Tab 400 Mg PO BID


Calcitrate (Calcium Citrate-Vitamin D) 315-250 Mg-Unit Tab 1 Tab PO QID


Dicyclomine (Dicyclomine HCl) 20 Mg Tab 40 Mg PO TID


Fioricet (Butalbital-Acetaminophen-Caffeine) -40 Mg Cap 1-2 Cap PO Q6H PRN


Zoloft (Sertraline HCl) 50 Mg Tab 50 Mg PO DAILY


Dulcolax Stool Softener (Docusate Sodium) 100 Mg Cap 100 Mg PO HS


Ambien (Zolpidem Tartrate) 10 Mg Tab 10 Mg PO HS PRN


Valium (Diazepam) 10 Mg Tab 10 Mg PO TID


Buspirone (Buspirone HCl) 7.5 Mg Tab 30 Mg PO TID








Review of Systems


Except as stated in HPI:  all other systems reviewed are Neg





Physical Exam


Narrative


GENERAL: Well-developed cachectic, pale but in no obvious distress.


SKIN: Focused skin assessment warm/dry.


HEAD: Atraumatic. Normocephalic. 


EYES: Pupils equal and round. No scleral icterus. No injection or drainage. 


ENT: No nasal bleeding or discharge.  Mucous membranes pink and moist.


NECK: Trachea midline. No JVD. 


CARDIOVASCULAR: Regular rate and rhythm.  No murmur appreciated.


RESPIRATORY: No accessory muscle use. Clear to auscultation. Breath sounds 

equal bilaterally. 


GASTROINTESTINAL: Abdomen soft, non-tender, nondistended. Hepatic and splenic 

margins not palpable. 


MUSCULOSKELETAL: No obvious deformities. No clubbing.  No cyanosis.  No edema. 


NEUROLOGICAL: Awake and alert. No obvious cranial nerve deficits.  No motor 

tone of her lower extremities, I wonder if there is a lack of effort.  

Sensation is intact, DTRs are 2+ bilateral equal at the patella and Achilles.


PSYCHIATRIC: Appropriate mood and affect; insight and judgment normal.





Data


Data


Last Documented VS





Vital Signs








  Date Time  Temp Pulse Resp B/P (MAP) Pulse Ox O2 Delivery O2 Flow Rate FiO2


 


17 06:29  63 18 107/78 (88) 98 Room Air  








Orders





 Orders


Complete Blood Count With Diff (17 04:19)


Comprehensive Metabolic Panel (17 04:19)


Iv Access Insert/Monitor (17 04:19)


Ecg Monitoring (17 04:19)


Oximetry (17 04:19)


Sodium Chloride 0.9% Flush (Ns Flush) (17 04:30)


Mri C Spine W/O Contrast (17 )


Mri L Spine W/O Contrast (17 )


Mri T Spine W/O Contrast (17 )





Labs





Laboratory Tests








Test


  17


04:50


 


White Blood Count 2.7 TH/MM3 


 


Red Blood Count 3.59 MIL/MM3 


 


Hemoglobin 9.3 GM/DL 


 


Hematocrit 28.9 % 


 


Mean Corpuscular Volume 80.6 FL 


 


Mean Corpuscular Hemoglobin 26.0 PG 


 


Mean Corpuscular Hemoglobin


Concent 32.2 % 


 


 


Red Cell Distribution Width 21.1 % 


 


Platelet Count 138 TH/MM3 


 


Mean Platelet Volume 9.0 FL 


 


Neutrophils (%) (Auto) 35.4 % 


 


Lymphocytes (%) (Auto) 47.8 % 


 


Monocytes (%) (Auto) 12.8 % 


 


Eosinophils (%) (Auto) 3.8 % 


 


Basophils (%) (Auto) 0.2 % 


 


Neutrophils # (Auto) 1.0 TH/MM3 


 


Lymphocytes # (Auto) 1.3 TH/MM3 


 


Monocytes # (Auto) 0.4 TH/MM3 


 


Eosinophils # (Auto) 0.1 TH/MM3 


 


Basophils # (Auto) 0.0 TH/MM3 


 


CBC Comment DIFF FINAL 


 


Differential Comment  


 


Blood Urea Nitrogen 14 MG/DL 


 


Creatinine 0.90 MG/DL 


 


Random Glucose 76 MG/DL 


 


Total Protein 6.1 GM/DL 


 


Albumin 3.2 GM/DL 


 


Calcium Level 8.4 MG/DL 


 


Alkaline Phosphatase 102 U/L 


 


Aspartate Amino Transf


(AST/SGOT) 27 U/L 


 


 


Alanine Aminotransferase


(ALT/SGPT) 30 U/L 


 


 


Total Bilirubin 0.2 MG/DL 


 


Sodium Level 142 MEQ/L 


 


Potassium Level 4.4 MEQ/L 


 


Chloride Level 108 MEQ/L 


 


Carbon Dioxide Level 28.1 MEQ/L 


 


Anion Gap 6 MEQ/L 


 


Estimat Glomerular Filtration


Rate 65 ML/MIN 


 











MDM


Medical Decision Making


Medical Screen Exam Complete:  Yes


Emergency Medical Condition:  Yes


Differential Diagnosis


Poor social circumstance, electrolyte abnormality, anemia, low back injury,


Narrative Course


Patient roomed in the emergency department, fairly weak in her lower 

extremities though I do think there is a lack of effort.  No back pain on 

initial evaluation patient does appear more pale than I remember, hemoglobin is 

about her baseline however.  Electrolytes within normal limits.  On revisit the 

patient is sleeping soundly but easily aroused.  She is requesting pain 

medicine and something for anxiety and I offered Dilaudid.  She's been lectured 

multiple times on the use of narcotic pain medicine for her chronic abdominal 

pain in this ER by multiple providers.  There is no acute pain that I can see 

that needs to be managed with high-level narcotics at this time.  The patient 

is endorsing back pain on review of systems at this time of symptoms she 

previously denied.  Given her weakness and MRIs indicated has been ordered.  

The patient will be discussed with Dr. Brunson at 0700 shift change to follow-

up MRI and disposition the patient appropriately.











Kostas Jean MD 2017 05:02

## 2017-11-26 ENCOUNTER — HOSPITAL ENCOUNTER (INPATIENT)
Dept: HOSPITAL 17 - NEPC | Age: 57
LOS: 6 days | Discharge: HOME HEALTH | DRG: 641 | End: 2017-12-02
Attending: HOSPITALIST | Admitting: HOSPITALIST
Payer: COMMERCIAL

## 2017-11-26 VITALS
OXYGEN SATURATION: 7 % | HEART RATE: 82 BPM | SYSTOLIC BLOOD PRESSURE: 136 MMHG | RESPIRATION RATE: 16 BRPM | DIASTOLIC BLOOD PRESSURE: 74 MMHG

## 2017-11-26 VITALS
SYSTOLIC BLOOD PRESSURE: 121 MMHG | RESPIRATION RATE: 18 BRPM | TEMPERATURE: 98.7 F | OXYGEN SATURATION: 97 % | HEART RATE: 62 BPM | DIASTOLIC BLOOD PRESSURE: 58 MMHG

## 2017-11-26 VITALS
RESPIRATION RATE: 18 BRPM | DIASTOLIC BLOOD PRESSURE: 76 MMHG | TEMPERATURE: 97.9 F | HEART RATE: 62 BPM | SYSTOLIC BLOOD PRESSURE: 132 MMHG | OXYGEN SATURATION: 100 %

## 2017-11-26 VITALS
SYSTOLIC BLOOD PRESSURE: 147 MMHG | OXYGEN SATURATION: 100 % | HEART RATE: 74 BPM | RESPIRATION RATE: 20 BRPM | DIASTOLIC BLOOD PRESSURE: 93 MMHG

## 2017-11-26 VITALS — WEIGHT: 91.49 LBS | HEIGHT: 63 IN | BODY MASS INDEX: 16.21 KG/M2

## 2017-11-26 VITALS — OXYGEN SATURATION: 98 %

## 2017-11-26 DIAGNOSIS — K31.84: ICD-10-CM

## 2017-11-26 DIAGNOSIS — R29.6: ICD-10-CM

## 2017-11-26 DIAGNOSIS — N39.0: ICD-10-CM

## 2017-11-26 DIAGNOSIS — M25.78: ICD-10-CM

## 2017-11-26 DIAGNOSIS — G43.109: ICD-10-CM

## 2017-11-26 DIAGNOSIS — R63.4: ICD-10-CM

## 2017-11-26 DIAGNOSIS — K44.9: ICD-10-CM

## 2017-11-26 DIAGNOSIS — M48.02: ICD-10-CM

## 2017-11-26 DIAGNOSIS — M41.9: ICD-10-CM

## 2017-11-26 DIAGNOSIS — R42: ICD-10-CM

## 2017-11-26 DIAGNOSIS — R25.1: ICD-10-CM

## 2017-11-26 DIAGNOSIS — I25.2: ICD-10-CM

## 2017-11-26 DIAGNOSIS — F32.9: ICD-10-CM

## 2017-11-26 DIAGNOSIS — M51.34: ICD-10-CM

## 2017-11-26 DIAGNOSIS — Z90.5: ICD-10-CM

## 2017-11-26 DIAGNOSIS — B96.20: ICD-10-CM

## 2017-11-26 DIAGNOSIS — E43: Primary | ICD-10-CM

## 2017-11-26 DIAGNOSIS — I10: ICD-10-CM

## 2017-11-26 DIAGNOSIS — D61.818: ICD-10-CM

## 2017-11-26 DIAGNOSIS — Z90.3: ICD-10-CM

## 2017-11-26 DIAGNOSIS — M21.372: ICD-10-CM

## 2017-11-26 DIAGNOSIS — I69.344: ICD-10-CM

## 2017-11-26 DIAGNOSIS — K30: ICD-10-CM

## 2017-11-26 DIAGNOSIS — F11.20: ICD-10-CM

## 2017-11-26 DIAGNOSIS — F41.9: ICD-10-CM

## 2017-11-26 DIAGNOSIS — E53.8: ICD-10-CM

## 2017-11-26 DIAGNOSIS — G89.29: ICD-10-CM

## 2017-11-26 DIAGNOSIS — Z85.528: ICD-10-CM

## 2017-11-26 LAB
ALP SERPL-CCNC: 114 U/L (ref 45–117)
ALT SERPL-CCNC: 33 U/L (ref 10–53)
ANION GAP SERPL CALC-SCNC: 7 MEQ/L (ref 5–15)
AST SERPL-CCNC: 24 U/L (ref 15–37)
BASOPHILS # BLD AUTO: 0 TH/MM3 (ref 0–0.2)
BASOPHILS NFR BLD: 0.1 % (ref 0–2)
BILIRUB SERPL-MCNC: 0.2 MG/DL (ref 0.2–1)
BUN SERPL-MCNC: 15 MG/DL (ref 7–18)
CHLORIDE SERPL-SCNC: 105 MEQ/L (ref 98–107)
EOSINOPHIL # BLD: 0.1 TH/MM3 (ref 0–0.4)
EOSINOPHIL NFR BLD: 2.1 % (ref 0–4)
ERYTHROCYTE [DISTWIDTH] IN BLOOD BY AUTOMATED COUNT: 21.2 % (ref 11.6–17.2)
GFR SERPLBLD BASED ON 1.73 SQ M-ARVRAT: 66 ML/MIN (ref 89–?)
HCO3 BLD-SCNC: 25.2 MEQ/L (ref 21–32)
HCT VFR BLD CALC: 29.9 % (ref 35–46)
HEMO FLAGS: (no result)
LYMPHOCYTES # BLD AUTO: 1.4 TH/MM3 (ref 1–4.8)
LYMPHOCYTES NFR BLD AUTO: 38.4 % (ref 9–44)
MCH RBC QN AUTO: 26.5 PG (ref 27–34)
MCHC RBC AUTO-ENTMCNC: 32.5 % (ref 32–36)
MCV RBC AUTO: 81.6 FL (ref 80–100)
MONOCYTES NFR BLD: 9.7 % (ref 0–8)
NEUTROPHILS # BLD AUTO: 1.8 TH/MM3 (ref 1.8–7.7)
NEUTROPHILS NFR BLD AUTO: 49.7 % (ref 16–70)
PLATELET # BLD: 146 TH/MM3 (ref 150–450)
POTASSIUM SERPL-SCNC: 4.2 MEQ/L (ref 3.5–5.1)
RBC # BLD AUTO: 3.67 MIL/MM3 (ref 4–5.3)
SODIUM SERPL-SCNC: 137 MEQ/L (ref 136–145)
WBC # BLD AUTO: 3.6 TH/MM3 (ref 4–11)

## 2017-11-26 PROCEDURE — 82948 REAGENT STRIP/BLOOD GLUCOSE: CPT

## 2017-11-26 PROCEDURE — 83921 ORGANIC ACID SINGLE QUANT: CPT

## 2017-11-26 PROCEDURE — 84443 ASSAY THYROID STIM HORMONE: CPT

## 2017-11-26 PROCEDURE — 87040 BLOOD CULTURE FOR BACTERIA: CPT

## 2017-11-26 PROCEDURE — 36430 TRANSFUSION BLD/BLD COMPNT: CPT

## 2017-11-26 PROCEDURE — 82728 ASSAY OF FERRITIN: CPT

## 2017-11-26 PROCEDURE — 80048 BASIC METABOLIC PNL TOTAL CA: CPT

## 2017-11-26 PROCEDURE — 76937 US GUIDE VASCULAR ACCESS: CPT

## 2017-11-26 PROCEDURE — 87086 URINE CULTURE/COLONY COUNT: CPT

## 2017-11-26 PROCEDURE — 84439 ASSAY OF FREE THYROXINE: CPT

## 2017-11-26 PROCEDURE — 96361 HYDRATE IV INFUSION ADD-ON: CPT

## 2017-11-26 PROCEDURE — 96372 THER/PROPH/DIAG INJ SC/IM: CPT

## 2017-11-26 PROCEDURE — 96375 TX/PRO/DX INJ NEW DRUG ADDON: CPT

## 2017-11-26 PROCEDURE — 81001 URINALYSIS AUTO W/SCOPE: CPT

## 2017-11-26 PROCEDURE — 71010: CPT

## 2017-11-26 PROCEDURE — 82607 VITAMIN B-12: CPT

## 2017-11-26 PROCEDURE — 87186 SC STD MICRODIL/AGAR DIL: CPT

## 2017-11-26 PROCEDURE — 70450 CT HEAD/BRAIN W/O DYE: CPT

## 2017-11-26 PROCEDURE — P9016 RBC LEUKOCYTES REDUCED: HCPCS

## 2017-11-26 PROCEDURE — 86900 BLOOD TYPING SEROLOGIC ABO: CPT

## 2017-11-26 PROCEDURE — 87077 CULTURE AEROBIC IDENTIFY: CPT

## 2017-11-26 PROCEDURE — 85025 COMPLETE CBC W/AUTO DIFF WBC: CPT

## 2017-11-26 PROCEDURE — 95819 EEG AWAKE AND ASLEEP: CPT

## 2017-11-26 PROCEDURE — G0378 HOSPITAL OBSERVATION PER HR: HCPCS

## 2017-11-26 PROCEDURE — 86850 RBC ANTIBODY SCREEN: CPT

## 2017-11-26 PROCEDURE — 83550 IRON BINDING TEST: CPT

## 2017-11-26 PROCEDURE — 85610 PROTHROMBIN TIME: CPT

## 2017-11-26 PROCEDURE — 83540 ASSAY OF IRON: CPT

## 2017-11-26 PROCEDURE — 83605 ASSAY OF LACTIC ACID: CPT

## 2017-11-26 PROCEDURE — 86922 COMPATIBILITY TEST ANTIGLOB: CPT

## 2017-11-26 PROCEDURE — 86901 BLOOD TYPING SEROLOGIC RH(D): CPT

## 2017-11-26 PROCEDURE — 80053 COMPREHEN METABOLIC PANEL: CPT

## 2017-11-26 PROCEDURE — 82533 TOTAL CORTISOL: CPT

## 2017-11-26 PROCEDURE — 85044 MANUAL RETICULOCYTE COUNT: CPT

## 2017-11-26 PROCEDURE — 82550 ASSAY OF CK (CPK): CPT

## 2017-11-26 PROCEDURE — 84480 ASSAY TRIIODOTHYRONINE (T3): CPT

## 2017-11-26 PROCEDURE — 84484 ASSAY OF TROPONIN QUANT: CPT

## 2017-11-26 PROCEDURE — 85730 THROMBOPLASTIN TIME PARTIAL: CPT

## 2017-11-26 PROCEDURE — 84146 ASSAY OF PROLACTIN: CPT

## 2017-11-26 PROCEDURE — 83010 ASSAY OF HAPTOGLOBIN QUANT: CPT

## 2017-11-26 PROCEDURE — 83615 LACTATE (LD) (LDH) ENZYME: CPT

## 2017-11-26 PROCEDURE — 96365 THER/PROPH/DIAG IV INF INIT: CPT

## 2017-11-26 PROCEDURE — 86920 COMPATIBILITY TEST SPIN: CPT

## 2017-11-26 PROCEDURE — 70551 MRI BRAIN STEM W/O DYE: CPT

## 2017-11-26 PROCEDURE — 93005 ELECTROCARDIOGRAM TRACING: CPT

## 2017-11-26 PROCEDURE — 85384 FIBRINOGEN ACTIVITY: CPT

## 2017-11-26 PROCEDURE — 96366 THER/PROPH/DIAG IV INF ADDON: CPT

## 2017-11-26 PROCEDURE — 80307 DRUG TEST PRSMV CHEM ANLYZR: CPT

## 2017-11-26 PROCEDURE — 82747 ASSAY OF FOLIC ACID RBC: CPT

## 2017-11-26 RX ADMIN — PHENYTOIN SODIUM SCH MLS/HR: 50 INJECTION INTRAMUSCULAR; INTRAVENOUS at 23:08

## 2017-11-26 RX ADMIN — OXYCODONE HYDROCHLORIDE SCH MG: 10 TABLET, FILM COATED, EXTENDED RELEASE ORAL at 23:07

## 2017-11-26 NOTE — PD
Data


Data


Last Documented VS





Vital Signs








  Date Time  Temp Pulse Resp B/P (MAP) Pulse Ox O2 Delivery O2 Flow Rate FiO2


 


11/26/17 18:37     98 Room Air  


 


11/26/17 16:28  74 20 147/93 (111)    








Orders





 Orders


Mri Brain W/O Contrast (11/26/17 17:41)


Sepsis Workup Initiated (11/26/17 )


Complete Blood Count With Diff (11/26/17 17:41)


Comprehensive Metabolic Panel (11/26/17 17:41)


Lactic Acid Sepsis Protocol (11/26/17 17:41)


Troponin I (11/26/17 17:41)


Urinalysis - C+S If Indicated (11/26/17 17:41)


Blood Culture (11/26/17 17:41)


Blood Glucose (11/26/17 17:41)


Ecg Monitoring (11/26/17 17:41)


Iv Access Insert/Monitor (11/26/17 17:41)


Cath For Specimen (11/26/17 17:41)


Oximetry (11/26/17 17:41)


Sodium Chlor 0.9% 1000 Ml Inj (Ns 1000 M (11/26/17 19:45)





Labs





Laboratory Tests








Test


  11/26/17


18:00


 


White Blood Count 3.6 TH/MM3 


 


Red Blood Count 3.67 MIL/MM3 


 


Hemoglobin 9.7 GM/DL 


 


Hematocrit 29.9 % 


 


Mean Corpuscular Volume 81.6 FL 


 


Mean Corpuscular Hemoglobin 26.5 PG 


 


Mean Corpuscular Hemoglobin


Concent 32.5 % 


 


 


Red Cell Distribution Width 21.2 % 


 


Platelet Count 146 TH/MM3 


 


Mean Platelet Volume 9.5 FL 


 


Neutrophils (%) (Auto) 49.7 % 


 


Lymphocytes (%) (Auto) 38.4 % 


 


Monocytes (%) (Auto) 9.7 % 


 


Eosinophils (%) (Auto) 2.1 % 


 


Basophils (%) (Auto) 0.1 % 


 


Neutrophils # (Auto) 1.8 TH/MM3 


 


Lymphocytes # (Auto) 1.4 TH/MM3 


 


Monocytes # (Auto) 0.3 TH/MM3 


 


Eosinophils # (Auto) 0.1 TH/MM3 


 


Basophils # (Auto) 0.0 TH/MM3 


 


CBC Comment DIFF FINAL 


 


Differential Comment  


 


Blood Urea Nitrogen 15 MG/DL 


 


Creatinine 0.89 MG/DL 


 


Random Glucose 87 MG/DL 


 


Total Protein 6.8 GM/DL 


 


Albumin 3.3 GM/DL 


 


Calcium Level 8.4 MG/DL 


 


Alkaline Phosphatase 114 U/L 


 


Aspartate Amino Transf


(AST/SGOT) 24 U/L 


 


 


Alanine Aminotransferase


(ALT/SGPT) 33 U/L 


 


 


Total Bilirubin 0.2 MG/DL 


 


Sodium Level 137 MEQ/L 


 


Potassium Level 4.2 MEQ/L 


 


Chloride Level 105 MEQ/L 


 


Carbon Dioxide Level 25.2 MEQ/L 


 


Anion Gap 7 MEQ/L 


 


Estimat Glomerular Filtration


Rate 66 ML/MIN 


 


 


Lactic Acid Level 1.6 mmol/L 


 


Troponin I


  LESS THAN 0.02


NG/ML











[facility]


Medical Record Reviewed:  Yes


Supervised Visit with DELLA:  No


Narrative Course


During the course of the patients emergency department visit, the patients 

history, examination, and differential diagnosis were reviewed with the 

patient. The patient was placed on a cardiac monitor with oximetry and frequent 

blood pressure monitoring. The patient had  IV access obtained and blood work 

sent for analysis. The patient's case was checked out to me by Dr. Gamez.  

Please see her complete history and physical.  The patient's case was checked 

out to me at the conclusion of her shift.





The patient was initially provided normal saline a 1 L IV fluid bolus per





The patients laboratory studies were reviewed and remarkable for white count of 

3.6, hemoglobin 9.7 which is stable compared to previously, platelets 146 which 

is also stable compared to previously, monocytes 9.7.  CMP is unremarkable, 

troponin I less than 0.02, lactic acid 1.6





Radiology studies were reviewed and remarkable for an MRI of the brain showed 

no acute abnormality.





The patient's results were discussed with her.  The patient reports to me that 

she's had a three-day history of problems with her memory and generalized 

weakness.  She reports that she has not been able to ambulate without falling 

frequently.  The patient reports that her primary care physician is .  

She reports that her gastroenterologist is .  The patient did undergo 

thorough workup yesterday including MRI of the C-spine, T-spine, and L-spine.  

MRI of the C-spine showed C5-C6 mild to moderate AP canal stenosis with mild 

flattening of the cord.  No cord signal abnormality.  Alignment within normal 

limits.  T-spine showed mild kyphoscoliosis with moderate degenerative disc 

disease in the mid thoracic spine, effacing the anterior thecal sac.  No 

significant cord compression and no cord signal abnormality.  MRI of the lumbar 

spine shows no acute abnormality.





A call was then placed out to the patient's primary care physician regarding 

admission as the patient is unable to ambulate.  Physical therapy evaluation 

will be done to further evaluate and treat.





The patients results were discussed with the patient, including the plan of 

care. I explained that further testing and/ or monitoring is indicated based on 

the patients history, examination, and/ or laboratory findings. Therefore, I 

recommended admission for additional evaluation. The patient expressed 

understanding and was agreeable with this plan. The patient was admitted to the 

hospital in stable condition and sent to a bed under the care of Dr. Vega.


Physician Communication


Physician Communication


The patient's case including history, pertinent physical examination findings, 

and laboratory studies were discussed with Dr. Vega. It was agreed that the 

patient would be admitted to his service.





Diagnosis





 Primary Impression:  


 Weakness





Admitting Information


Admitting Physician Requests:  Observation


Condition:  Stable











Lilly Jaimes MD Nov 26, 2017 19:14

## 2017-11-26 NOTE — RADRPT
EXAM DATE/TIME:  11/26/2017 19:08 

 

[facility] COMPARISON:     

No previous studies available for comparison.

       

 

 

INDICATIONS :     

Inability to ambulate.

                     

 

MEDICAL HISTORY :     

Hypertension.     Kidney CA., CVA

 

SURGICAL HISTORY :     

Nephrectomy, right.     Gastrectomy.

 

ENCOUNTER:     

Subsequent

 

ACUITY:     

2 day

 

PAIN SCORE:     

0/10

 

LOCATION:       

cranial 

 

TECHNIQUE:     

Multiplanar, multisequence MRI of the brain was performed without contrast.

 

FINDINGS:     

 

CEREBRUM:     

The ventricles are normal for age.  No evidence of midline shift, mass lesion, hemorrhage or acute in
farction.  No extraaxial fluid collections are seen.  The pituitary gland and suprasellar cistern are
 normal in configuration.

 

WHITE MATTER:     

Minimal signal abnormalities are seen in the white matter.

 

POSTERIOR FOSSA:     

The cerebellum and brainstem are intact.  The 4th ventricle is midline. The cerebellopontine angle is
 unremarkable.  The cerebellar tonsils are normal in position.

 

DIFFUSION IMAGING:     

No focal areas of restricted diffusion are seen.  No evidence of acute infarction.

 

EXTRACRANIAL:     

The visualized portions of the orbits and paranasal sinuses are unremarkable.

 

CONCLUSION:     

1. No acute findings. Minimal white matter signal changes probably representing tiny areas of demyeli
nization. No recent infarct. No mass, hemorrhage or shift.

 

 

 

 Paul Cabrera MD on November 26, 2017 at 19:45           

Board Certified Radiologist.

 This report was verified electronically.

## 2017-11-26 NOTE — PD
HPI


.


Weakness


Chief Complaint:  General Weakness


Time Seen by Provider:  16:51


Travel History


International Travel<30 days:  No


Contact w/Intl Traveler<30days:  No


Traveled to known affect area:  No





History of Present Illness


HPI


This patient presents complaining with weakness and inability to walk.  She is 

speaking so softly that I can't understand anything that she says.





Patient was seen here on  for chronic abdominal pain.  She complained of 

the inability to walk at that time as well.  She had an MRI of her entire spine 

done which was negative.  She was frequently requesting narcotics.  

Arrangements were made for her to be admitted to the hospital because of her 

inability to ambulate.  However, the patient subsequently signed out AMA.  The 

patient was noted to be voluntarily moving in her bed during her entire stay.





PFSH


Past Medical History


Hx Anticoagulant Therapy:  No


Asthma:  No


Blood Disorders:  No


Anxiety:  Yes


Depression:  Yes


Heart Rhythm Problems:  No


Cancer:  Yes (KIDNEY )


Cardiovascular Problems:  Yes


High Cholesterol:  No


Chemotherapy:  No


Chest Pain:  No


Congestive Heart Failure:  No


COPD:  No


Cerebrovascular Accident:  Yes


Diabetes:  No


Diminished Hearing:  No


Endocrine:  No


Gastrointestinal Disorders:  Yes (GASTRECTOMY, CHRONIC ABDOMINAL PAIN )


GERD:  No


Genitourinary:  Yes (OCCASSIONAL UTI)


Headaches:  Yes


Hiatal Hernia:  Yes


Hypertension:  Yes


Immune Disorder:  No


Implanted Vascular Access Dvce:  No


Kidney Stones:  No


Musculoskeletal:  No


Neurologic:  Yes (CVA )


Psychiatric:  No


Reproductive:  No


Respiratory:  No


Immunizations Current:  Yes


Migraines:  Yes


Myocardial Infarction:  Yes ()


Pneumonia:  Yes


Radiation Therapy:  No


Renal Failure:  No


Seizures:  Yes


Sleep Apnea:  No


Thyroid Disease:  No


Ulcer:  No


Tetanus Vaccination:  < 5 Years


Influenza Vaccination:  Yes


Menopausal:  Yes


:  1


Para:  1


Miscarriage:  0


:  0


Ectopic Pregnancy:  No


Ovarian Cysts:  No


Dilation and Curettage (D&C):  Yes


Tubal Ligation:  Yes





Past Surgical History


Abdominal Surgery:  Yes (total gastrectomy w/pouch , hernia repair)


Appendectomy:  Yes


Cardiac Surgery:  Yes (REPAIRED HOLE IN HEART)


Cholecystectomy:  Yes


Genitourinary Surgery:  Yes (RIGHT NEPHRECTOMY)


Gynecologic Surgery:  Yes


Hysterectomy:  No


Thoracic Surgery:  No


Tonsillectomy:  Yes


Other Surgery:  Yes (GASTRECTOMY, RIGHT KIDNEY REMOVAL, HERNIA REPAIR, 

GALLBLADDER, TONSILS)





Social History


Alcohol Use:  No (PT DENIES)


Tobacco Use:  No (PT DENIES)


Substance Use:  No (PT DENIES)





Allergies-Medications


(Allergen,Severity, Reaction):  


Coded Allergies:  


     Sulfa (Sulfonamide Antibiotics) (Verified  Allergy, Severe, Rash, 17)


     gadobenic acid (Verified  Allergy, Severe, Anaphylaxis, 17)


     gadodiamide (Verified  Allergy, Severe, Anaphylaxis, 17)


     gadoteridol (Verified  Allergy, Severe, Anaphylaxis, 17)


     ketorolac (Verified  Allergy, Severe, Shortness of Breath, 17)


     metoclopramide (Verified  Allergy, Severe, Shortness of Breath, 17)


     morphine (Verified  Allergy, Severe, Hives, 17)


     ondansetron (Verified  Allergy, Severe, Shortness of Breath, 17)


     penicillin G (Verified  Allergy, Severe, Rash, 17)


     prochlorperazine (Verified  Allergy, Severe, Shortness of Breath, 17)


     promethazine (Verified  Allergy, Severe, Shortness of Breath, 17)


     shellfish derived (Verified  Allergy, Severe, Anaphylaxis, 17)


     trimethobenzamide (Verified  Allergy, Severe, Shortness of Breath, 17

)


Reported Meds & Prescriptions





Reported Meds & Active Scripts


Active


Reported


Oxycontin (Oxycodone HCl) 10 Mg Tab 10 Mg PO Q8HR


Vitamin E (Vitamin E Mixed) 400 Unit Tablet 400 Units PO DAILY


Vitamin D-1000 (Cholecalciferol) 1,000 Unit Tab 1,000 Units PO DAILY


Magnesium Oxide 400 Mg Tab 400 Mg PO BID


Calcitrate (Calcium Citrate-Vitamin D) 315-250 Mg-Unit Tab 1 Tab PO QID


Dicyclomine (Dicyclomine HCl) 20 Mg Tab 40 Mg PO TID


Fioricet (Butalbital-Acetaminophen-Caffeine) -40 Mg Cap 1-2 Cap PO Q6H PRN


Zoloft (Sertraline HCl) 50 Mg Tab 50 Mg PO DAILY


Dulcolax Stool Softener (Docusate Sodium) 100 Mg Cap 100 Mg PO HS


Ambien (Zolpidem Tartrate) 10 Mg Tab 10 Mg PO HS PRN


Valium (Diazepam) 10 Mg Tab 10 Mg PO TID


Buspirone (Buspirone HCl) 7.5 Mg Tab 30 Mg PO TID








Review of Systems


ROS Limitations:  Poor Historian





Physical Exam


Narrative


GENERAL: Patient is lying on the bed in the left lateral decubitus position.  

Very thin and chronically ill-appearing.


SKIN:  warm/dry.  Good turgor.


HEAD: Normocephalic.  Atraumatic.


EYES: Pupils equal and round. No scleral icterus. No injection or drainage. 


ENT: No nasal bleeding or discharge.  Mucous membranes pink and moist.


NECK: Trachea midline.  Full range of motion without pain.. 


CARDIOVASCULAR: Regular rate and rhythm.  


RESPIRATORY: No accessory muscle use. Clear to auscultation. Breath sounds 

equal bilaterally. 


GASTROINTESTINAL: Abdomen soft.  Nontender.  Bowel sounds present.  

Nondistended.


:


MUSCULOSKELETAL: Diffuse muscle atrophy.


NEUROLOGICAL: Awake and alert. No obvious cranial nerve deficits.  


PSYCHIATRIC: Difficult to evaluate.





Data


Data


Last Documented VS





Vital Signs








  Date Time  Temp Pulse Resp B/P (MAP) Pulse Ox O2 Delivery O2 Flow Rate FiO2


 


17 18:37     98 Room Air  


 


17 16:28  74 20 147/93 (111)    








Orders





 Orders


Mri Brain W/O Contrast (17 17:41)


Sepsis Workup Initiated (17 )


Complete Blood Count With Diff (17 17:41)


Comprehensive Metabolic Panel (17 17:41)


Lactic Acid Sepsis Protocol (17 17:41)


Troponin I (17 17:41)


Urinalysis - C+S If Indicated (17 17:41)


Blood Culture (17 17:41)


Blood Glucose (17 17:41)


Ecg Monitoring (17 17:41)


Iv Access Insert/Monitor (17 17:41)


Cath For Specimen (17 17:41)


Oximetry (17 17:41)





Labs





Laboratory Tests








Test


  17


18:00


 


White Blood Count 3.6 TH/MM3 


 


Red Blood Count 3.67 MIL/MM3 


 


Hemoglobin 9.7 GM/DL 


 


Hematocrit 29.9 % 


 


Mean Corpuscular Volume 81.6 FL 


 


Mean Corpuscular Hemoglobin 26.5 PG 


 


Mean Corpuscular Hemoglobin


Concent 32.5 % 


 


 


Red Cell Distribution Width 21.2 % 


 


Platelet Count 146 TH/MM3 


 


Mean Platelet Volume 9.5 FL 


 


Neutrophils (%) (Auto) 49.7 % 


 


Lymphocytes (%) (Auto) 38.4 % 


 


Monocytes (%) (Auto) 9.7 % 


 


Eosinophils (%) (Auto) 2.1 % 


 


Basophils (%) (Auto) 0.1 % 


 


Neutrophils # (Auto) 1.8 TH/MM3 


 


Lymphocytes # (Auto) 1.4 TH/MM3 


 


Monocytes # (Auto) 0.3 TH/MM3 


 


Eosinophils # (Auto) 0.1 TH/MM3 


 


Basophils # (Auto) 0.0 TH/MM3 


 


CBC Comment DIFF FINAL 


 


Differential Comment  


 


Lactic Acid Level 1.6 mmol/L 











MDM


Medical Decision Making


Medical Screen Exam Complete:  Yes


Emergency Medical Condition:  Yes


Medical Record Reviewed:  Yes (multiple visits to the emergency department with 

chronic pain and weakness.  She has had a previous gastrectomy and is on TPN.)


Differential Diagnosis


Differential diagnosis of weakness includes but is not limited to infection, CVA

, electrolyte disturbance, renal failure, hypoglycemia, UTI, ACS, acute blood 

loss


Narrative Course


Patient presents to us via [facility] complaining with generalized weakness.  The 

patient was thoroughly evaluated here in 48 hours ago for same.  Her evaluation 

will not be repeated today.  I will observe her here for a while.





The patient continued delay in the left lateral decubitus position for quite 

some time.  I did not observe any spontaneous movement of her lower 

extremities.  I have subsequently ordered an MRI of her brain.  She had her 

spine MRIed less than 48 hours ago.  I have also ordered some basic labs.





The nurse was initiating this workup when the patient requested pain 

medication.  The patient informed the nurse that she would sign out AMA if she 

did not get any pain medication.  This is the same scenario as 2 days ago.





The patient has agreed to stay.  The nurse has observed spontaneous movement 

while drawing her blood and attempting IV access.  She was not able to obtain 

IV access.  She was able to obtain blood for routine blood work and one set of 

blood cultures.





The patient is being turned over to the oncoming provider pending her workup.





Diagnosis





 Primary Impression:  


 Weakness


Condition:  Stable











Marisela Chadwick MD 2017 17:07

## 2017-11-26 NOTE — HHI.HP
__________________________________________________





HPI


Service


[facility]


Primary Care Physician


Unknown


Admission Diagnosis





Generalized weakness with inability to ambulate


Diagnoses:  


(1) Generalized weakness


Diagnosis:  Principal





(2) Gait instability


Diagnosis:  Principal





(3) Narcotic dependence


Diagnosis:  Principal





(4) On total parenteral nutrition (TPN)


Diagnosis:  Principal





Travel History


International Travel<30 Days:  No


Contact w/Intl Traveler <30 Da:  No


Traveled to Known Affected Are:  No


History of Present Illness


This is a 56-year-old female with PMH of Anxiety, Depression, Renal Cell CA s/p 

Nephrectomy, h/o Gastrectomy on TPN, Narcotic Dependence and h/o CVA who 

presented to the ER w/ complaints of generalized weakness and recurrent falls.  

Seen in ER on 17 for similar complaints, per ER notes, pt persistently 

requesting IV Narcotics and c/o inability to ambulate, MRI C/T/L Spine w/ mild-

mod flattening of cord C5-6, otherwise negative.  Pt L CBC at baseline.  EFT 

AMA at that time.  Returns now w/ ongoing complaints.  States she doesn't 

recall leaving AMA yesterday.  Pt poor historian.  On arrival, /93, HR 74

, O2 sat 100% on RA.  Chemistry also baseline.  Troponin negative.  MRI Brain 

tonight w/ no acute findings.





Review of Systems


Except as stated in HPI:  all other systems reviewed are Neg


ROS: 14 point review of systems otherwise negative.





Past Family Social History


Past Medical History


PMH:  Anxiety, Depression, Renal Cell CA s/p Nephrectomy, h/o Gastrectomy on TPN

, Narcotic Dependence and h/o CVA


Past Surgical History


PAST SURGICAL HISTORY:  Total Gastrectomy, Hernia Repair, Appendectomy, Cardiac 

Surgery, Cholecystectomy, Right Nephrectomy, Tonsillectomy


Allergies:  


Coded Allergies:  


     Sulfa (Sulfonamide Antibiotics) (Verified  Allergy, Severe, Rash, 17)


     gadobenic acid (Verified  Allergy, Severe, Anaphylaxis, 17)


     gadodiamide (Verified  Allergy, Severe, Anaphylaxis, 17)


     gadoteridol (Verified  Allergy, Severe, Anaphylaxis, 17)


     ketorolac (Verified  Allergy, Severe, Shortness of Breath, 17)


     metoclopramide (Verified  Allergy, Severe, Shortness of Breath, 17)


     morphine (Verified  Allergy, Severe, Hives, 17)


     ondansetron (Verified  Allergy, Severe, Shortness of Breath, 17)


     penicillin G (Verified  Allergy, Severe, Rash, 17)


     prochlorperazine (Verified  Allergy, Severe, Shortness of Breath, 17)


     promethazine (Verified  Allergy, Severe, Shortness of Breath, 17)


     shellfish derived (Verified  Allergy, Severe, Anaphylaxis, 17)


     trimethobenzamide (Verified  Allergy, Severe, Shortness of Breath, 17

)


Family History


PAST FAMILY HISTORY:  Reviewed.  No h/o DM or CAD


Social History


PAST SOCIAL HISTORY:  Negative for alcohol, tobacco or drugs.





Physical Exam


Vital Signs





Vital Signs








  Date Time  Temp Pulse Resp B/P (MAP) Pulse Ox O2 Delivery O2 Flow Rate FiO2


 


17 18:37     98 Room Air  


 


17 16:35     100 Room Air  


 


17 16:28  74 20 147/93 (111) 100   








Physical Exam


PE:


GENERAL: Middle-aged, thin, chronically ill appearing female in no acute 

distress, very soft spoken, difficult to understand.


HEENT: PERRLA, EOMI. No scleral icterus or conjunctival pallor. No lid lag or 

facial droop.  


CARDIOVASCULAR: Regular rate and rhythm.  No obvious murmurs to auscultation. 

No chest tenderness to palpation. 


RESPIRATORY: No obvious rhonchi or wheezing. Clear to auscultation. Breath 

sounds equal bilaterally. 


GASTROINTESTINAL: Abdomen soft, non-tender, nondistended. BS normal. 


MUSCULOSKELETAL: Extremities without clubbing, cyanosis, or edema. No obvious 

deformities. 


NEUROLOGICAL: Awake, alert and oriented x4. No focal neurologic deficits. 

Moving both upper and lower extremities spontaneously.


Laboratory





Laboratory Tests








Test


  17


18:00


 


White Blood Count 3.6 


 


Red Blood Count 3.67 


 


Hemoglobin 9.7 


 


Hematocrit 29.9 


 


Mean Corpuscular Volume 81.6 


 


Mean Corpuscular Hemoglobin 26.5 


 


Mean Corpuscular Hemoglobin


Concent 32.5 


 


 


Red Cell Distribution Width 21.2 


 


Platelet Count 146 


 


Mean Platelet Volume 9.5 


 


Neutrophils (%) (Auto) 49.7 


 


Lymphocytes (%) (Auto) 38.4 


 


Monocytes (%) (Auto) 9.7 


 


Eosinophils (%) (Auto) 2.1 


 


Basophils (%) (Auto) 0.1 


 


Neutrophils # (Auto) 1.8 


 


Lymphocytes # (Auto) 1.4 


 


Monocytes # (Auto) 0.3 


 


Eosinophils # (Auto) 0.1 


 


Basophils # (Auto) 0.0 


 


CBC Comment DIFF FINAL 


 


Differential Comment  


 


Blood Urea Nitrogen 15 


 


Creatinine 0.89 


 


Random Glucose 87 


 


Total Protein 6.8 


 


Albumin 3.3 


 


Calcium Level 8.4 


 


Alkaline Phosphatase 114 


 


Aspartate Amino Transf


(AST/SGOT) 24 


 


 


Alanine Aminotransferase


(ALT/SGPT) 33 


 


 


Total Bilirubin 0.2 


 


Sodium Level 137 


 


Potassium Level 4.2 


 


Chloride Level 105 


 


Carbon Dioxide Level 25.2 


 


Anion Gap 7 


 


Estimat Glomerular Filtration


Rate 66 


 


 


Lactic Acid Level 1.6 


 


Troponin I LESS THAN 0.02 














 Date/Time


Source Procedure


Growth Status


 


 


 17 20:00


Blood Peripheral Aerobic Blood Culture


Pending Received


 


 17 20:00


Blood Peripheral Anaerobic Blood Culture


Pending Received








Result Diagram:  


17 1800                                                                  

              17 1800








Caprini VTE Risk Assessment


Caprini VTE Risk Assessment:  No/Low Risk (score <= 1)


Caprini Risk Assessment Model











 Point Value = 1          Point Value = 2  Point Value = 3  Point Value = 5


 


Age 41-60


Minor surgery


BMI > 25 kg/m2


Swollen legs


Varicose veins


Pregnancy or postpartum


History of unexplained or recurrent


   spontaneous 


Oral contraceptives or hormone


   replacement


Sepsis (< 1 month)


Serious lung disease, including


   pneumonia (< 1 month)


Abnormal pulmonary function


Acute myocardial infarction


Congestive heart failure (< 1 month)


History of inflammatory bowel disease


Medical patient at bed rest Age 61-74


Arthroscopic surgery


Major open surgery (> 45 min)


Laparoscopic surgery (> 45 min)


Malignancy


Confined to bed (> 72 hours)


Immobilizing plaster cast


Central venous access Age >= 75


History of VTE


Family history of VTE


Factor V Leiden


Prothrombin 90203H


Lupus anticoagulant


Anticardiolipin antibodies


Elevated serum homocysteine


Heparin-induced thrombocytopenia


Other congenital or acquired


   thrombophilia Stroke (< 1 month)


Elective arthroplasty


Hip, pelvis, or leg fracture


Acute spinal cord injury (< 1 month)








Prophylaxis Regimen











   Total Risk


Factor Score Risk Level Prophylaxis Regimen


 


0-1      Low Early ambulation


 


2 Moderate Order ONE of the following:


*Sequential Compression Device (SCD)


*Heparin 5000 units SQ BID


 


3-4 Higher Order ONE of the following medications:


*Heparin 5000 units SQ TID


*Enoxaparin/Lovenox 40 mg SQ daily (WT < 150 kg, CrCl > 30 mL/min)


*Enoxaparin/Lovenox 30 mg SQ daily (WT < 150 kg, CrCl > 10-29 mL/min)


*Enoxaparin/Lovenox 30 mg SQ BID (WT < 150 kg, CrCl > 30 mL/min)


AND/OR


*Sequential Compression Device (SCD)


 


5 or more Highest Order ONE of the following medications:


*Heparin 5000 units SQ TID (Preferred with Epidurals)


*Enoxaparin/Lovenox 40 mg SQ daily (WT < 150 kg, CrCl > 30 mL/min)


*Enoxaparin/Lovenox 30 mg SQ daily (WT < 150 kg, CrCl > 10-29 mL/min)


*Enoxaparin/Lovenox 30 mg SQ BID (WT < 150 kg, CrCl > 30 mL/min)


AND


*Sequential Compression Device (SCD)











Assessment and Plan


Problem List:  


(1) Generalized weakness


ICD Code:  R53.1 - Weakness


(2) Gait instability


ICD Code:  R26.81 - Unsteadiness on feet


(3) Narcotic dependence


ICD Code:  F11.20 - Opioid dependence, uncomplicated


(4) On total parenteral nutrition (TPN)


ICD Code:  Z78.9 - Other specified health status


Assessment and Plan


A/P:


1.  Generalized Weakness:  c/o ongoing generalized weakness, likely 

multifactorial-malnutrition and polypharmacy.  Seen in ER on 17 for same, 

MRI C/T/L Spine w/ mild-moderate canal stenosis C5-6, otherwise normal, pt LEFT 

AMA at that time, however does not recall doing so.  MRI Brain tonight w/ no 

acute findings, images reviewed by me.  PT for eval/tx.  


2.  Gait Instability:  reports inability to ambulate, MRI Spine essentially 

unremarkable as above.  PT for eval/tx.  


3.  Narcotic Dependence:  On multiple narcotics in addition to anxiolytics, 

repeatedly requesting pain medication in ER both last night and this evening, 

will hold Fioricet/Valium/Ambien for now.  No further narcotics.  


4.  On TPN:  h/o Gastrectomy on TPN, check CXR for line placement prior to 

restarting.  Consult Dietary.


5.  DVT Prophylaxis:  SCD/Teds. 


6.  Social work for d/c planning as needed. 


7.  Case discussed w/ ER physician at length.











Erica Voss MD 2017 21:30

## 2017-11-26 NOTE — RADRPT
EXAM DATE/TIME:  11/26/2017 22:25 

 

[facility] COMPARISON:     

CHEST SINGLE AP, August 15, 2017, 13:18.

 

                     

INDICATIONS :     

Short of breath.

                     

 

MEDICAL HISTORY :            

Myocardial infarction.   

 

SURGICAL HISTORY :        

kidney surgery and had a blood clot that went through a hole in her heart.

 

ENCOUNTER:     

Subsequent                                        

 

ACUITY:     

1 day      

 

PAIN SCORE:     

0/10

 

LOCATION:     

Bilateral  chest

 

FINDINGS:     

A single view of the chest demonstrates the lungs to be symmetrically aerated without evidence of mas
s, infiltrate or effusion.  The cardiomediastinal contours are unremarkable.  Osseous structures are 
intact.

 

CONCLUSION:     

1. Minimal basilar atelectasis. No effusion or pneumothorax.

 

 

 

 Paul Cabrera MD on November 26, 2017 at 22:50           

Board Certified Radiologist.

 This report was verified electronically.

## 2017-11-27 VITALS
OXYGEN SATURATION: 97 % | TEMPERATURE: 98.7 F | DIASTOLIC BLOOD PRESSURE: 78 MMHG | RESPIRATION RATE: 18 BRPM | HEART RATE: 68 BPM | SYSTOLIC BLOOD PRESSURE: 122 MMHG

## 2017-11-27 VITALS
DIASTOLIC BLOOD PRESSURE: 73 MMHG | HEART RATE: 69 BPM | SYSTOLIC BLOOD PRESSURE: 118 MMHG | RESPIRATION RATE: 18 BRPM | OXYGEN SATURATION: 97 % | TEMPERATURE: 98.5 F

## 2017-11-27 VITALS
TEMPERATURE: 98.4 F | RESPIRATION RATE: 20 BRPM | HEART RATE: 77 BPM | SYSTOLIC BLOOD PRESSURE: 127 MMHG | DIASTOLIC BLOOD PRESSURE: 78 MMHG | OXYGEN SATURATION: 97 %

## 2017-11-27 VITALS
SYSTOLIC BLOOD PRESSURE: 155 MMHG | TEMPERATURE: 98.2 F | OXYGEN SATURATION: 100 % | RESPIRATION RATE: 20 BRPM | HEART RATE: 63 BPM | DIASTOLIC BLOOD PRESSURE: 87 MMHG

## 2017-11-27 VITALS
TEMPERATURE: 98.6 F | DIASTOLIC BLOOD PRESSURE: 69 MMHG | HEART RATE: 88 BPM | OXYGEN SATURATION: 100 % | SYSTOLIC BLOOD PRESSURE: 119 MMHG | RESPIRATION RATE: 20 BRPM

## 2017-11-27 LAB
ALP SERPL-CCNC: 99 U/L (ref 45–117)
ALT SERPL-CCNC: 31 U/L (ref 10–53)
ANION GAP SERPL CALC-SCNC: 9 MEQ/L (ref 5–15)
AST SERPL-CCNC: 26 U/L (ref 15–37)
BACTERIA #/AREA URNS HPF: (no result) /HPF
BASOPHILS # BLD AUTO: 0 TH/MM3 (ref 0–0.2)
BASOPHILS NFR BLD: 0.1 % (ref 0–2)
BILIRUB SERPL-MCNC: 0.1 MG/DL (ref 0.2–1)
BUN SERPL-MCNC: 10 MG/DL (ref 7–18)
CHLORIDE SERPL-SCNC: 108 MEQ/L (ref 98–107)
COLOR UR: (no result)
COMMENT (UR): (no result)
CULTURE IF INDICATED: (no result)
EOSINOPHIL # BLD: 0.1 TH/MM3 (ref 0–0.4)
EOSINOPHIL NFR BLD: 3.3 % (ref 0–4)
ERYTHROCYTE [DISTWIDTH] IN BLOOD BY AUTOMATED COUNT: 20.6 % (ref 11.6–17.2)
GFR SERPLBLD BASED ON 1.73 SQ M-ARVRAT: 64 ML/MIN (ref 89–?)
GLUCOSE UR STRIP-MCNC: (no result) MG/DL
HCO3 BLD-SCNC: 25 MEQ/L (ref 21–32)
HCT VFR BLD CALC: 29.9 % (ref 35–46)
HEMO FLAGS: (no result)
HGB UR QL STRIP: (no result)
KETONES UR STRIP-MCNC: (no result) MG/DL
LYMPHOCYTES # BLD AUTO: 1.3 TH/MM3 (ref 1–4.8)
LYMPHOCYTES NFR BLD AUTO: 42.9 % (ref 9–44)
MCH RBC QN AUTO: 26.1 PG (ref 27–34)
MCHC RBC AUTO-ENTMCNC: 32.3 % (ref 32–36)
MCV RBC AUTO: 80.7 FL (ref 80–100)
MONOCYTES NFR BLD: 10.7 % (ref 0–8)
NEUTROPHILS # BLD AUTO: 1.3 TH/MM3 (ref 1.8–7.7)
NEUTROPHILS NFR BLD AUTO: 43 % (ref 16–70)
NITRITE UR QL STRIP: (no result)
PLATELET # BLD: 133 TH/MM3 (ref 150–450)
POTASSIUM SERPL-SCNC: 3.4 MEQ/L (ref 3.5–5.1)
RBC # BLD AUTO: 3.71 MIL/MM3 (ref 4–5.3)
SODIUM SERPL-SCNC: 142 MEQ/L (ref 136–145)
SP GR UR STRIP: 1.02 (ref 1–1.03)
SQUAMOUS #/AREA URNS HPF: 4 /HPF (ref 0–5)
T4 FREE SERPL-MCNC: 0.75 NG/DL (ref 0.76–1.46)
TRANS CELLS #/AREA URNS HPF: 1 /HPF
WBC # BLD AUTO: 3 TH/MM3 (ref 4–11)

## 2017-11-27 RX ADMIN — SERTRALINE HYDROCHLORIDE SCH MG: 50 TABLET, FILM COATED ORAL at 08:32

## 2017-11-27 RX ADMIN — Medication SCH ML: at 20:03

## 2017-11-27 RX ADMIN — OXYCODONE HYDROCHLORIDE SCH MG: 10 TABLET, FILM COATED, EXTENDED RELEASE ORAL at 22:43

## 2017-11-27 RX ADMIN — PHENYTOIN SODIUM SCH MLS/HR: 50 INJECTION INTRAMUSCULAR; INTRAVENOUS at 07:25

## 2017-11-27 RX ADMIN — PHENYTOIN SODIUM SCH MLS/HR: 50 INJECTION INTRAMUSCULAR; INTRAVENOUS at 17:25

## 2017-11-27 RX ADMIN — MAGNESIUM OXIDE TAB 400 MG (241.3 MG ELEMENTAL MG) SCH MG: 400 (241.3 MG) TAB at 08:33

## 2017-11-27 RX ADMIN — DICYCLOMINE HYDROCHLORIDE SCH MG: 20 TABLET ORAL at 18:08

## 2017-11-27 RX ADMIN — DICYCLOMINE HYDROCHLORIDE SCH MG: 20 TABLET ORAL at 12:11

## 2017-11-27 RX ADMIN — OXYCODONE HYDROCHLORIDE SCH MG: 10 TABLET, FILM COATED, EXTENDED RELEASE ORAL at 13:57

## 2017-11-27 RX ADMIN — DICYCLOMINE HYDROCHLORIDE SCH MG: 20 TABLET ORAL at 08:32

## 2017-11-27 RX ADMIN — STANDARDIZED SENNA CONCENTRATE AND DOCUSATE SODIUM SCH TAB: 8.6; 5 TABLET, FILM COATED ORAL at 22:43

## 2017-11-27 RX ADMIN — Medication SCH ML: at 08:33

## 2017-11-27 RX ADMIN — OXYCODONE HYDROCHLORIDE SCH MG: 10 TABLET, FILM COATED, EXTENDED RELEASE ORAL at 06:01

## 2017-11-27 RX ADMIN — MAGNESIUM OXIDE TAB 400 MG (241.3 MG ELEMENTAL MG) SCH MG: 400 (241.3 MG) TAB at 20:03

## 2017-11-27 RX ADMIN — STANDARDIZED SENNA CONCENTRATE AND DOCUSATE SODIUM SCH TAB: 8.6; 5 TABLET, FILM COATED ORAL at 08:32

## 2017-11-27 NOTE — MB
cc:

LYDIA HARVEY

****

 

 

DATE OF CONSULTATION

11/27/2017

 

DATE OF BIRTH

1960

 

REASON FOR GI CONSULTATION

Evaluation of anorexia, history of weight loss.

 

HISTORY OF PRESENT ILLNESS

The patient is known to us. She has a long history of chronic abdominal

discomfort.  She has had multiple surgeries including renal cell CA, previous

nephrectomy, history of gastrectomy,  narcotic use and dependence, history of

previous CVA.  The patient has had several admissions to this institution.  She

has been evaluated by the undersigned with upper endoscopy which was benign.

She is status post previous gastrectomy.  She has had lower GI evaluation by

Dr. Olivera's group.  She has had a previous imaging studies and evaluation of

her chronic abdominal pain, weight loss and poor GI motility.  She states she

is moving her bowels.  Her appetite has been chronically suppressed.  She has

depression and anxiety as well.  Some of her GI dysmotility maybe related to

her narcotic use.  She presented to the emergency room with inability to

ambulate, feeling unsteady with a vertigo like effect.  Her mentation also has

deteriorated. She has been forgetful and confused.  She is undergoing a

neurological workup at this time with imaging studies, etc.  As previously

stated she has had a previous history of weight loss, anorexia. Her weight is

currently at 88 pounds. To my knowledge when we had last seen her she was

around 90 pounds and actually she was given a course of TPN via PICC line

because of her decreased weight, BMI and malnutrition.  She did increase at

least 10 pounds with TPN up to 90 pounds.  She presented to the emergency room

the day before yesterday and today for similar complaints. MRI of brain and did

not reveal any acute findings.

 

PAST MEDICAL HISTORY

Her past history includes:

1. Anxiety.

2. Depression.

3. Renal cell cancer.

4. Previous gastrectomy.

5. Appendectomy.

 

ALLERGIES

THERE ARE MULTIPLE ALLERGIES INCLUDING MORPHINE, REGLAN, ONDANSETRON,

PENICILLIN, PROCARBAZINE, PROMETHAZINE, SULFA, KETOROLAC, TRIMETHOBENZAMIDE.

 

FAMILY HISTORY

Noncontributory.

 

SOCIAL HISTORY

Denies smoking or alcohol or illicit drug use.

 

REVIEW OF SYSTEMS

A 12-point review of systems as stated the HPI.

 

PHYSICAL EXAMINATION

GENERAL: Thin, chronically ill-appearing female, alert in no acute distress.

VITAL SIGNS: Stable.  She is afebrile.

HEENT:  Exam normocephalic.  Sclerae anicteric.  Oral mucosa dry.  Poor

dentition is noted.

NECK: Supple.

CARDIOVASCULAR:  S1-S2.

LUNGS: Chest is clear.

ABDOMEN: The abdomen soft, nontender.  Multiple surgical scars are noted.

Bowel sounds are present.  No masses.

EXTREMITIES: Without clubbing, cyanosis or edema.  Muscle wasting is noted.

 

IMPRESSION

A 56-year-old female with ongoing neurological complaints with  confusion,

difficulty with mentation, with vertigo like symptoms, etc. Currently

undergoing neurologic evaluation.  The patient has had anorexia and weight

loss.  She has chronic abdominal pain.  She has had multiple examinations and

studies in evaluation of her problems from a GI standpoint these seem to be

about the same. Her weight is holding at 88 pounds. She has had upper and lower

endoscopy.  She has had a motility study of the GI tract which showed poor

gastric motility in the past.

 

PLAN

Would try to optimize her nutrition. We could consider Megace therapy to

increase her p.o. intake.  Ultimately if she deteriorates further, repeat TPN

could be considered but would hold off for now.  I have discussed with the

patient.  It is noted the patient's TSH is quite low and a cortisol level was

low, this may be addressed by her internist or the general medical physician as

well.  We will be glad to follow the patient as an outpatient after she is

discharged.  I see no immediate need for any endoscopic workup at this time.

 

Thank you kindly for this consultation.

 

 

 

 

                              _________________________________

                              MD ANGELICA Jose/DHARA

D:  11/27/2017/5:41 PM

T:  11/27/2017/6:17 PM

Visit #:  [id number]

Job #:  [id number]

## 2017-11-28 VITALS
OXYGEN SATURATION: 98 % | RESPIRATION RATE: 18 BRPM | HEART RATE: 77 BPM | TEMPERATURE: 97.6 F | SYSTOLIC BLOOD PRESSURE: 130 MMHG | DIASTOLIC BLOOD PRESSURE: 80 MMHG

## 2017-11-28 VITALS
HEART RATE: 78 BPM | OXYGEN SATURATION: 97 % | RESPIRATION RATE: 20 BRPM | DIASTOLIC BLOOD PRESSURE: 75 MMHG | TEMPERATURE: 98 F | SYSTOLIC BLOOD PRESSURE: 132 MMHG

## 2017-11-28 VITALS
OXYGEN SATURATION: 95 % | SYSTOLIC BLOOD PRESSURE: 124 MMHG | HEART RATE: 84 BPM | TEMPERATURE: 97.9 F | DIASTOLIC BLOOD PRESSURE: 71 MMHG | RESPIRATION RATE: 18 BRPM

## 2017-11-28 VITALS
SYSTOLIC BLOOD PRESSURE: 120 MMHG | RESPIRATION RATE: 18 BRPM | HEART RATE: 69 BPM | OXYGEN SATURATION: 96 % | DIASTOLIC BLOOD PRESSURE: 75 MMHG | TEMPERATURE: 98.2 F

## 2017-11-28 VITALS
DIASTOLIC BLOOD PRESSURE: 78 MMHG | OXYGEN SATURATION: 98 % | TEMPERATURE: 98.2 F | SYSTOLIC BLOOD PRESSURE: 120 MMHG | HEART RATE: 70 BPM | RESPIRATION RATE: 19 BRPM

## 2017-11-28 VITALS
HEART RATE: 75 BPM | TEMPERATURE: 98 F | SYSTOLIC BLOOD PRESSURE: 123 MMHG | DIASTOLIC BLOOD PRESSURE: 93 MMHG | RESPIRATION RATE: 20 BRPM | OXYGEN SATURATION: 97 %

## 2017-11-28 LAB
ALP SERPL-CCNC: 105 U/L (ref 45–117)
ALT SERPL-CCNC: 29 U/L (ref 10–53)
ANION GAP SERPL CALC-SCNC: 5 MEQ/L (ref 5–15)
AST SERPL-CCNC: 26 U/L (ref 15–37)
BASOPHILS # BLD AUTO: 0 TH/MM3 (ref 0–0.2)
BASOPHILS NFR BLD: 0.1 % (ref 0–2)
BILIRUB SERPL-MCNC: 0.1 MG/DL (ref 0.2–1)
BUN SERPL-MCNC: 11 MG/DL (ref 7–18)
CHLORIDE SERPL-SCNC: 107 MEQ/L (ref 98–107)
EOSINOPHIL # BLD: 0.1 TH/MM3 (ref 0–0.4)
EOSINOPHIL NFR BLD: 3.6 % (ref 0–4)
ERYTHROCYTE [DISTWIDTH] IN BLOOD BY AUTOMATED COUNT: 21 % (ref 11.6–17.2)
GFR SERPLBLD BASED ON 1.73 SQ M-ARVRAT: 85 ML/MIN (ref 89–?)
HCO3 BLD-SCNC: 24.7 MEQ/L (ref 21–32)
HCT VFR BLD CALC: 31.5 % (ref 35–46)
HEMO FLAGS: (no result)
LYMPHOCYTES # BLD AUTO: 1.7 TH/MM3 (ref 1–4.8)
LYMPHOCYTES NFR BLD AUTO: 44.1 % (ref 9–44)
MCH RBC QN AUTO: 26.1 PG (ref 27–34)
MCHC RBC AUTO-ENTMCNC: 32 % (ref 32–36)
MCV RBC AUTO: 81.6 FL (ref 80–100)
MONOCYTES NFR BLD: 12.3 % (ref 0–8)
NEUTROPHILS # BLD AUTO: 1.6 TH/MM3 (ref 1.8–7.7)
NEUTROPHILS NFR BLD AUTO: 39.9 % (ref 16–70)
PLATELET # BLD: 134 TH/MM3 (ref 150–450)
POTASSIUM SERPL-SCNC: 4.2 MEQ/L (ref 3.5–5.1)
RBC # BLD AUTO: 3.86 MIL/MM3 (ref 4–5.3)
SODIUM SERPL-SCNC: 137 MEQ/L (ref 136–145)
WBC # BLD AUTO: 3.9 TH/MM3 (ref 4–11)

## 2017-11-28 RX ADMIN — OXYCODONE HYDROCHLORIDE SCH MG: 10 TABLET, FILM COATED, EXTENDED RELEASE ORAL at 05:50

## 2017-11-28 RX ADMIN — CYANOCOBALAMIN TAB 1000 MCG SCH MCG: 1000 TAB at 09:00

## 2017-11-28 RX ADMIN — MAGNESIUM OXIDE TAB 400 MG (241.3 MG ELEMENTAL MG) SCH MG: 400 (241.3 MG) TAB at 20:46

## 2017-11-28 RX ADMIN — ACYCLOVIR SCH TAB: 800 TABLET ORAL at 10:19

## 2017-11-28 RX ADMIN — STANDARDIZED SENNA CONCENTRATE AND DOCUSATE SODIUM SCH TAB: 8.6; 5 TABLET, FILM COATED ORAL at 20:48

## 2017-11-28 RX ADMIN — Medication SCH ML: at 09:00

## 2017-11-28 RX ADMIN — DICYCLOMINE HYDROCHLORIDE SCH MG: 20 TABLET ORAL at 18:25

## 2017-11-28 RX ADMIN — SERTRALINE HYDROCHLORIDE SCH MG: 50 TABLET, FILM COATED ORAL at 10:21

## 2017-11-28 RX ADMIN — DICYCLOMINE HYDROCHLORIDE SCH MG: 20 TABLET ORAL at 13:33

## 2017-11-28 RX ADMIN — PHENYTOIN SODIUM SCH MLS/HR: 50 INJECTION INTRAMUSCULAR; INTRAVENOUS at 13:35

## 2017-11-28 RX ADMIN — PHENYTOIN SODIUM SCH MLS/HR: 50 INJECTION INTRAMUSCULAR; INTRAVENOUS at 05:53

## 2017-11-28 RX ADMIN — SODIUM CHLORIDE SCH MLS/HR: 900 INJECTION INTRAVENOUS at 16:18

## 2017-11-28 RX ADMIN — Medication SCH ML: at 20:49

## 2017-11-28 RX ADMIN — MEGESTROL ACETATE SCH MG: 40 TABLET ORAL at 20:46

## 2017-11-28 RX ADMIN — MAGNESIUM OXIDE TAB 400 MG (241.3 MG ELEMENTAL MG) SCH MG: 400 (241.3 MG) TAB at 10:20

## 2017-11-28 RX ADMIN — DICYCLOMINE HYDROCHLORIDE SCH MG: 20 TABLET ORAL at 10:20

## 2017-11-28 RX ADMIN — STANDARDIZED SENNA CONCENTRATE AND DOCUSATE SODIUM SCH TAB: 8.6; 5 TABLET, FILM COATED ORAL at 10:20

## 2017-11-28 RX ADMIN — BUTALBITAL, ACETAMINOPHEN, AND CAFFEINE PRN TAB: 50; 325; 40 TABLET ORAL at 16:18

## 2017-11-28 RX ADMIN — CHOLECALCIFEROL CAP 125 MCG (5000 UNIT) SCH UNITS: 125 CAP at 10:21

## 2017-11-28 NOTE — HHI.GIFU
Subjective


Remarks


Feeling  better  today   tolerating  po  and  hungry   NAD





Objective


Vitals I&O





Vital Signs








  Date Time  Temp Pulse Resp B/P (MAP) Pulse Ox O2 Delivery O2 Flow Rate FiO2


 


11/28/17 11:38 98.0 75 20 123/93 (103) 97   


 


11/28/17 08:49 98.2 70 19 120/78 (92) 98   


 


11/28/17 06:55   16     


 


11/28/17 05:40 98.2 69 18 120/75 (90) 96   


 


11/28/17 00:33 97.9 84 18 124/71 (88) 95   


 


11/27/17 21:15 98.5 69 18 118/73 (88) 97   


 


11/27/17 16:53 98.4 77 20 127/78 (94) 97   














I/O      


 


 11/27/17 11/27/17 11/27/17 11/28/17 11/28/17 11/28/17





 07:00 15:00 23:00 07:00 15:00 23:00


 


Intake Total    780 ml  


 


Balance    780 ml  


 


      


 


Intake Oral    780 ml  


 


# Voids    2  








Laboratory





Laboratory Tests








Test


  11/27/17


21:58


 


Urine Color LIGHT-YELLOW 


 


Urine Turbidity HAZY 


 


Urine pH 6.0 


 


Urine Specific Gravity 1.016 


 


Urine Protein NEG 


 


Urine Glucose (UA) NEG 


 


Urine Ketones NEG 


 


Urine Occult Blood NEG 


 


Urine Nitrite POS 


 


Urine Bilirubin NEG 


 


Urine Urobilinogen LESS THAN 2.0 


 


Urine Leukocyte Esterase LARGE 


 


Urine RBC 4 


 


Urine  


 


Urine Squamous Epithelial


Cells 4 


 


 


Urine Transitional Epithelial


Cells 1 


 


 


Urine Bacteria MOD 


 


Microscopic Urinalysis Comment


  CATH-CULTURE


IND


 


Urine Opiates Screen NEG 


 


Urine Barbiturates Screen POS 


 


Urine Amphetamines Screen NEG 


 


Urine Benzodiazepines Screen POS 


 


Urine Cocaine Screen NEG 


 


Urine Cannabinoids Screen NEG 














 Date/Time


Source Procedure


Growth Status


 


 


 11/26/17 20:00


Blood Peripheral Aerobic Blood Culture - Preliminary


NO GROWTH IN 2 DAYS Resulted


 


 11/26/17 20:00


Blood Peripheral Anaerobic Blood Culture - Preliminary


NO GROWTH IN 2 DAYS Resulted


 


 11/27/17 21:58


Urine Catheterized Urine Urine Culture


Pending Received








Physical Exam





NECK: Neck is supple, no JVD, no lymphadenopathy.


CHEST:  Chest is clear to auscultation and percussion.


CARDIAC:  Regular rate and rhythm with no murmur gallop or rubs.


ABDOMEN:  Soft, nondistended, nontender; no hepatosplenomegaly; bowel sounds 

are present in all four quadrants.


EXTREMITIES: No clubbing, cyanosis, or edema.





Assessment and Plan


Assessment:  


(1) Chronic abdominal pain


ICD Codes:  R10.9 - Unspecified abdominal pain; G89.29 - Other chronic pain


Status:  Acute


(2) Gastroparesis


ICD Codes:  K31.84 - Gastroparesis


Status:  Acute


(3) Narcotic dependence


ICD Codes:  F11.20 - Opioid dependence, uncomplicated


Plan


Discussed  case  w  medical staff... if po intake  is  poor  consider  megace  

therapy  as well.    Pt  has  had  GI  w/u  and  evaluation..   could  also  

consider  a  trial  of  pancreatic  enzymes  if  abdominal  pain  recurs  .will 

f/u  after  d/c  thanks











Urbano Chung MD Nov 28, 2017 12:42

## 2017-11-28 NOTE — MB
cc:

ARMANDO SRINIVASAN MD

****

 

 

DATE OF CONSULTATION

11/28/17

 

ATTENDING PHYSICIAN

Dr. Clay

 

REASON FOR CONSULTATION

Hematology is consulted to render opinion regarding patient with pancytopenia.

 

HISTORY OF PRESENT ILLNESS

The patient is a very pleasant 56-year-old female with very complex medical

problems admitted to the hospital with complaint of increased lower extremity

numbness and difficulty ambulating.  She has fallen a few times.  She also have

a tremor.  She had history of total gastrectomy and has malnutrition.  She has

lost significant amount of weight.  She was started on TPN for about a month

and gained 10 pounds.  She is also had been giving herself B12 injection and

thiamine supplement.  She denies any fever or chills.  She has no chest pain,

shortness of breath or cough.  She has had dry heaves occasionally.  Denies any

change in bowel movement, denies any melena, hematochezia, any dysuria or

hematuria.

 

PAST MEDICAL HISTORY

1.  Renal cell carcinoma in 2005

2.  Stroke after nephrectomy surgery.

3.  Depression, anxiety.

4.  Chronic aspiration prior to her gastrectomy

5.  Myocardial function

6.  B12 deficiency.

 

PAST SURGICAL HISTORY

1.  Right nephrectomy

2.  Total gastrectomy 2005

3.  Fundoplication twice before the gastrectomy

4.  Hernia repair.

5.  Appendectomy

6.  Cholecystectomy.

7.  Tonsillectomy.

8.  Repair of perforated foramen ovale.

 

 

FAMILY HISTORY

Noncontributory.

 

SOCIAL HISTORY

Denies  tobacco or alcohol use.

 

ALLERGIES

Multiple allergies documented in the chart.

 

MEDICATIONS

1.  BuSpar

2.  Megace

3.  Ceftriaxone.

4.  Fioricet.

5.  Roxicodone

6.  B12.

7.  Multivitamin

8.  Vitamin D3

9.  Evonne-Colace.

10.  Bentyl

11.  Magnesium oxide

12.  Zoloft

 

REVIEW OF SYSTEMS

CONSTITUTIONAL:  As above.

EYES:  Negative.

ENT:  Negative.

CARDIOVASCULAR:  Denies chest pressure, palpitation

RESPIRATORY:  Denies any shortness of breath, cough.

GI:  As above.

:  Negative.

MUSCULOSKELETAL:   Has chronic pain

HEMATOLOGY: As above.

ENDOCRINE: Negative

DERMATOLOGY:  Negative.

PSYCHIATRIC: Negative.

NEUROLOGIC:    As above.

 

PHYSICAL EXAMINATION

VITAL SIGNS:  Temperature 98, blood pressure 132/75, O2 saturation 97%.

GENERAL:    She is alert, oriented x3.  Cachectic.

HEENT:  Atraumatic, normocephalic.  Pupils equal, round and reactive to light.

Extraocular muscles intact. No scleral icterus.  Oropharynx dry mucosa.  No

lesion.

NECK:  No thyromegaly.  No palpable masses.

LYMPHATICS:  No palpable cervical, clavicular, axillary or inguinal lymph node

 

CARDIOVASCULAR:  Regular S1-S2, no murmur.

LUNGS:  Clear to auscultation bilaterally.

ABDOMEN:  Soft.  There is a little soreness.  I could not palpate liver or

spleen.

EXTREMITIES: No cyanosis, clubbing or edema.

BACK:  No paravertebral tenderness.

SKIN:  No rash or petechiae.

NEUROLOGIC: Nonfocal.

 

LABORATORY DATA

Laboratory data reviewed

 

ASSESSMENT

1.  Pancytopenia.  Her blood count has been fluctuating the last several

months.  I think she likely has low blood count due to nutrition

deficiency.  Her recent drop in blood count may be due to the urinary tract

infection and transient bone marrow suppression.  She has no evidence of active

bleeding noted.  She is not neutropenic.  I am going to check her iron study

and vitamin study. Continue to monitor CBC for now.   

2.  History of TTP. She stated she had plasmapheresis about six years ago 

when she developed low platelet count.  She did not have an confirmed diagnosis 

of TTP.  Her platelet count is only slightly low.  There is no evidence of TTP.

3.  B12 deficiency and vitamin deficiency due to history of total gastrectomy.

She has been self administering a B12 injection monthly as well as thiamine

weekly.  We will check a vitamin study as above.

4.  History of renal cell carcinoma status post right nephrectomy in 2005.  She

did not require any adjuvant therapy.

5.  Malnutrition due to total gastrectomy. She was on TPN for a month and

gained 10 pounds. TPN has been discontinued

6.  History of stroke.

7.  History of  myocardial function.

 

PLAN

1.  Proceed with laboratory evaluation outlined as above.

2.  Monitor CBC.

3.  Discussed with the patient and her .

 

Thank you, Dr. Clay, for asking me to see this patient.

 

                              _________________________________

                              MD KIRILL Milner/

D:  11/28/2017/5:27 PM

T:  11/28/2017/5:49 PM

Visit #:  [id number]

Job #:  [id number]

PEDRITO

## 2017-11-28 NOTE — HHI.PR
Subjective


Remarks


Patient says she is feeling a little bit better today.  Denies any chest pain 

or shortness of breath.  Denies any nausea or vomiting.  Says her appetite has 

improved slightly.  She feels like she needs to go to rehabilitation, cannot 

take care of herself at home.





Objective





Vital Signs








  Date Time  Temp Pulse Resp B/P (MAP) Pulse Ox O2 Delivery O2 Flow Rate FiO2


 


11/28/17 08:49 98.2 70 19 120/78 (92) 98   


 


11/28/17 06:55   16     


 


11/28/17 05:40 98.2 69 18 120/75 (90) 96   


 


11/28/17 00:33 97.9 84 18 124/71 (88) 95   


 


11/27/17 21:15 98.5 69 18 118/73 (88) 97   


 


11/27/17 16:53 98.4 77 20 127/78 (94) 97   


 


11/27/17 11:13 98.6 88 20 119/69 (86) 100   














I/O      


 


 11/27/17 11/27/17 11/27/17 11/28/17 11/28/17 11/28/17





 07:00 15:00 23:00 07:00 15:00 23:00


 


Intake Total    780 ml  


 


Balance    780 ml  


 


      


 


Intake Oral    780 ml  


 


# Voids    2  








Result Diagram:  


11/27/17 1139                                                                  

              11/27/17 1139





Objective Remarks


GENERAL: Cachectic.  Patient sitting up in bed.  Appears comfortable.


SKIN: Warm and dry.


HEAD: Normocephalic.


EYES: No scleral icterus. No injection or drainage. 


NECK: Supple, trachea midline. No JVD.


CARDIOVASCULAR: Regular rate and rhythm without murmurs, gallops, or rubs. 


RESPIRATORY: Breath sounds equal bilaterally. No accessory muscle use.


GASTROINTESTINAL: Abdomen soft, non-tender, nondistended. 


MUSCULOSKELETAL: No cyanosis, or edema. 


BACK: Nontender without obvious deformity. No CVA tenderness.








A/P


Assessment and Plan





// Generalized Weakness:  c/o ongoing generalized weakness, likely 

multifactorial-malnutrition and polypharmacy.  Seen in ER on 11/25/17 for same, 

MRI C/T/L Spine w/ mild-moderate canal stenosis C5-6, otherwise normal, pt LEFT 

AMA at that time, however does not recall doing so.  MRI Brain tonight w/ no 

acute findings, images reviewed by me.  PT for eval/tx.


= PT recommends home health, however patient does not feel she will be able to 

go home.  She would like to rehabilitation.  I discussed with gastroenterology 

yesterday, and they recommend not doing TPN that by mouth intake should be 

sufficient.  Recommend going off narcotics.  We'll wean narcotics.





//Severe nutrition.  BMI 16.0.  Possibly related to loose brown toxicity.  We'

ll decrease dose of buspirone.





//Gait Instability:  reports inability to ambulate, MRI Spine essentially 

unremarkable as above.  PT for eval/tx.  


= Neurology assistance appreciated.  MRI changes do not explain generalized 

weakness.





//Leukopenia with neutropenia worsening home 0.3 yesterday.  We'll consult 

oncology.





//Narcotic Dependence:  On multiple narcotics in addition to anxiolytics, 

repeatedly requesting pain medication in ER both last night and this evening, 

will hold Fioricet/Valium/Ambien for now.  No further narcotics. 


= Discussed with nurse who will verify home medications.  His cussed at length 

the patient.  Do not feel narcotics are helping her nausea or appetite.





//euThyroid sick. 


-Patient with low TSH 0.1, normal T3, low T4 .  Prolactin slightly high, can be 

a primary or secondary.  We'll recheck.  Did discuss right brain with 

radiologist on 11/27, and no concerning hypothalamic/pituitary findings.





//DVT Prophylaxis:  SCD/Teds.


Discharge Planning


Worsening leukopenia.  Oncology consult pending.  Patient will need 

rehabilitation.











Bradford Clay MD Nov 28, 2017 10:00

## 2017-11-29 VITALS
RESPIRATION RATE: 18 BRPM | HEART RATE: 65 BPM | DIASTOLIC BLOOD PRESSURE: 85 MMHG | OXYGEN SATURATION: 97 % | SYSTOLIC BLOOD PRESSURE: 159 MMHG | TEMPERATURE: 98.8 F

## 2017-11-29 VITALS
DIASTOLIC BLOOD PRESSURE: 77 MMHG | RESPIRATION RATE: 12 BRPM | HEART RATE: 64 BPM | SYSTOLIC BLOOD PRESSURE: 126 MMHG | OXYGEN SATURATION: 97 % | TEMPERATURE: 98 F

## 2017-11-29 VITALS
OXYGEN SATURATION: 97 % | RESPIRATION RATE: 18 BRPM | HEART RATE: 72 BPM | DIASTOLIC BLOOD PRESSURE: 81 MMHG | TEMPERATURE: 97.6 F | SYSTOLIC BLOOD PRESSURE: 131 MMHG

## 2017-11-29 VITALS
OXYGEN SATURATION: 96 % | DIASTOLIC BLOOD PRESSURE: 89 MMHG | TEMPERATURE: 98.7 F | RESPIRATION RATE: 18 BRPM | SYSTOLIC BLOOD PRESSURE: 165 MMHG | HEART RATE: 71 BPM

## 2017-11-29 VITALS
TEMPERATURE: 97.9 F | HEART RATE: 68 BPM | OXYGEN SATURATION: 98 % | SYSTOLIC BLOOD PRESSURE: 117 MMHG | DIASTOLIC BLOOD PRESSURE: 77 MMHG | RESPIRATION RATE: 12 BRPM

## 2017-11-29 VITALS
SYSTOLIC BLOOD PRESSURE: 167 MMHG | DIASTOLIC BLOOD PRESSURE: 86 MMHG | OXYGEN SATURATION: 100 % | HEART RATE: 69 BPM | TEMPERATURE: 98.2 F | RESPIRATION RATE: 19 BRPM

## 2017-11-29 VITALS
SYSTOLIC BLOOD PRESSURE: 144 MMHG | RESPIRATION RATE: 17 BRPM | DIASTOLIC BLOOD PRESSURE: 88 MMHG | TEMPERATURE: 98.1 F | OXYGEN SATURATION: 99 % | HEART RATE: 75 BPM

## 2017-11-29 VITALS
DIASTOLIC BLOOD PRESSURE: 74 MMHG | HEART RATE: 70 BPM | SYSTOLIC BLOOD PRESSURE: 144 MMHG | TEMPERATURE: 98.3 F | RESPIRATION RATE: 18 BRPM | OXYGEN SATURATION: 98 %

## 2017-11-29 VITALS
RESPIRATION RATE: 12 BRPM | SYSTOLIC BLOOD PRESSURE: 112 MMHG | HEART RATE: 66 BPM | DIASTOLIC BLOOD PRESSURE: 70 MMHG | TEMPERATURE: 97.9 F | OXYGEN SATURATION: 98 %

## 2017-11-29 VITALS — TEMPERATURE: 98 F | HEART RATE: 69 BPM | SYSTOLIC BLOOD PRESSURE: 135 MMHG | DIASTOLIC BLOOD PRESSURE: 85 MMHG

## 2017-11-29 VITALS
SYSTOLIC BLOOD PRESSURE: 194 MMHG | TEMPERATURE: 99 F | DIASTOLIC BLOOD PRESSURE: 100 MMHG | RESPIRATION RATE: 20 BRPM | OXYGEN SATURATION: 100 % | HEART RATE: 83 BPM

## 2017-11-29 LAB
ALP SERPL-CCNC: 103 U/L (ref 45–117)
ALT SERPL-CCNC: 28 U/L (ref 10–53)
ANION GAP SERPL CALC-SCNC: 7 MEQ/L (ref 5–15)
APTT BLD: 25 SEC (ref 24.3–30.1)
AST SERPL-CCNC: 29 U/L (ref 15–37)
BASOPHILS # BLD AUTO: 0 TH/MM3 (ref 0–0.2)
BASOPHILS NFR BLD: 0.1 % (ref 0–2)
BILIRUB SERPL-MCNC: 0.1 MG/DL (ref 0.2–1)
BUN SERPL-MCNC: 12 MG/DL (ref 7–18)
CHLORIDE SERPL-SCNC: 106 MEQ/L (ref 98–107)
CK SERPL-CCNC: 129 U/L (ref 26–192)
EOSINOPHIL # BLD: 0.1 TH/MM3 (ref 0–0.4)
EOSINOPHIL NFR BLD: 3.8 % (ref 0–4)
ERYTHROCYTE [DISTWIDTH] IN BLOOD BY AUTOMATED COUNT: 20.7 % (ref 11.6–17.2)
FERRITIN SERPL-MCNC: 6 NG/ML (ref 8–252)
FIBRINOGEN PPP-MCNC: 318 MG/DL (ref 227–377)
GFR SERPLBLD BASED ON 1.73 SQ M-ARVRAT: 81 ML/MIN (ref 89–?)
HCO3 BLD-SCNC: 25.9 MEQ/L (ref 21–32)
HCT VFR BLD CALC: 30 % (ref 35–46)
HEMO FLAGS: (no result)
INR PPP: 1 RATIO
LDH SERPL-CCNC: 133 U/L (ref 84–246)
LYMPHOCYTES # BLD AUTO: 1.2 TH/MM3 (ref 1–4.8)
LYMPHOCYTES NFR BLD AUTO: 37.7 % (ref 9–44)
MCH RBC QN AUTO: 25.3 PG (ref 27–34)
MCHC RBC AUTO-ENTMCNC: 31.5 % (ref 32–36)
MCV RBC AUTO: 80.3 FL (ref 80–100)
MONOCYTES NFR BLD: 7.9 % (ref 0–8)
NEUTROPHILS # BLD AUTO: 1.5 TH/MM3 (ref 1.8–7.7)
NEUTROPHILS NFR BLD AUTO: 50.5 % (ref 16–70)
PLATELET # BLD: 119 TH/MM3 (ref 150–450)
POTASSIUM SERPL-SCNC: 4 MEQ/L (ref 3.5–5.1)
PROTHROMBIN TIME: 10.5 SEC (ref 9.8–11.6)
RBC # BLD AUTO: 3.74 MIL/MM3 (ref 4–5.3)
RETICS/RBC NFR: 0.6 % (ref 0.4–3)
REVIEW FLAG: (no result)
SODIUM SERPL-SCNC: 139 MEQ/L (ref 136–145)
TRANSFERRIN IRON PROFILE: 311 MG/DL (ref 200–360)
VIT B12 SERPL-MCNC: 492 PG/ML (ref 193–986)
WBC # BLD AUTO: 3.1 TH/MM3 (ref 4–11)

## 2017-11-29 PROCEDURE — 30233N1 TRANSFUSION OF NONAUTOLOGOUS RED BLOOD CELLS INTO PERIPHERAL VEIN, PERCUTANEOUS APPROACH: ICD-10-PCS | Performed by: HOSPITALIST

## 2017-11-29 RX ADMIN — BUTALBITAL, ACETAMINOPHEN, AND CAFFEINE PRN TAB: 50; 325; 40 TABLET ORAL at 00:21

## 2017-11-29 RX ADMIN — PHENYTOIN SODIUM SCH MLS/HR: 50 INJECTION INTRAMUSCULAR; INTRAVENOUS at 02:57

## 2017-11-29 RX ADMIN — DICYCLOMINE HYDROCHLORIDE SCH MG: 20 TABLET ORAL at 18:50

## 2017-11-29 RX ADMIN — BUTALBITAL, ACETAMINOPHEN, AND CAFFEINE PRN TAB: 50; 325; 40 TABLET ORAL at 08:41

## 2017-11-29 RX ADMIN — CHOLECALCIFEROL CAP 125 MCG (5000 UNIT) SCH UNITS: 125 CAP at 08:18

## 2017-11-29 RX ADMIN — MAGNESIUM OXIDE TAB 400 MG (241.3 MG ELEMENTAL MG) SCH MG: 400 (241.3 MG) TAB at 22:16

## 2017-11-29 RX ADMIN — MEGESTROL ACETATE SCH MG: 40 TABLET ORAL at 22:39

## 2017-11-29 RX ADMIN — STANDARDIZED SENNA CONCENTRATE AND DOCUSATE SODIUM SCH TAB: 8.6; 5 TABLET, FILM COATED ORAL at 22:15

## 2017-11-29 RX ADMIN — CYANOCOBALAMIN TAB 1000 MCG SCH MCG: 1000 TAB at 08:19

## 2017-11-29 RX ADMIN — ACYCLOVIR SCH TAB: 800 TABLET ORAL at 08:18

## 2017-11-29 RX ADMIN — MAGNESIUM OXIDE TAB 400 MG (241.3 MG ELEMENTAL MG) SCH MG: 400 (241.3 MG) TAB at 08:18

## 2017-11-29 RX ADMIN — DICYCLOMINE HYDROCHLORIDE SCH MG: 20 TABLET ORAL at 13:00

## 2017-11-29 RX ADMIN — Medication SCH ML: at 08:17

## 2017-11-29 RX ADMIN — SODIUM CHLORIDE SCH MLS/HR: 900 INJECTION INTRAVENOUS at 15:58

## 2017-11-29 RX ADMIN — DICYCLOMINE HYDROCHLORIDE SCH MG: 20 TABLET ORAL at 08:18

## 2017-11-29 RX ADMIN — Medication SCH ML: at 21:00

## 2017-11-29 RX ADMIN — SERTRALINE HYDROCHLORIDE SCH MG: 50 TABLET, FILM COATED ORAL at 08:18

## 2017-11-29 RX ADMIN — PHENYTOIN SODIUM SCH MLS/HR: 50 INJECTION INTRAMUSCULAR; INTRAVENOUS at 09:25

## 2017-11-29 RX ADMIN — BUTALBITAL, ACETAMINOPHEN, AND CAFFEINE PRN TAB: 50; 325; 40 TABLET ORAL at 15:56

## 2017-11-29 RX ADMIN — MEGESTROL ACETATE SCH MG: 40 TABLET ORAL at 08:18

## 2017-11-29 RX ADMIN — STANDARDIZED SENNA CONCENTRATE AND DOCUSATE SODIUM SCH TAB: 8.6; 5 TABLET, FILM COATED ORAL at 08:18

## 2017-11-29 RX ADMIN — DIAZEPAM PRN MG: 2 TABLET ORAL at 15:55

## 2017-11-29 RX ADMIN — PHENYTOIN SODIUM SCH MLS/HR: 50 INJECTION INTRAMUSCULAR; INTRAVENOUS at 15:58

## 2017-11-29 NOTE — PD.ONC.PN
Subjective


Subjective


Remarks


Afebrile overnight.


Patient resting in bed. 


Complaining of headache and continued left arm weakness since this AM. 


Stroke alert was called and neurologist following. 


Brain MRI was obtained which was unremarkable.





Objective


Data











  Date Time  Temp Pulse Resp B/P (MAP) Pulse Ox O2 Delivery O2 Flow Rate FiO2


 


11/29/17 10:32 98.2 69 19 167/86 (113) 100   


 


11/29/17 09:50 98.0 69  135/85 (102)    


 


11/29/17 08:27 99.0 83 20 194/100 (131) 100   


 


11/29/17 03:53 98.3 70 18 144/74 (97) 98   


 


11/29/17 00:05 97.6 72 18 131/81 (98) 97   


 


11/28/17 20:35 97.6 77 18 130/80 (97) 98   


 


11/28/17 15:53 98.0 78 20 132/75 (94) 97   














 11/29/17 11/29/17 11/29/17





 06:59 14:59 22:59


 


Intake Total 350 ml  


 


Balance 350 ml  








Result Diagram:  


11/29/17 0708                                                                  

              11/29/17 1126





Laboratory Results





Laboratory Tests








Test


  11/28/17


13:10 11/28/17


20:20 11/29/17


07:08 11/29/17


11:26


 


White Blood Count 3.9 TH/MM3   3.1 TH/MM3  


 


Red Blood Count 3.86 MIL/MM3   3.74 MIL/MM3  


 


Hemoglobin 10.1 GM/DL   9.5 GM/DL  


 


Hematocrit 31.5 %   30.0 %  


 


Mean Corpuscular Volume 81.6 FL   80.3 FL  


 


Mean Corpuscular Hemoglobin 26.1 PG   25.3 PG  


 


Mean Corpuscular Hemoglobin


Concent 32.0 % 


  


  31.5 % 


  


 


 


Red Cell Distribution Width 21.0 %   20.7 %  


 


Platelet Count 134 TH/MM3   119 TH/MM3  


 


Mean Platelet Volume 9.8 FL   9.5 FL  


 


Neutrophils (%) (Auto) 39.9 %   50.5 %  


 


Lymphocytes (%) (Auto) 44.1 %   37.7 %  


 


Monocytes (%) (Auto) 12.3 %   7.9 %  


 


Eosinophils (%) (Auto) 3.6 %   3.8 %  


 


Basophils (%) (Auto) 0.1 %   0.1 %  


 


Neutrophils # (Auto) 1.6 TH/MM3   1.5 TH/MM3  


 


Lymphocytes # (Auto) 1.7 TH/MM3   1.2 TH/MM3  


 


Monocytes # (Auto) 0.5 TH/MM3   0.2 TH/MM3  


 


Eosinophils # (Auto) 0.1 TH/MM3   0.1 TH/MM3  


 


Basophils # (Auto) 0.0 TH/MM3   0.0 TH/MM3  


 


CBC Comment DIFF FINAL   DIFF FINAL  


 


Differential Comment      


 


Blood Urea Nitrogen 11 MG/DL    12 MG/DL 


 


Creatinine 0.71 MG/DL    0.74 MG/DL 


 


Random Glucose 64 MG/DL    77 MG/DL 


 


Total Protein 6.1 GM/DL    6.4 GM/DL 


 


Albumin 3.1 GM/DL    3.2 GM/DL 


 


Calcium Level 8.4 MG/DL    8.4 MG/DL 


 


Alkaline Phosphatase 105 U/L    103 U/L 


 


Aspartate Amino Transf


(AST/SGOT) 26 U/L 


  


  


  29 U/L 


 


 


Alanine Aminotransferase


(ALT/SGPT) 29 U/L 


  


  


  28 U/L 


 


 


Total Bilirubin 0.1 MG/DL    0.1 MG/DL 


 


Sodium Level 137 MEQ/L    139 MEQ/L 


 


Potassium Level 4.2 MEQ/L    4.0 MEQ/L 


 


Chloride Level 107 MEQ/L    106 MEQ/L 


 


Carbon Dioxide Level 24.7 MEQ/L    25.9 MEQ/L 


 


Anion Gap 5 MEQ/L    7 MEQ/L 


 


Estimat Glomerular Filtration


Rate 85 ML/MIN 


  


  


  81 ML/MIN 


 


 


Random Cortisol 3.2 MCG/DL    


 


Reticulocyte Count   0.6 %  


 


Absolute Reticulocyte Count   21.1 MIL/L  


 


Iron Level   17 MCG/DL  


 


Total Iron Binding Capacity   435 MCG/DL  


 


Percent Iron Saturation   3.9 %  


 


Ferritin   6 NG/ML  


 


Lactate Dehydrogenase   133 U/L  


 


Total Creatine Kinase   129 U/L  


 


Troponin I


  


  


  LESS THAN 0.02


NG/ML 


 


 


Vitamin B12 Level   492 PG/ML  








Culture Results





Microbiology








 Date/Time


Source Procedure


Growth Status


 


 


 11/26/17 20:00


Blood Peripheral Aerobic Blood Culture - Preliminary


NO GROWTH IN 3 DAYS Resulted


 


 11/26/17 20:00


Blood Peripheral Anaerobic Blood Culture - Preliminary


NO GROWTH IN 3 DAYS Resulted





 11/26/17 18:00


Blood Peripheral Aerobic Blood Culture - Preliminary


NO GROWTH IN 3 DAYS Resulted


 


 11/26/17 18:00


Blood Peripheral Anaerobic Blood Culture - Preliminary


NO GROWTH IN 3 DAYS Resulted


 


 11/27/17 21:58


Urine Catheterized Urine Urine Culture - Final


Klebsiella Pneumoniae Complete








Imaging Studies





Last 24 hours Impressions








Head CT 11/29/17 0000 Signed





Impressions: 





 Service Date/Time:  Wednesday, November 29, 2017 10:52 - CONCLUSION:  Negative 





 for acute process.     Josh Ellis MD  FACR


 


Brain MRI 11/29/17 0000 Signed





Impressions: 





 Service Date/Time:  Wednesday, November 29, 2017 11:47 - CONCLUSION:  No 





 evidence of stroke. Scattered small nonspecific white matter lesions     Prince Rich MD 











Administered Medications








 Medications


  (Trade)  Dose


 Ordered  Sig/Jermaine


 Route


 PRN Reason  Start Time


 Stop Time Status Last Admin


Dose Admin


 


 Sodium Chloride  1,000 ml @ 


 100 mls/hr  Q10H


 IV


   11/26/17 21:25


    11/29/17 09:25


 


 


 Sodium Chloride


  (NS Flush)  2 ml  BID


 IV FLUSH


   11/27/17 09:00


    11/28/17 20:49


 


 


 Senna/Docusate


 Sodium


  (Evonne-Colace)  1 tab  BID


 PO


   11/27/17 09:00


    11/29/17 08:18


 


 


 Dicyclomine HCl


  (Bentyl)  40 mg  TID


 PO


   11/27/17 09:00


    11/29/17 08:18


 


 


 Magnesium Oxide


  (Mag-Ox)  400 mg  BID


 PO


   11/27/17 09:00


    11/29/17 08:18


 


 


 Sertraline HCl


  (Zoloft)  50 mg  DAILY


 PO


   11/27/17 09:00


    11/29/17 08:18


 


 


 Multivitamins


  (Theragran)  1 tab  DAILY


 PO


   11/28/17 09:00


    11/29/17 08:18


 


 


 Cholecalciferol


  (Vitamin D3)  5,000 units  DAILY


 PO


   11/28/17 09:00


    11/29/17 08:18


 


 


 Megestrol Acetate


  (Megace)  40 mg  Q12HR


 PO


   11/28/17 21:00


    11/29/17 08:18


 


 


 Buspirone HCl


  (Buspar)  10 mg  Q8HR


 PO


   11/28/17 22:00


    11/29/17 05:23


 


 


 Acetaminophen/


 Butalbital/


 Caffeine


  (Fioricet


 325-50-40)  1 tab  Q8H  PRN


 PO


 headache/migraine  11/28/17 15:30


    11/29/17 08:41


 


 


 Ceftriaxone


 Sodium 1000 mg/


 Sodium Chloride  100 ml @ 


 200 mls/hr  Q24H


 IV


   11/28/17 16:00


    11/28/17 16:18


 








Objective Remarks


GENERAL: Middle aged female sitting up in bed in nad. 


SKIN: Warm and dry.


HEAD: Normocephalic.


EYES: no injection or drainage. 


NECK: Supple, trachea midline.


CARDIOVASCULAR: +S1/S2


RESPIRATORY: anterior fields clear. 


GASTROINTESTINAL: Abdomen soft, non-tender, nondistended. 


EXTREMITIES: No cyanosis.


NEUROLOGICAL: awake and alert, normal speech. left leg weakness. moves upper 

extremities without difficulty.





Assessment/Plan


Problem List:  


(1) Pancytopenia


ICD Codes:  D61.818 - Other pancytopenia


Plan:  --blood count fluctuating over the last several months.  


--likely has low blood count due to nutrition deficiency.  


--recent drop in blood count may be due to the urinary tract infection and 

transient bone marrow suppression.  


--no evidence of active bleeding noted. not neutropenic.  


--will give B12 and thiamine. 





(2) History of TTP (thrombotic thrombocytopenic purpura)


ICD Codes:  Z86.2 - Personal history of diseases of the blood and blood-forming 

organs and certain disorders involving the immune mechanism


Plan:  --had plasmapheresis about six years ago  when she developed low 

platelet count. 


--did not have an confirmed diagnosis of TTP.


--platelet count is only slightly low.  


--There is no evidence of TTP.





(3) Left arm weakness


ICD Codes:  R29.898 - Other symptoms and signs involving the musculoskeletal 

system


Plan:  --neurology following


--MRI brain unremarkable





Assessment


57y/o female with pancytopenia.


h/o Renal cell carcinoma in 2005


Stroke after nephrectomy surgery.


Depression, anxiety.


Chronic aspiration prior to her gastrectomy


Myocardial function


B12 deficiency.


Plan


1. give B12 1000mcg x 1


2. give thiamine 


3. monitor CBC


Attending Statement


The exam, history, and the medical decision-making described in the above note 

were completed with the assistance of the mid-level provider. I reviewed and 

agree with the findings presented.  I attest that I had a face-to-face 

encounter with the patient on the same day, and personally performed and 

documented my assessment and findings in the medical record.Events noted.  

Stroke alert was called.  Pancytopenia slightly worse then yesterday.  Workup 

ongoing.  Drop in blood counts likely due to infection.  Will give her vit B12 

and thiamine as she missed a dose this week.











Sienna Murray Nov 29, 2017 13:07


Macho Rodriguez MD Nov 29, 2017 17:34

## 2017-11-29 NOTE — MG
cc:

FORD GONZALEZ MD

****

 

 

Lab No:  Date:  11/29/2017  Age:  Sex:  F Race:

 

EEG RECORD NUMBER



 

DATE OF BIRTH

1960

 

INDICATION

A 56-year-old history of seizures, headache, weakness.

 

DESCRIPTION

Posterior rhythm demonstrates 8-10 Hz activity, 20-50 microvolts with increased

beta frequencies throughout the recording.  Phase reversal sharp wave at P3

epoch 26.  Good EEG variability reactivity.  Photic stimulation not performed

as the patient complained of bad headache.

 

INTERPRETATION

Minimal nonspecific changes noted above with a lot of fast frequencies

throughout the recording likely related to psychotropic medication.  Clinical

correlation.

 

 

 

 

                              _________________________________

                              Ford Gonzalez MD

 

 

 

MG/KK

D:  11/29/2017/3:59 PM

T:  11/29/2017/4:18 PM

Visit #:  [id number]

Job #:  [id number]

## 2017-11-29 NOTE — RADRPT
EXAM DATE/TIME:  11/29/2017 10:52 

 

[facility] COMPARISON:     

CT BRAIN W/O CONTRAST, March 08, 2017, 16:39.

 

 

INDICATIONS :     

Left sided extremity weakness.

                      

 

RADIATION DOSE:     

31.47 CTDIvol (mGy) 

 

 

This report was called to  at 10:

59 AM.

 

 

MEDICAL HISTORY :     

Seizures. Cardiovascular disease Hypertension.Renal cancer.

 

SURGICAL HISTORY :      

Nephrectomy, right. 

 

ENCOUNTER:      

Initial

 

ACUITY:      

1 day

 

PAIN SCALE:      

Non-responsive

 

LOCATION:        

cranial 

 

TECHNIQUE:     

Multiple contiguous axial images were obtained of the head.  Using automated exposure control and adj
ustment of the mA and/or kV according to patient size, radiation dose was kept as low as reasonably a
chievable to obtain optimal diagnostic quality images.   DICOM format image data is available electro
nically for review and comparison.  

 

FINDINGS:     

 

CEREBRUM:     

The ventricles are normal for age.  No evidence of midline shift, mass lesion, hemorrhage or acute in
farction.  No extra-axial fluid collections are seen.

 

POSTERIOR FOSSA:     

The cerebellum and brainstem are intact.  The 4th ventricle is midline.  The cerebellopontine angle i
s unremarkable.

 

EXTRACRANIAL:     

The visualized portion of the orbits is intact.

 

SKULL:     

The calvaria is intact.  No evidence of skull fracture.

 

CONCLUSION:     

Negative for acute process.

 

 

 

 Josh Ellis MD FACR on November 29, 2017 at 10:59           

Board Certified Radiologist.

 This report was verified electronically.

## 2017-11-29 NOTE — HHI.PR
Subjective


Remarks


called to room due to what patient describes as acute left arm weakness, 

difficulty with speech.  Nurses report that she initially was not talking, 

however subsequently began talking, however continues to report left arm 

weakness.


She requests restarting diazepam for nausea, Ambien for sleep.





Objective





Vital Signs








  Date Time  Temp Pulse Resp B/P (MAP) Pulse Ox O2 Delivery O2 Flow Rate FiO2


 


11/29/17 09:50 98.0 69  135/85 (102)    


 


11/29/17 08:27 99.0 83 20 194/100 (131) 100   


 


11/29/17 03:53 98.3 70 18 144/74 (97) 98   


 


11/29/17 00:05 97.6 72 18 131/81 (98) 97   


 


11/28/17 20:35 97.6 77 18 130/80 (97) 98   


 


11/28/17 15:53 98.0 78 20 132/75 (94) 97   


 


11/28/17 11:38 98.0 75 20 123/93 (103) 97   














I/O      


 


 11/28/17 11/28/17 11/28/17 11/29/17 11/29/17 11/29/17





 07:00 15:00 23:00 07:00 15:00 23:00


 


Intake Total 780 ml   350 ml  


 


Balance 780 ml   350 ml  


 


      


 


Intake Oral 780 ml   350 ml  


 


# Voids 2  2 1  








Result Diagram:  


11/29/17 0708                                                                  

              11/28/17 1310





Objective Remarks


GENERAL: Cachectic.  Patient sitting up in bed.  Appears comfortable.


SKIN: Warm and dry.


HEAD: Normocephalic.


EYES: No scleral icterus. No injection or drainage. 


NECK: Supple, trachea midline. No JVD.


CARDIOVASCULAR: Regular rate and rhythm without murmurs, gallops, or rubs. 


RESPIRATORY: Breath sounds equal bilaterally. No accessory muscle use.


GASTROINTESTINAL: Abdomen soft, non-tender, nondistended. 


MUSCULOSKELETAL: No cyanosis, or edema. 


Neurologic.  5 out of 5 strength right upper extremity.  3 out of 5 strength 

left upper extremity.  Left lower extremity strength 4-5, right lower extremity 

strength 5


BACK: Nontender without obvious deformity. No CVA tenderness.








A/P


Assessment and Plan


// Generalized Weakness:  c/o ongoing generalized weakness, likely 

multifactorial-malnutrition and polypharmacy.  Seen in ER on 11/25/17 for same, 

MRI C/T/L Spine w/ mild-moderate canal stenosis C5-6, otherwise normal, pt LEFT 

AMA at that time, however does not recall doing so.  MRI Brain tonight w/ no 

acute findings, images reviewed by me.  PT for eval/tx.


= PT recommends home health, however patient does not feel she will be able to 

go home.  She would like to rehabilitation.  I discussed with gastroenterology 

yesterday, and they recommend not doing TPN that by mouth intake should be 

sufficient.  Recommend going off narcotics.  We'll wean narcotics.





/Acute left arm weakness 11/29


-I suspect this is possibly secondary to conversion disorder, however cannot 

rule out possibility patient is having acute stroke.


Stroke alert called.  CT head ordered stat.





//Severe malnutrition.  BMI 16.0.  Possibly related to loose brown toxicity.  

Continue on lower dose of buspirone.





//Gait Instability:  reports inability to ambulate, MRI Spine essentially 

unremarkable as above.  PT for eval/tx.  


= Neurology assistance appreciated.  MRI changes do not explain generalized 

weakness.





//Leukopenia with neutropenia worsening home 3.9.


Worsening 3.1 today.  Appreciate oncology assistance.





//Narcotic Dependence:  On multiple narcotics in addition to anxiolytics, 

repeatedly requesting pain medication in ER both last night and this evening, 

will hold Fioricet/Valium/Ambien for now.  No further narcotics. 


= Continue to try to avoid excessive narcotics.





//UTI.  Urinalysis from 11/27 Escherichia coli.  Sensitivities pending.  

Continue on ceftriaxone.





//euThyroid sick. 


-Patient with low TSH 0.1, normal T3, low T4 .  Prolactin slightly high, can be 

a primary or secondary.  We'll recheck.  Did discuss right brain with 

radiologist on 11/27, and no concerning hypothalamic/pituitary findings.


-PT levels as outpatient.





//DVT Prophylaxis:  SCD/Teds.


Discharge Planning


Patient with acute left upper extremity weakness











Bradford Clay MD Nov 29, 2017 11:05

## 2017-11-29 NOTE — HHI.PR
Subjective


Remarks


S:stroke alert called on pt for acute left sided weakness started at 10:30 am


she had a stat CT that was neg.


I ordered a stat MRI brain that was negative for acute stroke.


She is c/o headache states she has hx of complicated migraines.Also can not 

tolerate antiemetics and would like diazepam and im imitrex.





O:awake alert vss


speech is hypophonic not slurred or aphasic


perrla


eomi


no facial droop when she smiled spontaneously.


no vf loss


tongue midline





motor right ue/le 5/5


lue holds back from allowing me to lift due to tingling per pt-this shows me 

that she can maintain resistance in left arm.


tone is nl


dtr 2+


left leg ?old foot drop pt states from old stroke hx??


left toe is downgoing.


can lift left leg up.


gait deferred.








mri and ct brain both neg for acute findings.


last 2 d echo 12/ 30/16 ef 50-55%





a/p ?tia


?complicated migraine


-check eeg-I ordered


-ok to try diazepam and imitrex inj for migraine abortive therapy


-Dr Medina notified of change in pt-he will continue to follow her and make any 

further recommendations if needed.


TPA was not indicated as symptoms are not typical of an acute stroke may be due 

to migraine and mri was neg for cva.





Objective





Vital Signs








  Date Time  Temp Pulse Resp B/P (MAP) Pulse Ox O2 Delivery O2 Flow Rate FiO2


 


11/29/17 10:32 98.2 69 19 167/86 (113) 100   


 


11/29/17 09:50 98.0 69  135/85 (102)    


 


11/29/17 08:27 99.0 83 20 194/100 (131) 100   


 


11/29/17 03:53 98.3 70 18 144/74 (97) 98   


 


11/29/17 00:05 97.6 72 18 131/81 (98) 97   


 


11/28/17 20:35 97.6 77 18 130/80 (97) 98   


 


11/28/17 15:53 98.0 78 20 132/75 (94) 97   














I/O      


 


 11/28/17 11/28/17 11/28/17 11/29/17 11/29/17 11/29/17





 07:00 15:00 23:00 07:00 15:00 23:00


 


Intake Total 780 ml   350 ml  


 


Balance 780 ml   350 ml  


 


      


 


Intake Oral 780 ml   350 ml  


 


# Voids 2  2 1  








Result Diagram:  


11/29/17 0708                                                                  

              11/29/17 1126














Evie Carter MD Nov 29, 2017 12:46

## 2017-11-30 VITALS
DIASTOLIC BLOOD PRESSURE: 82 MMHG | HEART RATE: 79 BPM | OXYGEN SATURATION: 98 % | SYSTOLIC BLOOD PRESSURE: 126 MMHG | TEMPERATURE: 98.3 F | RESPIRATION RATE: 18 BRPM

## 2017-11-30 VITALS
TEMPERATURE: 98.6 F | HEART RATE: 53 BPM | SYSTOLIC BLOOD PRESSURE: 147 MMHG | DIASTOLIC BLOOD PRESSURE: 83 MMHG | OXYGEN SATURATION: 93 % | RESPIRATION RATE: 18 BRPM

## 2017-11-30 VITALS
TEMPERATURE: 97.9 F | RESPIRATION RATE: 18 BRPM | DIASTOLIC BLOOD PRESSURE: 71 MMHG | OXYGEN SATURATION: 98 % | HEART RATE: 79 BPM | SYSTOLIC BLOOD PRESSURE: 108 MMHG

## 2017-11-30 VITALS
DIASTOLIC BLOOD PRESSURE: 67 MMHG | OXYGEN SATURATION: 100 % | TEMPERATURE: 98.2 F | RESPIRATION RATE: 18 BRPM | HEART RATE: 65 BPM | SYSTOLIC BLOOD PRESSURE: 133 MMHG

## 2017-11-30 VITALS
DIASTOLIC BLOOD PRESSURE: 73 MMHG | TEMPERATURE: 98 F | SYSTOLIC BLOOD PRESSURE: 136 MMHG | RESPIRATION RATE: 18 BRPM | HEART RATE: 61 BPM | OXYGEN SATURATION: 99 %

## 2017-11-30 VITALS — HEART RATE: 64 BPM

## 2017-11-30 VITALS
RESPIRATION RATE: 12 BRPM | DIASTOLIC BLOOD PRESSURE: 71 MMHG | SYSTOLIC BLOOD PRESSURE: 108 MMHG | OXYGEN SATURATION: 98 % | HEART RATE: 79 BPM | TEMPERATURE: 97.9 F

## 2017-11-30 LAB
ANION GAP SERPL CALC-SCNC: 10 MEQ/L (ref 5–15)
BASOPHILS # BLD AUTO: 0 TH/MM3 (ref 0–0.2)
BASOPHILS NFR BLD: 0.1 % (ref 0–2)
BUN SERPL-MCNC: 12 MG/DL (ref 7–18)
CHLORIDE SERPL-SCNC: 103 MEQ/L (ref 98–107)
EOSINOPHIL # BLD: 0.1 TH/MM3 (ref 0–0.4)
EOSINOPHIL NFR BLD: 2.1 % (ref 0–4)
ERYTHROCYTE [DISTWIDTH] IN BLOOD BY AUTOMATED COUNT: 21.1 % (ref 11.6–17.2)
GFR SERPLBLD BASED ON 1.73 SQ M-ARVRAT: 87 ML/MIN (ref 89–?)
HCO3 BLD-SCNC: 22.7 MEQ/L (ref 21–32)
HCT VFR BLD CALC: 37.1 % (ref 35–46)
HEMO FLAGS: (no result)
LYMPHOCYTES # BLD AUTO: 0.9 TH/MM3 (ref 1–4.8)
LYMPHOCYTES NFR BLD AUTO: 24 % (ref 9–44)
MCH RBC QN AUTO: 26.5 PG (ref 27–34)
MCHC RBC AUTO-ENTMCNC: 32.2 % (ref 32–36)
MCV RBC AUTO: 82.3 FL (ref 80–100)
MONOCYTES NFR BLD: 6.8 % (ref 0–8)
NEUTROPHILS # BLD AUTO: 2.5 TH/MM3 (ref 1.8–7.7)
NEUTROPHILS NFR BLD AUTO: 67 % (ref 16–70)
PLATELET # BLD: 131 TH/MM3 (ref 150–450)
POTASSIUM SERPL-SCNC: 3.9 MEQ/L (ref 3.5–5.1)
RBC # BLD AUTO: 4.51 MIL/MM3 (ref 4–5.3)
SODIUM SERPL-SCNC: 136 MEQ/L (ref 136–145)
WBC # BLD AUTO: 3.7 TH/MM3 (ref 4–11)

## 2017-11-30 RX ADMIN — DIAZEPAM PRN MG: 2 TABLET ORAL at 17:58

## 2017-11-30 RX ADMIN — BUTALBITAL, ACETAMINOPHEN, AND CAFFEINE PRN TAB: 50; 325; 40 TABLET ORAL at 09:43

## 2017-11-30 RX ADMIN — SERTRALINE HYDROCHLORIDE SCH MG: 50 TABLET, FILM COATED ORAL at 09:38

## 2017-11-30 RX ADMIN — DICYCLOMINE HYDROCHLORIDE SCH MG: 20 TABLET ORAL at 09:38

## 2017-11-30 RX ADMIN — ZOLPIDEM TARTRATE PRN MG: 5 TABLET, COATED ORAL at 22:11

## 2017-11-30 RX ADMIN — DICYCLOMINE HYDROCHLORIDE SCH MG: 20 TABLET ORAL at 12:28

## 2017-11-30 RX ADMIN — CHOLECALCIFEROL CAP 125 MCG (5000 UNIT) SCH UNITS: 125 CAP at 09:37

## 2017-11-30 RX ADMIN — STANDARDIZED SENNA CONCENTRATE AND DOCUSATE SODIUM SCH TAB: 8.6; 5 TABLET, FILM COATED ORAL at 21:43

## 2017-11-30 RX ADMIN — MAGNESIUM OXIDE TAB 400 MG (241.3 MG ELEMENTAL MG) SCH MG: 400 (241.3 MG) TAB at 21:43

## 2017-11-30 RX ADMIN — Medication SCH ML: at 21:44

## 2017-11-30 RX ADMIN — ACYCLOVIR SCH TAB: 800 TABLET ORAL at 09:37

## 2017-11-30 RX ADMIN — PHENYTOIN SODIUM SCH MLS/HR: 50 INJECTION INTRAMUSCULAR; INTRAVENOUS at 15:25

## 2017-11-30 RX ADMIN — PHENYTOIN SODIUM SCH MLS/HR: 50 INJECTION INTRAMUSCULAR; INTRAVENOUS at 07:25

## 2017-11-30 RX ADMIN — DIAZEPAM PRN MG: 2 TABLET ORAL at 01:12

## 2017-11-30 RX ADMIN — Medication SCH ML: at 09:00

## 2017-11-30 RX ADMIN — MAGNESIUM OXIDE TAB 400 MG (241.3 MG ELEMENTAL MG) SCH MG: 400 (241.3 MG) TAB at 09:37

## 2017-11-30 RX ADMIN — BUTALBITAL, ACETAMINOPHEN, AND CAFFEINE PRN TAB: 50; 325; 40 TABLET ORAL at 17:54

## 2017-11-30 RX ADMIN — STANDARDIZED SENNA CONCENTRATE AND DOCUSATE SODIUM SCH TAB: 8.6; 5 TABLET, FILM COATED ORAL at 09:38

## 2017-11-30 RX ADMIN — BUTALBITAL, ACETAMINOPHEN, AND CAFFEINE PRN TAB: 50; 325; 40 TABLET ORAL at 00:48

## 2017-11-30 RX ADMIN — MEGESTROL ACETATE SCH MG: 40 TABLET ORAL at 22:12

## 2017-11-30 RX ADMIN — DICYCLOMINE HYDROCHLORIDE SCH MG: 20 TABLET ORAL at 16:57

## 2017-11-30 RX ADMIN — MEGESTROL ACETATE SCH MG: 40 TABLET ORAL at 09:37

## 2017-11-30 RX ADMIN — SODIUM CHLORIDE SCH MLS/HR: 900 INJECTION, SOLUTION INTRAVENOUS at 16:58

## 2017-11-30 RX ADMIN — SODIUM CHLORIDE SCH MLS/HR: 900 INJECTION INTRAVENOUS at 16:00

## 2017-11-30 RX ADMIN — CYANOCOBALAMIN TAB 1000 MCG SCH MCG: 1000 TAB at 09:43

## 2017-11-30 NOTE — PD.ONC.PN
Subjective


Subjective


Remarks


Afebrile


Asking for Imitrex injection for headache


Denies shortness of breath


Feels like she is getting stronger but reports she feels dizzy when she gets up 

to move





Objective


Data











  Date Time  Temp Pulse Resp B/P (MAP) Pulse Ox O2 Delivery O2 Flow Rate FiO2


 


11/30/17 12:00 98.6 53 18 147/83 (104) 93   


 


11/30/17 08:00 98.0 61 18 136/73 (94) 99   


 


11/30/17 04:00 98.2 65 18 133/67 (89) 100   


 


11/30/17 01:46 97.9 79 12 108/71 98   


 


11/30/17 00:00 97.9 79 18 108/71 (83) 98   


 


11/29/17 23:39 98.0 64 12 126/77 97   


 


11/29/17 23:00 97.9 68 12 117/77 98   


 


11/29/17 22:41 97.9 66 12 112/70 98   


 


11/29/17 20:00 98.8 65 18 159/85 (109) 97   


 


11/29/17 16:05 98.7 71 18 165/89 (114) 96   














 11/30/17 11/30/17 11/30/17





 07:00 15:00 23:00


 


Intake Total 450 ml  


 


Output Total 400 ml  


 


Balance 50 ml  








Result Diagram:  


11/30/17 0715 11/30/17 0715





Laboratory Results





Laboratory Tests








Test


  11/29/17


16:21 11/30/17


07:15


 


Prothrombin Time 10.5 SEC  


 


Prothromb Time International


Ratio 1.0 RATIO 


  


 


 


Activated Partial


Thromboplast Time 25.0 SEC 


  


 


 


Fibrinogen 318 mg/dL  


 


White Blood Count  3.7 TH/MM3 


 


Red Blood Count  4.51 MIL/MM3 


 


Hemoglobin  11.9 GM/DL 


 


Hematocrit  37.1 % 


 


Mean Corpuscular Volume  82.3 FL 


 


Mean Corpuscular Hemoglobin  26.5 PG 


 


Mean Corpuscular Hemoglobin


Concent 


  32.2 % 


 


 


Red Cell Distribution Width  21.1 % 


 


Platelet Count  131 TH/MM3 


 


Mean Platelet Volume  10.0 FL 


 


Neutrophils (%) (Auto)  67.0 % 


 


Lymphocytes (%) (Auto)  24.0 % 


 


Monocytes (%) (Auto)  6.8 % 


 


Eosinophils (%) (Auto)  2.1 % 


 


Basophils (%) (Auto)  0.1 % 


 


Neutrophils # (Auto)  2.5 TH/MM3 


 


Lymphocytes # (Auto)  0.9 TH/MM3 


 


Monocytes # (Auto)  0.3 TH/MM3 


 


Eosinophils # (Auto)  0.1 TH/MM3 


 


Basophils # (Auto)  0.0 TH/MM3 


 


CBC Comment  DIFF FINAL 


 


Differential Comment   


 


Blood Urea Nitrogen  12 MG/DL 


 


Creatinine  0.70 MG/DL 


 


Random Glucose  83 MG/DL 


 


Calcium Level  8.7 MG/DL 


 


Sodium Level  136 MEQ/L 


 


Potassium Level  3.9 MEQ/L 


 


Chloride Level  103 MEQ/L 


 


Carbon Dioxide Level  22.7 MEQ/L 


 


Anion Gap  10 MEQ/L 


 


Estimat Glomerular Filtration


Rate 


  87 ML/MIN 


 








Culture Results





Microbiology








 Date/Time


Source Procedure


Growth Status


 


 


 11/27/17 21:58


Urine Catheterized Urine Urine Culture - Final


Klebsiella Pneumoniae Complete











Administered Medications








 Medications


  (Trade)  Dose


 Ordered  Sig/Jermaine


 Route


 PRN Reason  Start Time


 Stop Time Status Last Admin


Dose Admin


 


 Sodium Chloride  1,000 ml @ 


 100 mls/hr  Q10H


 IV


   11/26/17 21:25


    11/30/17 07:25


 


 


 Sodium Chloride


  (NS Flush)  2 ml  BID


 IV FLUSH


   11/27/17 09:00


    11/29/17 21:00


 


 


 Acetaminophen


  (Tylenol)  650 mg  Q6H  PRN


 PO


 FEVER/PAIN SCALE 1 TO 2  11/26/17 21:30


    11/29/17 22:40


 


 


 Senna/Docusate


 Sodium


  (Evonne-Colace)  1 tab  BID


 PO


   11/27/17 09:00


    11/30/17 09:38


 


 


 Dicyclomine HCl


  (Bentyl)  40 mg  TID


 PO


   11/27/17 09:00


    11/30/17 12:28


 


 


 Magnesium Oxide


  (Mag-Ox)  400 mg  BID


 PO


   11/27/17 09:00


    11/30/17 09:37


 


 


 Sertraline HCl


  (Zoloft)  50 mg  DAILY


 PO


   11/27/17 09:00


    11/30/17 09:38


 


 


 Cyanocobalamin


  (Vitamin B12)  1,000 mcg  DAILY


 PO


   11/28/17 09:00


    11/30/17 09:43


 


 


 Multivitamins


  (Theragran)  1 tab  DAILY


 PO


   11/28/17 09:00


    11/30/17 09:37


 


 


 Cholecalciferol


  (Vitamin D3)  5,000 units  DAILY


 PO


   11/28/17 09:00


    11/30/17 09:37


 


 


 Megestrol Acetate


  (Megace)  40 mg  Q12HR


 PO


   11/28/17 21:00


    11/30/17 09:37


 


 


 Buspirone HCl


  (Buspar)  10 mg  Q8HR


 PO


   11/28/17 22:00


    11/30/17 12:28


 


 


 Acetaminophen/


 Butalbital/


 Caffeine


  (Fioricet


 325-50-40)  1 tab  Q8H  PRN


 PO


 headache/migraine  11/28/17 15:30


    11/30/17 09:43


 


 


 Ceftriaxone


 Sodium 1000 mg/


 Sodium Chloride  100 ml @ 


 200 mls/hr  Q24H


 IV


   11/28/17 16:00


    11/29/17 15:58


 


 


 Diazepam


  (Valium)  2 mg  Q8HR  PRN


 PO


 NAUSEA  11/29/17 10:45


    11/30/17 01:12


 








Objective Remarks


GENERAL: Middle aged female sitting up in bed in no obvious distress.


SKIN: Warm and dry.


HEAD: Normocephalic.


EYES: no injection or drainage. 


NECK: Supple, trachea midline.


CARDIOVASCULAR: +S1/S2


RESPIRATORY: Anterior fields clear. 


GASTROINTESTINAL: Abdomen soft, non-tender, nondistended. 


EXTREMITIES: No cyanosis.


NEUROLOGICAL: Awake and alert, normal speech. Left leg weakness. Moves upper 

extremities without difficulty.





Assessment/Plan


Problem List:  


(1) Pancytopenia


ICD Codes:  D61.818 - Other pancytopenia


Plan:  --blood count fluctuating over the last several months.  


--likely has low blood count due to nutrition deficiency.  


--recent drop in blood count may be due to the urinary tract infection and 

transient bone marrow suppression.  


--no evidence of active bleeding noted. not neutropenic.  


--will give B12 and thiamine. 





(2) History of TTP (thrombotic thrombocytopenic purpura)


ICD Codes:  Z86.2 - Personal history of diseases of the blood and blood-forming 

organs and certain disorders involving the immune mechanism


Plan:  --had plasmapheresis about six years ago  when she developed low 

platelet count. 


--did not have an confirmed diagnosis of TTP.


--platelet count is only slightly low.  


--There is no evidence of TTP.





(3) Left arm weakness


ICD Codes:  R29.898 - Other symptoms and signs involving the musculoskeletal 

system


Plan:  --neurology following


--MRI brain unremarkable





Assessment


55y/o female with pancytopenia.


h/o Renal cell carcinoma in 2005


Stroke after nephrectomy surgery.


Depression, anxiety.


Chronic aspiration prior to her gastrectomy


Myocardial function


B12 deficiency.


Plan


1. Noted that she has significant MANUEL with ferritin level of 6. 


2. Will give 3 days of iron sucrose 200mg IV.


3. Monitor CBC.


Attending Statement


The exam, history, and the medical decision-making described in the above note 

were completed with the assistance of the mid-level provider. I reviewed and 

agree with the findings presented.  I attest that I had a face-to-face 

encounter with the patient on the same day, and personally performed and 

documented my assessment and findings in the medical record.Still feels weak 

but slightly better after the transfusion.  W/u showed severe iron deficiency 

likely poor absorption, no obvious bleeding noted.  Will give IV venofer.  

Monitor CBC.











Caridad Patel Nov 30, 2017 14:19


Macho Rodriguez MD Nov 30, 2017 18:44

## 2017-11-30 NOTE — EKG
Date Performed: 2017       Time Performed: 12:32:29

 

PTAGE:      56 years

 

EKG:      SINUS BRADYCARDIA WITH SINUS ARRHYTHMIA LEFT BUNDLE BRANCH BLOCK ABNORMAL ECG WARNING: DATA
 QUALITY MAY AFFECT INTERPRETATION Since 

 

 PREVIOUS TRACING            , no significant change noted PREVIOUS TRACIN2017 10.37

 

DOCTOR:   Srikanth Dasilva  Interpretating Date/Time  2017 16:25:06

## 2017-11-30 NOTE — HHI.FF
Face to Face Verification


Diagnosis:  


(1) TIA (transient ischemic attack)


(2) Debility


(3) Chronic pain


(4) Abdominal pain


(5) History of gastrectomy


(6) History of CVA (cerebrovascular accident)


Physical Therapy


Order:  Evaluate and Treat, Improve ambulation, Strength and gait training





Home Health Nursing








Order: Medical education





 Signs/symptoms of disease process





 Medication education-adverse effect





 Nursing assessment with vital signs

















I have seen patient Bridget Stout on 11/30/17. My clinical findings support 

the need for the requested home health care services because:








 Ltd mobility - disease progression














I certify that my clinical findings support that this patient is homebound 

because:








 Unsafe to leave home unassisted

















Juan Figueroa MD Nov 30, 2017 11:36

## 2017-11-30 NOTE — HHI.PR
Subjective


Remarks


This is a pleasant 57 y/o female admitted due to increased lower extremity 

numbness and difficulty for ambulation


status post multiple falls, has tremor, history of Total gastrectomy and 

malnutrition, started on TPN about one month


ago and gained 10 pounds, has B12 injections and thiamine, followed by 

Neurology specialist, CT brain negative, MRI 


Negative for acute stroke, with Assessment of TIA, questioned Complicated 

Migraine, recommended EEG, Diazepam


and Imitrex, Hematology specialist following due to Pancytopenia, thought 

related to UTI and transient bone marrow


suppression replacing Vitamin B12 and Thiamine, History of TTP but diagnosis 

not confirmed, History of renal cell 


carcinoma, Stroke after Nephrectomy surgery, yesterday the patient continued 

with Left arm weakness.


Seen in her bedroom stable, she wants to get Fioricet for Headaches, asked for 

Ambien, neurology already started


her On Imitrex. will follow specialist recommendations. as per Oncology 

recommended Iron Sucrose 200 mg IV for 


3 days. No nausea, vomit or diarrhea, started regular diet.





Objective





Vital Signs








  Date Time  Temp Pulse Resp B/P (MAP) Pulse Ox O2 Delivery O2 Flow Rate FiO2


 


11/30/17 08:00 98.0 61 18 136/73 (94) 99   


 


11/30/17 04:00 98.2 65 18 133/67 (89) 100   


 


11/30/17 01:46 97.9 79 12 108/71 98   


 


11/30/17 00:00 97.9 79 18 108/71 (83) 98   


 


11/29/17 23:39 98.0 64 12 126/77 97   


 


11/29/17 23:00 97.9 68 12 117/77 98   


 


11/29/17 22:41 97.9 66 12 112/70 98   


 


11/29/17 20:00 98.8 65 18 159/85 (109) 97   


 


11/29/17 16:05 98.7 71 18 165/89 (114) 96   


 


11/29/17 13:13 98.1 75 17 144/88 (106) 99   


 


11/29/17 10:32 98.2 69 19 167/86 (113) 100   


 


11/29/17 09:50 98.0 69  135/85 (102)    














I/O      


 


 11/29/17 11/29/17 11/29/17 11/30/17 11/30/17 11/30/17





 07:00 15:00 23:00 07:00 15:00 23:00


 


Intake Total 350 ml 480 ml 410 ml 450 ml  


 


Output Total  850 ml 1300 ml 400 ml  


 


Balance 350 ml -370 ml -890 ml 50 ml  


 


      


 


Intake Oral 350 ml 480 ml 360 ml   


 


Packed Cells    400 ml  


 


Blood Product IV Normal Saline Flush   50 ml 50 ml  


 


Output Urine Total  850 ml 1300 ml 400 ml  


 


# Voids 1    1 








Result Diagram:  


11/29/17 0708                                                                  

              11/29/17 1126





Imaging





Last Impressions








Head CT 11/29/17 0000 Signed





Impressions: 





 Service Date/Time:  Wednesday, November 29, 2017 10:52 - CONCLUSION:  Negative 





 for acute process.     Josh Ellis MD  FACR


 


Brain MRI 11/29/17 0000 Signed





Impressions: 





 Service Date/Time:  Wednesday, November 29, 2017 11:47 - CONCLUSION:  No 





 evidence of stroke. Scattered small nonspecific white matter lesions     Prince Rich MD 


 


Chest X-Ray 11/26/17 0000 Signed





Impressions: 





 Service Date/Time:  Sunday, November 26, 2017 22:25 - CONCLUSION:  1. Minimal 





 basilar atelectasis. No effusion or pneumothorax.     Paul Cabrera MD 








Procedures


None


Other Results





Laboratory Tests








Test


  11/26/17


18:00 11/27/17


11:39 11/27/17


21:58 11/28/17


13:10


 


Lactic Acid Level 1.6 mmol/L    


 


Free Thyroxine  0.75 NG/DL   


 


Total Triiodothyronine  73 NG/DL   


 


Thyroid Stimulating Hormone


3rd Gen 


  0.084 uIU/ML 


  


  


 


 


Urine Color   LIGHT-YELLOW  


 


Urine Turbidity   HAZY  


 


Urine pH   6.0  


 


Urine Specific Gravity   1.016  


 


Urine Protein   NEG mg/dL  


 


Urine Glucose (UA)   NEG mg/dL  


 


Urine Ketones   NEG mg/dL  


 


Urine Occult Blood   NEG  


 


Urine Nitrite   POS  


 


Urine Bilirubin   NEG  


 


Urine Urobilinogen


  


  


  LESS THAN 2.0


MG/DL 


 


 


Urine Leukocyte Esterase   LARGE  


 


Urine RBC   4 /hpf  


 


Urine WBC   104 /hpf  


 


Urine Squamous Epithelial


Cells 


  


  4 /hpf 


  


 


 


Urine Transitional Epithelial


Cells 


  


  1 /hpf 


  


 


 


Urine Bacteria   MOD /hpf  


 


Microscopic Urinalysis Comment


  


  


  CATH-CULTURE


IND 


 


 


Urine Opiates Screen   NEG  


 


Urine Barbiturates Screen   POS  


 


Urine Amphetamines Screen   NEG  


 


Urine Benzodiazepines Screen   POS  


 


Urine Cocaine Screen   NEG  


 


Urine Cannabinoids Screen   NEG  


 


Random Cortisol    3.2 MCG/DL 


 


Test


  11/28/17


20:20 11/29/17


07:08 11/29/17


11:26 11/29/17


16:21


 


Prolactin 10.0 ng/mL    


 


Mean Corpuscular Volume  80.3 FL   


 


Mean Corpuscular Hemoglobin  25.3 PG   


 


Mean Corpuscular Hemoglobin


Concent 


  31.5 % 


  


  


 


 


Red Cell Distribution Width  20.7 %   


 


Mean Platelet Volume  9.5 FL   


 


Neutrophils (%) (Auto)  50.5 %   


 


Lymphocytes (%) (Auto)  37.7 %   


 


Monocytes (%) (Auto)  7.9 %   


 


Eosinophils (%) (Auto)  3.8 %   


 


Basophils (%) (Auto)  0.1 %   


 


Neutrophils # (Auto)  1.5 TH/MM3   


 


Lymphocytes # (Auto)  1.2 TH/MM3   


 


Monocytes # (Auto)  0.2 TH/MM3   


 


Eosinophils # (Auto)  0.1 TH/MM3   


 


Basophils # (Auto)  0.0 TH/MM3   


 


CBC Comment  DIFF FINAL   


 


Reticulocyte Count  0.6 %   


 


Absolute Reticulocyte Count  21.1 MIL/L   


 


Haptoglobin  107 MG/DL   


 


Iron Level  17 MCG/DL   


 


Total Iron Binding Capacity  435 MCG/DL   


 


Percent Iron Saturation  3.9 %   


 


Ferritin  6 NG/ML   


 


Lactate Dehydrogenase  133 U/L   


 


Total Creatine Kinase  129 U/L   


 


Troponin I


  


  LESS THAN 0.02


NG/ML 


  


 


 


Vitamin B12 Level  492 PG/ML   


 


Estimat Glomerular Filtration


Rate 


  


  81 ML/MIN 


  


 


 


Blood Urea Nitrogen   12 MG/DL  


 


Creatinine   0.74 MG/DL  


 


Random Glucose   77 MG/DL  


 


Total Protein   6.4 GM/DL  


 


Albumin   3.2 GM/DL  


 


Calcium Level   8.4 MG/DL  


 


Alkaline Phosphatase   103 U/L  


 


Aspartate Amino Transf


(AST/SGOT) 


  


  29 U/L 


  


 


 


Alanine Aminotransferase


(ALT/SGPT) 


  


  28 U/L 


  


 


 


Total Bilirubin   0.1 MG/DL  


 


Sodium Level   139 MEQ/L  


 


Potassium Level   4.0 MEQ/L  


 


Chloride Level   106 MEQ/L  


 


Carbon Dioxide Level   25.9 MEQ/L  


 


Prothrombin Time    10.5 SEC 


 


Prothromb Time International


Ratio 


  


  


  1.0 RATIO 


 


 


Activated Partial


Thromboplast Time 


  


  


  25.0 SEC 


 


 


Fibrinogen    318 mg/dL 


 


Test


  11/30/17


07:15 


  


  


 








Objective Remarks


GENERAL: Cachectic.  no acute distress. 


SKIN: Warm and dry.


HEAD: Normocephalic.


EYES: No scleral icterus. No injection or drainage. 


NECK: Supple, trachea midline. No JVD.


CARDIOVASCULAR: Regular rate and rhythm without murmurs, gallops, or rubs. 


RESPIRATORY: Breath sounds equal bilaterally. No accessory muscle use.


GASTROINTESTINAL: Abdomen soft, non-tender, nondistended. 


MUSCULOSKELETAL: No cyanosis, or edema.


Medications and IVs





Current Medications








 Medications


  (Trade)  Dose


 Ordered  Sig/Jermaine


 Route  Start Time


 Stop Time Status Last Admin


 


 Sodium Chloride  1,000 ml @ 


 100 mls/hr  Q10H


 IV  11/26/17 21:25


    11/30/17 07:25


 


 


  (NS Flush)  2 ml  UNSCH  PRN


 IV FLUSH  11/26/17 21:30


     


 


 


  (NS Flush)  2 ml  BID


 IV FLUSH  11/27/17 09:00


    11/29/17 21:00


 


 


  (Tylenol)  650 mg  Q6H  PRN


 PO  11/26/17 21:30


    11/29/17 22:40


 


 


  (Evonne-Colace)  1 tab  BID


 PO  11/27/17 09:00


    11/29/17 22:15


 


 


  (Milk Of


 Magnesia Liq)  30 ml  Q12H  PRN


 PO  11/26/17 21:30


     


 


 


  (Senokot)  17.2 mg  Q12H  PRN


 PO  11/26/17 21:30


     


 


 


  (Dulcolax Supp)  10 mg  DAILY  PRN


 RECTAL  11/26/17 21:30


     


 


 


  (Lactulose Liq)  30 ml  DAILY  PRN


 PO  11/26/17 21:30


     


 


 


  (Bentyl)  40 mg  TID


 PO  11/27/17 09:00


    11/29/17 18:50


 


 


  (Mag-Ox)  400 mg  BID


 PO  11/27/17 09:00


    11/29/17 22:16


 


 


  (Zoloft)  50 mg  DAILY


 PO  11/27/17 09:00


    11/29/17 08:18


 


 


  (Vitamin B12)  1,000 mcg  DAILY


 PO  11/28/17 09:00


     


 


 


  (Theragran)  1 tab  DAILY


 PO  11/28/17 09:00


    11/29/17 08:18


 


 


  (Vitamin D3)  5,000 units  DAILY


 PO  11/28/17 09:00


    11/29/17 08:18


 


 


  (Megace)  40 mg  Q12HR


 PO  11/28/17 21:00


    11/29/17 22:39


 


 


  (Buspar)  10 mg  Q8HR


 PO  11/28/17 22:00


    11/30/17 07:25


 


 


  (Fioricet


 325-50-40)  1 tab  Q8H  PRN


 PO  11/28/17 15:30


    11/30/17 00:48


 


 


 Ceftriaxone


 Sodium 1000 mg/


 Sodium Chloride  100 ml @ 


 200 mls/hr  Q24H


 IV  11/28/17 16:00


    11/29/17 15:58


 


 


  (Valium)  2 mg  Q8HR  PRN


 PO  11/29/17 10:45


    11/30/17 01:12


 











A/P


Assessment and Plan


// Generalized Weakness:  c/o ongoing generalized weakness, likely 

multifactorial-malnutrition and polypharmacy.  Seen in ER on 11/25/17 for same, 

MRI C/T/L 


   Spine w/ mild-moderate canal stenosis C5-6, otherwise normal, pt LEFT AMA at 

that time, however does not recall doing so.  MRI Brain tonight w/ no acute 


   findings, images reviewed by me.  PT for eval/tx. = PT recommends home health

, however patient does not feel she will be able to go home. asked for Rehab 


   facility as per PT will need Home with [facility] face to face in EMR for Home 

Health care. 





//Acute left arm weakness 11/29, Neurology specialist following CT Brain 

negative, MRI Negative for acute stroke, placed for diagnosis of TIA by 

Neurology specialist


  also recommended EEG, Diazepam and Imitrex. 





//History of renal cell carcinoma stroke after nephrectomy surgery. 





//Severe malnutrition.  BMI 16.0.  Possibly related to loose brown toxicity.  

Continue on lower dose of buspirone. the patient was on TPN about one month ago


  she gained 10 pound, was continued on B 12 injections and thiamine by 

hematology specialist. 





//Gait Instability:  reports inability to ambulate, MRI Spine essentially 

unremarkable as above.  PT recommended for Home with [facility].  


= Neurology assistance appreciated.  MRI changes do not explain generalized 

weakness. 





//Pancytopenia as per Hematology specialist thought related to UTI and 

transient bone marrow suppression. has History of TTP but at this time no signs 

of this pathology





//Narcotic Dependence:  On multiple narcotics in addition to anxiolytics, 

repeatedly requesting pain medication in ER both last night and this evening, 

will hold Fioricet/Valium patient asked for Ambien 5 mg re started


  at a lower dose. = Continue to try to avoid excessive narcotics.





//UTI.  Urinalysis from 11/27 Escherichia coli.  Sensitivities pending.  

Continue on ceftriaxone.





//DVT Prophylaxis:  SCD/Teds.








Discussed with neurology, Dr. Carter.  We'll get stat CT, stat MRI following CT.

  Discussed with Dr. FLOWERS, who recommends that if MRI brain is negative, would 

suggest consulted neurosurgery for possible cervical spinal stenosis.


Discharge Planning


Once cleared by Specialists.











Juan Figueroa MD Nov 30, 2017 08:37

## 2017-12-01 VITALS
OXYGEN SATURATION: 99 % | DIASTOLIC BLOOD PRESSURE: 77 MMHG | RESPIRATION RATE: 18 BRPM | SYSTOLIC BLOOD PRESSURE: 177 MMHG | TEMPERATURE: 97.7 F | HEART RATE: 77 BPM

## 2017-12-01 VITALS
HEART RATE: 66 BPM | SYSTOLIC BLOOD PRESSURE: 153 MMHG | RESPIRATION RATE: 16 BRPM | OXYGEN SATURATION: 100 % | TEMPERATURE: 98.7 F | DIASTOLIC BLOOD PRESSURE: 80 MMHG

## 2017-12-01 VITALS
TEMPERATURE: 98.4 F | OXYGEN SATURATION: 97 % | DIASTOLIC BLOOD PRESSURE: 71 MMHG | SYSTOLIC BLOOD PRESSURE: 126 MMHG | HEART RATE: 76 BPM | RESPIRATION RATE: 18 BRPM

## 2017-12-01 VITALS — OXYGEN SATURATION: 98 %

## 2017-12-01 VITALS
OXYGEN SATURATION: 99 % | DIASTOLIC BLOOD PRESSURE: 76 MMHG | RESPIRATION RATE: 18 BRPM | HEART RATE: 60 BPM | SYSTOLIC BLOOD PRESSURE: 145 MMHG | TEMPERATURE: 98.1 F

## 2017-12-01 VITALS
DIASTOLIC BLOOD PRESSURE: 82 MMHG | TEMPERATURE: 96.9 F | OXYGEN SATURATION: 99 % | SYSTOLIC BLOOD PRESSURE: 155 MMHG | RESPIRATION RATE: 18 BRPM | HEART RATE: 74 BPM

## 2017-12-01 VITALS
RESPIRATION RATE: 18 BRPM | OXYGEN SATURATION: 98 % | SYSTOLIC BLOOD PRESSURE: 138 MMHG | TEMPERATURE: 98.6 F | HEART RATE: 65 BPM | DIASTOLIC BLOOD PRESSURE: 84 MMHG

## 2017-12-01 VITALS — HEART RATE: 69 BPM

## 2017-12-01 VITALS — OXYGEN SATURATION: 99 %

## 2017-12-01 VITALS — HEART RATE: 72 BPM

## 2017-12-01 RX ADMIN — Medication SCH ML: at 09:00

## 2017-12-01 RX ADMIN — SODIUM CHLORIDE SCH MLS/HR: 900 INJECTION, SOLUTION INTRAVENOUS at 09:42

## 2017-12-01 RX ADMIN — STANDARDIZED SENNA CONCENTRATE AND DOCUSATE SODIUM SCH TAB: 8.6; 5 TABLET, FILM COATED ORAL at 21:10

## 2017-12-01 RX ADMIN — BUTALBITAL, ACETAMINOPHEN, AND CAFFEINE PRN TAB: 50; 325; 40 TABLET ORAL at 21:10

## 2017-12-01 RX ADMIN — BUTALBITAL, ACETAMINOPHEN, AND CAFFEINE PRN TAB: 50; 325; 40 TABLET ORAL at 12:57

## 2017-12-01 RX ADMIN — BUTALBITAL, ACETAMINOPHEN, AND CAFFEINE PRN TAB: 50; 325; 40 TABLET ORAL at 04:29

## 2017-12-01 RX ADMIN — CHOLECALCIFEROL CAP 125 MCG (5000 UNIT) SCH UNITS: 125 CAP at 09:41

## 2017-12-01 RX ADMIN — SERTRALINE HYDROCHLORIDE SCH MG: 50 TABLET, FILM COATED ORAL at 09:42

## 2017-12-01 RX ADMIN — DICYCLOMINE HYDROCHLORIDE SCH MG: 20 TABLET ORAL at 09:41

## 2017-12-01 RX ADMIN — ZOLPIDEM TARTRATE PRN MG: 5 TABLET, COATED ORAL at 21:10

## 2017-12-01 RX ADMIN — DIAZEPAM PRN MG: 2 TABLET ORAL at 18:24

## 2017-12-01 RX ADMIN — DICYCLOMINE HYDROCHLORIDE SCH MG: 20 TABLET ORAL at 18:24

## 2017-12-01 RX ADMIN — MAGNESIUM OXIDE TAB 400 MG (241.3 MG ELEMENTAL MG) SCH MG: 400 (241.3 MG) TAB at 21:09

## 2017-12-01 RX ADMIN — MAGNESIUM OXIDE TAB 400 MG (241.3 MG ELEMENTAL MG) SCH MG: 400 (241.3 MG) TAB at 09:42

## 2017-12-01 RX ADMIN — SODIUM CHLORIDE SCH MLS/HR: 900 INJECTION INTRAVENOUS at 16:00

## 2017-12-01 RX ADMIN — MEGESTROL ACETATE SCH MG: 40 TABLET ORAL at 09:55

## 2017-12-01 RX ADMIN — CYANOCOBALAMIN TAB 1000 MCG SCH MCG: 1000 TAB at 09:00

## 2017-12-01 RX ADMIN — PHENYTOIN SODIUM SCH MLS/HR: 50 INJECTION INTRAMUSCULAR; INTRAVENOUS at 22:34

## 2017-12-01 RX ADMIN — STANDARDIZED SENNA CONCENTRATE AND DOCUSATE SODIUM SCH TAB: 8.6; 5 TABLET, FILM COATED ORAL at 09:41

## 2017-12-01 RX ADMIN — DICYCLOMINE HYDROCHLORIDE SCH MG: 20 TABLET ORAL at 13:07

## 2017-12-01 RX ADMIN — PHENYTOIN SODIUM SCH MLS/HR: 50 INJECTION INTRAMUSCULAR; INTRAVENOUS at 11:25

## 2017-12-01 RX ADMIN — ACYCLOVIR SCH TAB: 800 TABLET ORAL at 09:41

## 2017-12-01 RX ADMIN — SUMATRIPTAN SUCCINATE PRN MG: 6 INJECTION SUBCUTANEOUS at 16:06

## 2017-12-01 RX ADMIN — MEGESTROL ACETATE SCH MG: 40 TABLET ORAL at 21:27

## 2017-12-01 RX ADMIN — OXYCODONE HYDROCHLORIDE PRN MG: 10 TABLET, FILM COATED, EXTENDED RELEASE ORAL at 21:28

## 2017-12-01 RX ADMIN — SUMATRIPTAN SUCCINATE PRN MG: 6 INJECTION SUBCUTANEOUS at 10:06

## 2017-12-01 RX ADMIN — Medication SCH ML: at 21:00

## 2017-12-01 RX ADMIN — PHENYTOIN SODIUM SCH MLS/HR: 50 INJECTION INTRAMUSCULAR; INTRAVENOUS at 05:12

## 2017-12-01 RX ADMIN — DIAZEPAM PRN MG: 2 TABLET ORAL at 04:29

## 2017-12-01 RX ADMIN — DIAZEPAM PRN MG: 2 TABLET ORAL at 09:41

## 2017-12-01 NOTE — MB
cc:

LUIS LIN DALIA M.D.

****

 

 

DATE OF CONSULTATION:  12/1/17

 

 

REASON FOR CONSULTATION

C5-C6 stenosis.

 

HISTORY OF PRESENT ILLNESS

This is a 56-year-old lady who presents with complaints of generalized weakness

and recurrent falls.  She also had an acute onset of dense left-sided

hemiparesis associated with a severe migraine headache a couple days ago, and

states that subsequently with Imitrex and pain medication this has improved and

hemiparesis has also mainly resolved.  She has a chronic left foot weakness

from a previous stroke after her heart surgery and was found to have a septal

defect which was repaired.  She also is very malnourished and has a history of

gastrectomy and nephrectomy for renal cell carcinoma and was initially on TPN.

She takes OxyContin on a regular basis mainly for her abdominal pain and

symptoms.  She has been evaluated by Neurology and extensive neurologic workup

including MRI scan of the brain as well as cervical, thoracic and lumbar spine

has been obtained.  The only pertinent finding is a moderate stenosis at the

C5-6 level from a disc/osteophyte complex and degenerate changes.  No intrinsic

spinal cord changes are noted.

 

PAST MEDICAL HISTORY

1. Gastrectomy with overall poor nutrition and appetite.

2. Renal cell carcinoma status post nephrectomy.

3. History of stroke with a left-sided weakness particularly distally in the

   left foot.

4. Anxiety, depression.

 

MEDICATIONS

Medications prior to admission include -

1. BuSpar 30 mg t.i.d.

2. Fioricet 20 mg q.6 hours p.r.n.

3. Calcium and Vitamin D supplements.

4. Valium 3 mg t.i.d.

5. Dicyclomine 40 mg t.i.d.

6. Dulcolax 100 mg q.h.s.

7. Cymbalta 20 mg daily.

8. Haldol 20 mg p.r.n.

9. Magnesium oxide 400 mg b.i.d.

10. Nortriptyline 75 mg q.h.s.

11. OxyContin 10 mg q.8 hours.

12. Zoloft 50 mg daily.

13. Vitamin E.

14. Ambien 10 mg q.h.s. p.r.n.

 

ALLERGIES

SULFA, MORPHINE, ZOFRAN, PENICILLIN, PROMETHAZINE, SHELLFISH, REGLAN,

KETOROLAC, TRIMETHOBENZAMIDE, PROCHLORPERAZINE, GADOTERIDOL, GADODIAMIDE,

GADOBENIC ACID.

 

SOCIAL HISTORY

She is .  Denies alcohol or tobacco use.

 

LABORATORY FINDINGS

White blood cell count 3.7, hemoglobin 11.9, platelet count of 131.

 

PT 10.5, INR 1.0, PTT 25, fibrinogen 318.

 

Sodium 136, potassium 3.9, BUN 12, creatinine 0.7, glucose 83.

 

FAMILY HISTORY

Negative for any diabetes mellitus or coronary artery disease.

 

PHYSICAL EXAMINATION

VITAL SIGNS:  Temperature 96.9, pulse is 74, respiratory rate 18, blood

pressure 155/82, oxygen saturation 99% on room air.

HEENT:  No Whalen's or raccoon's sign.  Normocephalic, atraumatic.

NECK:  There is some mild restriction in range of motion which is chronic for

her with mild discomfort.

CHEST:  Clear to auscultation bilaterally.

HEART:  Regular rate and rhythm.  Normal S1 and S2.

ABDOMEN:  Soft, nontender.  No hepatosplenomegaly.

NEUROLOGIC:  She is awake, alert.  Extraocular muscles are intact.  Face is

symmetric.  Tongue is midline.  She moves upper and lower extremities with mild

left upper extremity weakness about 4+/5, lower extremity proximally she is 4+,

and distally in dorsiflexion she is 3, and plantar flexion 4-.  Equivocal

Babinski.  She states that she has had light touch sensation decreased distally

below her knee in the left leg.  There is normal sensation in the upper

extremities and the right lower extremity.  Speech is fluent.

GENERAL APPEARANCE:  Overall a middle-aged female, very cachectic appearance,

in no acute distress.

SKIN:  No breakdown or rashes or any ecchymosis noted.

 

IMPRESSION

Cervical C5-C6 moderate disc stenosis, although current symptoms do not appear

to be related to this.  She denies any incontinence and had a transient dense

left hemiparesis which has resolved once her migraines also improved and

appears to be likely a complex migraine attack.  The neurologists also are in

agreement with this diagnosis.  She does have confounding medical comorbidities

as well as being malnourished and also received packed red blood cells and iron

infusion to help with her generalized weakness.

 

PLAN

I have discussed the findings regarding the C5-C6 stenosis and she understands

that there is an increased risk for spinal cord injury especially with fall

given the stenosis.  Surgical intervention is the only way to decompress the

spinal canal, but given her overall frail condition and comorbidities her risk

for complications are moderate to high range.

 

She is adamant that she does not want any surgical intervention at this point

and will accept the risks associated with the cervical stenosis.  I recommended

she continue with improving her p.o. intake as well as undergo rehabilitation

with physical and occupational therapy.  She will followup with Neurology and I

will see her on an as-needed basis.

 

 

 

 

 

                              _________________________________

                              MD LAWRENCE Rey/WOLF

D:  12/1/2017/4:03 PM

T:  12/1/2017/4:18 PM

Visit #:  [id number]

Job #:  [id number]

## 2017-12-01 NOTE — PD.ONC.PN
Subjective


Subjective


Remarks


Afebrile


Pt reports she is having a lot of nausea associated with the iron infusion.


Currently trying to eat a late lunch


Reports she is going home tomorrow with home health PT





Objective


Data











  Date Time  Temp Pulse Resp B/P (MAP) Pulse Ox O2 Delivery O2 Flow Rate FiO2


 


12/1/17 12:13 96.9 74 18 155/82 (106) 99   


 


12/1/17 10:53     98   


 


12/1/17 08:14 98.7 66 16 153/80 (104) 100   


 


12/1/17 04:00 98.1 60 18 145/76 (99) 99   


 


12/1/17 03:50  69      


 


12/1/17 00:00 98.4 76 18 126/71 (89) 97   


 


11/30/17 21:02 98.3 79 18 126/82 (97) 98   


 


11/30/17 20:36  64      














 12/1/17 12/1/17 12/1/17





 07:00 15:00 23:00


 


Intake Total 300 ml 960 ml 


 


Output Total 500 ml  


 


Balance -200 ml 960 ml 








Result Diagram:  


11/30/17 0715 11/30/17 0715








Administered Medications








 Medications


  (Trade)  Dose


 Ordered  Sig/Jermaine


 Route


 PRN Reason  Start Time


 Stop Time Status Last Admin


Dose Admin


 


 Sodium Chloride  1,000 ml @ 


 100 mls/hr  Q10H


 IV


   11/26/17 21:25


    12/1/17 05:12


 


 


 Sodium Chloride


  (NS Flush)  2 ml  BID


 IV FLUSH


   11/27/17 09:00


    12/1/17 09:00


 


 


 Acetaminophen


  (Tylenol)  650 mg  Q6H  PRN


 PO


 FEVER/PAIN SCALE 1 TO 2  11/26/17 21:30


    11/29/17 22:40


 


 


 Senna/Docusate


 Sodium


  (Evonne-Colace)  1 tab  BID


 PO


   11/27/17 09:00


    12/1/17 09:41


 


 


 Dicyclomine HCl


  (Bentyl)  40 mg  TID


 PO


   11/27/17 09:00


    12/1/17 13:07


 


 


 Magnesium Oxide


  (Mag-Ox)  400 mg  BID


 PO


   11/27/17 09:00


    12/1/17 09:42


 


 


 Sertraline HCl


  (Zoloft)  50 mg  DAILY


 PO


   11/27/17 09:00


    12/1/17 09:42


 


 


 Cyanocobalamin


  (Vitamin B12)  1,000 mcg  DAILY


 PO


   11/28/17 09:00


    11/30/17 09:43


 


 


 Multivitamins


  (Theragran)  1 tab  DAILY


 PO


   11/28/17 09:00


    12/1/17 09:41


 


 


 Cholecalciferol


  (Vitamin D3)  5,000 units  DAILY


 PO


   11/28/17 09:00


    12/1/17 09:41


 


 


 Megestrol Acetate


  (Megace)  40 mg  Q12HR


 PO


   11/28/17 21:00


    12/1/17 09:55


 


 


 Buspirone HCl


  (Buspar)  10 mg  Q8HR


 PO


   11/28/17 22:00


    12/1/17 12:57


 


 


 Ceftriaxone


 Sodium 1000 mg/


 Sodium Chloride  100 ml @ 


 200 mls/hr  Q24H


 IV


   11/28/17 16:00


    11/30/17 16:00


 


 


 Diazepam


  (Valium)  2 mg  Q8HR  PRN


 PO


 NAUSEA  11/29/17 10:45


    12/1/17 09:41


 


 


 Zolpidem Tartrate


  (Ambien)  5 mg  HS  PRN


 PO


 INSOMNIA  11/30/17 21:00


    11/30/17 22:11


 


 


 Iron Sucrose 200


 mg/Sodium Chloride  110 ml @ 


 110 mls/hr  DAILY


 IV


   11/30/17 14:00


 12/2/17 09:59  12/1/17 09:42


 


 


 Acetaminophen/


 Butalbital/


 Caffeine


  (Fioricet


 325-50-40)  1 tab  Q6H  PRN


 PO


 HEADACHE  12/1/17 09:15


    12/1/17 12:57


 


 


 Oxycodone HCl


  (OxyCONTIN CR)  10 mg  Q8HR


 PO


   12/1/17 14:00


    12/1/17 13:54


 


 


 Sumatriptan


 Succinate


  (Imitrex Inj)  6 mg  UNSCH  PRN


 SQ


 MIGRAINE HEADACHE  12/1/17 09:15


    12/1/17 10:06


 








Objective Remarks


GENERAL: Thin, middle aged female sitting up in bed in no obvious distress.


SKIN: Warm and dry.


HEAD: Normocephalic.


EYES: no injection or drainage. 


NECK: Supple, trachea midline.


CARDIOVASCULAR: +S1/S2


RESPIRATORY: Anterior fields clear. 


GASTROINTESTINAL: Abdomen soft, non-tender, nondistended. 


EXTREMITIES: No cyanosis. No edema


NEUROLOGICAL: Awake and alert, normal speech. Left leg weakness. Moves upper 

extremities without difficulty.





Assessment/Plan


Problem List:  


(1) Pancytopenia


ICD Codes:  D61.818 - Other pancytopenia


Plan:  --blood count fluctuating over the last several months.  


--likely has low blood count due to nutrition deficiency.  


--recent drop in blood count may be due to the urinary tract infection and 

transient bone marrow suppression.  


--no evidence of active bleeding noted. not neutropenic.  


--will give B12 and thiamine. 





(2) History of TTP (thrombotic thrombocytopenic purpura)


ICD Codes:  Z86.2 - Personal history of diseases of the blood and blood-forming 

organs and certain disorders involving the immune mechanism


Plan:  --had plasmapheresis about six years ago  when she developed low 

platelet count. 


--did not have an confirmed diagnosis of TTP.


--platelet count is only slightly low.  


--There is no evidence of TTP.





(3) Left arm weakness


ICD Codes:  R29.898 - Other symptoms and signs involving the musculoskeletal 

system


Plan:  --neurology following


--MRI brain unremarkable





Assessment


57y/o female with pancytopenia.


h/o Renal cell carcinoma in 2005


Stroke after nephrectomy surgery.


Depression, anxiety.


Chronic aspiration prior to her gastrectomy


Myocardial function


B12 deficiency.


Plan


1. Will give dose of Ativan 0.5mg po today and x 1 dose tomorrow prior to iron. 


2. OK for discharge from oncology standpoint.


Attending Statement


The exam, history, and the medical decision-making described in the above note 

were completed with the assistance of the mid-level provider. I reviewed and 

agree with the findings presented.  I attest that I had a face-to-face 

encounter with the patient on the same day, and personally performed and 

documented my assessment and findings in the medical record.  Feels better 

after the transfusion.  Tolerated Venofer well.  Will give 2 more doses of 

venofer.  With h/o of poor absorption, she will need periodic iron infusion and 

she can f/u at hematology clinic.  Monitor CBC.











Caridad Patel Dec 1, 2017 16:04


Macho Rodriguez MD Dec 1, 2017 17:44

## 2017-12-01 NOTE — HHI.PR
Subjective


Remarks


This is a pleasant 57 y/o female admitted due to increased lower extremity 

numbness and difficulty for ambulation


status post multiple falls, has tremor, history of Total gastrectomy and 

malnutrition, started on TPN about one month


ago and gained 10 pounds, has B12 injections and thiamine, followed by 

Neurology specialist, CT brain negative, MRI 


Negative for acute stroke, with Assessment of TIA, questioned Complicated 

Migraine, recommended EEG, Diazepam


and Imitrex, Hematology specialist following due to Pancytopenia, thought 

related to UTI and transient bone marrow


suppression replacing Vitamin B12 and Thiamine, History of TTP but diagnosis 

not confirmed, History of renal cell 


carcinoma, Stroke after Nephrectomy surgery, yesterday the patient continued 

with Left arm weakness.


Seen in her bedroom stable, she wants to get Fioricet for Headaches, asked for 

Ambien, neurology already started


her On Imitrex. will follow specialist recommendations. as per Oncology 

recommended Iron Sucrose 200 mg IV for 


3 days. No nausea, vomit or diarrhea, started regular diet. 





12/01: Stable in her bedroom, she will need Iron infusion as per Hematology 

specialist, will discuss with  


if the final dose for tomorrow may be given at home I do not see why the 

patient can not be discharged today, 


Okay to discharge from Medicine standpoint if okay with Neurology and 

Hematology specialist. I obtained the discharge


by Hematology specialist and Neurology specialist but She states she has the 

next Iron infusion for tomorrow in am and


needs to receive it. No nausea, vomit or diarrhea.





Objective





Vital Signs








  Date Time  Temp Pulse Resp B/P (MAP) Pulse Ox O2 Delivery O2 Flow Rate FiO2


 


12/1/17 08:14 98.7 66 16 153/80 (104) 100   


 


12/1/17 04:00 98.1 60 18 145/76 (99) 99   


 


12/1/17 03:50  69      


 


12/1/17 00:00 98.4 76 18 126/71 (89) 97   


 


11/30/17 21:02 98.3 79 18 126/82 (97) 98   


 


11/30/17 20:36  64      


 


11/30/17 12:00 98.6 53 18 147/83 (104) 93   














I/O      


 


 11/30/17 11/30/17 11/30/17 12/1/17 12/1/17 12/1/17





 07:00 15:00 23:00 07:00 15:00 23:00


 


Intake Total 450 ml   300 ml  


 


Output Total 400 ml   500 ml  


 


Balance 50 ml   -200 ml  


 


      


 


Intake Oral    300 ml  


 


Packed Cells 400 ml     


 


Blood Product IV Normal Saline Flush 50 ml     


 


Output Urine Total 400 ml   500 ml  


 


# Voids  6 3   


 


# Bowel Movements  1  3  








Result Diagram:  


11/30/17 0715                                                                  

              11/30/17 0715





Imaging





Last Impressions








Head CT 11/29/17 0000 Signed





Impressions: 





 Service Date/Time:  Wednesday, November 29, 2017 10:52 - CONCLUSION:  Negative 





 for acute process.     Josh Ellis MD  FACR


 


Brain MRI 11/29/17 0000 Signed





Impressions: 





 Service Date/Time:  Wednesday, November 29, 2017 11:47 - CONCLUSION:  No 





 evidence of stroke. Scattered small nonspecific white matter lesions     Prince Rich MD 


 


Chest X-Ray 11/26/17 0000 Signed





Impressions: 





 Service Date/Time:  Sunday, November 26, 2017 22:25 - CONCLUSION:  1. Minimal 





 basilar atelectasis. No effusion or pneumothorax.     Paul Cabrera MD 








Procedures


None


Other Results





Laboratory Tests








Test


  11/26/17


18:00 11/27/17


11:39 11/27/17


21:58 11/28/17


13:10


 


Lactic Acid Level 1.6 mmol/L    


 


Free Thyroxine  0.75 NG/DL   


 


Total Triiodothyronine  73 NG/DL   


 


Thyroid Stimulating Hormone


3rd Gen 


  0.084 uIU/ML 


  


  


 


 


Urine Color   LIGHT-YELLOW  


 


Urine Turbidity   HAZY  


 


Urine pH   6.0  


 


Urine Specific Gravity   1.016  


 


Urine Protein   NEG mg/dL  


 


Urine Glucose (UA)   NEG mg/dL  


 


Urine Ketones   NEG mg/dL  


 


Urine Occult Blood   NEG  


 


Urine Nitrite   POS  


 


Urine Bilirubin   NEG  


 


Urine Urobilinogen


  


  


  LESS THAN 2.0


MG/DL 


 


 


Urine Leukocyte Esterase   LARGE  


 


Urine RBC   4 /hpf  


 


Urine WBC   104 /hpf  


 


Urine Squamous Epithelial


Cells 


  


  4 /hpf 


  


 


 


Urine Transitional Epithelial


Cells 


  


  1 /hpf 


  


 


 


Urine Bacteria   MOD /hpf  


 


Microscopic Urinalysis Comment


  


  


  CATH-CULTURE


IND 


 


 


Urine Opiates Screen   NEG  


 


Urine Barbiturates Screen   POS  


 


Urine Amphetamines Screen   NEG  


 


Urine Benzodiazepines Screen   POS  


 


Urine Cocaine Screen   NEG  


 


Urine Cannabinoids Screen   NEG  


 


Random Cortisol    3.2 MCG/DL 


 


Test


  11/28/17


20:20 11/29/17


07:08 11/29/17


11:26 11/29/17


16:21


 


Prolactin 10.0 ng/mL    


 


Reticulocyte Count  0.6 %   


 


Absolute Reticulocyte Count  21.1 MIL/L   


 


Haptoglobin  107 MG/DL   


 


Iron Level  17 MCG/DL   


 


Total Iron Binding Capacity  435 MCG/DL   


 


Percent Iron Saturation  3.9 %   


 


Ferritin  6 NG/ML   


 


Lactate Dehydrogenase  133 U/L   


 


Total Creatine Kinase  129 U/L   


 


Troponin I


  


  LESS THAN 0.02


NG/ML 


  


 


 


Vitamin B12 Level  492 PG/ML   


 


Methylmalonic Acid  0.32 nmol/mL   


 


Blood Urea Nitrogen   12 MG/DL  


 


Creatinine   0.74 MG/DL  


 


Random Glucose   77 MG/DL  


 


Total Protein   6.4 GM/DL  


 


Albumin   3.2 GM/DL  


 


Calcium Level   8.4 MG/DL  


 


Alkaline Phosphatase   103 U/L  


 


Aspartate Amino Transf


(AST/SGOT) 


  


  29 U/L 


  


 


 


Alanine Aminotransferase


(ALT/SGPT) 


  


  28 U/L 


  


 


 


Total Bilirubin   0.1 MG/DL  


 


Sodium Level   139 MEQ/L  


 


Potassium Level   4.0 MEQ/L  


 


Chloride Level   106 MEQ/L  


 


Carbon Dioxide Level   25.9 MEQ/L  


 


Prothrombin Time    10.5 SEC 


 


Prothromb Time International


Ratio 


  


  


  1.0 RATIO 


 


 


Activated Partial


Thromboplast Time 


  


  


  25.0 SEC 


 


 


Fibrinogen    318 mg/dL 


 


Test


  11/30/17


07:15 


  


  


 


 


White Blood Count 3.7 TH/MM3    


 


Red Blood Count 4.51 MIL/MM3    


 


Hemoglobin 11.9 GM/DL    


 


Hematocrit 37.1 %    


 


Mean Corpuscular Volume 82.3 FL    


 


Mean Corpuscular Hemoglobin 26.5 PG    


 


Mean Corpuscular Hemoglobin


Concent 32.2 % 


  


  


  


 


 


Red Cell Distribution Width 21.1 %    


 


Platelet Count 131 TH/MM3    


 


Mean Platelet Volume 10.0 FL    


 


Neutrophils (%) (Auto) 67.0 %    


 


Lymphocytes (%) (Auto) 24.0 %    


 


Monocytes (%) (Auto) 6.8 %    


 


Eosinophils (%) (Auto) 2.1 %    


 


Basophils (%) (Auto) 0.1 %    


 


Neutrophils # (Auto) 2.5 TH/MM3    


 


Lymphocytes # (Auto) 0.9 TH/MM3    


 


Monocytes # (Auto) 0.3 TH/MM3    


 


Eosinophils # (Auto) 0.1 TH/MM3    


 


Basophils # (Auto) 0.0 TH/MM3    


 


CBC Comment DIFF FINAL    


 


Differential Comment     


 


Blood Urea Nitrogen 12 MG/DL    


 


Creatinine 0.70 MG/DL    


 


Random Glucose 83 MG/DL    


 


Calcium Level 8.7 MG/DL    


 


Sodium Level 136 MEQ/L    


 


Potassium Level 3.9 MEQ/L    


 


Chloride Level 103 MEQ/L    


 


Carbon Dioxide Level 22.7 MEQ/L    


 


Anion Gap 10 MEQ/L    


 


Estimat Glomerular Filtration


Rate 87 ML/MIN 


  


  


  


 








Objective Remarks


GENERAL: Cachectic.  no acute distress. 


SKIN: Warm and dry.


HEAD: Normocephalic.


EYES: No scleral icterus. No injection or drainage. 


NECK: Supple, trachea midline. No JVD.


CARDIOVASCULAR: Regular rate and rhythm without murmurs, gallops, or rubs. 


RESPIRATORY: Breath sounds equal bilaterally. No accessory muscle use.


GASTROINTESTINAL: Abdomen soft, non-tender, nondistended. 


MUSCULOSKELETAL: No cyanosis, or edema.


Medications and IVs





Current Medications








 Medications


  (Trade)  Dose


 Ordered  Sig/Jermaine


 Route  Start Time


 Stop Time Status Last Admin


 


 Sodium Chloride  1,000 ml @ 


 100 mls/hr  Q10H


 IV  11/26/17 21:25


    12/1/17 05:12


 


 


  (NS Flush)  2 ml  UNSCH  PRN


 IV FLUSH  11/26/17 21:30


     


 


 


  (NS Flush)  2 ml  BID


 IV FLUSH  11/27/17 09:00


    11/30/17 21:44


 


 


  (Tylenol)  650 mg  Q6H  PRN


 PO  11/26/17 21:30


    11/29/17 22:40


 


 


  (Evonne-Colace)  1 tab  BID


 PO  11/27/17 09:00


    11/30/17 21:43


 


 


  (Milk Of


 Magnesia Liq)  30 ml  Q12H  PRN


 PO  11/26/17 21:30


     


 


 


  (Senokot)  17.2 mg  Q12H  PRN


 PO  11/26/17 21:30


     


 


 


  (Dulcolax Supp)  10 mg  DAILY  PRN


 RECTAL  11/26/17 21:30


     


 


 


  (Lactulose Liq)  30 ml  DAILY  PRN


 PO  11/26/17 21:30


     


 


 


  (Bentyl)  40 mg  TID


 PO  11/27/17 09:00


    11/30/17 16:57


 


 


  (Mag-Ox)  400 mg  BID


 PO  11/27/17 09:00


    11/30/17 21:43


 


 


  (Zoloft)  50 mg  DAILY


 PO  11/27/17 09:00


    11/30/17 09:38


 


 


  (Vitamin B12)  1,000 mcg  DAILY


 PO  11/28/17 09:00


    11/30/17 09:43


 


 


  (Theragran)  1 tab  DAILY


 PO  11/28/17 09:00


    11/30/17 09:37


 


 


  (Vitamin D3)  5,000 units  DAILY


 PO  11/28/17 09:00


    11/30/17 09:37


 


 


  (Megace)  40 mg  Q12HR


 PO  11/28/17 21:00


    11/30/17 22:12


 


 


  (Buspar)  10 mg  Q8HR


 PO  11/28/17 22:00


    12/1/17 05:09


 


 


 Ceftriaxone


 Sodium 1000 mg/


 Sodium Chloride  100 ml @ 


 200 mls/hr  Q24H


 IV  11/28/17 16:00


    11/30/17 16:00


 


 


  (Valium)  2 mg  Q8HR  PRN


 PO  11/29/17 10:45


    12/1/17 04:29


 


 


  (Ambien)  5 mg  HS  PRN


 PO  11/30/17 21:00


    11/30/17 22:11


 


 


 Iron Sucrose 200


 mg/Sodium Chloride  110 ml @ 


 110 mls/hr  DAILY


 IV  11/30/17 14:00


 12/2/17 09:59  11/30/17 16:58


 


 


 Non-Formulary


 Medication  20 mg  Q6H  PRN


 PO  12/1/17 09:15


   UNV  


 


 


  (OxyCONTIN CR)  10 mg  Q8HR


 PO  12/1/17 14:00


   UNV  


 


 


  (Imitrex Inj)  6 mg  UNSCH  PRN


 SQ  12/1/17 09:15


   UNV  


 











A/P


Assessment and Plan


// Generalized Weakness:  c/o ongoing generalized weakness, likely 

multifactorial-malnutrition and polypharmacy.  Seen in ER on 11/25/17 for same, 

MRI C/T/L 


   Spine w/ mild-moderate canal stenosis C5-6, otherwise normal, pt LEFT AMA at 

that time, however does not recall doing so.  MRI Brain tonight w/ no acute 


   findings, images reviewed by me.  PT for eval/tx. = PT recommends home health

, however patient does not feel she will be able to go home. asked for Rehab 


   facility as per PT will need Home with [facility] face to face in EMR for Home 

Health care. 





//Acute left arm weakness 11/29, Neurology specialist following CT Brain 

negative, MRI Negative for acute stroke, placed for diagnosis of TIA by 

Neurology specialist


  also recommended EEG, Diazepam and Imitrex.  Okay to discharge as per 

Neurology specialist 


  EEG minimal nonspecific changes noted with lot of fast frequencies. 





//History of renal cell carcinoma stroke after nephrectomy surgery. 





//Severe malnutrition.  BMI 16.0.  Possibly related to loose brown toxicity.  

Continue on lower dose of buspirone. the patient was on TPN about one month ago


  she gained 10 pound, was continued on B 12 injections and thiamine by 

hematology specialist. 





//Gait Instability:  reports inability to ambulate, MRI Spine essentially 

unremarkable as above.  PT recommended for Home with [facility].  


= Neurology assistance appreciated.  MRI changes do not explain generalized 

weakness. 





//Pancytopenia as per Hematology specialist thought related to UTI and 

transient bone marrow suppression. has History of TTP but at this time no signs 

of this pathology





//Narcotic Dependence:  On multiple narcotics in addition to anxiolytics, 

repeatedly requesting pain medication in ER both last night and this evening, 

will hold Fioricet/Valium patient asked for Ambien 5 mg re started at a lower 

dose. = Continue to try to avoid excessive narcotics.





//UTI.  Urinalysis from 11/27 Escherichia coli.  Sensitivities pending.  

Continue on ceftriaxone.





//DVT Prophylaxis:  SCD/Teds.








Discussed with neurology, Dr. Carter.  We'll get stat CT, stat MRI following CT.

  Discussed with Dr. FLOWERS, who recommends that if MRI brain is negative, would 

suggest consulted neurosurgery for possible cervical spinal stenosis.


Discharge Planning


after final dose of Iron infusion tomorrow will be discharged.











Juan Figueroa MD Dec 1, 2017 9:24 am

## 2017-12-02 VITALS
RESPIRATION RATE: 18 BRPM | OXYGEN SATURATION: 97 % | TEMPERATURE: 98.3 F | DIASTOLIC BLOOD PRESSURE: 70 MMHG | SYSTOLIC BLOOD PRESSURE: 135 MMHG | HEART RATE: 64 BPM

## 2017-12-02 VITALS — OXYGEN SATURATION: 96 %

## 2017-12-02 VITALS
HEART RATE: 68 BPM | SYSTOLIC BLOOD PRESSURE: 144 MMHG | TEMPERATURE: 98.5 F | RESPIRATION RATE: 16 BRPM | OXYGEN SATURATION: 96 % | DIASTOLIC BLOOD PRESSURE: 78 MMHG

## 2017-12-02 VITALS — HEART RATE: 69 BPM

## 2017-12-02 VITALS
OXYGEN SATURATION: 100 % | TEMPERATURE: 97.3 F | DIASTOLIC BLOOD PRESSURE: 67 MMHG | SYSTOLIC BLOOD PRESSURE: 135 MMHG | HEART RATE: 59 BPM | RESPIRATION RATE: 18 BRPM

## 2017-12-02 VITALS — HEART RATE: 75 BPM

## 2017-12-02 VITALS — HEART RATE: 82 BPM

## 2017-12-02 VITALS
HEART RATE: 74 BPM | DIASTOLIC BLOOD PRESSURE: 72 MMHG | RESPIRATION RATE: 16 BRPM | SYSTOLIC BLOOD PRESSURE: 145 MMHG | OXYGEN SATURATION: 98 % | TEMPERATURE: 98.5 F

## 2017-12-02 VITALS — HEART RATE: 74 BPM

## 2017-12-02 LAB
BASOPHILS # BLD AUTO: 0 TH/MM3 (ref 0–0.2)
BASOPHILS NFR BLD: 0.2 % (ref 0–2)
EOSINOPHIL # BLD: 0.1 TH/MM3 (ref 0–0.4)
EOSINOPHIL NFR BLD: 2.8 % (ref 0–4)
ERYTHROCYTE [DISTWIDTH] IN BLOOD BY AUTOMATED COUNT: 21.1 % (ref 11.6–17.2)
HCT VFR BLD CALC: 33.9 % (ref 35–46)
HEMO FLAGS: (no result)
LYMPHOCYTES # BLD AUTO: 1.3 TH/MM3 (ref 1–4.8)
LYMPHOCYTES NFR BLD AUTO: 41.1 % (ref 9–44)
MCH RBC QN AUTO: 25.8 PG (ref 27–34)
MCHC RBC AUTO-ENTMCNC: 31.9 % (ref 32–36)
MCV RBC AUTO: 81 FL (ref 80–100)
MONOCYTES NFR BLD: 10.7 % (ref 0–8)
NEUTROPHILS # BLD AUTO: 1.5 TH/MM3 (ref 1.8–7.7)
NEUTROPHILS NFR BLD AUTO: 45.2 % (ref 16–70)
PLATELET # BLD: 114 TH/MM3 (ref 150–450)
RBC # BLD AUTO: 4.18 MIL/MM3 (ref 4–5.3)
WBC # BLD AUTO: 3.3 TH/MM3 (ref 4–11)

## 2017-12-02 RX ADMIN — DIAZEPAM PRN MG: 2 TABLET ORAL at 08:47

## 2017-12-02 RX ADMIN — SODIUM CHLORIDE SCH MLS/HR: 900 INJECTION, SOLUTION INTRAVENOUS at 08:51

## 2017-12-02 RX ADMIN — SUMATRIPTAN SUCCINATE PRN MG: 6 INJECTION SUBCUTANEOUS at 10:11

## 2017-12-02 RX ADMIN — BUTALBITAL, ACETAMINOPHEN, AND CAFFEINE PRN TAB: 50; 325; 40 TABLET ORAL at 08:47

## 2017-12-02 RX ADMIN — STANDARDIZED SENNA CONCENTRATE AND DOCUSATE SODIUM SCH TAB: 8.6; 5 TABLET, FILM COATED ORAL at 08:26

## 2017-12-02 RX ADMIN — MEGESTROL ACETATE SCH MG: 40 TABLET ORAL at 08:24

## 2017-12-02 RX ADMIN — CYANOCOBALAMIN TAB 1000 MCG SCH MCG: 1000 TAB at 08:24

## 2017-12-02 RX ADMIN — SERTRALINE HYDROCHLORIDE SCH MG: 50 TABLET, FILM COATED ORAL at 08:24

## 2017-12-02 RX ADMIN — OXYCODONE HYDROCHLORIDE PRN MG: 10 TABLET, FILM COATED, EXTENDED RELEASE ORAL at 06:03

## 2017-12-02 RX ADMIN — ACYCLOVIR SCH TAB: 800 TABLET ORAL at 08:24

## 2017-12-02 RX ADMIN — BUTALBITAL, ACETAMINOPHEN, AND CAFFEINE PRN TAB: 50; 325; 40 TABLET ORAL at 03:07

## 2017-12-02 RX ADMIN — MAGNESIUM OXIDE TAB 400 MG (241.3 MG ELEMENTAL MG) SCH MG: 400 (241.3 MG) TAB at 08:24

## 2017-12-02 RX ADMIN — CHOLECALCIFEROL CAP 125 MCG (5000 UNIT) SCH UNITS: 125 CAP at 08:23

## 2017-12-02 NOTE — HHI.DS
__________________________________________________





Discharge Summary


Admission Date


Nov 29, 2017 at 11:09


Discharge Date:  Dec 2, 2017


Admitting Diagnosis





Generalized weakness with inability to ambulate





(1) Generalized weakness


ICD Code:  R53.1 - Weakness


Diagnosis:  Principal





(2) Gait instability


ICD Code:  R26.81 - Unsteadiness on feet


Diagnosis:  Principal





(3) Narcotic dependence


ICD Code:  F11.20 - Opioid dependence, uncomplicated


Diagnosis:  Principal





Procedures


None


Brief History - From Admission


This is a 56-year-old female with PMH of Anxiety, Depression, Renal Cell CA s/p 

Nephrectomy, h/o Gastrectomy on TPN, Narcotic Dependence and h/o CVA who 

presented to the ER w/ complaints of generalized weakness and recurrent falls.  

Seen in ER on 11/25/17 for similar complaints, per ER notes, pt persistently 

requesting IV Narcotics and c/o inability to ambulate, MRI C/T/L Spine w/ mild-

mod flattening of cord C5-6, otherwise negative.  Pt L CBC at baseline.  EFT 

AMA at that time.  Returns now w/ ongoing complaints.  States she doesn't 

recall leaving AMA yesterday.  Pt poor historian.  On arrival, /93, HR 74

, O2 sat 100% on RA.  Chemistry also baseline.  Troponin negative.  MRI Brain 

tonight w/ no acute findings.


CBC/BMP:  


12/2/17 0659                                                                   

             11/30/17 0715





Significant Findings





Laboratory Tests








Test


  11/29/17


11:26 11/29/17


16:21 11/30/17


07:15 12/2/17


06:59


 


Albumin


  3.2 GM/DL


(3.4-5.0) 


  


  


 


 


Calcium Level


  8.4 MG/DL


(8.5-10.1) 


  


  


 


 


Total Bilirubin


  0.1 MG/DL


(0.2-1.0) 


  


  


 


 


Estimat Glomerular Filtration


Rate 81 ML/MIN


(>89) 


  87 ML/MIN


(>89) 


 


 


White Blood Count


  


  


  3.7 TH/MM3


(4.0-11.0) 3.3 TH/MM3


(4.0-11.0)


 


Mean Corpuscular Hemoglobin


  


  


  26.5 PG


(27.0-34.0) 25.8 PG


(27.0-34.0)


 


Red Cell Distribution Width


  


  


  21.1 %


(11.6-17.2) 21.1 %


(11.6-17.2)


 


Platelet Count


  


  


  131 TH/MM3


(150-450) 114 TH/MM3


(150-450)


 


Lymphocytes # (Auto)


  


  


  0.9 TH/MM3


(1.0-4.8) 


 


 


Hemoglobin


  


  


  


  10.8 GM/DL


(11.6-15.3)


 


Hematocrit


  


  


  


  33.9 %


(35.0-46.0)


 


Mean Corpuscular Hemoglobin


Concent 


  


  


  31.9 %


(32.0-36.0)


 


Monocytes (%) (Auto)


  


  


  


  10.7 %


(0.0-8.0)


 


Neutrophils # (Auto)


  


  


  


  1.5 TH/MM3


(1.8-7.7)








Imaging





Last Impressions








Head CT 11/29/17 0000 Signed





Impressions: 





 Service Date/Time:  Wednesday, November 29, 2017 10:52 - CONCLUSION:  Negative 





 for acute process.     oJsh Ellis MD  FACR


 


Brain MRI 11/29/17 0000 Signed





Impressions: 





 Service Date/Time:  Wednesday, November 29, 2017 11:47 - CONCLUSION:  No 





 evidence of stroke. Scattered small nonspecific white matter lesions     Prince Rich MD 


 


Chest X-Ray 11/26/17 0000 Signed





Impressions: 





 Service Date/Time:  Sunday, November 26, 2017 22:25 - CONCLUSION:  1. Minimal 





 basilar atelectasis. No effusion or pneumothorax.     Paul Cabrera MD 








PE at Discharge


GENERAL: Cachectic.  no acute distress. 


SKIN: Warm and dry.


HEAD: Normocephalic.


EYES: No scleral icterus. No injection or drainage. 


NECK: Supple, trachea midline. No JVD.


CARDIOVASCULAR: Regular rate and rhythm without murmurs, gallops, or rubs. 


RESPIRATORY: Breath sounds equal bilaterally. No accessory muscle use.


GASTROINTESTINAL: Abdomen soft, non-tender, nondistended. 


MUSCULOSKELETAL: No cyanosis, or edema.


Hospital Course


This is a pleasant 57 y/o female admitted due to increased lower extremity 

numbness and difficulty for ambulation


status post multiple falls, has tremor, history of Total gastrectomy and 

malnutrition, started on TPN about one month


ago and gained 10 pounds, has B12 injections and thiamine, followed by 

Neurology specialist, CT brain negative, MRI 


Negative for acute stroke, with Assessment of TIA, questioned Complicated 

Migraine, recommended EEG, Diazepam


and Imitrex, Hematology specialist following due to Pancytopenia, thought 

related to UTI and transient bone marrow


suppression replacing Vitamin B12 and Thiamine, History of TTP but diagnosis 

not confirmed, History of renal cell 


carcinoma, Stroke after Nephrectomy surgery, yesterday the patient continued 

with Left arm weakness.


Seen in her bedroom stable, she wants to get Fioricet for Headaches, asked for 

Ambien, neurology already started


her On Imitrex. will follow specialist recommendations. as per Oncology 

recommended Iron Sucrose 200 mg IV for 


3 days. No nausea, vomit or diarrhea, started regular diet. 





12/01: Stable in her bedroom, she will need Iron infusion as per Hematology 

specialist, will discuss with  


if the final dose for tomorrow may be given at home I do not see why the 

patient can not be discharged today, 


Okay to discharge from Medicine standpoint if okay with Neurology and 

Hematology specialist. I obtained the discharge


by Hematology specialist and Neurology specialist but She states she has the 

next Iron infusion for tomorrow in am and


needs to receive it. 





12/02: Seen in her Bedroom in the presence of nurse Mr Jacob she has improved her 

Nausea, no vomit or diarrhea, 


asking for scripts to go home.








Assessment and Plan


// Generalized Weakness:  c/o ongoing generalized weakness, likely 

multifactorial-malnutrition and polypharmacy.  Seen in ER on 11/25/17 for same, 

MRI C/T/L 


   Spine w/ mild-moderate canal stenosis C5-6, otherwise normal, pt LEFT AMA at 

that time, however does not recall doing so.  MRI Brain tonight w/ no acute 


   findings, images reviewed by me.  PT for eval/tx. = PT recommends home health

, however patient does not feel she will be able to go home. asked for Rehab 


   facility as per PT will need Home with [facility] face to face in EMR for Home 

Health care. 





//Acute left arm weakness 11/29, Neurology specialist following CT Brain 

negative, MRI Negative for acute stroke, placed for diagnosis of TIA by 

Neurology specialist


  also recommended EEG, Diazepam and Imitrex.  Okay to discharge as per 

Neurology specialist 


  EEG minimal nonspecific changes noted with lot of fast frequencies. 





//History of renal cell carcinoma stroke after nephrectomy surgery. 





//Severe malnutrition.  BMI 16.0.  Possibly related to loose brown toxicity.  

Continue on lower dose of buspirone. the patient was on TPN about one month ago


  she gained 10 pound, was continued on B 12 injections and thiamine by 

hematology specialist. 





//Gait Instability:  reports inability to ambulate, MRI Spine essentially 

unremarkable as above.  PT recommended for Home with [facility].  


= Neurology assistance appreciated.  MRI changes do not explain generalized 

weakness. 





//Pancytopenia as per Hematology specialist thought related to UTI and 

transient bone marrow suppression. has History of TTP but at this time no signs 

of this pathology





//Narcotic Dependence:  On multiple narcotics in addition to anxiolytics, 

repeatedly requesting pain medication in ER both last night and this evening, 

will hold Fioricet/Valium patient asked for Ambien 5 mg re started at a lower 

dose. = Continue to try to avoid excessive narcotics.





//UTI.  Urinalysis from 11/27 Escherichia coli.  removed Ceftriaxone and send 

home treated





//DVT Prophylaxis:  SCD/Teds.








Discussed with neurology, Dr. Carter.  We'll get stat CT, stat MRI following CT.

  Discussed with Dr. FLOWERS, who recommends that if MRI brain is negative, would 

suggest consulted neurosurgery for possible cervical spinal stenosis.


Discharge Planning


Discharge Home on [facility] today.


Pt Condition on Discharge:  Stable


Discharge Disposition:  Disch w/ Home Health Serv


Discharge Time:  > 30 minutes


Discharge Instructions


DIET: Follow Instructions for:  As Tolerated, No Restrictions


Activities you can perform:  Regular-No Restrictions











Juan Figueroa MD Dec 2, 2017 11:09

## 2017-12-02 NOTE — HHI.PR
Subjective


Remarks


This is a pleasant 57 y/o female admitted due to increased lower extremity 

numbness and difficulty for ambulation


status post multiple falls, has tremor, history of Total gastrectomy and 

malnutrition, started on TPN about one month


ago and gained 10 pounds, has B12 injections and thiamine, followed by 

Neurology specialist, CT brain negative, MRI 


Negative for acute stroke, with Assessment of TIA, questioned Complicated 

Migraine, recommended EEG, Diazepam


and Imitrex, Hematology specialist following due to Pancytopenia, thought 

related to UTI and transient bone marrow


suppression replacing Vitamin B12 and Thiamine, History of TTP but diagnosis 

not confirmed, History of renal cell 


carcinoma, Stroke after Nephrectomy surgery, yesterday the patient continued 

with Left arm weakness.


Seen in her bedroom stable, she wants to get Fioricet for Headaches, asked for 

Ambien, neurology already started


her On Imitrex. will follow specialist recommendations. as per Oncology 

recommended Iron Sucrose 200 mg IV for 


3 days. No nausea, vomit or diarrhea, started regular diet. 





12/01: Stable in her bedroom, she will need Iron infusion as per Hematology 

specialist, will discuss with  


if the final dose for tomorrow may be given at home I do not see why the 

patient can not be discharged today, 


Okay to discharge from Medicine standpoint if okay with Neurology and 

Hematology specialist. I obtained the discharge


by Hematology specialist and Neurology specialist but She states she has the 

next Iron infusion for tomorrow in am and


needs to receive it. 





12/02: Seen in her Bedroom in the presence of nurse Mr Jacob she has improved her 

Nausea, no vomit or diarrhea, 


asking for scripts to go home.





Objective





Vital Signs








  Date Time  Temp Pulse Resp B/P (MAP) Pulse Ox O2 Delivery O2 Flow Rate FiO2


 


12/2/17 04:41  74      


 


12/2/17 04:16  69      


 


12/2/17 04:12  75      


 


12/2/17 04:00 97.3 59 18 135/67 (89) 100   


 


12/2/17 00:00 98.3 64 18 135/70 (91) 97   


 


12/1/17 20:00 98.6 65 18 138/84 (102) 98   


 


12/1/17 17:42     99   21


 


12/1/17 16:45 97.7 77 18 177/77 (110) 99   


 


12/1/17 12:13 96.9 74 18 155/82 (106) 99   


 


12/1/17 12:00  72      


 


12/1/17 10:53     98   














I/O      


 


 12/1/17 12/1/17 12/1/17 12/2/17 12/2/17 12/2/17





 07:00 15:00 23:00 07:00 15:00 23:00


 


Intake Total 300 ml 960 ml 1370 ml   


 


Output Total 500 ml     


 


Balance -200 ml 960 ml 1370 ml   


 


      


 


Intake Oral 300 ml 960 ml 1370 ml   


 


Output Urine Total 500 ml     


 


# Voids   5  3 


 


# Bowel Movements 3     








Result Diagram:  


12/2/17 0659                                                                   

             11/30/17 0715





Imaging





Last Impressions








Head CT 11/29/17 0000 Signed





Impressions: 





 Service Date/Time:  Wednesday, November 29, 2017 10:52 - CONCLUSION:  Negative 





 for acute process.     Josh Ellis MD  FACR


 


Brain MRI 11/29/17 0000 Signed





Impressions: 





 Service Date/Time:  Wednesday, November 29, 2017 11:47 - CONCLUSION:  No 





 evidence of stroke. Scattered small nonspecific white matter lesions     Prince Rich MD 


 


Chest X-Ray 11/26/17 0000 Signed





Impressions: 





 Service Date/Time:  Sunday, November 26, 2017 22:25 - CONCLUSION:  1. Minimal 





 basilar atelectasis. No effusion or pneumothorax.     Paul Cabrera MD 








Procedures


None


Other Results





Laboratory Tests








Test


  11/26/17


18:00 11/27/17


11:39 11/27/17


21:58 11/28/17


13:10


 


Lactic Acid Level 1.6 mmol/L    


 


Free Thyroxine  0.75 NG/DL   


 


Total Triiodothyronine  73 NG/DL   


 


Thyroid Stimulating Hormone


3rd Gen 


  0.084 uIU/ML 


  


  


 


 


Urine Color   LIGHT-YELLOW  


 


Urine Turbidity   HAZY  


 


Urine pH   6.0  


 


Urine Specific Gravity   1.016  


 


Urine Protein   NEG mg/dL  


 


Urine Glucose (UA)   NEG mg/dL  


 


Urine Ketones   NEG mg/dL  


 


Urine Occult Blood   NEG  


 


Urine Nitrite   POS  


 


Urine Bilirubin   NEG  


 


Urine Urobilinogen


  


  


  LESS THAN 2.0


MG/DL 


 


 


Urine Leukocyte Esterase   LARGE  


 


Urine RBC   4 /hpf  


 


Urine WBC   104 /hpf  


 


Urine Squamous Epithelial


Cells 


  


  4 /hpf 


  


 


 


Urine Transitional Epithelial


Cells 


  


  1 /hpf 


  


 


 


Urine Bacteria   MOD /hpf  


 


Microscopic Urinalysis Comment


  


  


  CATH-CULTURE


IND 


 


 


Urine Opiates Screen   NEG  


 


Urine Barbiturates Screen   POS  


 


Urine Amphetamines Screen   NEG  


 


Urine Benzodiazepines Screen   POS  


 


Urine Cocaine Screen   NEG  


 


Urine Cannabinoids Screen   NEG  


 


Random Cortisol    3.2 MCG/DL 


 


Test


  11/28/17


20:20 11/29/17


07:08 11/29/17


11:26 11/29/17


16:21


 


Prolactin 10.0 ng/mL    


 


Reticulocyte Count  0.6 %   


 


Absolute Reticulocyte Count  21.1 MIL/L   


 


Haptoglobin  107 MG/DL   


 


Iron Level  17 MCG/DL   


 


Total Iron Binding Capacity  435 MCG/DL   


 


Percent Iron Saturation  3.9 %   


 


Ferritin  6 NG/ML   


 


Lactate Dehydrogenase  133 U/L   


 


Total Creatine Kinase  129 U/L   


 


Troponin I


  


  LESS THAN 0.02


NG/ML 


  


 


 


Vitamin B12 Level  492 PG/ML   


 


Methylmalonic Acid  0.32 nmol/mL   


 


Red Blood Cell Folate  286   


 


Blood Urea Nitrogen   12 MG/DL  


 


Creatinine   0.74 MG/DL  


 


Random Glucose   77 MG/DL  


 


Total Protein   6.4 GM/DL  


 


Albumin   3.2 GM/DL  


 


Calcium Level   8.4 MG/DL  


 


Alkaline Phosphatase   103 U/L  


 


Aspartate Amino Transf


(AST/SGOT) 


  


  29 U/L 


  


 


 


Alanine Aminotransferase


(ALT/SGPT) 


  


  28 U/L 


  


 


 


Total Bilirubin   0.1 MG/DL  


 


Sodium Level   139 MEQ/L  


 


Potassium Level   4.0 MEQ/L  


 


Chloride Level   106 MEQ/L  


 


Carbon Dioxide Level   25.9 MEQ/L  


 


Prothrombin Time    10.5 SEC 


 


Prothromb Time International


Ratio 


  


  


  1.0 RATIO 


 


 


Activated Partial


Thromboplast Time 


  


  


  25.0 SEC 


 


 


Fibrinogen    318 mg/dL 


 


Test


  11/30/17


07:15 12/2/17


06:59 


  


 


 


Blood Urea Nitrogen 12 MG/DL    


 


Creatinine 0.70 MG/DL    


 


Random Glucose 83 MG/DL    


 


Calcium Level 8.7 MG/DL    


 


Sodium Level 136 MEQ/L    


 


Potassium Level 3.9 MEQ/L    


 


Chloride Level 103 MEQ/L    


 


Carbon Dioxide Level 22.7 MEQ/L    


 


Anion Gap 10 MEQ/L    


 


Estimat Glomerular Filtration


Rate 87 ML/MIN 


  


  


  


 


 


White Blood Count  3.3 TH/MM3   


 


Red Blood Count  4.18 MIL/MM3   


 


Hemoglobin  10.8 GM/DL   


 


Hematocrit  33.9 %   


 


Mean Corpuscular Volume  81.0 FL   


 


Mean Corpuscular Hemoglobin  25.8 PG   


 


Mean Corpuscular Hemoglobin


Concent 


  31.9 % 


  


  


 


 


Red Cell Distribution Width  21.1 %   


 


Platelet Count  114 TH/MM3   


 


Mean Platelet Volume  9.8 FL   


 


Neutrophils (%) (Auto)  45.2 %   


 


Lymphocytes (%) (Auto)  41.1 %   


 


Monocytes (%) (Auto)  10.7 %   


 


Eosinophils (%) (Auto)  2.8 %   


 


Basophils (%) (Auto)  0.2 %   


 


Neutrophils # (Auto)  1.5 TH/MM3   


 


Lymphocytes # (Auto)  1.3 TH/MM3   


 


Monocytes # (Auto)  0.4 TH/MM3   


 


Eosinophils # (Auto)  0.1 TH/MM3   


 


Basophils # (Auto)  0.0 TH/MM3   


 


CBC Comment  DIFF FINAL   


 


Differential Comment     








Objective Remarks


GENERAL: Cachectic.  no acute distress. 


SKIN: Warm and dry.


HEAD: Normocephalic.


EYES: No scleral icterus. No injection or drainage. 


NECK: Supple, trachea midline. No JVD.


CARDIOVASCULAR: Regular rate and rhythm without murmurs, gallops, or rubs. 


RESPIRATORY: Breath sounds equal bilaterally. No accessory muscle use.


GASTROINTESTINAL: Abdomen soft, non-tender, nondistended. 


MUSCULOSKELETAL: No cyanosis, or edema.


Medications and IVs





Current Medications








 Medications


  (Trade)  Dose


 Ordered  Sig/Jermaine


 Route  Start Time


 Stop Time Status Last Admin


 


 Sodium Chloride  1,000 ml @ 


 100 mls/hr  Q10H


 IV  11/26/17 21:25


    12/1/17 22:34


 


 


  (NS Flush)  2 ml  UNSCH  PRN


 IV FLUSH  11/26/17 21:30


     


 


 


  (NS Flush)  2 ml  BID


 IV FLUSH  11/27/17 09:00


    12/1/17 09:00


 


 


  (Tylenol)  650 mg  Q6H  PRN


 PO  11/26/17 21:30


    11/29/17 22:40


 


 


  (Evonne-Colace)  1 tab  BID


 PO  11/27/17 09:00


    12/1/17 21:10


 


 


  (Milk Of


 Magnesia Liq)  30 ml  Q12H  PRN


 PO  11/26/17 21:30


     


 


 


  (Senokot)  17.2 mg  Q12H  PRN


 PO  11/26/17 21:30


     


 


 


  (Dulcolax Supp)  10 mg  DAILY  PRN


 RECTAL  11/26/17 21:30


     


 


 


  (Lactulose Liq)  30 ml  DAILY  PRN


 PO  11/26/17 21:30


     


 


 


  (Bentyl)  40 mg  TID


 PO  11/27/17 09:00


    12/1/17 18:24


 


 


  (Mag-Ox)  400 mg  BID


 PO  11/27/17 09:00


    12/1/17 21:09


 


 


  (Zoloft)  50 mg  DAILY


 PO  11/27/17 09:00


    12/1/17 09:42


 


 


  (Vitamin B12)  1,000 mcg  DAILY


 PO  11/28/17 09:00


    11/30/17 09:43


 


 


  (Theragran)  1 tab  DAILY


 PO  11/28/17 09:00


    12/1/17 09:41


 


 


  (Vitamin D3)  5,000 units  DAILY


 PO  11/28/17 09:00


    12/1/17 09:41


 


 


  (Megace)  40 mg  Q12HR


 PO  11/28/17 21:00


    12/1/17 21:27


 


 


  (Buspar)  10 mg  Q8HR


 PO  11/28/17 22:00


    12/2/17 06:31


 


 


 Ceftriaxone


 Sodium 1000 mg/


 Sodium Chloride  100 ml @ 


 200 mls/hr  Q24H


 IV  11/28/17 16:00


    12/1/17 16:00


 


 


  (Valium)  2 mg  Q8HR  PRN


 PO  11/29/17 10:45


    12/1/17 18:24


 


 


  (Ambien)  5 mg  HS  PRN


 PO  11/30/17 21:00


    12/1/17 21:10


 


 


 Iron Sucrose 200


 mg/Sodium Chloride  110 ml @ 


 110 mls/hr  DAILY


 IV  11/30/17 14:00


 12/2/17 09:59  12/1/17 09:42


 


 


  (Fioricet


 325-50-40)  1 tab  Q6H  PRN


 PO  12/1/17 09:15


    12/2/17 03:07


 


 


  (Imitrex Inj)  6 mg  UNSCH  PRN


 SQ  12/1/17 09:15


    12/1/17 16:06


 


 


  (Ativan)  0.5 mg  ONCE  ONCE


 PO  12/2/17 08:30


 12/2/17 08:31   


 


 


  (OxyCONTIN CR)  10 mg  TID  PRN


 PO  12/1/17 21:00


    12/2/17 06:03


 











A/P


Assessment and Plan


// Generalized Weakness:  c/o ongoing generalized weakness, likely 

multifactorial-malnutrition and polypharmacy.  Seen in ER on 11/25/17 for same, 

MRI C/T/L 


   Spine w/ mild-moderate canal stenosis C5-6, otherwise normal, pt LEFT AMA at 

that time, however does not recall doing so.  MRI Brain tonight w/ no acute 


   findings, images reviewed by me.  PT for eval/tx. = PT recommends home health

, however patient does not feel she will be able to go home. asked for Rehab 


   facility as per PT will need Home with [facility] face to face in EMR for Home 

Health care. 





//Acute left arm weakness 11/29, Neurology specialist following CT Brain 

negative, MRI Negative for acute stroke, placed for diagnosis of TIA by 

Neurology specialist


  also recommended EEG, Diazepam and Imitrex.  Okay to discharge as per 

Neurology specialist 


  EEG minimal nonspecific changes noted with lot of fast frequencies. 





//History of renal cell carcinoma stroke after nephrectomy surgery. 





//Severe malnutrition.  BMI 16.0.  Possibly related to loose brown toxicity.  

Continue on lower dose of buspirone. the patient was on TPN about one month ago


  she gained 10 pound, was continued on B 12 injections and thiamine by 

hematology specialist. 





//Gait Instability:  reports inability to ambulate, MRI Spine essentially 

unremarkable as above.  PT recommended for Home with [facility].  


= Neurology assistance appreciated.  MRI changes do not explain generalized 

weakness. 





//Pancytopenia as per Hematology specialist thought related to UTI and 

transient bone marrow suppression. has History of TTP but at this time no signs 

of this pathology





//Narcotic Dependence:  On multiple narcotics in addition to anxiolytics, 

repeatedly requesting pain medication in ER both last night and this evening, 

will hold Fioricet/Valium patient asked for Ambien 5 mg re started at a lower 

dose. = Continue to try to avoid excessive narcotics.





//UTI.  Urinalysis from 11/27 Escherichia coli.  removed Ceftriaxone and send 

home treated





//DVT Prophylaxis:  SCD/Teds.








Discussed with neurology, Dr. Carter.  We'll get stat CT, stat MRI following CT.

  Discussed with Dr. FLOWERS, who recommends that if MRI brain is negative, would 

suggest consulted neurosurgery for possible cervical spinal stenosis.


Discharge Planning


Discharge Home on [facility] today.











Juan Figueroa MD Dec 2, 2017 08:31

## 2017-12-02 NOTE — PD.ONC.PN
Subjective


Subjective


Remarks


Afebrile


Reports she had a bad migraine overnight


Feeling better today after getting Imitrex SQ


Excited to be getting discharged today





Objective


Data











  Date Time  Temp Pulse Resp B/P (MAP) Pulse Ox O2 Delivery O2 Flow Rate FiO2


 


12/2/17 08:26 98.5 68 16 144/78 (100) 96   


 


12/2/17 04:41  74      


 


12/2/17 04:16  69      


 


12/2/17 04:12  75      


 


12/2/17 04:00 97.3 59 18 135/67 (89) 100   


 


12/2/17 00:00 98.3 64 18 135/70 (91) 97   


 


12/1/17 20:00 98.6 65 18 138/84 (102) 98   


 


12/1/17 17:42     99   21


 


12/1/17 16:45 97.7 77 18 177/77 (110) 99   


 


12/1/17 12:13 96.9 74 18 155/82 (106) 99   


 


12/1/17 12:00  72      








Result Diagram:  


12/2/17 0659                                                                   

             11/30/17 0715





Laboratory Results





Laboratory Tests








Test


  12/2/17


06:59


 


White Blood Count 3.3 TH/MM3 


 


Red Blood Count 4.18 MIL/MM3 


 


Hemoglobin 10.8 GM/DL 


 


Hematocrit 33.9 % 


 


Mean Corpuscular Volume 81.0 FL 


 


Mean Corpuscular Hemoglobin 25.8 PG 


 


Mean Corpuscular Hemoglobin


Concent 31.9 % 


 


 


Red Cell Distribution Width 21.1 % 


 


Platelet Count 114 TH/MM3 


 


Mean Platelet Volume 9.8 FL 


 


Neutrophils (%) (Auto) 45.2 % 


 


Lymphocytes (%) (Auto) 41.1 % 


 


Monocytes (%) (Auto) 10.7 % 


 


Eosinophils (%) (Auto) 2.8 % 


 


Basophils (%) (Auto) 0.2 % 


 


Neutrophils # (Auto) 1.5 TH/MM3 


 


Lymphocytes # (Auto) 1.3 TH/MM3 


 


Monocytes # (Auto) 0.4 TH/MM3 


 


Eosinophils # (Auto) 0.1 TH/MM3 


 


Basophils # (Auto) 0.0 TH/MM3 


 


CBC Comment DIFF FINAL 


 


Differential Comment  











Administered Medications








 Medications


  (Trade)  Dose


 Ordered  Sig/Jermaine


 Route


 PRN Reason  Start Time


 Stop Time Status Last Admin


Dose Admin


 


 Sodium Chloride  1,000 ml @ 


 100 mls/hr  Q10H


 IV


   11/26/17 21:25


    12/1/17 22:34


 


 


 Sodium Chloride


  (NS Flush)  2 ml  BID


 IV FLUSH


   11/27/17 09:00


    12/1/17 09:00


 


 


 Acetaminophen


  (Tylenol)  650 mg  Q6H  PRN


 PO


 FEVER/PAIN SCALE 1 TO 2  11/26/17 21:30


    11/29/17 22:40


 


 


 Senna/Docusate


 Sodium


  (Evonne-Colace)  1 tab  BID


 PO


   11/27/17 09:00


    12/2/17 08:26


 


 


 Magnesium


 Hydroxide


  (Milk Of


 Magnesia Liq)  30 ml  Q12H  PRN


 PO


 Mild constipation  11/26/17 21:30


    12/2/17 09:00


 


 


 Dicyclomine HCl


  (Bentyl)  40 mg  TID


 PO


   11/27/17 09:00


    12/1/17 18:24


 


 


 Magnesium Oxide


  (Mag-Ox)  400 mg  BID


 PO


   11/27/17 09:00


    12/2/17 08:24


 


 


 Sertraline HCl


  (Zoloft)  50 mg  DAILY


 PO


   11/27/17 09:00


    12/2/17 08:24


 


 


 Cyanocobalamin


  (Vitamin B12)  1,000 mcg  DAILY


 PO


   11/28/17 09:00


    12/2/17 08:24


 


 


 Multivitamins


  (Theragran)  1 tab  DAILY


 PO


   11/28/17 09:00


    12/2/17 08:24


 


 


 Cholecalciferol


  (Vitamin D3)  5,000 units  DAILY


 PO


   11/28/17 09:00


    12/2/17 08:23


 


 


 Megestrol Acetate


  (Megace)  40 mg  Q12HR


 PO


   11/28/17 21:00


    12/2/17 08:24


 


 


 Buspirone HCl


  (Buspar)  10 mg  Q8HR


 PO


   11/28/17 22:00


    12/2/17 06:31


 


 


 Ceftriaxone


 Sodium 1000 mg/


 Sodium Chloride  100 ml @ 


 200 mls/hr  Q24H


 IV


   11/28/17 16:00


    12/1/17 16:00


 


 


 Diazepam


  (Valium)  2 mg  Q8HR  PRN


 PO


 NAUSEA  11/29/17 10:45


    12/2/17 08:47


 


 


 Zolpidem Tartrate


  (Ambien)  5 mg  HS  PRN


 PO


 INSOMNIA  11/30/17 21:00


    12/1/17 21:10


 


 


 Acetaminophen/


 Butalbital/


 Caffeine


  (Fioricet


 325-50-40)  1 tab  Q6H  PRN


 PO


 HEADACHE  12/1/17 09:15


    12/2/17 08:47


 


 


 Sumatriptan


 Succinate


  (Imitrex Inj)  6 mg  UNSCH  PRN


 SQ


 MIGRAINE HEADACHE  12/1/17 09:15


    12/2/17 10:11


 


 


 Oxycodone HCl


  (OxyCONTIN CR)  10 mg  TID  PRN


 PO


 PAIN 5-10  12/1/17 21:00


    12/2/17 06:03


 








Objective Remarks


GENERAL: Thin, middle aged female sitting up in bed in no obvious distress.


SKIN: Warm and dry.


HEAD: Normocephalic.


EYES: no injection or drainage. 


NECK: Supple, trachea midline.


CARDIOVASCULAR: +S1/S2


RESPIRATORY: Anterior fields clear. 


GASTROINTESTINAL: Abdomen soft, non-tender, nondistended. 


EXTREMITIES: No cyanosis. No edema


NEUROLOGICAL: Awake and alert, normal speech. Left leg weakness. Moves upper 

extremities without difficulty.





Assessment/Plan


Problem List:  


(1) Pancytopenia


ICD Codes:  D61.818 - Other pancytopenia


Plan:  --blood count fluctuating over the last several months.  


--likely has low blood count due to nutrition deficiency.  


--recent drop in blood count may be due to the urinary tract infection and 

transient bone marrow suppression.  


--no evidence of active bleeding noted. not neutropenic.  


--will give B12 and thiamine. 





(2) History of TTP (thrombotic thrombocytopenic purpura)


ICD Codes:  Z86.2 - Personal history of diseases of the blood and blood-forming 

organs and certain disorders involving the immune mechanism


Plan:  --had plasmapheresis about six years ago  when she developed low 

platelet count. 


--did not have an confirmed diagnosis of TTP.


--platelet count is only slightly low.  


--There is no evidence of TTP.





(3) Left arm weakness


ICD Codes:  R29.898 - Other symptoms and signs involving the musculoskeletal 

system


Plan:  --neurology following


--MRI brain unremarkable





Assessment


57y/o female with pancytopenia.


h/o Renal cell carcinoma in 2005


Stroke after nephrectomy surgery.


Depression, anxiety.


Chronic aspiration prior to her gastrectomy


Myocardial function


B12 deficiency.


Plan


1. Patient tolerated iron transfusion with less nausea with by mouth Ativan.


2. Okay for discharge from hematology standpoint


3. We'll have her follow-up in the outpatient clinic in approximately 1 month 

follow-up


4. Facesheet faxed to new patient referrals


Attending Statement


The exam, history, and the medical decision-making described in the above note 

were completed with the assistance of the mid-level provider. I reviewed and 

agree with the findings presented.  I attest that I had a face-to-face 

encounter with the patient on the same day, and personally performed and 

documented my assessment and findings in the medical record.  Has headache and 

was given Imitrex.  Just completed another dose of venofer this morning.  Gave 

her ativan before infusion and she tolerated better.  Have her f/u hematology 

clinic as she is going to need periodic iron infusion.











Caridad Patel Dec 2, 2017 10:58


Macho Rodriguez MD Dec 2, 2017 11:27

## 2018-01-07 ENCOUNTER — HOSPITAL ENCOUNTER (EMERGENCY)
Dept: HOSPITAL 17 - NEPC | Age: 58
Discharge: HOME | End: 2018-01-07
Payer: COMMERCIAL

## 2018-01-07 VITALS
OXYGEN SATURATION: 99 % | DIASTOLIC BLOOD PRESSURE: 87 MMHG | RESPIRATION RATE: 18 BRPM | SYSTOLIC BLOOD PRESSURE: 170 MMHG | HEART RATE: 90 BPM

## 2018-01-07 VITALS — WEIGHT: 99.21 LBS | HEIGHT: 62 IN | BODY MASS INDEX: 18.26 KG/M2

## 2018-01-07 VITALS
SYSTOLIC BLOOD PRESSURE: 161 MMHG | RESPIRATION RATE: 16 BRPM | TEMPERATURE: 98.8 F | HEART RATE: 100 BPM | OXYGEN SATURATION: 96 % | DIASTOLIC BLOOD PRESSURE: 107 MMHG

## 2018-01-07 VITALS — RESPIRATION RATE: 17 BRPM

## 2018-01-07 DIAGNOSIS — Z88.0: ICD-10-CM

## 2018-01-07 DIAGNOSIS — I10: ICD-10-CM

## 2018-01-07 DIAGNOSIS — G43.909: Primary | ICD-10-CM

## 2018-01-07 DIAGNOSIS — I25.2: ICD-10-CM

## 2018-01-07 DIAGNOSIS — Z86.73: ICD-10-CM

## 2018-01-07 DIAGNOSIS — F41.9: ICD-10-CM

## 2018-01-07 DIAGNOSIS — F32.9: ICD-10-CM

## 2018-01-07 DIAGNOSIS — Z76.0: ICD-10-CM

## 2018-01-07 DIAGNOSIS — Z79.899: ICD-10-CM

## 2018-01-07 PROCEDURE — 96361 HYDRATE IV INFUSION ADD-ON: CPT

## 2018-01-07 PROCEDURE — 99284 EMERGENCY DEPT VISIT MOD MDM: CPT

## 2018-01-07 PROCEDURE — 96374 THER/PROPH/DIAG INJ IV PUSH: CPT

## 2018-01-07 NOTE — PD
HPI


Chief Complaint:  Headache


Time Seen by Provider:  05:53


Travel History


International Travel<30 days:  No


Contact w/Intl Traveler<30days:  No


Traveled to known affect area:  No





History of Present Illness


HPI


57-year-old female with history of migraine headaches and cervical disc 

disease.  Patient presents with 2 day headache consistent with her migraine 

history with associated photophobia and dry heaves.  Patient has had previous 

gastrectomy and denies vomiting history.  Patient states that she is used 

sumatriptan and Fioricet without symptomatic relief.  Patient is allergic to 

multiple antinausea medications but has been able to tolerate Ativan and 

Benadryl in the past.  Patient denies fever or chills.  Patient denies fall or 

injury.  Patient denies chest pain or shortness of breath.  Patient denies 

upper extremity or lower extremity numbness tingling or weakness or ataxia 

gait.  Patient denies bladder or bowel dysfunction or saddle anesthesia.  

Patient reports headache is not sudden onset thunderclap or worst ever but is 

unable to break with her typical regimen.





PFSH


Past Medical History


Hx Anticoagulant Therapy:  No


Asthma:  No


Blood Disorders:  No


Anxiety:  Yes


Depression:  Yes


Heart Rhythm Problems:  No


Cancer:  Yes (KIDNEY )


Cardiovascular Problems:  Yes (MI)


High Cholesterol:  No


Chemotherapy:  No


Chest Pain:  No


Congestive Heart Failure:  No


COPD:  No


Cerebrovascular Accident:  Yes


Diabetes:  No


Diminished Hearing:  No


Endocrine:  No


Gastrointestinal Disorders:  Yes (GASTRECTOMY, CHRONIC ABDOMINAL PAIN )


GERD:  No


Genitourinary:  Yes (OCCASSIONAL UTI)


Headaches:  Yes


Hiatal Hernia:  Yes


Hypertension:  Yes


Immune Disorder:  No


Implanted Vascular Access Dvce:  No


Kidney Stones:  No


Musculoskeletal:  No


Neurologic:  Yes (CVA )


Psychiatric:  No


Reproductive:  No


Respiratory:  No


Immunizations Current:  Yes


Migraines:  Yes


Myocardial Infarction:  Yes ()


Pneumonia:  Yes


Radiation Therapy:  No


Renal Failure:  No


Seizures:  Yes


Sleep Apnea:  No


Thyroid Disease:  No


Ulcer:  No


Menopausal:  Yes


:  1


Para:  1


Miscarriage:  0


:  0


Ectopic Pregnancy:  No


Ovarian Cysts:  No


Dilation and Curettage (D&C):  Yes


Tubal Ligation:  Yes





Past Surgical History


Abdominal Surgery:  Yes (total gastrectomy w/pouch , hernia repair)


Appendectomy:  Yes


Cardiac Surgery:  Yes (REPAIRED HOLE IN HEART)


Cholecystectomy:  Yes


Genitourinary Surgery:  Yes (RIGHT NEPHRECTOMY)


Gynecologic Surgery:  Yes


Hysterectomy:  Yes


Thoracic Surgery:  No


Tonsillectomy:  Yes


Other Surgery:  Yes (GASTRECTOMY, RIGHT KIDNEY REMOVAL, HERNIA REPAIR, 

GALLBLADDER, TONSILS)





Social History


Alcohol Use:  No (PT DENIES)


Tobacco Use:  No (PT DENIES)


Substance Use:  No (PT DENIES)





Allergies-Medications


(Allergen,Severity, Reaction):  


Coded Allergies:  


     MRI PRECAUTION (Verified  Allergy, Severe, MRI contrast allergy, 18)


 anaphylaxis


     Sulfa (Sulfonamide Antibiotics) (Verified  Allergy, Severe, Rash, 18)


     gadobenic acid (Verified  Allergy, Severe, Anaphylaxis, 18)


     gadodiamide (Verified  Allergy, Severe, Anaphylaxis, 18)


     gadoteridol (Verified  Allergy, Severe, Anaphylaxis, 18)


     ketorolac (Verified  Allergy, Severe, Shortness of Breath, 18)


     metoclopramide (Verified  Allergy, Severe, Shortness of Breath, 18)


     morphine (Verified  Allergy, Severe, Hives, 18)


     ondansetron (Verified  Allergy, Severe, Shortness of Breath, 18)


     penicillin G (Verified  Allergy, Severe, Rash, 18)


     prochlorperazine (Verified  Allergy, Severe, Shortness of Breath, 18)


     promethazine (Verified  Allergy, Severe, Shortness of Breath, 18)


     shellfish derived (Verified  Allergy, Severe, Anaphylaxis, 18)


     trimethobenzamide (Verified  Allergy, Severe, Shortness of Breath, 18)


Reported Meds & Prescriptions





Reported Meds & Active Scripts


Active


Imitrex (Sumatriptan Succinate) 50 Mg Tab 50 Mg PO ONCE PRN


     If a satisfactory response has not been obtained at 2 hours, a second


     dose may be administered


Ambien (Zolpidem Tartrate) 5 Mg Tab 5 Mg PO HS PRN


Fioricet (Butalbital-Acetaminophen-Caffeine) -40 Mg Cap 20 Mg PO Q6H PRN


Reported


Amitriptyline (Amitriptyline HCl) 50 Mg Tab 50 Mg PO HS


Cymbalta DR (Duloxetine HCl) 30 Mg Capdr 30 Mg PO DAILY


Oxycontin (Oxycodone HCl) 10 Mg Tab 10 Mg PO Q8HR


Vitamin E (Vitamin E Mixed) 400 Unit Tablet 400 Units PO DAILY


Vitamin D-1000 (Cholecalciferol) 1,000 Unit Tab 1,000 Units PO DAILY


Magnesium Oxide 400 Mg Tab 400 Mg PO BID


Calcitrate (Calcium Citrate-Vitamin D) 315-250 Mg-Unit Tab 1 Tab PO QID


Dicyclomine (Dicyclomine HCl) 20 Mg Tab 40 Mg PO TID


Zoloft (Sertraline HCl) 50 Mg Tab 50 Mg PO DAILY


Dulcolax Stool Softener (Docusate Sodium) 100 Mg Cap 100 Mg PO HS


Valium (Diazepam) 10 Mg Tab 3 Mg PO TID


Buspirone (Buspirone HCl) 7.5 Mg Tab 30 Mg PO TID








Review of Systems


Except as stated in HPI:  all other systems reviewed are Neg





Physical Exam


Narrative


GENERAL: Well-developed elderly appearing thin female in no acute distress no 

respiratory distress; GCS 15


SKIN: Warm and dry.


HEAD: Atraumatic. Normocephalic. 


EYES: Pupils equal and round. No scleral icterus. No injection or drainage. 


ENT: No nasal bleeding or discharge.  Mucous membranes pink and moist.


NECK: Trachea midline. No JVD.  Supple no meningismus no nuchal rigidity.  


CARDIOVASCULAR: Regular rate and rhythm.  


RESPIRATORY: No accessory muscle use. Clear to auscultation. Breath sounds 

equal bilaterally. 


GASTROINTESTINAL: Abdomen soft, non-tender, nondistended. Hepatic and splenic 

margins not palpable. 


MUSCULOSKELETAL: Extremities without clubbing, cyanosis, or edema. No obvious 

deformities. 


NEUROLOGICAL: Awake and alert.  GCS 15.  No obvious cranial nerve deficits.  

Motor grossly within normal limits. Five out of 5 muscle strength in the arms 

and legs.  Normal speech.


PSYCHIATRIC: Appropriate mood and affect; insight and judgment normal.





Data


Data


Last Documented VS





Vital Signs








  Date Time  Temp Pulse Resp B/P (MAP) Pulse Ox O2 Delivery O2 Flow Rate FiO2


 


18 06:14     99 Room Air  


 


18 06:14  90 18     


 


18 05:11 98.8       








Orders





 Orders


Ecg Monitoring (18 05:54)


Iv Access Insert/Monitor (18 05:54)


Oximetry (18 05:54)


Sodium Chloride 0.9% Flush (Ns Flush) (18 06:00)


Diphenhydramine Inj (Benadryl Inj) (18 06:00)


Sodium Chlor 0.9% 1000 Ml Inj (Ns 1000 M (18 06:00)


Oxycodone-Acetamin  Mg (Percocet 1 (18 06:45)


Sumatriptan Succinate (Imitrex) (18 07:15)








MDM


Medical Decision Making


Medical Screen Exam Complete:  Yes


Emergency Medical Condition:  Yes


Medical Record Reviewed:  Yes


Differential Diagnosis


Recurrent migraine, tension headache, cervical spine sprain strain, ICH


Narrative Course


Patient administered 1 L normal saline along with Benadryl 25 mg IV


Patient states nausea improved requesting narcotic pain medication states 

typically receives Dilaudid; patient provided oral dose of percocet


It is 7:14 AM patient states she is clinically improved but request again a 

dose of Imitrex states that she took a dose yesterday but ran out of her 

medications at that time is also requesting a refill prescription for her 

Imitrex.





Diagnosis





 Primary Impression:  


 Migraine headache


 Additional Impression:  


 Medication refill


Referrals:  


Primary Care Physician


call for appointment


Patient Instructions:  General Instructions





***Additional Instructions:  


Increase fluid hydration


Take medication as prescribed


Follow-up with her primary care provider


Return to the emergency department for any concerns or change in condition


***Med/Other Pt SpecificInfo:  Prescription(s) given


Scripts


Sumatriptan (Sumatriptan) 50 Mg Tab


50 MG PO ONCE Y for MIGRAINE HEADACHE, #15 TAB 0 Refills


   If a satisfactory response has not been obtained at 2 hours, a


   second


   dose may be administered


   Prov: Laurie Joshi MD         18


Disposition:  01 DISCHARGE HOME


Condition:  Stable











Laurie Joshi MD 2018 05:57

## 2018-02-09 ENCOUNTER — HOSPITAL ENCOUNTER (EMERGENCY)
Dept: HOSPITAL 17 - NEPE | Age: 58
Discharge: HOME | End: 2018-02-09
Payer: COMMERCIAL

## 2018-02-09 VITALS — BODY MASS INDEX: 17.24 KG/M2 | HEIGHT: 62 IN | WEIGHT: 93.7 LBS

## 2018-02-09 VITALS
OXYGEN SATURATION: 100 % | RESPIRATION RATE: 16 BRPM | TEMPERATURE: 98.3 F | DIASTOLIC BLOOD PRESSURE: 95 MMHG | HEART RATE: 79 BPM | SYSTOLIC BLOOD PRESSURE: 162 MMHG

## 2018-02-09 DIAGNOSIS — F41.9: ICD-10-CM

## 2018-02-09 DIAGNOSIS — Z86.73: ICD-10-CM

## 2018-02-09 DIAGNOSIS — Z88.2: ICD-10-CM

## 2018-02-09 DIAGNOSIS — F32.9: ICD-10-CM

## 2018-02-09 DIAGNOSIS — M54.2: Primary | ICD-10-CM

## 2018-02-09 DIAGNOSIS — I10: ICD-10-CM

## 2018-02-09 DIAGNOSIS — I25.2: ICD-10-CM

## 2018-02-09 DIAGNOSIS — R56.9: ICD-10-CM

## 2018-02-09 DIAGNOSIS — Z79.899: ICD-10-CM

## 2018-02-09 PROCEDURE — 99283 EMERGENCY DEPT VISIT LOW MDM: CPT

## 2018-02-09 PROCEDURE — 96372 THER/PROPH/DIAG INJ SC/IM: CPT

## 2018-02-09 NOTE — PD
HPI


.


Back pain/neck pain


Chief Complaint:  Back/ Neck Pain or Injury


Time Seen by Provider:  05:34


Travel History


International Travel<30 days:  No


Contact w/Intl Traveler<30days:  No


Traveled to known affect area:  No





History of Present Illness


HPI


57-year-old female history of cervical discopathy, multiple painful etiologies, 

under the care of a pain medicine doctor, presents with complaints of having 

exacerbation of her neck pain.  Patient states she has pain that is 10 out of 

10 severity.  Patient states she is having difficulty moving her neck secondary 

to same.  Denies fever chills sweats, denies stiff neck, notes bilateral hand 

tingling/paresthesias, denies any weakness or numbness.





PFSH


Past Medical History


*** Narrative Medical


Past medical history reviewed


Hx Anticoagulant Therapy:  No


Asthma:  No


Blood Disorders:  No


Anxiety:  Yes


Depression:  Yes


Heart Rhythm Problems:  No


Cancer:  Yes (KIDNEY )


Cardiovascular Problems:  Yes (MI , repair )


High Cholesterol:  No


Chemotherapy:  No


Chest Pain:  No


Congestive Heart Failure:  No


COPD:  No


Cerebrovascular Accident:  Yes (Stroke )


Diabetes:  No


Diminished Hearing:  No


Endocrine:  No


Gastrointestinal Disorders:  Yes (GASTRECTOMY, CHRONIC ABDOMINAL PAIN )


GERD:  No


Genitourinary:  Yes (OCCASSIONAL UTI)


Headaches:  Yes


Hiatal Hernia:  Yes


Hypertension:  Yes


Immune Disorder:  No


Implanted Vascular Access Dvce:  No


Kidney Stones:  No


Musculoskeletal:  No


Neurologic:  Yes (CVA )


Psychiatric:  No


Reproductive:  No


Respiratory:  No


Immunizations Current:  Yes


Migraines:  Yes


Myocardial Infarction:  Yes ()


Pneumonia:  Yes


Radiation Therapy:  No


Renal Failure:  No


Seizures:  Yes


Sleep Apnea:  No


Thyroid Disease:  No


Ulcer:  No


Tetanus Vaccination:  < 5 Years


Influenza Vaccination:  Yes


Pregnant?:  Not Pregnant


Menopausal:  Yes


:  1


Para:  1


Miscarriage:  0


:  0


Ectopic Pregnancy:  No


Ovarian Cysts:  No


Dilation and Curettage (D&C):  Yes


Tubal Ligation:  Yes





Past Surgical History


Abdominal Surgery:  Yes (total gastrectomy w/pouch , hernia repair)


Appendectomy:  Yes


Cardiac Surgery:  Yes (REPAIRED HOLE IN HEART)


Cholecystectomy:  Yes


Genitourinary Surgery:  Yes (RIGHT NEPHRECTOMY)


Gynecologic Surgery:  Yes


Hysterectomy:  Yes


Thoracic Surgery:  No


Tonsillectomy:  Yes


Other Surgery:  Yes (GASTRECTOMY, RIGHT KIDNEY REMOVAL, HERNIA REPAIR, 

GALLBLADDER, TONSILS)





Social History


Alcohol Use:  No (PT DENIES)


Tobacco Use:  No (PT DENIES)


Substance Use:  No (PT DENIES)





Allergies-Medications


(Allergen,Severity, Reaction):  


Coded Allergies:  


     MRI PRECAUTION (Verified  Allergy, Severe, MRI contrast allergy, 18)


 anaphylaxis


     Sulfa (Sulfonamide Antibiotics) (Verified  Allergy, Severe, Rash, 18)


     gadobenic acid (Verified  Allergy, Severe, Anaphylaxis, 18)


     gadodiamide (Verified  Allergy, Severe, Anaphylaxis, 18)


     gadoteridol (Verified  Allergy, Severe, Anaphylaxis, 18)


     ketorolac (Verified  Allergy, Severe, Shortness of Breath, 18)


     metoclopramide (Verified  Allergy, Severe, Shortness of Breath, 18)


     morphine (Verified  Allergy, Severe, Hives, 18)


     ondansetron (Verified  Allergy, Severe, Shortness of Breath, 18)


     penicillin G (Verified  Allergy, Severe, Rash, 18)


     prochlorperazine (Verified  Allergy, Severe, Shortness of Breath, 18)


     promethazine (Verified  Allergy, Severe, Shortness of Breath, 18)


     shellfish derived (Verified  Allergy, Severe, Anaphylaxis, 18)


     trimethobenzamide (Verified  Allergy, Severe, Shortness of Breath, 18)


Reported Meds & Prescriptions





Reported Meds & Active Scripts


Active


Imitrex (Sumatriptan Succinate) 50 Mg Tab 50 Mg PO ONCE PRN


     If a satisfactory response has not been obtained at 2 hours, a second


     dose may be administered


Ambien (Zolpidem Tartrate) 5 Mg Tab 5 Mg PO HS PRN


Fioricet (Butalbital-Acetaminophen-Caffeine) -40 Mg Cap 20 Mg PO Q6H PRN


Reported


Amitriptyline (Amitriptyline HCl) 50 Mg Tab 50 Mg PO HS


Oxycontin (Oxycodone HCl) 10 Mg Tab 10 Mg PO Q8HR


Vitamin E (Vitamin E Mixed) 400 Unit Tablet 400 Units PO DAILY





Narrative Medication


Allergies and medications reviewed





Review of Systems


Except as stated in HPI:  all other systems reviewed are Neg


General / Constitutional:  No: Fever


Eyes:  No: Visual changes


HENT:  No: Headaches


Cardiovascular:  No: Chest Pain or Discomfort


Respiratory:  No: Shortness of Breath


Gastrointestinal:  No: Abdominal Pain


Genitourinary:  No: Dysuria


Musculoskeletal:  Positive: Pain


Skin:  No Rash


Neurologic:  No: Weakness


Psychiatric:  No: Depression


Endocrine:  No: Polydipsia


Hematologic/Lymphatic:  No: Easy Bruising





Physical Exam


Narrative


GENERAL: Awake alert oriented 3 no acute distress


SKIN: Warm and dry.


HEAD: Atraumatic. Normocephalic. 


EYES: Pupils equal and round. No scleral icterus. No injection or drainage. 


ENT: No nasal bleeding or discharge.  Mucous membranes pink and moist.


NECK: Trachea midline. No JVD.  Supple full range of motion.  Patient has no 

difficulty rotating neck to left and right or performing flexion and extension 

to follow movement and speak to examiner and staff.  Patient developed neck 

stiffness or rigidity during exam only without palpation


CARDIOVASCULAR: Regular rate and rhythm.  


RESPIRATORY: No accessory muscle use. Clear to auscultation. Breath sounds 

equal bilaterally. 


GASTROINTESTINAL: Abdomen soft, non-tender, nondistended. Hepatic and splenic 

margins not palpable. 


MUSCULOSKELETAL: Extremities without clubbing, cyanosis, or edema. No obvious 

deformities. 


NEUROLOGICAL: Awake and alert. No obvious cranial nerve deficits.  Motor 

grossly within normal limits. Five out of 5 muscle strength in the arms and 

legs.  Normal speech.


PSYCHIATRIC: Appropriate mood and affect; insight and judgment normal.





Data


Data


Last Documented VS





Vital Signs








  Date Time  Temp Pulse Resp B/P (MAP) Pulse Ox O2 Delivery O2 Flow Rate FiO2


 


18 05:22 98.3 79 16 162/95 (117) 100 Room Air  








Orders





 Orders


Tramadol (Ultram) (18 06:15)


Diazepam (Valium) (18 06:15)








[facility]


Medical Decision Making


Medical Screen Exam Complete:  Yes


Emergency Medical Condition:  Yes


Medical Record Reviewed:  Yes


Differential Diagnosis


Neck pain, somatizations


Narrative Course


Patient given pain medications and muscle relaxants with some efficacy.  

Encouraged to follow-up with your pain medicine specialist





Diagnosis





 Primary Impression:  


 Neck pain


Patient Instructions:  Acute Neck Pain (ED), General Instructions





***Additional Instructions:  


Pain medications and muscle relaxants as prescribed.  Follow-up with your pain 

medicine doctor.  Return for worsening


Scripts


Cyclobenzaprine (Flexeril) 10 Mg Tab


10 MG PO TID for Muscle Spasm, #10 TAB 0 Refills


   Prov: Og Freed MD         18 


Tramadol (Ultram) 50 Mg Tab


50 MG PO Q8H Y for PAIN, #10 TAB 0 Refills


   Prov: Og Freed MD         18


Disposition:  01 DISCHARGE HOME


Condition:  Stable











Og Freed MD 2018 06:18

## 2018-02-10 ENCOUNTER — HOSPITAL ENCOUNTER (EMERGENCY)
Dept: HOSPITAL 17 - NEPE | Age: 58
Discharge: HOME | End: 2018-02-10
Payer: COMMERCIAL

## 2018-02-10 VITALS
OXYGEN SATURATION: 99 % | RESPIRATION RATE: 14 BRPM | HEART RATE: 97 BPM | TEMPERATURE: 98.7 F | SYSTOLIC BLOOD PRESSURE: 159 MMHG | DIASTOLIC BLOOD PRESSURE: 89 MMHG

## 2018-02-10 VITALS — WEIGHT: 93.7 LBS | BODY MASS INDEX: 17.24 KG/M2 | HEIGHT: 62 IN

## 2018-02-10 VITALS — OXYGEN SATURATION: 99 %

## 2018-02-10 DIAGNOSIS — R51: Primary | ICD-10-CM

## 2018-02-10 DIAGNOSIS — Z88.2: ICD-10-CM

## 2018-02-10 DIAGNOSIS — R56.9: ICD-10-CM

## 2018-02-10 DIAGNOSIS — F41.8: ICD-10-CM

## 2018-02-10 DIAGNOSIS — R53.1: ICD-10-CM

## 2018-02-10 DIAGNOSIS — Z88.0: ICD-10-CM

## 2018-02-10 DIAGNOSIS — I25.2: ICD-10-CM

## 2018-02-10 DIAGNOSIS — I10: ICD-10-CM

## 2018-02-10 DIAGNOSIS — G89.29: ICD-10-CM

## 2018-02-10 DIAGNOSIS — Z88.5: ICD-10-CM

## 2018-02-10 DIAGNOSIS — H53.8: ICD-10-CM

## 2018-02-10 DIAGNOSIS — R20.0: ICD-10-CM

## 2018-02-10 DIAGNOSIS — R53.81: ICD-10-CM

## 2018-02-10 LAB
ALBUMIN SERPL-MCNC: 3.4 GM/DL (ref 3.4–5)
ALP SERPL-CCNC: 142 U/L (ref 45–117)
ALT SERPL-CCNC: 34 U/L (ref 10–53)
AST SERPL-CCNC: 24 U/L (ref 15–37)
BASOPHILS # BLD AUTO: 0 TH/MM3 (ref 0–0.2)
BASOPHILS NFR BLD: 0.2 % (ref 0–2)
BILIRUB SERPL-MCNC: 0.2 MG/DL (ref 0.2–1)
BUN SERPL-MCNC: 14 MG/DL (ref 7–18)
CALCIUM SERPL-MCNC: 8.7 MG/DL (ref 8.5–10.1)
CHLORIDE SERPL-SCNC: 104 MEQ/L (ref 98–107)
CREAT SERPL-MCNC: 0.7 MG/DL (ref 0.5–1)
EOSINOPHIL # BLD: 0.1 TH/MM3 (ref 0–0.4)
EOSINOPHIL NFR BLD: 1.8 % (ref 0–4)
ERYTHROCYTE [DISTWIDTH] IN BLOOD BY AUTOMATED COUNT: 20.2 % (ref 11.6–17.2)
GFR SERPLBLD BASED ON 1.73 SQ M-ARVRAT: 86 ML/MIN (ref 89–?)
GLUCOSE SERPL-MCNC: 96 MG/DL (ref 74–106)
HCO3 BLD-SCNC: 27.7 MEQ/L (ref 21–32)
HCT VFR BLD CALC: 36.4 % (ref 35–46)
HGB BLD-MCNC: 12.4 GM/DL (ref 11.6–15.3)
INR PPP: 1 RATIO
LYMPHOCYTES # BLD AUTO: 1.5 TH/MM3 (ref 1–4.8)
LYMPHOCYTES NFR BLD AUTO: 38 % (ref 9–44)
MCH RBC QN AUTO: 32.3 PG (ref 27–34)
MCHC RBC AUTO-ENTMCNC: 34 % (ref 32–36)
MCV RBC AUTO: 95.1 FL (ref 80–100)
MONOCYTE #: 0.3 TH/MM3 (ref 0–0.9)
MONOCYTES NFR BLD: 7 % (ref 0–8)
NEUTROPHILS # BLD AUTO: 2.1 TH/MM3 (ref 1.8–7.7)
NEUTROPHILS NFR BLD AUTO: 53 % (ref 16–70)
PLATELET # BLD: 167 TH/MM3 (ref 150–450)
PMV BLD AUTO: 8.6 FL (ref 7–11)
PROT SERPL-MCNC: 6.3 GM/DL (ref 6.4–8.2)
PROTHROMBIN TIME: 10.1 SEC (ref 9.8–11.6)
RBC # BLD AUTO: 3.82 MIL/MM3 (ref 4–5.3)
SODIUM SERPL-SCNC: 139 MEQ/L (ref 136–145)
WBC # BLD AUTO: 3.9 TH/MM3 (ref 4–11)

## 2018-02-10 PROCEDURE — 96374 THER/PROPH/DIAG INJ IV PUSH: CPT

## 2018-02-10 PROCEDURE — 85025 COMPLETE CBC W/AUTO DIFF WBC: CPT

## 2018-02-10 PROCEDURE — 99284 EMERGENCY DEPT VISIT MOD MDM: CPT

## 2018-02-10 PROCEDURE — 96375 TX/PRO/DX INJ NEW DRUG ADDON: CPT

## 2018-02-10 PROCEDURE — 85610 PROTHROMBIN TIME: CPT

## 2018-02-10 PROCEDURE — 72125 CT NECK SPINE W/O DYE: CPT

## 2018-02-10 PROCEDURE — 80053 COMPREHEN METABOLIC PANEL: CPT

## 2018-02-10 PROCEDURE — 85730 THROMBOPLASTIN TIME PARTIAL: CPT

## 2018-02-10 PROCEDURE — 70450 CT HEAD/BRAIN W/O DYE: CPT

## 2018-02-10 PROCEDURE — 96372 THER/PROPH/DIAG INJ SC/IM: CPT

## 2018-02-10 NOTE — RADRPT
EXAM DATE/TIME:  02/10/2018 17:22 

 

[facility] COMPARISON:     

No previous studies available for comparison.

 

 

INDICATIONS :     

Neck pain with numbness and confusion.

                      

 

RADIATION DOSE:     

10.25 CTDIvol (mGy) 

 

 

 

MEDICAL HISTORY :     

Cerebrovascular disease. Hypertension. Seizures.

 

SURGICAL HISTORY :      

Appendectomy. Cholecystectomy.

 

ENCOUNTER:      

Initial

 

ACUITY:      

1 day

 

PAIN SCALE:      

Non-responsive

 

LOCATION:       

Bilateral  neck region.

 

TECHNIQUE:     

Volumetric scanning of the cervical spine was performed. Multiplanar reconstructions in the sagittal,
 coronal and oblique axial planes were performed.   Using automated exposure control and adjustment o
f the mA and/or kV according to patient size, radiation dose was kept as low as reasonably achievable
 to obtain optimal diagnostic quality images.   DICOM format image data is available electronically f
or review and comparison.  

 

FINDINGS:     

There is moderate curvature of the cervical spine convex towards the left.  There is 2 mm retrolisthe
sis of C5 with respect to C6 and associated posterior endplate sclerosis with posterior osteophytes. 
 Moderate narrowing of the C6-7 interspace with vacuum phenomenon but no significant osteophytes.  Th
ere is fusion of the facet joint on the right side at C5-6.  The facets are in normal alignment witho
ut evidence of locked or perched facets.  Spinous processes are intact.  The atlantoaxial articulatio
n is intact.

 

C2-C3: 

No fracture seen.  The neural foramina are patent.

 

C3-C4:  

No fracture seen.  The neural foramina are patent.

 

C4-C5: 

No fracture seen.  Mild bilateral neural foraminal stenosis.

 

C5-C6: 

No fracture seen.  Moderate bilateral neural foraminal stenosis.

 

C6-C7:  

No fracture seen.  The neural foramina are patent.

 

C7-T1:  

No fracture seen.  The neural foramina are patent.

 

CONCLUSION:     

1. Mild retrolisthesis of the C5-6 level probably related to discogenic degenerative changes.  

2. No fractures seen.

 

 

 

 Kodi Sanford MD on February 10, 2018 at 17:48           

Board Certified Radiologist.

 This report was verified electronically.

## 2018-02-10 NOTE — RADRPT
EXAM DATE/TIME:  02/10/2018 17:22 

 

[facility] COMPARISON:     

CT BRAIN W/O CONTRAST, November 29, 2017, 10:52.

 

 

INDICATIONS :     

Head pain due to headaches and slurred speech.

                      

 

RADIATION DOSE:     

31.16 CTDIvol (mGy) 

 

 

 

MEDICAL HISTORY :     

Seizures.  Kidney cancer, CVA, HTN

 

SURGICAL HISTORY :      

Appendectomy. Cholecystectomy.Hysterectomy.

 

ENCOUNTER:      

Initial

 

ACUITY:      

1 day

 

PAIN SCALE:      

Non-responsive

 

LOCATION:       

Bilateral cranial 

 

TECHNIQUE:     

Multiple contiguous axial images were obtained of the head.  Using automated exposure control and adj
ustment of the mA and/or kV according to patient size, radiation dose was kept as low as reasonably a
chievable to obtain optimal diagnostic quality images.   DICOM format image data is available electro
nically for review and comparison.  

 

FINDINGS:     

 

CEREBRUM:     

The ventricles are normal for age.  No evidence of midline shift, mass lesion, hemorrhage or acute in
farction.  No extra-axial fluid collections are seen.

 

POSTERIOR FOSSA:     

The cerebellum and brainstem are intact.  The 4th ventricle is midline.  The cerebellopontine angle i
s unremarkable.

 

EXTRACRANIAL:     

The visualized portion of the orbits is intact.

 

SKULL:     

The calvaria is intact.  No evidence of skull fracture.

 

CONCLUSION:     

Normal examination.  No significant change has occurred.  

 

 

 

 Tavon Resendez MD on February 10, 2018 at 17:30           

Board Certified Radiologist.

 This report was verified electronically.

## 2018-02-10 NOTE — PD
HPI


Chief Complaint:  Numbness/Tingling


Time Seen by Provider:  16:46


Travel History


International Travel<30 days:  No


Contact w/Intl Traveler<30days:  No


Traveled to known affect area:  No





History of Present Illness


HPI


The patient is a 57-year-old  female who presents to the emergency 

department for headache and neck pain.  The patient states she developed neck 

pain 4 days ago, was seen in the emergency department yesterday for her neck 

pain.  She then developed a headache earlier today which is global but not 

associated with any photophobia, nausea, or vomiting.  The patient does have a 

history of migraines in the past and saw her pain interventional list [place] 

earlier this week.  However, she continues to have pain.  She also complains of 

weakness and the entire body as well as numbness of the entire body, left 

greater than the right, which she should beast or previous stroke.  The patient 

denies any chest pain, shortness of breath, nausea, vomiting, or abdominal 

pain.  Symptoms are moderate.  The patient also complains of intermittent 

blurred vision.





PFSH


Past Medical History


Hx Anticoagulant Therapy:  No


Asthma:  No


Blood Disorders:  No


Anxiety:  Yes


Depression:  Yes


Heart Rhythm Problems:  No


Cancer:  Yes (KIDNEY )


Cardiovascular Problems:  Yes (MI , repair )


High Cholesterol:  No


Chemotherapy:  No


Chest Pain:  No


Congestive Heart Failure:  No


COPD:  No


Cerebrovascular Accident:  Yes ()


Diabetes:  No


Diminished Hearing:  No


Endocrine:  No


Gastrointestinal Disorders:  Yes (GASTRECTOMY, CHRONIC ABDOMINAL PAIN )


GERD:  No


Genitourinary:  Yes (OCCASSIONAL UTI)


Headaches:  Yes


Hiatal Hernia:  Yes


Hypertension:  Yes


Immune Disorder:  No


Implanted Vascular Access Dvce:  No


Kidney Stones:  No


Musculoskeletal:  No


Neurologic:  Yes (CVA )


Psychiatric:  No


Reproductive:  No


Respiratory:  No


Immunizations Current:  Yes


Migraines:  Yes


Myocardial Infarction:  Yes ()


Pneumonia:  Yes


Radiation Therapy:  No


Renal Failure:  No


Seizures:  Yes


Sleep Apnea:  No


Thyroid Disease:  No


Ulcer:  No


Pregnant?:  Not Pregnant


Menopausal:  Yes


:  1


Para:  1


Miscarriage:  0


:  0


Ectopic Pregnancy:  No


Ovarian Cysts:  No


Dilation and Curettage (D&C):  Yes


Tubal Ligation:  Yes





Past Surgical History


Abdominal Surgery:  Yes (total gastrectomy w/pouch , hernia repair)


Appendectomy:  Yes


Cardiac Surgery:  Yes (REPAIRED HOLE IN HEART)


Cholecystectomy:  Yes


Genitourinary Surgery:  Yes (RIGHT NEPHRECTOMY)


Gynecologic Surgery:  Yes


Hysterectomy:  Yes


Thoracic Surgery:  No


Tonsillectomy:  Yes


Other Surgery:  Yes (GASTRECTOMY, RIGHT KIDNEY REMOVAL, HERNIA REPAIR, 

GALLBLADDER, TONSILS)





Social History


Alcohol Use:  No (PT DENIES)


Tobacco Use:  No (PT DENIES)


Substance Use:  No (PT DENIES)





Allergies-Medications


(Allergen,Severity, Reaction):  


Coded Allergies:  


     MRI PRECAUTION (Verified  Allergy, Severe, MRI contrast allergy, 18)


 anaphylaxis


     Sulfa (Sulfonamide Antibiotics) (Verified  Allergy, Severe, Rash, 18)


     gadobenic acid (Verified  Allergy, Severe, Anaphylaxis, 18)


     gadodiamide (Verified  Allergy, Severe, Anaphylaxis, 18)


     gadoteridol (Verified  Allergy, Severe, Anaphylaxis, 18)


     ketorolac (Verified  Allergy, Severe, Shortness of Breath, 18)


     metoclopramide (Verified  Allergy, Severe, Shortness of Breath, 18)


     morphine (Verified  Allergy, Severe, Hives, 18)


     ondansetron (Verified  Allergy, Severe, Shortness of Breath, 18)


     penicillin G (Verified  Allergy, Severe, Rash, 18)


     prochlorperazine (Verified  Allergy, Severe, Shortness of Breath, 18)


     promethazine (Verified  Allergy, Severe, Shortness of Breath, 18)


     shellfish derived (Verified  Allergy, Severe, Anaphylaxis, 18)


     trimethobenzamide (Verified  Allergy, Severe, Shortness of Breath, 18)


Reported Meds & Prescriptions





Reported Meds & Active Scripts


Active


Flexeril (Cyclobenzaprine HCl) 10 Mg Tab 10 Mg PO TID


Ultram (Tramadol HCl) 50 Mg Tab 50 Mg PO Q8H PRN


Imitrex (Sumatriptan Succinate) 50 Mg Tab 50 Mg PO ONCE PRN


     If a satisfactory response has not been obtained at 2 hours, a second


     dose may be administered


Ambien (Zolpidem Tartrate) 5 Mg Tab 5 Mg PO HS PRN


Fioricet (Butalbital-Acetaminophen-Caffeine) -40 Mg Cap 20 Mg PO Q6H PRN


Reported


Amitriptyline (Amitriptyline HCl) 50 Mg Tab 50 Mg PO HS


Oxycontin (Oxycodone HCl) 10 Mg Tab 10 Mg PO Q8HR


Vitamin E (Vitamin E Mixed) 400 Unit Tablet 400 Units PO DAILY








Review of Systems


Except as stated in HPI:  all other systems reviewed are Neg


General / Constitutional:  No: Fever


Eyes:  Positive: Blurred Vision, Visual changes


HENT:  Positive: Headaches, Neck Pain


Cardiovascular:  No: Chest Pain or Discomfort


Respiratory:  No: Shortness of Breath


Gastrointestinal:  No: Nausea, Vomiting, Abdominal Pain


Musculoskeletal:  Positive: Weakness


Neurologic:  Positive: Paresthesia, Sensory Disturbance





Physical Exam


Narrative


GENERAL: Awake, alert, 57 year-old female appears her stated age and is in no 

acute respiratory distress.


SKIN: Focused skin assessment warm/dry.


HEAD: Atraumatic. Normocephalic. 


EYES: Pupils equal and round.  3 mm bilateral reactive.  EOMs are intact.  Able 

to see fingers at a distance of 2 feet without difficulty.


ENT: No nasal bleeding or discharge.  Mucous membranes pink and moist.


NECK: Trachea midline. No JVD. 


CARDIOVASCULAR: Regular rate and rhythm.  No murmur appreciated.


RESPIRATORY: No accessory muscle use. Clear to auscultation. Breath sounds 

equal bilaterally. 


GASTROINTESTINAL: Abdomen soft, non-tender, nondistended.  No rebound 

tenderness.


MUSCULOSKELETAL: No obvious deformities. No clubbing.  No cyanosis.  No edema. 


NEUROLOGICAL: Awake and alert. No obvious cranial nerve deficits.  Motor 

grossly within normal limits. Normal speech.  No obvious drift of the upper or 

lower extremities.  However, each time exam is repeated at changes.  Sensation 

is symmetric on the face, arms, and legs.  She is oriented to person place.  

Her speech is slow and stuttering at times, however, it will normalize and her 

speech will be a normal jasper.


PSYCHIATRIC: Odd affect.





Data


Data


Last Documented VS





Vital Signs








  Date Time  Temp Pulse Resp B/P (MAP) Pulse Ox O2 Delivery O2 Flow Rate FiO2


 


2/10/18 17:00     99 Room Air  


 


2/10/18 16:21 98.7 97 14     








Orders





 Orders


Complete Blood Count With Diff (2/10/18 16:56)


Comprehensive Metabolic Panel (2/10/18 16:56)


Prothrombin Time / Inr (Pt) (2/10/18 16:56)


Act Partial Throm Time (Ptt) (2/10/18 16:56)


Ct Brain W/O Iv Contrast(Rout) (2/10/18 16:56)


Ecg Monitoring (2/10/18 16:56)


Iv Access Insert/Monitor (2/10/18 16:56)


Oximetry (2/10/18 16:56)


Sodium Chloride 0.9% Flush (Ns Flush) (2/10/18 17:00)


Diphenhydramine Inj (Benadryl Inj) (2/10/18 17:00)


Sodium Chlor 0.9% 1000 Ml Inj (Ns 1000 M (2/10/18 16:56)


Ct Cerv Spine W/O Contrast (2/10/18 )


Urinalysis - C+S If Indicated (2/10/18 17:02)


Sumatriptan Inj (Imitrex Inj) (2/10/18 18:15)


Dexamethasone Inj (Decadron Inj) (2/10/18 18:15)





Labs





Laboratory Tests








Test


  2/10/18


17:10


 


White Blood Count 3.9 TH/MM3 


 


Red Blood Count 3.82 MIL/MM3 


 


Hemoglobin 12.4 GM/DL 


 


Hematocrit 36.4 % 


 


Mean Corpuscular Volume 95.1 FL 


 


Mean Corpuscular Hemoglobin 32.3 PG 


 


Mean Corpuscular Hemoglobin


Concent 34.0 % 


 


 


Red Cell Distribution Width 20.2 % 


 


Platelet Count 167 TH/MM3 


 


Mean Platelet Volume 8.6 FL 


 


Neutrophils (%) (Auto) 53.0 % 


 


Lymphocytes (%) (Auto) 38.0 % 


 


Monocytes (%) (Auto) 7.0 % 


 


Eosinophils (%) (Auto) 1.8 % 


 


Basophils (%) (Auto) 0.2 % 


 


Neutrophils # (Auto) 2.1 TH/MM3 


 


Lymphocytes # (Auto) 1.5 TH/MM3 


 


Monocytes # (Auto) 0.3 TH/MM3 


 


Eosinophils # (Auto) 0.1 TH/MM3 


 


Basophils # (Auto) 0.0 TH/MM3 


 


CBC Comment DIFF FINAL 


 


Differential Comment  


 


Prothrombin Time 10.1 SEC 


 


Prothromb Time International


Ratio 1.0 RATIO 


 


 


Activated Partial


Thromboplast Time 24.7 SEC 


 


 


Blood Urea Nitrogen 14 MG/DL 


 


Creatinine 0.70 MG/DL 


 


Random Glucose 96 MG/DL 


 


Total Protein 6.3 GM/DL 


 


Albumin 3.4 GM/DL 


 


Calcium Level 8.7 MG/DL 


 


Alkaline Phosphatase 142 U/L 


 


Aspartate Amino Transf


(AST/SGOT) 24 U/L 


 


 


Alanine Aminotransferase


(ALT/SGPT) 34 U/L 


 


 


Total Bilirubin 0.2 MG/DL 


 


Sodium Level 139 MEQ/L 


 


Potassium Level 3.6 MEQ/L 


 


Chloride Level 104 MEQ/L 


 


Carbon Dioxide Level 27.7 MEQ/L 


 


Anion Gap 7 MEQ/L 


 


Estimat Glomerular Filtration


Rate 86 ML/MIN 


 











MDM


Medical Decision Making


Medical Screen Exam Complete:  Yes


Emergency Medical Condition:  Yes


Medical Record Reviewed:  Yes


Differential Diagnosis


Differential diagnosis includes CVA, intracranial hemorrhage, MS, hyponatremia, 

hypocalcemia, malingering, drug-seeking behavior.


Narrative Course


IV was established, labs are drawn and sent, and the patient was placed on 

cardiac telemetry monitoring and continuous pulse oximetry monitoring.  CT the 

brain and cervical spine were ordered.  I reviewed the patient's EMR, she had 

MRIs in 2017 of the brain and entire spine, MRI of the lumbar spine 

and thoracic spine were unremarkable.  MRI of the brain reveals some 

nonspecific white matter changes.  MRI of the cervical spine reveals some 

flattening of the cord at C5-C6, I reviewed Dr. Pablo's neurosurgical note, he 

did not believe her symptoms at that time were associated with the spinal cord 

findings on MRI., I do not believe this is related to a syringomyelia.  The 

patient's physical examination changes upon repeated attempts, unsure if this 

is malingering versus atypical presentation of underlying neurologic disorder.  

The patient was administered Benadryl for her pain, however, she is allergic to 

essentially everything including Toradol, Reglan, morphine, Zofran, Phenergan, 

and Compazine.  The patient requested Imitrex for her headache, she was 

administered Imitrex 6 mg subcutaneous and Decadron 8 mg IV.





Diagnosis





 Primary Impression:  


 Cephalgia


 Qualified Codes:  R51 - Headache


 Additional Impressions:  


 Chronic pain


 Qualified Codes:  G89.29 - Other chronic pain


 Debility


Patient Instructions:  General Instructions





***Additional Instructions:  


Please provide the patient a copy of her labs and CT results at discharge.  

Follow-up with her primary physician and pain interventionalist.


***Med/Other Pt SpecificInfo:  Prescription(s) given


Scripts


Methylprednisolone Dosepak (Medrol Dosepak) 4 Mg Dspk


4 MG PO AS DIRECTED, #1 DSPK 0 Refills


   Per Pharmacist direction


   Prov: Nathan Kohler MD         2/10/18


Disposition:   DISCHARGE HOME


Condition:  Stable











Nathan Kohler MD Feb 10, 2018 17:12

## 2018-02-24 ENCOUNTER — HOSPITAL ENCOUNTER (EMERGENCY)
Dept: HOSPITAL 17 - NEPC | Age: 58
Discharge: HOME | End: 2018-02-24
Payer: COMMERCIAL

## 2018-02-24 VITALS — DIASTOLIC BLOOD PRESSURE: 81 MMHG | SYSTOLIC BLOOD PRESSURE: 134 MMHG | TEMPERATURE: 97.7 F

## 2018-02-24 VITALS — WEIGHT: 95.9 LBS | HEIGHT: 62 IN | BODY MASS INDEX: 17.65 KG/M2

## 2018-02-24 VITALS
HEART RATE: 83 BPM | DIASTOLIC BLOOD PRESSURE: 90 MMHG | SYSTOLIC BLOOD PRESSURE: 143 MMHG | RESPIRATION RATE: 15 BRPM | TEMPERATURE: 97.8 F | OXYGEN SATURATION: 99 %

## 2018-02-24 VITALS
DIASTOLIC BLOOD PRESSURE: 92 MMHG | RESPIRATION RATE: 14 BRPM | TEMPERATURE: 99 F | SYSTOLIC BLOOD PRESSURE: 147 MMHG | OXYGEN SATURATION: 100 % | HEART RATE: 96 BPM

## 2018-02-24 DIAGNOSIS — G43.009: Primary | ICD-10-CM

## 2018-02-24 PROCEDURE — 96374 THER/PROPH/DIAG INJ IV PUSH: CPT

## 2018-02-24 PROCEDURE — 96361 HYDRATE IV INFUSION ADD-ON: CPT

## 2018-02-24 PROCEDURE — 99284 EMERGENCY DEPT VISIT MOD MDM: CPT

## 2018-02-24 NOTE — PD
HPI


Chief Complaint:  Headache


Time Seen by Provider:  08:42


Travel History


International Travel<30 days:  No


Contact w/Intl Traveler<30days:  No


Traveled to known affect area:  No





History of Present Illness


HPI


Patient is a 57-year-old female who comes in complaining of a migraine 

headache.  She says this is been going on for 3 days, but she is out of her 

Imitrex.  She tried calling her doctor, says but she cannot get it for 72 

hours.  She says this headache feels the same as all of her migraines.  She 

denies any new symptoms.  Denies fever chills.  She denies any head injury.  

She has multiple allergies, she says that usually the Imitrex and Benadryl help 

her.  Severity is mild to moderate.





PFSH


Past Medical History


Hx Anticoagulant Therapy:  No


Asthma:  No


Blood Disorders:  No


Anxiety:  Yes


Depression:  Yes


Heart Rhythm Problems:  No


Cancer:  Yes (KIDNEY )


Cardiovascular Problems:  Yes (MI )


High Cholesterol:  No


Chemotherapy:  No


Chest Pain:  No


Congestive Heart Failure:  No


COPD:  No


Cerebrovascular Accident:  Yes ()


Diabetes:  No


Diminished Hearing:  No


Endocrine:  No


Gastrointestinal Disorders:  Yes (GASTRECTOMY, CHRONIC ABDOMINAL PAIN )


GERD:  No


Genitourinary:  Yes (OCCASSIONAL UTI)


Headaches:  Yes


Hiatal Hernia:  Yes


Hypertension:  Yes


Immune Disorder:  No


Implanted Vascular Access Dvce:  No


Kidney Stones:  No


Musculoskeletal:  No


Neurologic:  Yes (CVA )


Psychiatric:  No


Reproductive:  No


Respiratory:  No


Immunizations Current:  Yes


Migraines:  Yes


Myocardial Infarction:  Yes ()


Pneumonia:  Yes


Radiation Therapy:  No


Renal Failure:  No


Seizures:  Yes


Sleep Apnea:  No


Thyroid Disease:  No


Ulcer:  No


Tetanus Vaccination:  < 5 Years


Influenza Vaccination:  Yes


Pregnant?:  Unknown


Menopausal:  Yes


:  1


Para:  1


Miscarriage:  0


:  0


Ectopic Pregnancy:  No


Ovarian Cysts:  No


Dilation and Curettage (D&C):  Yes


Tubal Ligation:  Yes





Past Surgical History


Abdominal Surgery:  Yes (total gastrectomy w/pouch , hernia repair)


Appendectomy:  Yes


Cardiac Surgery:  Yes (REPAIRED HOLE IN HEART)


Cholecystectomy:  Yes


Genitourinary Surgery:  Yes (RIGHT NEPHRECTOMY)


Gynecologic Surgery:  Yes


Hysterectomy:  No


Thoracic Surgery:  No


Tonsillectomy:  Yes


Other Surgery:  Yes (GASTRECTOMY, RIGHT KIDNEY REMOVAL, HERNIA REPAIR, 

GALLBLADDER, TONSILS)





Social History


Alcohol Use:  No (PT DENIES)


Tobacco Use:  No (PT DENIES)


Substance Use:  No (PT DENIES)





Allergies-Medications


(Allergen,Severity, Reaction):  


Coded Allergies:  


     MRI PRECAUTION (Verified  Allergy, Severe, MRI contrast allergy, 18)


 anaphylaxis


     Sulfa (Sulfonamide Antibiotics) (Verified  Allergy, Severe, Rash, 18)


     gadobenic acid (Verified  Allergy, Severe, Anaphylaxis, 18)


     gadodiamide (Verified  Allergy, Severe, Anaphylaxis, 18)


     gadoteridol (Verified  Allergy, Severe, Anaphylaxis, 18)


     ketorolac (Verified  Allergy, Severe, Shortness of Breath, 18)


     metoclopramide (Verified  Allergy, Severe, Shortness of Breath, 18)


     morphine (Verified  Allergy, Severe, Hives, 18)


     ondansetron (Verified  Allergy, Severe, Shortness of Breath, 18)


     penicillin G (Verified  Allergy, Severe, Rash, 18)


     prochlorperazine (Verified  Allergy, Severe, Shortness of Breath, 18)


     promethazine (Verified  Allergy, Severe, Shortness of Breath, 18)


     shellfish derived (Verified  Allergy, Severe, Anaphylaxis, 18)


     trimethobenzamide (Verified  Allergy, Severe, Shortness of Breath, 18)


Reported Meds & Prescriptions





Reported Meds & Active Scripts


Active


Imitrex (Sumatriptan Succinate) 50 Mg Tab 50 Mg PO ONCE PRN


     If a satisfactory response has not been obtained at 2 hours, a second


     dose may be administered


Ambien (Zolpidem Tartrate) 5 Mg Tab 5 Mg PO HS PRN


Fioricet (Butalbital-Acetaminophen-Caffeine) -40 Mg Cap 20 Mg PO Q6H PRN


Reported


Amitriptyline (Amitriptyline HCl) 50 Mg Tab 50 Mg PO HS


Oxycontin (Oxycodone HCl) 10 Mg Tab 10 Mg PO Q8HR


Vitamin E (Vitamin E Mixed) 400 Unit Tablet 400 Units PO DAILY








Review of Systems


Except as stated in HPI:  all other systems reviewed are Neg


General / Constitutional:  No: Fever, Chills


HENT:  Positive: Headaches


Cardiovascular:  No: Chest Pain or Discomfort


Respiratory:  No: Shortness of Breath


Gastrointestinal:  Positive: Nausea, No: Vomiting


Musculoskeletal:  No: Myalgias


Skin:  No Rash, No Change in Pigmentation





Physical Exam


Narrative


GENERAL: Awake and alert, in no acute distress.


SKIN: Focused skin assessment warm/dry.  No wounds or signs of infection.


HEAD: Atraumatic. Normocephalic. 


EYES: Pupils equal and round and reactive. No scleral icterus.  Extraocular 

movements intact.


ENT: Mucous membranes pink and moist.


NECK: Trachea midline. No JVD. 


CARDIOVASCULAR: Regular rate and rhythm.  No murmur appreciated.


RESPIRATORY: No accessory muscle use. Clear to auscultation. Breath sounds 

equal bilaterally. 


GASTROINTESTINAL: Abdomen soft, non-tender, nondistended. 


MUSCULOSKELETAL: No obvious deformities. No clubbing.  No cyanosis.  No edema. 


NEUROLOGICAL: Awake and alert. No obvious cranial nerve deficits.  Motor 

grossly within normal limits. Normal speech.


PSYCHIATRIC: Appropriate mood and affect; insight and judgment normal.





Data


Data


Last Documented VS





Vital Signs








  Date Time  Temp Pulse Resp B/P (MAP) Pulse Ox O2 Delivery O2 Flow Rate FiO2


 


18 10:22 97.8 83 15 143/90 (107) 99 Room Air  








Orders





 Orders


Sodium Chlor 0.9% 1000 Ml Inj (Ns 1000 M (18 09:00)


Diphenhydramine Inj (Benadryl Inj) (18 09:00)


Sumatriptan Succinate (Imitrex) (18 09:00)


Lorazepam (Ativan) (18 10:15)








MDM


Medical Decision Making


Medical Screen Exam Complete:  Yes


Emergency Medical Condition:  Yes


Medical Record Reviewed:  Yes


Differential Diagnosis


Migraine vs tension headache vs dehydration


Narrative Course


Patient is a 57-year-old female comes in complaining of a migraine take.  She 

says she is out of her Imitrex.  Exam shows no neurologic abnormalities.  

Patient has multiple allergies.  She is given a dose of Imitrex as well as IV 

fluids and Benadryl.  She reports feeling better.  She says she still feels a 

little nauseous and that Ativan is what works for her.  She is given a small 

dose of Ativan given a prescription for Imitrex until she is able to get them 

from her doctor.  Advised to follow-up with her doctors.  Advised to drink 

plenty fluids.  Advised return to the ED as needed for any worsening symptoms.





Diagnosis





 Primary Impression:  


 Migraine


 Qualified Codes:  G43.009 - Migraine without aura, not intractable, without 

status migrainosus


Patient Instructions:  Acute Headache (ED), General Instructions





***Additional Instructions:  


Follow-up with your doctor.  Take medication as needed for your headache.  

Return to the ED as needed for any worsening symptoms.


Scripts


Sumatriptan (Imitrex) 100 Mg Tab


100 MG PO ONCE Y for MIGRAINE HEADACHE, #10 TAB 0 Refills


   If a satisfactory response has not been obtained at 2 hours, a second


   dose may be administered


   Prov: Christina Durham MD         18


Disposition:   DISCHARGE HOME


Condition:  Stable











Christina Durham MD 2018 10:18

## 2018-02-28 ENCOUNTER — HOSPITAL ENCOUNTER (EMERGENCY)
Dept: HOSPITAL 17 - NEPE | Age: 58
Discharge: HOME | End: 2018-02-28
Payer: COMMERCIAL

## 2018-02-28 VITALS — WEIGHT: 99.21 LBS | HEIGHT: 62 IN | BODY MASS INDEX: 18.26 KG/M2

## 2018-02-28 VITALS
SYSTOLIC BLOOD PRESSURE: 141 MMHG | DIASTOLIC BLOOD PRESSURE: 89 MMHG | TEMPERATURE: 99.3 F | OXYGEN SATURATION: 99 % | RESPIRATION RATE: 16 BRPM | HEART RATE: 107 BPM

## 2018-02-28 DIAGNOSIS — R51: Primary | ICD-10-CM

## 2018-02-28 DIAGNOSIS — Z91.013: ICD-10-CM

## 2018-02-28 DIAGNOSIS — Z88.2: ICD-10-CM

## 2018-02-28 DIAGNOSIS — Z86.73: ICD-10-CM

## 2018-02-28 DIAGNOSIS — I25.2: ICD-10-CM

## 2018-02-28 DIAGNOSIS — Z88.0: ICD-10-CM

## 2018-02-28 DIAGNOSIS — F32.9: ICD-10-CM

## 2018-02-28 DIAGNOSIS — I10: ICD-10-CM

## 2018-02-28 DIAGNOSIS — Z85.528: ICD-10-CM

## 2018-02-28 DIAGNOSIS — R94.31: ICD-10-CM

## 2018-02-28 DIAGNOSIS — Z88.5: ICD-10-CM

## 2018-02-28 PROCEDURE — 96372 THER/PROPH/DIAG INJ SC/IM: CPT

## 2018-02-28 PROCEDURE — 99283 EMERGENCY DEPT VISIT LOW MDM: CPT

## 2018-02-28 PROCEDURE — 93005 ELECTROCARDIOGRAM TRACING: CPT

## 2018-03-01 ENCOUNTER — HOSPITAL ENCOUNTER (EMERGENCY)
Dept: HOSPITAL 17 - NEPD | Age: 58
Discharge: HOME | End: 2018-03-01
Payer: COMMERCIAL

## 2018-03-01 VITALS — WEIGHT: 94.8 LBS | HEIGHT: 62 IN | BODY MASS INDEX: 17.44 KG/M2

## 2018-03-01 VITALS
TEMPERATURE: 98.3 F | OXYGEN SATURATION: 99 % | DIASTOLIC BLOOD PRESSURE: 97 MMHG | HEART RATE: 102 BPM | SYSTOLIC BLOOD PRESSURE: 131 MMHG | RESPIRATION RATE: 18 BRPM

## 2018-03-01 DIAGNOSIS — R51: Primary | ICD-10-CM

## 2018-03-01 PROCEDURE — 99283 EMERGENCY DEPT VISIT LOW MDM: CPT

## 2018-03-01 NOTE — PD
HPI


Chief Complaint:  Headache


Time Seen by Provider:  17:31


Travel History


International Travel<30 days:  No


Contact w/Intl Traveler<30days:  No


Traveled to known affect area:  No





History of Present Illness


HPI


57-year-old female complains of headache.  Patient states that she has history 

of recurrent migraine.  Patient has been to the emergency room recently 

multiple times for headache.  Patient was seen in emergency room last night and 

given oxycodone and Dilaudid and Fioricet and discharged home.  Patient state 

that she had persistent headache today.  Patient has been to the emergency room 

multiple times in the past with recurrent abdominal pain.  Patient denies any 

photophobia.  Patient denies any nausea vomiting.  Patient denies any focal 

weakness or numbness of the extremity.  Patient denies any recent head injury.  

Patient had CT of the head and neck recently which was normal.





PFSH


Past Medical History


Hx Anticoagulant Therapy:  No


Asthma:  No


Blood Disorders:  No


Anxiety:  Yes


Depression:  Yes


Heart Rhythm Problems:  No


Cancer:  Yes (KIDNEY )


Cardiovascular Problems:  Yes (MI )


High Cholesterol:  No


Chemotherapy:  No


Chest Pain:  No


Congestive Heart Failure:  No


COPD:  No


Cerebrovascular Accident:  Yes ()


Diabetes:  No


Diminished Hearing:  No


Endocrine:  No


Gastrointestinal Disorders:  Yes (GASTRECTOMY, CHRONIC ABDOMINAL PAIN )


GERD:  No


Genitourinary:  Yes (OCCASSIONAL UTI)


Headaches:  Yes


Hiatal Hernia:  Yes


Hypertension:  Yes


Immune Disorder:  No


Implanted Vascular Access Dvce:  No


Kidney Stones:  No


Musculoskeletal:  No


Neurologic:  Yes (CVA )


Psychiatric:  No


Reproductive:  No


Respiratory:  No


Immunizations Current:  Yes


Migraines:  Yes


Myocardial Infarction:  Yes ()


Pneumonia:  Yes


Radiation Therapy:  No


Renal Failure:  No


Seizures:  Yes


Sleep Apnea:  No


Thyroid Disease:  No


Ulcer:  No


Menopausal:  Yes


:  1


Para:  1


Miscarriage:  0


:  0


Ectopic Pregnancy:  No


Ovarian Cysts:  No


Dilation and Curettage (D&C):  Yes


Tubal Ligation:  Yes





Past Surgical History


Abdominal Surgery:  Yes (total gastrectomy w/pouch , hernia repair)


Appendectomy:  Yes


Cardiac Surgery:  Yes (REPAIRED HOLE IN HEART)


Cholecystectomy:  Yes


Genitourinary Surgery:  Yes (RIGHT NEPHRECTOMY)


Gynecologic Surgery:  Yes


Hysterectomy:  No


Thoracic Surgery:  No


Tonsillectomy:  Yes


Other Surgery:  Yes (GASTRECTOMY, RIGHT KIDNEY REMOVAL, HERNIA REPAIR, 

GALLBLADDER, TONSILS)





Social History


Alcohol Use:  No (PT DENIES)


Tobacco Use:  No (PT DENIES)


Substance Use:  No (PT DENIES)





Allergies-Medications


(Allergen,Severity, Reaction):  


Coded Allergies:  


     MRI PRECAUTION (Verified  Allergy, Severe, MRI contrast allergy, 3/1/18)


 anaphylaxis


     Sulfa (Sulfonamide Antibiotics) (Verified  Allergy, Severe, Rash, 3/1/18)


     gadobenic acid (Verified  Allergy, Severe, Anaphylaxis, 3/1/18)


     gadodiamide (Verified  Allergy, Severe, Anaphylaxis, 3/1/18)


     gadoteridol (Verified  Allergy, Severe, Anaphylaxis, 3/1/18)


     ketorolac (Verified  Allergy, Severe, Shortness of Breath, 3/1/18)


     metoclopramide (Verified  Allergy, Severe, Shortness of Breath, 3/1/18)


     morphine (Verified  Allergy, Severe, Hives, 3/1/18)


     ondansetron (Verified  Allergy, Severe, Shortness of Breath, 3/1/18)


     penicillin G (Verified  Allergy, Severe, Rash, 3/1/18)


     prochlorperazine (Verified  Allergy, Severe, Shortness of Breath, 3/1/18)


     promethazine (Verified  Allergy, Severe, Shortness of Breath, 3/1/18)


     shellfish derived (Verified  Allergy, Severe, Anaphylaxis, 3/1/18)


     trimethobenzamide (Verified  Allergy, Severe, Shortness of Breath, 3/1/18)


Reported Meds & Prescriptions





Reported Meds & Active Scripts


Active


Imitrex (Sumatriptan Succinate) 100 Mg Tab 100 Mg PO ONCE PRN


     If a satisfactory response has not been obtained at 2 hours, a second


     dose may be administered


Imitrex (Sumatriptan Succinate) 50 Mg Tab 50 Mg PO ONCE PRN


     If a satisfactory response has not been obtained at 2 hours, a second


     dose may be administered


Ambien (Zolpidem Tartrate) 5 Mg Tab 5 Mg PO HS PRN


Fioricet (Butalbital-Acetaminophen-Caffeine) -40 Mg Cap 20 Mg PO Q6H PRN


Reported


Amitriptyline (Amitriptyline HCl) 50 Mg Tab 50 Mg PO HS


Oxycontin (Oxycodone HCl) 10 Mg Tab 10 Mg PO Q8HR


Vitamin E (Vitamin E Mixed) 400 Unit Tablet 400 Units PO DAILY








Review of Systems


General / Constitutional:  No: Fever


Eyes:  No: Visual changes


HENT:  Positive: Headaches


Cardiovascular:  No: Chest Pain or Discomfort


Respiratory:  No: Shortness of Breath


Gastrointestinal:  No: Abdominal Pain


Genitourinary:  No: Dysuria


Musculoskeletal:  No: Pain


Skin:  No Rash


Neurologic:  No: Weakness


Psychiatric:  No: Depression


Endocrine:  No: Polydipsia


Hematologic/Lymphatic:  No: Easy Bruising





Physical Exam


Narrative


GENERAL: Well-nourished, well-developed patient.


SKIN: Focused skin assessment warm/dry.


HEAD: Normocephalic.


EYES: No scleral icterus. No injection or drainage.  Pupil 2 mm equal reactive.


NECK: Supple, trachea midline. No JVD or lymphadenopathy.  No meningismus.


CARDIOVASCULAR: Regular rate and rhythm without murmurs, gallops, or rubs. 


RESPIRATORY: Breath sounds equal bilaterally. No accessory muscle use.


GASTROINTESTINAL: Abdomen soft, non-tender, nondistended. 


MUSCULOSKELETAL: No cyanosis, or edema. 


BACK: Nontender without obvious deformity. No CVA tenderness.


Neurologic exam: Patient is awake and alert oriented 3.  No obvious focal 

neurological deficit.





Data


Data


Last Documented VS





Vital Signs








  Date Time  Temp Pulse Resp B/P (MAP) Pulse Ox O2 Delivery O2 Flow Rate FiO2


 


3/1/18 15:37   18  100 Room Air  


 


3/1/18 15:33 98.3 102  131/97 (108)    








Orders





 Orders


Sumatriptan Succinate (Imitrex) (3/1/18 17:45)


Acet-Butal-Caff 325-50-40 Mg (Fioricet 3 (3/1/18 17:45)








MDM


Medical Decision Making


Medical Screen Exam Complete:  Yes


Emergency Medical Condition:  Yes


Differential Diagnosis


Differential diagnosis including migraine headache, tension headache, cluster 

headache, opioid dependence.


Narrative Course


57-year-old female with recurrent headache.  History of recurrent abdominal 

pain that opioid dependent.  Patient states that she has history of migraine.  

Patient has no photophobia, nausea vomiting, neurologic exam normal today.  

Patient was given Dilaudid and oxycodone last night.  Patient will be given 

Fioricet and Imitrex today.  Patient will be discharged.





Diagnosis





 Primary Impression:  


 Cephalgia


 Qualified Codes:  R51 - Headache


Patient Instructions:  General Instructions





***Additional Instructions:  


Advised patient continue with Fioricet and Imitrex as needed.  Follow-up local 

physician.


***Med/Other Pt SpecificInfo:  No Change to Meds


Disposition:   DISCHARGE HOME


Condition:  Stable











Marito Zamudio MD Mar 1, 2018 17:46

## 2018-03-01 NOTE — EKG
Date Performed: 02/28/2018       Time Performed: 19:39:58

 

PTAGE:      57 years

 

EKG:      Sinus rhythm 

 

 POSSIBLE RIGHT ATRIAL ENLARGEMENT LEFT ATRIAL ENLARGEMENT LEFT BUNDLE BRANCH BLOCK ABNORMAL ECG Sinc
e the prior tracing, there has been no significant change 

 

 PREVIOUS TRACING            : 11/29/2017 12.32

 

DOCTOR:   Srikanth Dasilva  Interpretating Date/Time  03/01/2018 11:34:59

## 2018-03-02 ENCOUNTER — HOSPITAL ENCOUNTER (EMERGENCY)
Dept: HOSPITAL 17 - NEPD | Age: 58
Discharge: HOME | End: 2018-03-02
Payer: COMMERCIAL

## 2018-03-02 VITALS — WEIGHT: 99.21 LBS | BODY MASS INDEX: 18.26 KG/M2 | HEIGHT: 62 IN

## 2018-03-02 VITALS
HEART RATE: 108 BPM | SYSTOLIC BLOOD PRESSURE: 148 MMHG | TEMPERATURE: 98.3 F | DIASTOLIC BLOOD PRESSURE: 82 MMHG | OXYGEN SATURATION: 100 % | RESPIRATION RATE: 18 BRPM

## 2018-03-02 DIAGNOSIS — I10: ICD-10-CM

## 2018-03-02 DIAGNOSIS — I25.2: ICD-10-CM

## 2018-03-02 DIAGNOSIS — R51: Primary | ICD-10-CM

## 2018-03-02 PROCEDURE — 96372 THER/PROPH/DIAG INJ SC/IM: CPT

## 2018-03-02 PROCEDURE — 99283 EMERGENCY DEPT VISIT LOW MDM: CPT

## 2018-03-02 NOTE — PD
HPI


Chief Complaint:  Headache


Time Seen by Provider:  09:06


Travel History


International Travel<30 days:  No


Contact w/Intl Traveler<30days:  No


Traveled to known affect area:  No





History of Present Illness


HPI


c/o generalized ha, ongoing , for several days.  here requesting pain medication

, particularly "dilaudid" is the only one that helps her.  I advised that such 

a strong narcotic is reserved for very severe cases only and usually avoided 

due to narcotic rebound headache.  patient states she has her follow up 

appointment with dr ortolani (her neurologist)








multiple allergies


pcp is dr march


MEDICAL HISTORY :     


Seizures.  Kidney cancer, CVA, HTN


SURGICAL HISTORY :      


Appendectomy. Cholecystectomy.Hysterectomy.





PFSH


Past Medical History


Hx Anticoagulant Therapy:  No


Asthma:  No


Blood Disorders:  No


Anxiety:  Yes


Depression:  Yes


Heart Rhythm Problems:  No


Cancer:  Yes (KIDNEY )


Cardiovascular Problems:  Yes (MI )


High Cholesterol:  No


Chemotherapy:  No


Chest Pain:  No


Congestive Heart Failure:  No


COPD:  No


Cerebrovascular Accident:  Yes ()


Diabetes:  No


Diminished Hearing:  No


Endocrine:  No


Gastrointestinal Disorders:  Yes (GASTRECTOMY, CHRONIC ABDOMINAL PAIN )


GERD:  No


Genitourinary:  Yes (OCCASSIONAL UTI)


Headaches:  Yes


Hiatal Hernia:  Yes


Hypertension:  Yes


Immune Disorder:  No


Implanted Vascular Access Dvce:  No


Kidney Stones:  No


Musculoskeletal:  No


Neurologic:  Yes (CVA )


Psychiatric:  No


Reproductive:  No


Respiratory:  No


Immunizations Current:  Yes


Migraines:  Yes


Myocardial Infarction:  Yes ()


Pneumonia:  Yes


Radiation Therapy:  No


Renal Failure:  No


Seizures:  Yes


Sleep Apnea:  No


Thyroid Disease:  No


Ulcer:  No


Menopausal:  Yes


:  1


Para:  1


Miscarriage:  0


:  0


Ectopic Pregnancy:  No


Ovarian Cysts:  No


Dilation and Curettage (D&C):  Yes


Tubal Ligation:  Yes





Past Surgical History


Abdominal Surgery:  Yes (total gastrectomy w/pouch , hernia repair)


Appendectomy:  Yes


Cardiac Surgery:  Yes (REPAIRED HOLE IN HEART)


Cholecystectomy:  Yes


Genitourinary Surgery:  Yes (RIGHT NEPHRECTOMY)


Gynecologic Surgery:  Yes


Hysterectomy:  No


Thoracic Surgery:  No


Tonsillectomy:  Yes


Other Surgery:  Yes (GASTRECTOMY, RIGHT KIDNEY REMOVAL, HERNIA REPAIR, 

GALLBLADDER, TONSILS)





Social History


Alcohol Use:  No (PT DENIES)


Tobacco Use:  No (PT DENIES)


Substance Use:  No (PT DENIES)





Allergies-Medications


(Allergen,Severity, Reaction):  


Coded Allergies:  


     MRI PRECAUTION (Verified  Allergy, Severe, MRI contrast allergy, 3/2/18)


 anaphylaxis


     Sulfa (Sulfonamide Antibiotics) (Verified  Allergy, Severe, Rash, 3/2/18)


     gadobenic acid (Verified  Allergy, Severe, Anaphylaxis, 3/2/18)


     gadodiamide (Verified  Allergy, Severe, Anaphylaxis, 3/2/18)


     gadoteridol (Verified  Allergy, Severe, Anaphylaxis, 3/2/18)


     ketorolac (Verified  Allergy, Severe, Shortness of Breath, 3/2/18)


     metoclopramide (Verified  Allergy, Severe, Shortness of Breath, 3/2/18)


     morphine (Verified  Allergy, Severe, Hives, 3/2/18)


     ondansetron (Verified  Allergy, Severe, Shortness of Breath, 3/2/18)


     penicillin G (Verified  Allergy, Severe, Rash, 3/2/18)


     prochlorperazine (Verified  Allergy, Severe, Shortness of Breath, 3/2/18)


     promethazine (Verified  Allergy, Severe, Shortness of Breath, 3/2/18)


     shellfish derived (Verified  Allergy, Severe, Anaphylaxis, 3/2/18)


     trimethobenzamide (Verified  Allergy, Severe, Shortness of Breath, 3/2/18)


Reported Meds & Prescriptions





Reported Meds & Active Scripts


Active


Imitrex (Sumatriptan Succinate) 100 Mg Tab 100 Mg PO ONCE PRN


     If a satisfactory response has not been obtained at 2 hours, a second


     dose may be administered


Ambien (Zolpidem Tartrate) 5 Mg Tab 5 Mg PO HS PRN


Fioricet (Butalbital-Acetaminophen-Caffeine) -40 Mg Cap 20 Mg PO Q6H PRN


Reported


Oxycontin (Oxycodone HCl) 10 Mg Tab 10 Mg PO Q8HR


Vitamin E (Vitamin E Mixed) 400 Unit Tablet 400 Units PO DAILY








Review of Systems


General / Constitutional:  No: Fever


Eyes:  No: Visual changes


HENT:  Positive: Headaches


Cardiovascular:  No: Chest Pain or Discomfort


Respiratory:  No: Shortness of Breath


Gastrointestinal:  No: Abdominal Pain


Genitourinary:  No: Dysuria


Musculoskeletal:  No: Pain


Skin:  No Rash


Neurologic:  No: Weakness


Psychiatric:  No: Depression


Endocrine:  No: Polydipsia


Hematologic/Lymphatic:  No: Easy Bruising





Physical Exam


Narrative


GENERAL: 


SKIN: Warm and dry.


HEAD: Atraumatic. Normocephalic. 


EYES: Pupils equal and round. No scleral icterus. No injection or drainage. 


ENT: No nasal bleeding or discharge.  Mucous membranes pink and moist.


NECK: Trachea midline. No JVD. 


CARDIOVASCULAR: Regular rate and rhythm.  


RESPIRATORY: No accessory muscle use. Clear to auscultation. Breath sounds 

equal bilaterally. 


GASTROINTESTINAL: Abdomen soft, non-tender, nondistended. 


MUSCULOSKELETAL: Extremities without clubbing, cyanosis, or edema. No obvious 

deformities. 


NEUROLOGICAL: Awake and alert. No obvious cranial nerve deficits.  Motor 

grossly within normal limits. Five out of 5 muscle strength in the arms and 

legs.  Normal speech.


PSYCHIATRIC: Appropriate mood and affect; insight and judgment normal.





Data


Data


Last Documented VS





Vital Signs








  Date Time  Temp Pulse Resp B/P (MAP) Pulse Ox O2 Delivery O2 Flow Rate FiO2


 


3/2/18 07:51 98.3 108 18 148/82 (104) 100   








Orders





 Orders


Sumatriptan Inj (Imitrex Inj) (3/2/18 09:30)


Orphenadrine Inj (Norflex Inj) (3/2/18 09:30)








MDM


Medical Decision Making


Medical Screen Exam Complete:  Yes


Emergency Medical Condition:  Yes


Medical Record Reviewed:  Yes


Differential Diagnosis


tension ha v brain mass v aneurysm v migraine


Narrative Course


this patient on chart review used to come to ed for chronic abd pain and has 

been "developing" allergies to toradol, morphine, zofran, reglan, and many more 

medications.  Now recently, she has been coming to dept for headche and has had 

appropriate work up such as ct head/neck on feb 10: both negative as read by 

radiologist...of note mild retrolesthesis c5/c6 is only major finding on 

neck....additionally had mri brain on nov, just 4 months ago with negative mass

, aneurysm or stroke findings.  





will be given norflex and imitrex im and advise to keep appointment with her 

neurologist....no prescription for pain medication will be prescribed.





Diagnosis





 Primary Impression:  


 chronic headache vs malingering


Patient Instructions:  Chronic Pain (ED), General Instructions


Disposition:  01 DISCHARGE HOME


Condition:  Stable











Kelton Bragg MD Mar 2, 2018 09:16

## 2018-03-12 ENCOUNTER — HOSPITAL ENCOUNTER (EMERGENCY)
Dept: HOSPITAL 17 - NEPD | Age: 58
Discharge: HOME | End: 2018-03-12
Payer: COMMERCIAL

## 2018-03-12 VITALS
RESPIRATION RATE: 18 BRPM | OXYGEN SATURATION: 95 % | DIASTOLIC BLOOD PRESSURE: 98 MMHG | SYSTOLIC BLOOD PRESSURE: 164 MMHG | HEART RATE: 94 BPM | TEMPERATURE: 98.1 F

## 2018-03-12 VITALS — WEIGHT: 96.21 LBS | BODY MASS INDEX: 17.7 KG/M2 | HEIGHT: 62 IN

## 2018-03-12 DIAGNOSIS — R51: ICD-10-CM

## 2018-03-12 DIAGNOSIS — I25.2: ICD-10-CM

## 2018-03-12 DIAGNOSIS — F41.9: ICD-10-CM

## 2018-03-12 DIAGNOSIS — G89.4: Primary | ICD-10-CM

## 2018-03-12 DIAGNOSIS — H91.8X2: ICD-10-CM

## 2018-03-12 DIAGNOSIS — M54.2: ICD-10-CM

## 2018-03-12 DIAGNOSIS — I10: ICD-10-CM

## 2018-03-12 DIAGNOSIS — Z86.73: ICD-10-CM

## 2018-03-12 DIAGNOSIS — R56.9: ICD-10-CM

## 2018-03-12 PROCEDURE — 99283 EMERGENCY DEPT VISIT LOW MDM: CPT

## 2018-03-12 NOTE — PD
HPI


.


Headache


Chief Complaint:  Headache


Time Seen by Provider:  14:50


Travel History


International Travel<30 days:  No


Contact w/Intl Traveler<30days:  No


Traveled to known affect area:  No





History of Present Illness


HPI


This patient presents complaining with neck pain, head pain and decreased 

hearing in her left ear.  Onset was 2 days ago.  Pain is rated 9/10.  Pain has 

been unrelieved by Fioricet and Imitrex which she takes chronically.  Nothing 

exacerbates her pain.





The patient reports that she called her pain management doctor, Dr. Ortolani, 

and was advised to present us for management.





PFSH


Past Medical History


Hx Anticoagulant Therapy:  No


Asthma:  No


Blood Disorders:  No


Anxiety:  Yes


Depression:  Yes


Heart Rhythm Problems:  No


Cancer:  Yes (KIDNEY )


Cardiovascular Problems:  Yes (MI )


High Cholesterol:  No


Chemotherapy:  No


Chest Pain:  No


Congestive Heart Failure:  No


COPD:  No


Cerebrovascular Accident:  Yes ()


Diabetes:  No


Diminished Hearing:  No


Endocrine:  No


Gastrointestinal Disorders:  Yes (GASTRECTOMY, CHRONIC ABDOMINAL PAIN )


GERD:  No


Genitourinary:  Yes (OCCASSIONAL UTI)


Headaches:  Yes


Hiatal Hernia:  Yes


Hypertension:  Yes


Immune Disorder:  No


Implanted Vascular Access Dvce:  No


Kidney Stones:  No


Musculoskeletal:  No


Neurologic:  Yes (CVA )


Psychiatric:  No


Reproductive:  No


Respiratory:  No


Immunizations Current:  Yes


Migraines:  Yes


Myocardial Infarction:  Yes ()


Pneumonia:  Yes


Radiation Therapy:  No


Renal Failure:  No


Seizures:  Yes


Sleep Apnea:  No


Thyroid Disease:  No


Ulcer:  No


Menopausal:  Yes


:  1


Para:  1


Miscarriage:  0


:  0


Ectopic Pregnancy:  No


Ovarian Cysts:  No


Dilation and Curettage (D&C):  Yes


Tubal Ligation:  Yes





Past Surgical History


Abdominal Surgery:  Yes (total gastrectomy w/pouch , hernia repair)


Appendectomy:  Yes


Cardiac Surgery:  Yes (REPAIRED HOLE IN HEART)


Cholecystectomy:  Yes


Genitourinary Surgery:  Yes (RIGHT NEPHRECTOMY)


Gynecologic Surgery:  Yes


Hysterectomy:  No


Thoracic Surgery:  No


Tonsillectomy:  Yes


Other Surgery:  Yes (GASTRECTOMY, RIGHT KIDNEY REMOVAL, HERNIA REPAIR, 

GALLBLADDER, TONSILS)





Social History


Alcohol Use:  No ( )


Tobacco Use:  No ( )


Substance Use:  No ( )





Allergies-Medications


(Allergen,Severity, Reaction):  


Coded Allergies:  


     MRI PRECAUTION (Verified  Allergy, Severe, MRI contrast allergy, 3/2/18)


 anaphylaxis


     Sulfa (Sulfonamide Antibiotics) (Verified  Allergy, Severe, Rash, 3/2/18)


     gadobenic acid (Verified  Allergy, Severe, Anaphylaxis, 3/2/18)


     gadodiamide (Verified  Allergy, Severe, Anaphylaxis, 3/2/18)


     gadoteridol (Verified  Allergy, Severe, Anaphylaxis, 3/2/18)


     ketorolac (Verified  Allergy, Severe, Shortness of Breath, 3/2/18)


     metoclopramide (Verified  Allergy, Severe, Shortness of Breath, 3/2/18)


     morphine (Verified  Allergy, Severe, Hives, 3/2/18)


     ondansetron (Verified  Allergy, Severe, Shortness of Breath, 3/2/18)


     penicillin G (Verified  Allergy, Severe, Rash, 3/2/18)


     prochlorperazine (Verified  Allergy, Severe, Shortness of Breath, 3/2/18)


     promethazine (Verified  Allergy, Severe, Shortness of Breath, 3/2/18)


     shellfish derived (Verified  Allergy, Severe, Anaphylaxis, 3/2/18)


     trimethobenzamide (Verified  Allergy, Severe, Shortness of Breath, 3/2/18)


Reported Meds & Prescriptions





Reported Meds & Active Scripts


Active


Imitrex (Sumatriptan Succinate) 50 Mg Tab 50 Mg PO ONCE PRN


     If a satisfactory response has not been obtained at 2 hours, a second


     dose may be administered


Imitrex (Sumatriptan Succinate) 100 Mg Tab 100 Mg PO ONCE PRN


     If a satisfactory response has not been obtained at 2 hours, a second


     dose may be administered


Ambien (Zolpidem Tartrate) 5 Mg Tab 5 Mg PO HS PRN


Fioricet (Butalbital-Acetaminophen-Caffeine) -40 Mg Cap 20 Mg PO Q6H PRN


Reported


Oxycontin (Oxycodone HCl) 10 Mg Tab 10 Mg PO Q8HR


Vitamin E (Vitamin E Mixed) 400 Unit Tablet 400 Units PO DAILY








Review of Systems


Except as stated in HPI:  all other systems reviewed are Neg


General / Constitutional:  No: Fever, Chills


Eyes:  No: Blurred Vision


HENT:  Positive: Headaches, Neck Pain, Other (Decreased hearing from the left 

ear)


Neurologic:  No: Paresthesia





Physical Exam


Narrative


GENERAL: Awake and alert and in no acute distress.  Very thin.


SKIN: Warm and dry.


HEAD: Normocephalic/atraumatic.


EYES: Pupils are equal.  Extraocular movements are intact.


ENT: Both TMs are shiny and gray with good light reflexes.  There is no cerumen 

in either EAC.


NECK: Normal range of motion.  Diffusely tender.


MUSCULOSKELETAL: Atraumatic.


NEUROLOGICAL: A and O 3.  No obvious cranial nerve deficits.  Moving all 4 

extremities equally.  Finger-nose-finger exam is intact.


PSYCHIATRIC: Appropriate mood and affect.





Data


Data


Last Documented VS





Vital Signs








  Date Time  Temp Pulse Resp B/P (MAP) Pulse Ox O2 Delivery O2 Flow Rate FiO2


 


3/12/18 14:51   16   Room Air  


 


3/12/18 12:38 98.1 94  164/98 (120) 95   








Orders





 Orders


Sumatriptan Succinate (Imitrex) (3/12/18 15:15)








[facility]


Medical Decision Making


Medical Screen Exam Complete:  Yes


Emergency Medical Condition:  Yes


Medical Record Reviewed:  Yes (Patient is seen here frequently for similar 

complaints.  She reports numerous drug allergies.  Previous records indicate 

that she states that the only thing that helps her pain is Dilaudid.  I have 

reviewed her past 7 ED visits she was given Dilaudid here only once.)


Differential Diagnosis


Differential diagnosis of headache includes but is not limited to migraine, 

muscle contraction headache, brain tumor, brain bleed





Differential diagnosis of ear pain includes eustachian tube dysfunction, otitis 

externa, otitis media, TMJ syndrome


Narrative Course


This patient presents for the evaluation of neck pain, head pain and decreased 

hearing from the left ear.  Her EACs are clear with no cerumen and both TMs are 

shiny and gray with good light reflexes.





The patient reports that she was sent to us by her pain management doctor.  I 

called Dr. Ortolani's office to find out #1, did they sent her here? and #2, 

how with a like for us to assist in her management?  I was told that the 

patient had called her office today requesting a walk-in appointment.  They did 

not have any available appointments today.  She told them that she had 

decreased hearing in her left ear.  She was instructed to be seen either here 

or at urgent care for evaluation of her left ear.





The patient has asked me for a dose of her routine Imitrex.  I have provided 

that.





Diagnosis





 Primary Impression:  


 Decreased hearing of left ear


 Additional Impression:  


 Chronic pain


 Qualified Codes:  G89.4 - Chronic pain syndrome


Patient Instructions:  General Instructions, Hearing Loss (DC)


Disposition:  01 DISCHARGE HOME


Condition:  Stable











Marisela Chadwick MD Mar 12, 2018 15:14

## 2018-03-13 ENCOUNTER — HOSPITAL ENCOUNTER (EMERGENCY)
Dept: HOSPITAL 17 - NEPE | Age: 58
LOS: 1 days | Discharge: HOME | End: 2018-03-14
Payer: COMMERCIAL

## 2018-03-13 VITALS
SYSTOLIC BLOOD PRESSURE: 167 MMHG | OXYGEN SATURATION: 97 % | DIASTOLIC BLOOD PRESSURE: 103 MMHG | RESPIRATION RATE: 18 BRPM | HEART RATE: 93 BPM

## 2018-03-13 VITALS
SYSTOLIC BLOOD PRESSURE: 159 MMHG | DIASTOLIC BLOOD PRESSURE: 102 MMHG | HEART RATE: 111 BPM | TEMPERATURE: 98.8 F | OXYGEN SATURATION: 97 % | RESPIRATION RATE: 18 BRPM

## 2018-03-13 DIAGNOSIS — R51: Primary | ICD-10-CM

## 2018-03-13 DIAGNOSIS — G89.29: ICD-10-CM

## 2018-03-13 DIAGNOSIS — I25.2: ICD-10-CM

## 2018-03-13 DIAGNOSIS — R11.2: ICD-10-CM

## 2018-03-13 PROCEDURE — 99283 EMERGENCY DEPT VISIT LOW MDM: CPT

## 2018-03-13 PROCEDURE — 96372 THER/PROPH/DIAG INJ SC/IM: CPT

## 2018-03-13 NOTE — PD
HPI


Chief Complaint:  Headache


Time Seen by Provider:  22:57


Travel History


International Travel<30 days:  No


Contact w/Intl Traveler<30days:  No


Traveled to known affect area:  No





History of Present Illness


HPI


Patient returns for the sixth time this month with the same complaint of 

headache, and with the same exact discussion stating that she is unable to take 

her Imitrex pill because she is actively vomiting.  The last time I saw her I 

gave her Zofran ODT which the patient states that she is not able to tolerate.  

Patient describes her headache the same way as she does in the past.  There is 

nothing different, just that she is not getting relief at home.





Upon chart review she has been worked up with an MRI of the brain 2017

, as well as a recent CT scan of the head and neck 2018.





Patient states he only narcotic that she is able to take without any problem 

his Dilaudid.





PFSH


Past Medical History


Hx Anticoagulant Therapy:  No


Asthma:  No


Blood Disorders:  No


Anxiety:  Yes


Depression:  Yes


Heart Rhythm Problems:  No


Cancer:  Yes (KIDNEY )


Cardiovascular Problems:  Yes (MI )


High Cholesterol:  No


Chemotherapy:  No


Chest Pain:  No


Congestive Heart Failure:  No


COPD:  No


Cerebrovascular Accident:  Yes ()


Diabetes:  No


Diminished Hearing:  No


Endocrine:  No


Gastrointestinal Disorders:  Yes (GASTRECTOMY, CHRONIC ABDOMINAL PAIN )


GERD:  No


Genitourinary:  Yes (OCCASSIONAL UTI)


Headaches:  Yes


Hiatal Hernia:  Yes


Hypertension:  Yes


Immune Disorder:  No


Implanted Vascular Access Dvce:  No


Kidney Stones:  No


Musculoskeletal:  No


Neurologic:  Yes (CVA )


Psychiatric:  No


Reproductive:  No


Respiratory:  No


Immunizations Current:  Yes


Migraines:  Yes


Myocardial Infarction:  Yes ()


Pneumonia:  Yes


Radiation Therapy:  No


Renal Failure:  No


Seizures:  Yes


Sleep Apnea:  No


Thyroid Disease:  No


Ulcer:  No


Tetanus Vaccination:  Unknown


Influenza Vaccination:  Yes


Menopausal:  Yes


:  1


Para:  1


Miscarriage:  0


:  0


Ectopic Pregnancy:  No


Ovarian Cysts:  No


Dilation and Curettage (D&C):  Yes


Tubal Ligation:  Yes





Past Surgical History


Abdominal Surgery:  Yes (total gastrectomy w/pouch , hernia repair)


Appendectomy:  Yes


Cardiac Surgery:  Yes (REPAIRED HOLE IN HEART)


Cholecystectomy:  Yes


Genitourinary Surgery:  Yes (RIGHT NEPHRECTOMY)


Gynecologic Surgery:  Yes


Hysterectomy:  No


Thoracic Surgery:  No


Tonsillectomy:  Yes


Other Surgery:  Yes (GASTRECTOMY, RIGHT KIDNEY REMOVAL, HERNIA REPAIR, 

GALLBLADDER, TONSILS)





Social History


Alcohol Use:  No ( )


Tobacco Use:  No ( )


Substance Use:  No ( )





Allergies-Medications


(Allergen,Severity, Reaction):  


Coded Allergies:  


     MRI PRECAUTION (Verified  Allergy, Severe, MRI contrast allergy, 3/2/18)


 anaphylaxis


     Sulfa (Sulfonamide Antibiotics) (Verified  Allergy, Severe, Rash, 3/2/18)


     gadobenic acid (Verified  Allergy, Severe, Anaphylaxis, 3/2/18)


     gadodiamide (Verified  Allergy, Severe, Anaphylaxis, 3/2/18)


     gadoteridol (Verified  Allergy, Severe, Anaphylaxis, 3/2/18)


     ketorolac (Verified  Allergy, Severe, Shortness of Breath, 3/2/18)


     metoclopramide (Verified  Allergy, Severe, Shortness of Breath, 3/2/18)


     morphine (Verified  Allergy, Severe, Hives, 3/2/18)


     ondansetron (Verified  Allergy, Severe, Shortness of Breath, 3/2/18)


     penicillin G (Verified  Allergy, Severe, Rash, 3/2/18)


     prochlorperazine (Verified  Allergy, Severe, Shortness of Breath, 3/2/18)


     promethazine (Verified  Allergy, Severe, Shortness of Breath, 3/2/18)


     shellfish derived (Verified  Allergy, Severe, Anaphylaxis, 3/2/18)


     trimethobenzamide (Verified  Allergy, Severe, Shortness of Breath, 3/2/18)


Reported Meds & Prescriptions





Reported Meds & Active Scripts


Active


Imitrex (Sumatriptan Succinate) 50 Mg Tab 50 Mg PO ONCE PRN


     If a satisfactory response has not been obtained at 2 hours, a second


     dose may be administered


Imitrex (Sumatriptan Succinate) 100 Mg Tab 100 Mg PO ONCE PRN


     If a satisfactory response has not been obtained at 2 hours, a second


     dose may be administered


Ambien (Zolpidem Tartrate) 5 Mg Tab 5 Mg PO HS PRN


Fioricet (Butalbital-Acetaminophen-Caffeine) -40 Mg Cap 20 Mg PO Q6H PRN


Reported


Oxycontin (Oxycodone HCl) 10 Mg Tab 10 Mg PO Q8HR


Vitamin E (Vitamin E Mixed) 400 Unit Tablet 400 Units PO DAILY








Review of Systems


General / Constitutional:  No: Fever


Eyes:  No: Visual changes


HENT:  Positive: Headaches


Cardiovascular:  No: Chest Pain or Discomfort


Respiratory:  No: Shortness of Breath


Gastrointestinal:  No: Abdominal Pain


Genitourinary:  No: Dysuria


Musculoskeletal:  No: Pain


Skin:  No Rash


Neurologic:  No: Weakness


Psychiatric:  No: Depression


Endocrine:  No: Polydipsia


Hematologic/Lymphatic:  No: Easy Bruising





Physical Exam


Narrative


GENERAL: 


SKIN: Warm and dry.


HEAD: Atraumatic. Normocephalic. 


EYES: Pupils equal and round. No scleral icterus. No injection or drainage. 


ENT: No nasal bleeding or discharge.  Mucous membranes pink and moist.


NECK: Trachea midline. No JVD. 


CARDIOVASCULAR: Regular rate and rhythm.  


RESPIRATORY: No accessory muscle use. Clear to auscultation. Breath sounds 

equal bilaterally. 


GASTROINTESTINAL: Abdomen soft, non-tender, nondistended. 


MUSCULOSKELETAL: Extremities without clubbing, cyanosis, or edema. No obvious 

deformities. 


NEUROLOGICAL: Awake and alert. No obvious cranial nerve deficits.  Motor 

grossly within normal limits. Five out of 5 muscle strength in the arms and 

legs.  Normal speech.


PSYCHIATRIC: Appropriate mood and affect; insight and judgment normal.





Data


Data


Last Documented VS





Vital Signs








  Date Time  Temp Pulse Resp B/P (MAP) Pulse Ox O2 Delivery O2 Flow Rate FiO2


 


3/13/18 23:19  93 18 167/103 (124) 97 Room Air  


 


3/13/18 20:06 98.8       








Orders





 Orders


Trimethobenzamide Inj (Tigan Inj) (3/14/18 00:15)


Sumatriptan Inj (Imitrex Inj) (3/14/18 00:45)








[facility]


Medical Decision Making


Medical Screen Exam Complete:  Yes


Emergency Medical Condition:  Yes


Medical Record Reviewed:  Yes


Differential Diagnosis


After focused extensive evaluation of her charts unfortunately my differential 

at this time is malingering versus acute on chronic exacerbation of headache


Narrative Course


Patient essentially list allergies to every antiemetic with the exception of 

droperidol and Marinol... However I am not inclined to give her either of those 

anyway.


Patient also has multiple allergies to medications and the pain category 

including morphine, Toradol in particular.  So patient will be treated with 

Imitrex which she can take, she has at home medication for.





Diagnosis





 Primary Impression:  


 Chronic cephalgia


Patient Instructions:  Chronic Pain (DC), General Instructions





***Additional Instructions:  


FOLLOW UP WITH YOUR NEUROLOGIST FOR FURTHER PAIN CONTROL.


Disposition:  01 DISCHARGE HOME


Condition:  Stable











[facility] MD Mar 13, 2018 23:31

## 2018-03-25 ENCOUNTER — HOSPITAL ENCOUNTER (EMERGENCY)
Dept: HOSPITAL 17 - NEPC | Age: 58
Discharge: HOME | End: 2018-03-25
Payer: COMMERCIAL

## 2018-03-25 VITALS
DIASTOLIC BLOOD PRESSURE: 92 MMHG | SYSTOLIC BLOOD PRESSURE: 180 MMHG | TEMPERATURE: 98.5 F | OXYGEN SATURATION: 98 % | RESPIRATION RATE: 16 BRPM | HEART RATE: 111 BPM

## 2018-03-25 DIAGNOSIS — R51: Primary | ICD-10-CM

## 2018-03-25 DIAGNOSIS — G89.29: ICD-10-CM

## 2018-03-25 DIAGNOSIS — I25.2: ICD-10-CM

## 2018-03-25 DIAGNOSIS — F32.9: ICD-10-CM

## 2018-03-25 DIAGNOSIS — I10: ICD-10-CM

## 2018-03-25 DIAGNOSIS — Z86.73: ICD-10-CM

## 2018-03-25 DIAGNOSIS — F41.9: ICD-10-CM

## 2018-03-25 PROCEDURE — 99283 EMERGENCY DEPT VISIT LOW MDM: CPT

## 2018-03-25 PROCEDURE — 96372 THER/PROPH/DIAG INJ SC/IM: CPT

## 2018-03-25 NOTE — PD
HPI


Chief Complaint:  Headache


Time Seen by Provider:  07:39


Travel History


International Travel<30 days:  No


Contact w/Intl Traveler<30days:  No


Traveled to known affect area:  No





History of Present Illness


HPI


Patient is a 57-year-old female known to me from previous ER visits presents 

emergency department for evaluation of headache.  This is her seventh 

presentation in the past 30 days for similar.  Patient states she ran out of 

her Imitrex at home, she also states that she has a problem with discs in her 

neck and her neck feels very tight but she has an appointment to follow-up with 

a neurosurgeon in 2 weeks.  Patient denies any thunderclap presentation, states 

is not the worst headache of her life, no nuchal rigidity.  Patient has a 

history of chronic abdominal pain as well, she has presented to the emergency 

department multiple times for this as well.





PFSH


Past Medical History


Hx Anticoagulant Therapy:  No


Asthma:  No


Blood Disorders:  No


Anxiety:  Yes


Depression:  Yes


Heart Rhythm Problems:  No


Cancer:  Yes (KIDNEY )


Cardiovascular Problems:  Yes (MI )


High Cholesterol:  No


Chemotherapy:  No


Chest Pain:  No


Congestive Heart Failure:  No


COPD:  No


Cerebrovascular Accident:  Yes ()


Diabetes:  No


Diminished Hearing:  No


Endocrine:  No


Gastrointestinal Disorders:  Yes (GASTRECTOMY, CHRONIC ABDOMINAL PAIN )


GERD:  No


Genitourinary:  Yes (OCCASSIONAL UTI)


Headaches:  Yes


Hiatal Hernia:  Yes


Hypertension:  Yes


Immune Disorder:  No


Implanted Vascular Access Dvce:  No


Kidney Stones:  No


Musculoskeletal:  No


Neurologic:  Yes (CVA )


Psychiatric:  No


Reproductive:  No


Respiratory:  No


Immunizations Current:  Yes


Migraines:  Yes


Myocardial Infarction:  Yes ()


Pneumonia:  Yes


Radiation Therapy:  No


Renal Failure:  No


Seizures:  Yes


Sleep Apnea:  No


Thyroid Disease:  No


Ulcer:  No


Menopausal:  Yes


:  1


Para:  1


Miscarriage:  0


:  0


Ectopic Pregnancy:  No


Ovarian Cysts:  No


Dilation and Curettage (D&C):  Yes


Tubal Ligation:  Yes





Past Surgical History


Abdominal Surgery:  Yes (total gastrectomy w/pouch , hernia repair)


Appendectomy:  Yes


Cardiac Surgery:  Yes (REPAIRED HOLE IN HEART)


Cholecystectomy:  Yes


Genitourinary Surgery:  Yes (RIGHT NEPHRECTOMY)


Gynecologic Surgery:  Yes


Hysterectomy:  No


Thoracic Surgery:  No


Tonsillectomy:  Yes


Other Surgery:  Yes (GASTRECTOMY, RIGHT KIDNEY REMOVAL, HERNIA REPAIR, 

GALLBLADDER, TONSILS)





Social History


Alcohol Use:  No ( )


Tobacco Use:  No ( )


Substance Use:  No ( )





Allergies-Medications


(Allergen,Severity, Reaction):  


Coded Allergies:  


     MRI PRECAUTION (Verified  Allergy, Severe, MRI contrast allergy, 3/25/18)


 anaphylaxis


     Sulfa (Sulfonamide Antibiotics) (Verified  Allergy, Severe, Rash, 3/25/18)


     gadobenic acid (Verified  Allergy, Severe, Anaphylaxis, 3/25/18)


     gadodiamide (Verified  Allergy, Severe, Anaphylaxis, 3/25/18)


     gadoteridol (Verified  Allergy, Severe, Anaphylaxis, 3/25/18)


     ketorolac (Verified  Allergy, Severe, Shortness of Breath, 3/25/18)


     metoclopramide (Verified  Allergy, Severe, Shortness of Breath, 3/25/18)


     morphine (Verified  Allergy, Severe, Hives, 3/25/18)


     ondansetron (Verified  Allergy, Severe, Shortness of Breath, 3/25/18)


     penicillin G (Verified  Allergy, Severe, Rash, 3/25/18)


     prochlorperazine (Verified  Allergy, Severe, Shortness of Breath, 3/25/18)


     promethazine (Verified  Allergy, Severe, Shortness of Breath, 3/25/18)


     shellfish derived (Verified  Allergy, Severe, Anaphylaxis, 3/25/18)


     trimethobenzamide (Verified  Allergy, Severe, Shortness of Breath, 3/25/18)


Reported Meds & Prescriptions





Reported Meds & Active Scripts


Active


Imitrex Nasal Spray (Sumatriptan Succinate) 5 Mg/Act Spray 1 Spray NASAL ONCE 

PRN


     Spray in one nostril. If headache has not resolved within 2 hours or


     returns, the dose may be repeated once after 2 hours


Imitrex (Sumatriptan Succinate) 50 Mg Tab 50 Mg PO ONCE PRN


     If a satisfactory response has not been obtained at 2 hours, a second


     dose may be administered


Imitrex (Sumatriptan Succinate) 100 Mg Tab 100 Mg PO ONCE PRN


     If a satisfactory response has not been obtained at 2 hours, a second


     dose may be administered


Ambien (Zolpidem Tartrate) 5 Mg Tab 5 Mg PO HS PRN


Fioricet (Butalbital-Acetaminophen-Caffeine) -40 Mg Cap 20 Mg PO Q6H PRN


Reported


Oxycontin (Oxycodone HCl) 10 Mg Tab 10 Mg PO Q8HR


Vitamin E (Vitamin E Mixed) 400 Unit Tablet 400 Units PO DAILY








Review of Systems


Except as stated in HPI:  all other systems reviewed are Neg





Physical Exam


Narrative


GENERAL: Well-developed, thin female in no obvious distress.


SKIN: Focused skin assessment warm/dry.


HEAD: Atraumatic. Normocephalic. 


EYES: Pupils equal and round. No scleral icterus. No injection or drainage. 


ENT: No nasal bleeding or discharge.  Mucous membranes pink and moist.


NECK: Trachea midline. No JVD. 


CARDIOVASCULAR: Regular rate and rhythm.  No murmur appreciated.


RESPIRATORY: No accessory muscle use. Clear to auscultation. Breath sounds 

equal bilaterally. 


GASTROINTESTINAL: Abdomen soft, non-tender, nondistended. Hepatic and splenic 

margins not palpable. 


MUSCULOSKELETAL: No obvious deformities. No clubbing.  No cyanosis.  No edema. 


NEUROLOGICAL: Awake and alert.  Cranial nerves II through XII grossly intact 

and nonfocal, 5 out of 5 strength in all 4 extremities.  Cerebellar testing 

negative, ambulance with an even narrow-base gait.


PSYCHIATRIC: Appropriate mood and affect; insight and judgment normal.





Data


Data


Last Documented VS





Vital Signs








  Date Time  Temp Pulse Resp B/P (MAP) Pulse Ox O2 Delivery O2 Flow Rate FiO2


 


3/25/18 06:51 98.5 111 16 180/92 (121) 98   








Orders





 Orders


Sumatriptan Inj (Imitrex Inj) (3/25/18 08:00)


Acet-Butal-Caff 325-50-40 Mg (Fioricet 3 (3/25/18 08:00)


Ed Discharge Order (3/25/18 08:40)








MDM


Medical Decision Making


Medical Screen Exam Complete:  Yes


Emergency Medical Condition:  Yes


Differential Diagnosis


Headache, migraine headache, cluster headache, acute intracranial pathology 

highly unlikely.


Narrative Course


roomed in ED.  No red flag s/s.  She appears well.  medications given, feeling 

better.  Stable for discharge.  No indication for additional workup at this 

time.  Neurologically intact, CT scan within one month was negative.  Suspicion 

for meningitis and SAH are very low.





Diagnosis





 Primary Impression:  


 Chronic headache


***Med/Other Pt SpecificInfo:  Prescription(s) given


Scripts


Sumatriptan Nasal Spray (Imitrex Nasal Spray) 5 Mg/Act [place]


1 SPRAY NASAL ONCE Y for MIGRAINE HEADACHE, #1 BOTTLE 0 Refills


   Spray in one nostril. If headache has not resolved within 2 hours or


   returns, the dose may be repeated once after 2 hours


   Prov: Kostas Jean MD         3/25/18


Disposition:  01 DISCHARGE HOME


Condition:  Stable











Kostas Jean MD Mar 25, 2018 07:51

## 2018-04-14 ENCOUNTER — HOSPITAL ENCOUNTER (INPATIENT)
Dept: HOSPITAL 17 - NEPE | Age: 58
LOS: 2 days | DRG: 296 | End: 2018-04-16
Attending: HOSPITALIST | Admitting: HOSPITALIST
Payer: COMMERCIAL

## 2018-04-14 VITALS
DIASTOLIC BLOOD PRESSURE: 82 MMHG | TEMPERATURE: 102.3 F | RESPIRATION RATE: 18 BRPM | SYSTOLIC BLOOD PRESSURE: 136 MMHG | OXYGEN SATURATION: 95 % | HEART RATE: 116 BPM

## 2018-04-14 VITALS
HEART RATE: 89 BPM | DIASTOLIC BLOOD PRESSURE: 84 MMHG | RESPIRATION RATE: 17 BRPM | TEMPERATURE: 98.3 F | SYSTOLIC BLOOD PRESSURE: 133 MMHG | OXYGEN SATURATION: 100 %

## 2018-04-14 VITALS
DIASTOLIC BLOOD PRESSURE: 86 MMHG | SYSTOLIC BLOOD PRESSURE: 134 MMHG | RESPIRATION RATE: 18 BRPM | HEART RATE: 92 BPM | TEMPERATURE: 99.1 F | OXYGEN SATURATION: 98 %

## 2018-04-14 VITALS
DIASTOLIC BLOOD PRESSURE: 87 MMHG | TEMPERATURE: 99.4 F | OXYGEN SATURATION: 100 % | HEART RATE: 86 BPM | SYSTOLIC BLOOD PRESSURE: 159 MMHG | RESPIRATION RATE: 20 BRPM

## 2018-04-14 VITALS
TEMPERATURE: 100.3 F | OXYGEN SATURATION: 97 % | RESPIRATION RATE: 18 BRPM | DIASTOLIC BLOOD PRESSURE: 81 MMHG | HEART RATE: 101 BPM | SYSTOLIC BLOOD PRESSURE: 148 MMHG

## 2018-04-14 VITALS
OXYGEN SATURATION: 96 % | HEART RATE: 100 BPM | SYSTOLIC BLOOD PRESSURE: 147 MMHG | TEMPERATURE: 97.8 F | RESPIRATION RATE: 16 BRPM | DIASTOLIC BLOOD PRESSURE: 77 MMHG

## 2018-04-14 VITALS — BODY MASS INDEX: 18.78 KG/M2 | WEIGHT: 102.07 LBS | HEIGHT: 62 IN

## 2018-04-14 VITALS
HEART RATE: 95 BPM | RESPIRATION RATE: 20 BRPM | DIASTOLIC BLOOD PRESSURE: 89 MMHG | TEMPERATURE: 100 F | SYSTOLIC BLOOD PRESSURE: 165 MMHG | OXYGEN SATURATION: 100 %

## 2018-04-14 VITALS — OXYGEN SATURATION: 98 %

## 2018-04-14 VITALS — RESPIRATION RATE: 18 BRPM | OXYGEN SATURATION: 98 %

## 2018-04-14 VITALS — DIASTOLIC BLOOD PRESSURE: 84 MMHG | TEMPERATURE: 98.1 F | SYSTOLIC BLOOD PRESSURE: 133 MMHG

## 2018-04-14 DIAGNOSIS — G43.909: ICD-10-CM

## 2018-04-14 DIAGNOSIS — N39.0: ICD-10-CM

## 2018-04-14 DIAGNOSIS — M54.9: ICD-10-CM

## 2018-04-14 DIAGNOSIS — Z90.3: ICD-10-CM

## 2018-04-14 DIAGNOSIS — Z85.528: ICD-10-CM

## 2018-04-14 DIAGNOSIS — I10: ICD-10-CM

## 2018-04-14 DIAGNOSIS — J02.9: ICD-10-CM

## 2018-04-14 DIAGNOSIS — F11.20: ICD-10-CM

## 2018-04-14 DIAGNOSIS — Z86.73: ICD-10-CM

## 2018-04-14 DIAGNOSIS — G89.4: ICD-10-CM

## 2018-04-14 DIAGNOSIS — E87.6: ICD-10-CM

## 2018-04-14 DIAGNOSIS — J15.9: ICD-10-CM

## 2018-04-14 DIAGNOSIS — A41.9: ICD-10-CM

## 2018-04-14 DIAGNOSIS — R74.0: ICD-10-CM

## 2018-04-14 DIAGNOSIS — I46.9: Primary | ICD-10-CM

## 2018-04-14 DIAGNOSIS — M54.2: ICD-10-CM

## 2018-04-14 DIAGNOSIS — Z90.5: ICD-10-CM

## 2018-04-14 DIAGNOSIS — I25.2: ICD-10-CM

## 2018-04-14 LAB
ALBUMIN SERPL-MCNC: 2.8 GM/DL (ref 3.4–5)
ALP SERPL-CCNC: 106 U/L (ref 45–117)
ALT SERPL-CCNC: 113 U/L (ref 10–53)
AST SERPL-CCNC: 216 U/L (ref 15–37)
BACTERIA #/AREA URNS HPF: (no result) /HPF
BASOPHILS # BLD AUTO: 0 TH/MM3 (ref 0–0.2)
BASOPHILS NFR BLD: 0.1 % (ref 0–2)
BILIRUB SERPL-MCNC: 0.4 MG/DL (ref 0.2–1)
BUN SERPL-MCNC: 13 MG/DL (ref 7–18)
CALCIUM SERPL-MCNC: 8.6 MG/DL (ref 8.5–10.1)
CHLORIDE SERPL-SCNC: 102 MEQ/L (ref 98–107)
COLOR UR: YELLOW
CREAT SERPL-MCNC: 0.86 MG/DL (ref 0.5–1)
EOSINOPHIL # BLD: 0 TH/MM3 (ref 0–0.4)
EOSINOPHIL NFR BLD: 0.2 % (ref 0–4)
ERYTHROCYTE [DISTWIDTH] IN BLOOD BY AUTOMATED COUNT: 15.4 % (ref 11.6–17.2)
GFR SERPLBLD BASED ON 1.73 SQ M-ARVRAT: 68 ML/MIN (ref 89–?)
GLUCOSE SERPL-MCNC: 135 MG/DL (ref 74–106)
GLUCOSE UR STRIP-MCNC: (no result) MG/DL
HCO3 BLD-SCNC: 26.4 MEQ/L (ref 21–32)
HCT VFR BLD CALC: 32.7 % (ref 35–46)
HGB BLD-MCNC: 11 GM/DL (ref 11.6–15.3)
HGB UR QL STRIP: (no result)
INR PPP: 1 RATIO
KETONES UR STRIP-MCNC: 10 MG/DL
LYMPHOCYTES # BLD AUTO: 0.7 TH/MM3 (ref 1–4.8)
LYMPHOCYTES NFR BLD AUTO: 9.1 % (ref 9–44)
MCH RBC QN AUTO: 33.8 PG (ref 27–34)
MCHC RBC AUTO-ENTMCNC: 33.7 % (ref 32–36)
MCV RBC AUTO: 100.3 FL (ref 80–100)
MONOCYTE #: 0.8 TH/MM3 (ref 0–0.9)
MONOCYTES NFR BLD: 9.4 % (ref 0–8)
MUCOUS THREADS #/AREA URNS LPF: (no result) /LPF
NEUTROPHILS # BLD AUTO: 6.6 TH/MM3 (ref 1.8–7.7)
NEUTROPHILS NFR BLD AUTO: 81.2 % (ref 16–70)
NITRITE UR QL STRIP: (no result)
PLATELET # BLD: 157 TH/MM3 (ref 150–450)
PMV BLD AUTO: 9.1 FL (ref 7–11)
PROT SERPL-MCNC: 6.9 GM/DL (ref 6.4–8.2)
PROTHROMBIN TIME: 10.1 SEC (ref 9.8–11.6)
RBC # BLD AUTO: 3.26 MIL/MM3 (ref 4–5.3)
SODIUM SERPL-SCNC: 137 MEQ/L (ref 136–145)
SP GR UR STRIP: 1.03 (ref 1–1.03)
SQUAMOUS #/AREA URNS HPF: 3 /HPF (ref 0–5)
URINE LEUKOCYTE ESTERASE: (no result)
WBC # BLD AUTO: 8.2 TH/MM3 (ref 4–11)

## 2018-04-14 PROCEDURE — 80053 COMPREHEN METABOLIC PANEL: CPT

## 2018-04-14 PROCEDURE — 36600 WITHDRAWAL OF ARTERIAL BLOOD: CPT

## 2018-04-14 PROCEDURE — 85610 PROTHROMBIN TIME: CPT

## 2018-04-14 PROCEDURE — 80074 ACUTE HEPATITIS PANEL: CPT

## 2018-04-14 PROCEDURE — 96365 THER/PROPH/DIAG IV INF INIT: CPT

## 2018-04-14 PROCEDURE — 96367 TX/PROPH/DG ADDL SEQ IV INF: CPT

## 2018-04-14 PROCEDURE — 71045 X-RAY EXAM CHEST 1 VIEW: CPT

## 2018-04-14 PROCEDURE — 82805 BLOOD GASES W/O2 SATURATION: CPT

## 2018-04-14 PROCEDURE — 87086 URINE CULTURE/COLONY COUNT: CPT

## 2018-04-14 PROCEDURE — 85025 COMPLETE CBC W/AUTO DIFF WBC: CPT

## 2018-04-14 PROCEDURE — 94664 DEMO&/EVAL PT USE INHALER: CPT

## 2018-04-14 PROCEDURE — 87804 INFLUENZA ASSAY W/OPTIC: CPT

## 2018-04-14 PROCEDURE — 74176 CT ABD & PELVIS W/O CONTRAST: CPT

## 2018-04-14 PROCEDURE — 87040 BLOOD CULTURE FOR BACTERIA: CPT

## 2018-04-14 PROCEDURE — 31500 INSERT EMERGENCY AIRWAY: CPT

## 2018-04-14 PROCEDURE — 96375 TX/PRO/DX INJ NEW DRUG ADDON: CPT

## 2018-04-14 PROCEDURE — 85730 THROMBOPLASTIN TIME PARTIAL: CPT

## 2018-04-14 PROCEDURE — 92950 HEART/LUNG RESUSCITATION CPR: CPT

## 2018-04-14 PROCEDURE — 83605 ASSAY OF LACTIC ACID: CPT

## 2018-04-14 PROCEDURE — 96361 HYDRATE IV INFUSION ADD-ON: CPT

## 2018-04-14 PROCEDURE — 81001 URINALYSIS AUTO W/SCOPE: CPT

## 2018-04-14 RX ADMIN — Medication SCH ML: at 20:06

## 2018-04-14 RX ADMIN — HYDROMORPHONE HYDROCHLORIDE ONE MG: 2 INJECTION INTRAMUSCULAR; INTRAVENOUS; SUBCUTANEOUS at 07:39

## 2018-04-14 RX ADMIN — STANDARDIZED SENNA CONCENTRATE AND DOCUSATE SODIUM SCH TAB: 8.6; 5 TABLET, FILM COATED ORAL at 20:06

## 2018-04-14 RX ADMIN — OXYCODONE HYDROCHLORIDE SCH MG: 10 TABLET, FILM COATED, EXTENDED RELEASE ORAL at 16:24

## 2018-04-14 RX ADMIN — CEFEPIME SCH MLS/HR: 2 INJECTION, POWDER, FOR SOLUTION INTRAVENOUS at 16:24

## 2018-04-14 RX ADMIN — HYDROMORPHONE HYDROCHLORIDE ONE MG: 2 INJECTION INTRAMUSCULAR; INTRAVENOUS; SUBCUTANEOUS at 07:29

## 2018-04-14 RX ADMIN — OXYCODONE HYDROCHLORIDE SCH MG: 10 TABLET, FILM COATED, EXTENDED RELEASE ORAL at 22:27

## 2018-04-14 RX ADMIN — Medication SCH TAB: at 20:06

## 2018-04-14 NOTE — PD
HPI


Chief Complaint:  Back/ Neck Pain or Injury


Time Seen by Provider:  07:20


Travel History


International Travel<30 days:  No


Contact w/Intl Traveler<30days:  No


Traveled to known affect area:  No





History of Present Illness


HPI


Patient is a 57-year-old female, well-known to this ED, who comes in 

complaining of throat pain, back pain, and cough.  She says this is been going 

on for a week.  She has had fevers at home.  She says she took Tylenol without 

relief of her symptoms.  She also says that she has been treated for UTI since 

last Friday with Cipro.  She says she still feels like she has a urinary tract 

infection.  She denies nausea or vomiting.  She denies any injury.  Severity is 

mild to moderate.





PFSH


Past Medical History


Hx Anticoagulant Therapy:  No


Asthma:  No


Blood Disorders:  No


Anxiety:  Yes


Depression:  Yes


Heart Rhythm Problems:  No


Cancer:  Yes (KIDNEY )


Cardiovascular Problems:  Yes (MI )


High Cholesterol:  No


Chemotherapy:  No


Chest Pain:  No


Congestive Heart Failure:  No


COPD:  No


Cerebrovascular Accident:  Yes ()


Diabetes:  No


Diminished Hearing:  No


Endocrine:  No


Gastrointestinal Disorders:  Yes (GASTRECTOMY, CHRONIC ABDOMINAL PAIN )


GERD:  No


Genitourinary:  Yes (OCCASSIONAL UTI)


Headaches:  Yes


Hiatal Hernia:  Yes


Hypertension:  Yes


Immune Disorder:  No


Implanted Vascular Access Dvce:  No


Kidney Stones:  No


Musculoskeletal:  No


Neurologic:  Yes (CVA )


Psychiatric:  No


Reproductive:  No


Respiratory:  No


Immunizations Current:  Yes


Migraines:  Yes


Myocardial Infarction:  Yes ()


Pneumonia:  Yes


Radiation Therapy:  No


Renal Failure:  No


Seizures:  Yes


Sleep Apnea:  No


Thyroid Disease:  No


Ulcer:  No


Menopausal:  Yes


:  1


Para:  1


Miscarriage:  0


:  0


Ectopic Pregnancy:  No


Ovarian Cysts:  No


Dilation and Curettage (D&C):  Yes


Tubal Ligation:  Yes





Past Surgical History


Abdominal Surgery:  Yes (total gastrectomy w/pouch , hernia repair)


Appendectomy:  Yes


Cardiac Surgery:  Yes (REPAIRED HOLE IN HEART)


Cholecystectomy:  Yes


Genitourinary Surgery:  Yes (RIGHT NEPHRECTOMY)


Gynecologic Surgery:  Yes


Hysterectomy:  No


Thoracic Surgery:  No


Tonsillectomy:  Yes


Other Surgery:  Yes (GASTRECTOMY, RIGHT KIDNEY REMOVAL, HERNIA REPAIR, 

GALLBLADDER, TONSILS)





Social History


Alcohol Use:  No ( )


Tobacco Use:  No ( )


Substance Use:  No ( )





Allergies-Medications


(Allergen,Severity, Reaction):  


Coded Allergies:  


     MRI PRECAUTION (Verified  Allergy, Severe, MRI contrast allergy, 18)


 anaphylaxis


     Sulfa (Sulfonamide Antibiotics) (Verified  Allergy, Severe, Rash, 18)


     gadobenic acid (Verified  Allergy, Severe, Anaphylaxis, 18)


     gadodiamide (Verified  Allergy, Severe, Anaphylaxis, 18)


     gadoteridol (Verified  Allergy, Severe, Anaphylaxis, 18)


     ketorolac (Verified  Allergy, Severe, Shortness of Breath, 18)


     metoclopramide (Verified  Allergy, Severe, Shortness of Breath, 18)


     morphine (Verified  Allergy, Severe, Hives, 18)


     ondansetron (Verified  Allergy, Severe, Shortness of Breath, 18)


     penicillin G (Verified  Allergy, Severe, Rash, 18)


     prochlorperazine (Verified  Allergy, Severe, Shortness of Breath, 18)


     promethazine (Verified  Allergy, Severe, Shortness of Breath, 18)


     shellfish derived (Verified  Allergy, Severe, Anaphylaxis, 18)


     trimethobenzamide (Verified  Allergy, Severe, Shortness of Breath, 18)


Reported Meds & Prescriptions





Reported Meds & Active Scripts


Active


Imitrex Nasal Spray (Sumatriptan Succinate) 5 Mg/Act Spray 1 Spray NASAL ONCE 

PRN


     Spray in one nostril. If headache has not resolved within 2 hours or


     returns, the dose may be repeated once after 2 hours


Imitrex (Sumatriptan Succinate) 50 Mg Tab 50 Mg PO ONCE PRN


     If a satisfactory response has not been obtained at 2 hours, a second


     dose may be administered


Imitrex (Sumatriptan Succinate) 100 Mg Tab 100 Mg PO ONCE PRN


     If a satisfactory response has not been obtained at 2 hours, a second


     dose may be administered


Ambien (Zolpidem Tartrate) 5 Mg Tab 5 Mg PO HS PRN


Fioricet (Butalbital-Acetaminophen-Caffeine) -40 Mg Cap 20 Mg PO Q6H PRN


Reported


Oxycontin (Oxycodone HCl) 10 Mg Tab 10 Mg PO Q8HR


Vitamin E (Vitamin E Mixed) 400 Unit Tablet 400 Units PO DAILY








Review of Systems


Except as stated in HPI:  all other systems reviewed are Neg


General / Constitutional:  Positive: Fever, Chills


HENT:  No: Headaches


Cardiovascular:  No: Chest Pain or Discomfort


Respiratory:  Positive: Cough, No: Shortness of Breath


Genitourinary:  Positive: Dysuria


Musculoskeletal:  Positive: Pain


Skin:  No Rash, No Change in Pigmentation


Neurologic:  No: Weakness, Dizziness





Physical Exam


Narrative


GENERAL: Awake and alert, no acute distress.


SKIN: Focused skin assessment warm/dry.


HEAD: Atraumatic. Normocephalic. 


EYES: Pupils equal and round. No scleral icterus. No injection or drainage. 


ENT: No tonsillar swelling or exudates. Mucous membranes pink and moist.


NECK: Trachea midline. No JVD. 


CARDIOVASCULAR: Regular rate and rhythm.  No murmur appreciated.


RESPIRATORY: No accessory muscle use. Clear to auscultation. Breath sounds 

equal bilaterally. 


GASTROINTESTINAL: Abdomen soft, nondistended. Mild tenderness to the lower 

abdomen.  No rebound or guarding. 


MUSCULOSKELETAL: No obvious deformities. No clubbing.  No cyanosis.  No edema. 

No spinal tenderness to palpation.


NEUROLOGICAL: Awake and alert. No obvious cranial nerve deficits.  Motor 

grossly within normal limits. Normal speech.


PSYCHIATRIC: Appropriate mood and affect; insight and judgment normal.





Data


Data


Last Documented VS





Vital Signs








  Date Time  Temp Pulse Resp B/P (MAP) Pulse Ox O2 Delivery O2 Flow Rate FiO2


 


18 09:13 99.1 92 18 134/86 (102) 98 Room Air 2.00 








Orders





 Orders


Complete Blood Count With Diff (18 07:21)


Comprehensive Metabolic Panel (18 07:21)


Prothrombin Time / Inr (Pt) (18 07:21)


Act Partial Throm Time (Ptt) (18 07:21)


Lactic Acid Sepsis Protocol (18 07:21)


Urinalysis - C+S If Indicated (18 07:21)


Influenzae A/B Antigen (18 07:21)


Blood Culture (18 07:21)


Chest, Single Ap (18 07:21)


Blood Glucose (18 07:21)


Ecg Monitoring (18 07:21)


Iv Access Insert/Monitor (18 07:21)


Oximetry (18 07:21)


Oxygen Administration (18 07:21)


Acetaminophen (Tylenol) (18 07:30)


Hydromorphone Pf Inj (Dilaudid Pf Inj) (18 07:30)


Ct Abd/Pel W/O Iv Contrast (18 07:21)


Sodium Chlor 0.9% 1000 Ml Inj (Ns 1000 M (18 07:21)


Sodium Chlor 0.9% 1000 Ml Inj (Ns 1000 M (18 07:21)


Hydromorphone Pf Inj (Dilaudid Pf Inj) (18 07:29)


Ceftriaxone Inj (Rocephin Inj) (18 08:45)


Azithromycin Inj (Zithromax Inj) (18 08:45)


Urine Culture (18 08:41)


Hydromorphone Pf Inj (Dilaudid Pf Inj) (18 09:45)


Hydromorphone Pf Inj (Dilaudid Pf Inj) (18 09:54)


Admit Order (Ed Use Only) (18 )





Labs





Laboratory Tests








Test


  18


07:41 18


08:41


 


White Blood Count 8.2 TH/MM3  


 


Red Blood Count 3.26 MIL/MM3  


 


Hemoglobin 11.0 GM/DL  


 


Hematocrit 32.7 %  


 


Mean Corpuscular Volume 100.3 FL  


 


Mean Corpuscular Hemoglobin 33.8 PG  


 


Mean Corpuscular Hemoglobin


Concent 33.7 % 


  


 


 


Red Cell Distribution Width 15.4 %  


 


Platelet Count 157 TH/MM3  


 


Mean Platelet Volume 9.1 FL  


 


Neutrophils (%) (Auto) 81.2 %  


 


Lymphocytes (%) (Auto) 9.1 %  


 


Monocytes (%) (Auto) 9.4 %  


 


Eosinophils (%) (Auto) 0.2 %  


 


Basophils (%) (Auto) 0.1 %  


 


Neutrophils # (Auto) 6.6 TH/MM3  


 


Lymphocytes # (Auto) 0.7 TH/MM3  


 


Monocytes # (Auto) 0.8 TH/MM3  


 


Eosinophils # (Auto) 0.0 TH/MM3  


 


Basophils # (Auto) 0.0 TH/MM3  


 


CBC Comment DIFF FINAL  


 


Differential Comment   


 


Prothrombin Time 10.1 SEC  


 


Prothromb Time International


Ratio 1.0 RATIO 


  


 


 


Activated Partial


Thromboplast Time 32.1 SEC 


  


 


 


Blood Urea Nitrogen 13 MG/DL  


 


Creatinine 0.86 MG/DL  


 


Random Glucose 135 MG/DL  


 


Total Protein 6.9 GM/DL  


 


Albumin 2.8 GM/DL  


 


Calcium Level 8.6 MG/DL  


 


Alkaline Phosphatase 106 U/L  


 


Aspartate Amino Transf


(AST/SGOT) 216 U/L 


  


 


 


Alanine Aminotransferase


(ALT/SGPT) 113 U/L 


  


 


 


Total Bilirubin 0.4 MG/DL  


 


Sodium Level 137 MEQ/L  


 


Potassium Level 4.3 MEQ/L  


 


Chloride Level 102 MEQ/L  


 


Carbon Dioxide Level 26.4 MEQ/L  


 


Anion Gap 9 MEQ/L  


 


Estimat Glomerular Filtration


Rate 68 ML/MIN 


  


 


 


Lactic Acid Level 1.3 mmol/L  


 


Urine Color  YELLOW 


 


Urine Turbidity  HAZY 


 


Urine pH  6.0 


 


Urine Specific Gravity  1.026 


 


Urine Protein  100 mg/dL 


 


Urine Glucose (UA)  NEG mg/dL 


 


Urine Ketones  10 mg/dL 


 


Urine Occult Blood  MOD 


 


Urine Nitrite  NEG 


 


Urine Bilirubin  NEG 


 


Urine Urobilinogen


  


  LESS THAN 2.0


MG/DL


 


Urine Leukocyte Esterase  TRACE 


 


Urine RBC  14 /hpf 


 


Urine WBC  5 /hpf 


 


Urine Squamous Epithelial


Cells 


  3 /hpf 


 


 


Urine Bacteria  FEW /hpf 


 


Urine Mucus  FEW /lpf 


 


Microscopic Urinalysis Comment


  


  CATH-CULTURE


IND











MDM


Medical Decision Making


Medical Screen Exam Complete:  Yes


Emergency Medical Condition:  Yes


Medical Record Reviewed:  Yes


Differential Diagnosis


sepsis vs pneumonia vs UTI


Narrative Course


Patient is a 57 year old female who comes in complaining of neck and back pain.

  IV established labs sent.  Labs concerning for UTI.  Patient is febrile and 

tachycardic. Treated with antibiotics and given pain medicine.  Admitted for 

management of urosepsis. 


Last 24 hours Impressions








Chest X-Ray 18 Signed





Impressions: 





 Service Date/Time:  2018 07:54 - CONCLUSION: New airspace 





 disease leftt lung.      Josh Ellis MD  FACR


 


Abdomen/Pelvis CT 18 Signed





Impressions: 





 Service Date/Time:  2018 08:48 - CONCLUSION: Severely 





 limited exam the lateral intravenous contrast.  Status post right nephrectomy 





 and partial gastrectomy Moderate distention of the bladder Stool throughout 

the 





 colon without free air or abscess. The patient remains symptomatic repeat exam 





 with oral and intravenous contrast would be of benefit.      Josh Ellis MD 





  FACR











Diagnosis





 Primary Impression:  


 UTI (urinary tract infection)


 Qualified Codes:  N30.00 - Acute cystitis without hematuria


 Additional Impression:  


 Sepsis


 Qualified Codes:  A41.9 - Sepsis, unspecified organism





Admitting Information


Admitting Physician Requests:  it











Christina Durham MD 2018 07:38

## 2018-04-14 NOTE — HHI.HP
__________________________________________________





HPI


Service


[facility]


Primary Care Physician


Gina Pagan MD


Admission Diagnosis





pneumonia, uti


Diagnoses:  


(1) Sepsis


(2) Community acquired bacterial pneumonia


(3) UTI (urinary tract infection)


(4) Chronic narcotic dependence


Chief Complaint:  


Sore throat and cough production


Travel History


International Travel<30 Days:  No


Contact w/Intl Traveler <30 Da:  No


Traveled to Known Affected Are:  No





Sepsis Criteria


SIRS Criteria (2 or more):  Temp > 100.9 or < 96.8, Heart rate over 90


Sepsis Criteria (SIRS+source):  Infect source susp/known


History of Present Illness


 57-year-old female with PMH of Anxiety, Depression, Renal Cell CA s/p 

Nephrectomy, h/o Gastrectomy on TPN, Narcotic Dependence and h/o CVA who 

presented to the ER w/ complaints of sore throat, cough production times 

several day duration along with subjective fevers.  She states her symptoms 

initially started on 2018 when she started not feeling well.  She 

initially had dry cough however it became productive on .  She also 

complained of shortness of breath as well as chest pain mostly with coughing.  

Chest x-ray in ED revealed new airspace disease in left lung.  Patient 

consistently asking for narcotics.  She also reported increase urinary 

frequency with burning with urination.  She has been recently diagnosed with 

UTI but 9 days ago.





Review of Systems


Except as stated in HPI:  all other systems reviewed are Neg





Past Family Social History


Past Medical History


 Anxiety, Depression, Renal Cell CA s/p Nephrectomy, h/o Gastrectomy on TPN, 

Narcotic Dependence and h/o CVA


Past Surgical History


Total Gastrectomy, Hernia Repair, Appendectomy, Cardiac Surgery, Cholecystectomy

, Right Nephrectomy, Tonsillectomy


Reported Medications


Imitrex Nasal Spray (Sumatriptan Succinate) 5 Mg/Act Spray 1 Spray NASAL ONCE 

PRN


     Spray in one nostril. If headache has not resolved within 2 hours or


     returns, the dose may be repeated once after 2 hours


Imitrex (Sumatriptan Succinate) 50 Mg Tab 50 Mg PO ONCE PRN


     If a satisfactory response has not been obtained at 2 hours, a second


     dose may be administered


Imitrex (Sumatriptan Succinate) 100 Mg Tab 100 Mg PO ONCE PRN


     If a satisfactory response has not been obtained at 2 hours, a second


     dose may be administered


Ambien (Zolpidem Tartrate) 5 Mg Tab 5 Mg PO HS PRN


Fioricet (Butalbital-Acetaminophen-Caffeine) -40 Mg Cap 20 Mg PO Q6H PRN


Reported


Oxycontin (Oxycodone HCl) 10 Mg Tab 10 Mg PO Q8HR


Vitamin E (Vitamin E Mixed) 400 Unit Tablet 400 Units PO DAILY


Allergies:  


Coded Allergies:  


     MRI PRECAUTION (Verified  Allergy, Severe, MRI contrast allergy, 18)


 anaphylaxis


     Sulfa (Sulfonamide Antibiotics) (Verified  Allergy, Severe, Rash, 18)


     gadobenic acid (Verified  Allergy, Severe, Anaphylaxis, 18)


     gadodiamide (Verified  Allergy, Severe, Anaphylaxis, 18)


     gadoteridol (Verified  Allergy, Severe, Anaphylaxis, 18)


     ketorolac (Verified  Allergy, Severe, Shortness of Breath, 18)


     metoclopramide (Verified  Allergy, Severe, Shortness of Breath, 18)


     morphine (Verified  Allergy, Severe, Hives, 18)


     ondansetron (Verified  Allergy, Severe, Shortness of Breath, 18)


     penicillin G (Verified  Allergy, Severe, Rash, 18)


     prochlorperazine (Verified  Allergy, Severe, Shortness of Breath, 18)


     promethazine (Verified  Allergy, Severe, Shortness of Breath, 18)


     shellfish derived (Verified  Allergy, Severe, Anaphylaxis, 18)


     trimethobenzamide (Verified  Allergy, Severe, Shortness of Breath, 18)


Family History


COPD


Social History


Alcohol Use:  No ( )


Tobacco Use:  No ( )


Substance Use:  No ( )





Physical Exam


Vital Signs





Vital Signs








  Date Time  Temp Pulse Resp B/P (MAP) Pulse Ox O2 Delivery O2 Flow Rate FiO2


 


18 12:30 99.4 86 20 159/87 (111) 100   


 


18 11:27 98.1 91 18 133/84 (100) 99 Nasal Cannula 2.00 


 


18 11:12 98.3 89 17 133/84 (100) 100 Nasal Cannula 3.00 


 


18 10:28   17     


 


18 10:28   17     


 


18 09:13 99.1 92 18 134/86 (102) 98 Room Air 2.00 


 


18 08:11   18     


 


18 08:11   18     


 


18 07:41     99 Nasal Cannula 2.00 


 


18 07:41   18  98 Room Air  


 


18 07:26 102.2 100 16 147/77 (100) 96 Nasal Cannula 2.00 


 


18 07:10  108 16     


 


18 06:43 102.3 116 18 136/82 (100) 95   








Physical Exam


GENERAL: This is a well-nourished, well-developed patient, in no apparent 

distress.


SKIN: No rashes, ecchymoses or lesions. Cool and dry.


HEAD: Atraumatic. Normocephalic. No temporal or scalp tenderness.


EYES: Pupils equal round and reactive. Extraocular motions intact. No scleral 

icterus. No injection or drainage. 


ENT: Nose without bleeding, purulent drainage or septal hematoma. Throat 

without erythema, tonsillar hypertrophy or exudate. Uvula midline. Airway 

patent.


NECK: Trachea midline. No JVD or lymphadenopathy. Supple, nontender, no 

meningeal signs.


CARDIOVASCULAR: Regular rate and rhythm without murmurs, gallops, or rubs. 


RESPIRATORY: Clear to auscultation. Breath sounds equal bilaterally. No wheezes

, rales, or rhonchi.  


GASTROINTESTINAL: Abdomen soft, non-tender, nondistended. No hepato-splenomegaly

, or palpable masses. No guarding.


MUSCULOSKELETAL: Extremities without clubbing, cyanosis, or edema. No joint 

tenderness, effusion, or edema noted. No calf tenderness. Negative Homans sign 

bilaterally.


NEUROLOGICAL: Awake and alert. Cranial nerves II through XII intact.  Motor and 

sensory grossly within normal limits. Five out of 5 muscle strength in all 

muscle groups.  Normal speech.


Laboratory





Laboratory Tests








Test


  18


07:41 18


08:41 18


10:50


 


White Blood Count 8.2   


 


Red Blood Count 3.26   


 


Hemoglobin 11.0   


 


Hematocrit 32.7   


 


Mean Corpuscular Volume 100.3   


 


Mean Corpuscular Hemoglobin 33.8   


 


Mean Corpuscular Hemoglobin


Concent 33.7 


  


  


 


 


Red Cell Distribution Width 15.4   


 


Platelet Count 157   


 


Mean Platelet Volume 9.1   


 


Neutrophils (%) (Auto) 81.2   


 


Lymphocytes (%) (Auto) 9.1   


 


Monocytes (%) (Auto) 9.4   


 


Eosinophils (%) (Auto) 0.2   


 


Basophils (%) (Auto) 0.1   


 


Neutrophils # (Auto) 6.6   


 


Lymphocytes # (Auto) 0.7   


 


Monocytes # (Auto) 0.8   


 


Eosinophils # (Auto) 0.0   


 


Basophils # (Auto) 0.0   


 


CBC Comment DIFF FINAL   


 


Differential Comment    


 


Prothrombin Time 10.1   


 


Prothromb Time International


Ratio 1.0 


  


  


 


 


Activated Partial


Thromboplast Time 32.1 


  


  


 


 


Blood Urea Nitrogen 13   


 


Creatinine 0.86   


 


Random Glucose 135   


 


Total Protein 6.9   


 


Albumin 2.8   


 


Calcium Level 8.6   


 


Alkaline Phosphatase 106   


 


Aspartate Amino Transf


(AST/SGOT) 216 


  


  


 


 


Alanine Aminotransferase


(ALT/SGPT) 113 


  


  


 


 


Total Bilirubin 0.4   


 


Sodium Level 137   


 


Potassium Level 4.3   


 


Chloride Level 102   


 


Carbon Dioxide Level 26.4   


 


Anion Gap 9   


 


Estimat Glomerular Filtration


Rate 68 


  


  


 


 


Lactic Acid Level 1.3   


 


Urine Color  YELLOW  


 


Urine Turbidity  HAZY  


 


Urine pH  6.0  


 


Urine Specific Gravity  1.026  


 


Urine Protein  100  


 


Urine Glucose (UA)  NEG  


 


Urine Ketones  10  


 


Urine Occult Blood  MOD  


 


Urine Nitrite  NEG  


 


Urine Bilirubin  NEG  


 


Urine Urobilinogen  LESS THAN 2.0  


 


Urine Leukocyte Esterase  TRACE  


 


Urine RBC  14  


 


Urine WBC  5  


 


Urine Squamous Epithelial


Cells 


  3 


  


 


 


Urine Bacteria  FEW  


 


Urine Mucus  FEW  


 


Microscopic Urinalysis Comment


  


  CATH-CULTURE


IND 


 


 


Hepatitis A IgM Antibody   NONREACTIVE 


 


Hepatitis B Surface Antigen   NONREACTIVE 


 


Hepatitis B Core IgM Antibody   NONREACTIVE 


 


Hepatitis C IgG Antibody   NONREACTIVE 














 Date/Time


Source Procedure


Growth Status


 


 


 18 07:51


Blood Peripheral Aerobic Blood Culture


Pending Received


 


 18 07:51


Blood Peripheral Anaerobic Blood Culture


Pending Received


 


 18 07:41


Nasal Washing Influenza Types A,B Antigen (LIZA) - Final


NEGATIVE FOR FLU A AND B ANTIGEN.... Complete


 


 18 08:41


Urine Catheterized Urine Urine Culture


Pending Received








Result Diagram:  


18





Imaging





Last Impressions








Chest X-Ray 18 Signed





Impressions: 





 Service Date/Time:  2018 07:54 - CONCLUSION: New airspace 





 disease leftt lung.      Josh Ellis MD  FACR


 


Abdomen/Pelvis CT 18 Signed





Impressions: 





 Service Date/Time:  2018 08:48 - CONCLUSION: Severely 





 limited exam the lateral intravenous contrast.  Status post right nephrectomy 





 and partial gastrectomy Moderate distention of the bladder Stool throughout 

the 





 colon without free air or abscess. The patient remains symptomatic repeat exam 





 with oral and intravenous contrast would be of benefit.      Josh Ellis MD 





  FACR











Septic Shock Reassessment


Septic shock perfusion:  reassessment completed





Caprini VTE Risk Assessment


Caprini VTE Risk Assessment:  No/Low Risk (score <= 1)


Caprini Risk Assessment Model











 Point Value = 1          Point Value = 2  Point Value = 3  Point Value = 5


 


Age 41-60


Minor surgery


BMI > 25 kg/m2


Swollen legs


Varicose veins


Pregnancy or postpartum


History of unexplained or recurrent


   spontaneous 


Oral contraceptives or hormone


   replacement


Sepsis (< 1 month)


Serious lung disease, including


   pneumonia (< 1 month)


Abnormal pulmonary function


Acute myocardial infarction


Congestive heart failure (< 1 month)


History of inflammatory bowel disease


Medical patient at bed rest Age 61-74


Arthroscopic surgery


Major open surgery (> 45 min)


Laparoscopic surgery (> 45 min)


Malignancy


Confined to bed (> 72 hours)


Immobilizing plaster cast


Central venous access Age >= 75


History of VTE


Family history of VTE


Factor V Leiden


Prothrombin 66278A


Lupus anticoagulant


Anticardiolipin antibodies


Elevated serum homocysteine


Heparin-induced thrombocytopenia


Other congenital or acquired


   thrombophilia Stroke (< 1 month)


Elective arthroplasty


Hip, pelvis, or leg fracture


Acute spinal cord injury (< 1 month)








Prophylaxis Regimen











   Total Risk


Factor Score Risk Level Prophylaxis Regimen


 


0-1      Low Early ambulation


 


2 Moderate Order ONE of the following:


*Sequential Compression Device (SCD)


*Heparin 5000 units SQ BID


 


3-4 Higher Order ONE of the following medications:


*Heparin 5000 units SQ TID


*Enoxaparin/Lovenox 40 mg SQ daily (WT < 150 kg, CrCl > 30 mL/min)


*Enoxaparin/Lovenox 30 mg SQ daily (WT < 150 kg, CrCl > 10-29 mL/min)


*Enoxaparin/Lovenox 30 mg SQ BID (WT < 150 kg, CrCl > 30 mL/min)


AND/OR


*Sequential Compression Device (SCD)


 


5 or more Highest Order ONE of the following medications:


*Heparin 5000 units SQ TID (Preferred with Epidurals)


*Enoxaparin/Lovenox 40 mg SQ daily (WT < 150 kg, CrCl > 30 mL/min)


*Enoxaparin/Lovenox 30 mg SQ daily (WT < 150 kg, CrCl > 10-29 mL/min)


*Enoxaparin/Lovenox 30 mg SQ BID (WT < 150 kg, CrCl > 30 mL/min)


AND


*Sequential Compression Device (SCD)











Assessment and Plan


Problem List:  


(1) Sepsis


ICD Code:  A41.9 - Sepsis, unspecified organism


(2) Community acquired bacterial pneumonia


ICD Code:  J15.9 - Unspecified bacterial pneumonia


(3) UTI (urinary tract infection)


ICD Code:  N39.0 - Urinary tract infection, site not specified


Status:  Acute


(4) Chronic narcotic dependence


ICD Code:  F11.20 - Opioid dependence, uncomplicated


Status:  Acute


Assessment and Plan


57-year-old female with





Sepsis: Temp > 100.9 or < 96.8, Heart rate over 90; Infect source susp/known (

PNA + UTI); Lactic acid wnl


   Status post Rocephin and azithromycin IV 1, will continue cefepime IV every 

8 hour pending culture report





Community-acquired bacterial pneumonia


   Chest x-ray noted and reviewed by me with finding of new airspace disease in 

left lung


   Status post Rocephin and azithromycin IV 1 in ED, with start cefepime 2 g 

IV every 8 hour


   Maintain oxygen saturation above 92%


   DuoNeb as needed





Abnormal UA


   Status post Rocephin IV 1, treated with cefepime pending urine culture





Chronic narcotics dependence


   Resume OxyContin 10 mg q. 8





DVT prophylaxis: Bilateral SCDs


Code Status


Full code


Discussed Condition With


Patient, ED position





Physician Certification


2 Midnight Certification Type:  Admission for Inpatient Services


Order for Inpatient Services


The services are ordered in accordance with Medicare regulations or non-

Medicare payer requirements, as applicable.  In the case of services not 

specified as inpatient-only, they are appropriately provided as inpatient 

services in accordance with the 2-midnight benchmark.


Estimated LOS (days):  2


 days is the estimated time the patient will need to remain in the hospital, 

assuming treatment plan goals are met and no additional complications.


Post-Hospital Plan:  Not yet determined











Mp Tinsley MD 2018 13:17

## 2018-04-14 NOTE — RADRPT
EXAM DATE/TIME:  04/14/2018 07:54 

 

[facility] COMPARISON:     

CHEST SINGLE AP, November 26, 2017, 22:25.

 

                     

INDICATIONS :     

Fever. Cough and back pain.

                     

 

MEDICAL HISTORY :            

Seizures. Kidney cancer, CVA, HTN   

 

SURGICAL HISTORY :     

Appendectomy. Cholecystectomy. Hysterectomy. 

 

ENCOUNTER:     

Initial                                        

 

ACUITY:     

3 days      

 

PAIN SCORE:     

5/10

 

LOCATION:     

Bilateral chest 

 

FINDINGS:     

New developing airspace disease in the left lung.  Right lung clear. The heart and pulmonary vascular
ity are normal. The portion of the bony skeleton visualized is unremarkable.

 

 

CONCLUSION:     New airspace disease leftt lung.  

 

 

 Josh Ellis MD FACR on April 14, 2018 at 8:20           

Board Certified Radiologist.

 This report was verified electronically.

## 2018-04-14 NOTE — RADRPT
EXAM DATE/TIME:  04/14/2018 08:48 

 

[facility] COMPARISON:     

CT ABDOMEN & PELVIS W/O CONTRAST, December 24, 2016, 13:06.

 

 

INDICATIONS :     

Lower abdomen pain today.

                  

 

ORAL CONTRAST:      

No oral contrast ingested.

                  

 

RADIATION DOSE:     

4.52 CTDIvol (mGy) 

 

 

MEDICAL HISTORY :     

Hernia, hiatal. Myocardial infarction. renal cancer

 

SURGICAL HISTORY :      

Nephrectomy, right. gastrectomy, cholecystectomy

 

ENCOUNTER:      

Initial

 

ACUITY:      

1 day

 

PAIN SCALE:      

7/10

 

LOCATION:       

Bilateral lower quadrant 

 

TECHNIQUE:     

Volumetric scanning of the abdomen and pelvis was performed.  Using automated exposure control and ad
justment of the mA and/or kV according to patient size, radiation dose was kept as low as reasonably 
achievable to obtain optimal diagnostic quality images.  DICOM format image data is available electro
nically for review and comparison.  

 

FINDINGS:     

Patchy airspace disease left lung base without fluid.  Right lung clear.

Mild compensated cardiomegaly

Liver and gallbladder are unremarkable

Pancreas and spleen appear normal

Patient is status post right nephrectomy and partial gastrectomy.  The left kidney appears normal.  T
here is no myopathy.

Lack of oral and intravenous contrast makes detection somewhat difficult.  There is no abscess or cynthia
e air.

Bladder is distended.

Review of bone windows reveals only degenerative changes.

 

CONCLUSION:     Severely limited exam the lateral intravenous contrast.  Status post right nephrectom
y and partial gastrectomy

Moderate distention of the bladder

Stool throughout the colon without free air or abscess.

The patient remains symptomatic repeat exam with oral and intravenous contrast would be of benefit.  


 

 

 Josh Ellis MD FACR on April 14, 2018 at 9:02           

Board Certified Radiologist.

 This report was verified electronically.

## 2018-04-15 VITALS
TEMPERATURE: 98.2 F | RESPIRATION RATE: 18 BRPM | DIASTOLIC BLOOD PRESSURE: 88 MMHG | OXYGEN SATURATION: 97 % | HEART RATE: 91 BPM | SYSTOLIC BLOOD PRESSURE: 144 MMHG

## 2018-04-15 VITALS
RESPIRATION RATE: 18 BRPM | OXYGEN SATURATION: 99 % | HEART RATE: 92 BPM | SYSTOLIC BLOOD PRESSURE: 162 MMHG | TEMPERATURE: 98.8 F | DIASTOLIC BLOOD PRESSURE: 89 MMHG

## 2018-04-15 VITALS
RESPIRATION RATE: 18 BRPM | DIASTOLIC BLOOD PRESSURE: 85 MMHG | HEART RATE: 85 BPM | OXYGEN SATURATION: 100 % | TEMPERATURE: 98.3 F | SYSTOLIC BLOOD PRESSURE: 151 MMHG

## 2018-04-15 VITALS
RESPIRATION RATE: 18 BRPM | TEMPERATURE: 97.9 F | SYSTOLIC BLOOD PRESSURE: 160 MMHG | DIASTOLIC BLOOD PRESSURE: 95 MMHG | OXYGEN SATURATION: 100 % | HEART RATE: 79 BPM

## 2018-04-15 VITALS
SYSTOLIC BLOOD PRESSURE: 150 MMHG | HEART RATE: 75 BPM | DIASTOLIC BLOOD PRESSURE: 85 MMHG | TEMPERATURE: 97.9 F | RESPIRATION RATE: 18 BRPM

## 2018-04-15 VITALS — TEMPERATURE: 98.3 F

## 2018-04-15 VITALS
OXYGEN SATURATION: 100 % | SYSTOLIC BLOOD PRESSURE: 152 MMHG | HEART RATE: 88 BPM | DIASTOLIC BLOOD PRESSURE: 90 MMHG | TEMPERATURE: 99 F | RESPIRATION RATE: 18 BRPM

## 2018-04-15 LAB
ALBUMIN SERPL-MCNC: 2.6 GM/DL (ref 3.4–5)
ALP SERPL-CCNC: 103 U/L (ref 45–117)
ALT SERPL-CCNC: 103 U/L (ref 10–53)
AST SERPL-CCNC: 152 U/L (ref 15–37)
BASOPHILS # BLD AUTO: 0 TH/MM3 (ref 0–0.2)
BASOPHILS NFR BLD: 0.2 % (ref 0–2)
BILIRUB SERPL-MCNC: 0.5 MG/DL (ref 0.2–1)
BUN SERPL-MCNC: 5 MG/DL (ref 7–18)
CALCIUM SERPL-MCNC: 9.3 MG/DL (ref 8.5–10.1)
CHLORIDE SERPL-SCNC: 98 MEQ/L (ref 98–107)
CREAT SERPL-MCNC: 0.5 MG/DL (ref 0.5–1)
EOSINOPHIL # BLD: 0.1 TH/MM3 (ref 0–0.4)
EOSINOPHIL NFR BLD: 0.9 % (ref 0–4)
ERYTHROCYTE [DISTWIDTH] IN BLOOD BY AUTOMATED COUNT: 15.1 % (ref 11.6–17.2)
GFR SERPLBLD BASED ON 1.73 SQ M-ARVRAT: 127 ML/MIN (ref 89–?)
GLUCOSE SERPL-MCNC: 101 MG/DL (ref 74–106)
HCO3 BLD-SCNC: 28.1 MEQ/L (ref 21–32)
HCT VFR BLD CALC: 32.8 % (ref 35–46)
HGB BLD-MCNC: 11.1 GM/DL (ref 11.6–15.3)
LYMPHOCYTES # BLD AUTO: 0.7 TH/MM3 (ref 1–4.8)
LYMPHOCYTES NFR BLD AUTO: 10.8 % (ref 9–44)
MCH RBC QN AUTO: 33.5 PG (ref 27–34)
MCHC RBC AUTO-ENTMCNC: 33.9 % (ref 32–36)
MCV RBC AUTO: 98.8 FL (ref 80–100)
MONOCYTE #: 0.5 TH/MM3 (ref 0–0.9)
MONOCYTES NFR BLD: 8.6 % (ref 0–8)
NEUTROPHILS # BLD AUTO: 4.8 TH/MM3 (ref 1.8–7.7)
NEUTROPHILS NFR BLD AUTO: 79.5 % (ref 16–70)
PLATELET # BLD: 163 TH/MM3 (ref 150–450)
PMV BLD AUTO: 10 FL (ref 7–11)
PROT SERPL-MCNC: 7 GM/DL (ref 6.4–8.2)
RBC # BLD AUTO: 3.32 MIL/MM3 (ref 4–5.3)
SODIUM SERPL-SCNC: 136 MEQ/L (ref 136–145)
WBC # BLD AUTO: 6 TH/MM3 (ref 4–11)

## 2018-04-15 RX ADMIN — OXYCODONE HYDROCHLORIDE SCH MG: 10 TABLET, FILM COATED, EXTENDED RELEASE ORAL at 05:31

## 2018-04-15 RX ADMIN — Medication SCH TAB: at 08:25

## 2018-04-15 RX ADMIN — CEFEPIME SCH MLS/HR: 2 INJECTION, POWDER, FOR SOLUTION INTRAVENOUS at 08:24

## 2018-04-15 RX ADMIN — STANDARDIZED SENNA CONCENTRATE AND DOCUSATE SODIUM SCH TAB: 8.6; 5 TABLET, FILM COATED ORAL at 20:39

## 2018-04-15 RX ADMIN — OXYCODONE HYDROCHLORIDE SCH MG: 10 TABLET, FILM COATED, EXTENDED RELEASE ORAL at 12:28

## 2018-04-15 RX ADMIN — Medication SCH ML: at 20:36

## 2018-04-15 RX ADMIN — CEFEPIME SCH MLS/HR: 2 INJECTION, POWDER, FOR SOLUTION INTRAVENOUS at 15:54

## 2018-04-15 RX ADMIN — CEFEPIME SCH MLS/HR: 2 INJECTION, POWDER, FOR SOLUTION INTRAVENOUS at 01:36

## 2018-04-15 RX ADMIN — STANDARDIZED SENNA CONCENTRATE AND DOCUSATE SODIUM SCH TAB: 8.6; 5 TABLET, FILM COATED ORAL at 08:25

## 2018-04-15 RX ADMIN — OXYCODONE HYDROCHLORIDE SCH MG: 10 TABLET, FILM COATED, EXTENDED RELEASE ORAL at 20:35

## 2018-04-15 RX ADMIN — Medication SCH TAB: at 20:39

## 2018-04-15 RX ADMIN — Medication SCH ML: at 08:24

## 2018-04-15 NOTE — HHI.PR
Subjective


Remarks


Follow-up sepsis/community acquired bacterial pneumonia/UTI/chronic pain 

syndrome


April 15, 2018-patient seen and examined, spike temp overnight however 

currently afebrile.  Complaint of generalized pain.  Denies any chest pain.  

Still with throat pain.





Objective


Vitals





Vital Signs








  Date Time  Temp Pulse Resp B/P (MAP) Pulse Ox O2 Delivery O2 Flow Rate FiO2


 


4/15/18 08:21 98.2 91 18 144/88 (106) 97   


 


4/15/18 05:30 98.3       


 


4/15/18 05:07 99.0 88 18 152/90 (110) 100   


 


4/15/18 01:13 98.8 92 18 162/89 (113) 99   


 


4/14/18 21:35     98 Nasal Cannula 2.00 


 


4/14/18 21:23 100.3 101 18 148/81 (103) 97   


 


4/14/18 16:13 100.0 95 20 165/89 (114) 100   


 


4/14/18 14:38     98 Nasal Cannula 2.00 


 


4/14/18 12:30 99.4 86 20 159/87 (111) 100   


 


4/14/18 11:27 98.1 91 18 133/84 (100) 99 Nasal Cannula 2.00 


 


4/14/18 11:12 98.3 89 17 133/84 (100) 100 Nasal Cannula 3.00 














I/O      


 


 4/14/18 4/14/18 4/14/18 4/15/18 4/15/18 4/15/18





 06:59 14:59 22:59 06:59 14:59 22:59


 


Intake Total  2350 ml 100 ml  100 ml 


 


Balance  2350 ml 100 ml  100 ml 


 


      


 


Intake IV Total  2350 ml 100 ml  100 ml 


 


# Voids  1 3 2  


 


# Bowel Movements  0    








Result Diagram:  


4/15/18 0704                                                                   

             4/15/18 0704





Imaging





Last Impressions








Chest X-Ray 4/14/18 0721 Signed





Impressions: 





 Service Date/Time:  Saturday, April 14, 2018 07:54 - CONCLUSION: New airspace 





 disease leftt lung.      Josh Ellis MD  FACR


 


Abdomen/Pelvis CT 4/14/18 0721 Signed





Impressions: 





 Service Date/Time:  Saturday, April 14, 2018 08:48 - CONCLUSION: Severely 





 limited exam the lateral intravenous contrast.  Status post right nephrectomy 





 and partial gastrectomy Moderate distention of the bladder Stool throughout 

the 





 colon without free air or abscess. The patient remains symptomatic repeat exam 





 with oral and intravenous contrast would be of benefit.      Josh Ellis MD 





  FACR








Objective Remarks


GENERAL: NAD


SKIN: Warm and dry.


HEAD: Normocephalic.


EYES: No scleral icterus. No injection or drainage. 


NECK: Supple, trachea midline. No JVD or lymphadenopathy.


CARDIOVASCULAR: Regular rate and rhythm without murmurs, gallops, or rubs. 


RESPIRATORY: Breath sounds equal bilaterally. No accessory muscle use.


GASTROINTESTINAL: Abdomen soft, non-tender, nondistended. 


MUSCULOSKELETAL: No cyanosis, or edema. 


BACK: Nontender without obvious deformity. No CVA tenderness.








A/P


Problem List:  


(1) Sepsis


ICD Code:  A41.9 - Sepsis, unspecified organism


(2) Community acquired bacterial pneumonia


ICD Code:  J15.9 - Unspecified bacterial pneumonia


(3) UTI (urinary tract infection)


ICD Code:  N39.0 - Urinary tract infection, site not specified


Status:  Acute


(4) Chronic narcotic dependence


ICD Code:  F11.20 - Opioid dependence, uncomplicated


Status:  Acute


Assessment and Plan


57-year-old female with





57-year-old female with





Sepsis: Temp > 100.9 or < 96.8, Heart rate over 90; Infect source susp/known (

PNA + UTI); Lactic acid wnl


   Status post Rocephin and azithromycin IV 1, continue cefepime IV every 8 

hour pending culture report





Community-acquired bacterial pneumonia


   Chest x-ray noted and reviewed by me with finding of new airspace disease in 

left lung


   Status post Rocephin and azithromycin IV 1 in ED; currently on cefepime 2 g 

IV every 8 hour


   Maintain oxygen saturation above 92%


   DuoNeb as needed





Abnormal UA


   Status post Rocephin IV 1; currently on cefepime pending urine culture





Chronic narcotics dependence


   Continue OxyContin 10 mg q. 8





Hypokalemia


   Give potassium 60 mEq 1 now and monitor electrolytes





Transaminitis


   Hepatitis profile negative


   LFTs trending down





DVT prophylaxis: Bilateral SCDs











Mp Tinsley MD Apr 15, 2018 10:48

## 2018-04-16 VITALS
SYSTOLIC BLOOD PRESSURE: 146 MMHG | TEMPERATURE: 98.7 F | HEART RATE: 80 BPM | DIASTOLIC BLOOD PRESSURE: 80 MMHG | RESPIRATION RATE: 18 BRPM | OXYGEN SATURATION: 98 %

## 2018-04-16 RX ADMIN — CEFEPIME SCH MLS/HR: 2 INJECTION, POWDER, FOR SOLUTION INTRAVENOUS at 02:04

## 2018-04-16 NOTE — HHI.DS
Death Summary Note


Date of Death:  Apr 16, 2018


Time Of Death:  0505


Admission Date


Apr 14, 2018 at 09:56


Admitting Diagnosis





pneumonia, uti


Diagnosis at Time of Death:  


(1) Sepsis


ICD Code:  A41.9 - Sepsis, unspecified organism


(2) Community acquired bacterial pneumonia


ICD Code:  J15.9 - Unspecified bacterial pneumonia


(3) UTI (urinary tract infection)


ICD Code:  N39.0 - Urinary tract infection, site not specified


(4) Chronic narcotic dependence


ICD Code:  F11.20 - Opioid dependence, uncomplicated


Brief History


 57-year-old female with PMH of Anxiety, Depression, Renal Cell CA s/p 

Nephrectomy, h/o Gastrectomy on TPN, Narcotic Dependence and h/o CVA who 

presented to the ER w/ complaints of sore throat, cough production times 

several day duration along with subjective fevers.  She states her symptoms 

initially started on April 9, 2018 when she started not feeling well.  She 

initially had dry cough however it became productive on April 11.  She also 

complained of shortness of breath as well as chest pain mostly with coughing.  

Chest x-ray in ED revealed new airspace disease in left lung.  Patient 

consistently asking for narcotics.  She also reported increase urinary 

frequency with burning with urination.  She has been recently diagnosed with 

UTI but 9 days ago.


CBC/BMP:  


4/15/18 0704                                                                   

             4/15/18 0704





Significant Findings





Laboratory Tests








Test


  4/14/18


07:41 4/14/18


08:41 4/14/18


10:50 4/15/18


07:04


 


Red Blood Count


  3.26 MIL/MM3


(4.00-5.30) 


  


  3.32 MIL/MM3


(4.00-5.30)


 


Hemoglobin


  11.0 GM/DL


(11.6-15.3) 


  


  11.1 GM/DL


(11.6-15.3)


 


Hematocrit


  32.7 %


(35.0-46.0) 


  


  32.8 %


(35.0-46.0)


 


Mean Corpuscular Volume


  100.3 FL


(80.0-100.0) 


  


  


 


 


Neutrophils (%) (Auto)


  81.2 %


(16.0-70.0) 


  


  79.5 %


(16.0-70.0)


 


Monocytes (%) (Auto)


  9.4 %


(0.0-8.0) 


  


  8.6 %


(0.0-8.0)


 


Lymphocytes # (Auto)


  0.7 TH/MM3


(1.0-4.8) 


  


  0.7 TH/MM3


(1.0-4.8)


 


Activated Partial


Thromboplast Time 32.1 SEC


(24.3-30.1) 


  


  


 


 


Random Glucose


  135 MG/DL


() 


  


  


 


 


Albumin


  2.8 GM/DL


(3.4-5.0) 


  


  2.6 GM/DL


(3.4-5.0)


 


Aspartate Amino Transf


(AST/SGOT) 216 U/L


(15-37) 


  


  152 U/L


(15-37)


 


Alanine Aminotransferase


(ALT/SGPT) 113 U/L


(10-53) 


  


  103 U/L


(10-53)


 


Estimat Glomerular Filtration


Rate 68 ML/MIN


(>89) 


  


  


 


 


Urine Turbidity  HAZY (CLEAR)   


 


Urine Protein


  


  100 mg/dL


(NEG-TRACE) 


  


 


 


Urine Ketones  10 mg/dL (NEG)   


 


Urine Occult Blood  MOD (NEG)   


 


Urine Leukocyte Esterase  TRACE (NEG)   


 


Urine RBC  14 /hpf (0-3)   


 


Urine Bacteria


  


  FEW /hpf


(NONE) 


  


 


 


Urine Mucus  FEW /lpf (OCC)   


 


Blood Urea Nitrogen    5 MG/DL (7-18) 


 


Potassium Level


  


  


  


  3.0 MEQ/L


(3.5-5.1)


 


Test


  4/16/18


04:50 


  


  


 


 


Blood Gas HCO3


  9 mmol/L


(22-26) 


  


  


 


 


Blood Gas Base Excess


  -20.8 mmol/L


(-2-2) 


  


  


 


 


Arterial Blood pH


  6.90


(7.380-7.420) 


  


  


 


 


Arterial Blood Partial


Pressure CO2 50 mmHg


(38-42) 


  


  


 


 


Arterial Blood Partial


Pressure O2 129 mmHg


() 


  


  


 


 


Blood Gas Hemoglobin


  11.2 G/DL


(12.0-16.0) 


  


  


 








Imaging





Last Impressions








Chest X-Ray 4/14/18 0721 Signed





Impressions: 





 Service Date/Time:  Saturday, April 14, 2018 07:54 - CONCLUSION: New airspace 





 disease leftt lung.      Josh Ellis MD  FACR


 


Abdomen/Pelvis CT 4/14/18 0721 Signed





Impressions: 





 Service Date/Time:  Saturday, April 14, 2018 08:48 - CONCLUSION: Severely 





 limited exam the lateral intravenous contrast.  Status post right nephrectomy 





 and partial gastrectomy Moderate distention of the bladder Stool throughout 

the 





 colon without free air or abscess. The patient remains symptomatic repeat exam 





 with oral and intravenous contrast would be of benefit.      Josh Ellis MD 





  FACR








Hospital Course


Prior to expiring, she was treated for:





Sepsis: Temp > 100.9 or < 96.8, Heart rate over 90; Infect source susp/known (

PNA + UTI); Lactic acid wnl


   Status post Rocephin and azithromycin IV 1, Treated with cefepime IV every 

8 hour pending culture report





Community-acquired bacterial pneumonia


   Chest x-ray noted and reviewed by me with finding of new airspace disease in 

left lung


   Status post Rocephin and azithromycin IV 1 in ED; Treated with cefepime 2 g 

IV every 8 hour


   Maintain oxygen saturation above 92%


   DuoNeb as needed





Abnormal UA


   Status post Rocephin IV 1; Treated with cefepime pending urine culture





Chronic narcotics dependence


   Treated with OxyContin 10 mg q. 8





Hypokalemia


   Give potassium 60 mEq 1 now and monitor electrolytes





Transaminitis


   Hepatitis profile negative


   LFTs trending down





DVT prophylaxis: Bilateral SCDs











Mp Tinsley MD Apr 16, 2018 10:45

## 2018-04-16 NOTE — PD.PROCEDR
Procedure Note


Procedure


Date: 4/16/18





Procedure:  Cardiopulmonary resucitation





Indication: Asystolic cardiac arrest





Details of procedure:


I responded to [facility].  Patient is admitted for UTI and pneumonia.  She had 

been on nasal cannula.  The nurse had hung cefepime approximately 30 minutes 

prior and when tech went in to check vital signs patient was cyanotic and 

pulseless.  She was placed on the Zoll and was found to be in asystole.  ACLS 

was initiated.  Patient was intubated by the RT prior to my arrival.  Breath 

sounds were auscultated bilaterally.  Patient received multiple doses of 

epinephrine, 1 amp calcium chloride.  Glucose was 79.  She was given 1 amp of 

D50.  I performed bedside ultrasound and there was sliding lung sign 

bilaterally which was consistent with absence of pneumothorax.  ABG was 

obtained and demonstrated pH of 6.9/PCO2 of 50/PO2 129.  Bedside echo 

demonstrated cardiac standstill with biventricular thrombus.  After 30 minutes 

of resuscitation we were unable to restore spontaneous circulation.    Pt was 

pronounced dead at 05: 05 hours.  I notified the patient's , Benito Stout.  He states that he was going to come into the hospital.  I have updated 

the nurses on the floor that he anticipates coming in.











Elizabeth Canela MD Apr 16, 2018 06:02

## 2018-04-16 NOTE — HHI.DS
__________________________________________________





Discharge Summary


Admission Date


Apr 14, 2018 at 09:56


Discharge Date:  Apr 16, 2018


Admitting Diagnosis





pneumonia, uti





(1) Sepsis


ICD Code:  A41.9 - Sepsis, unspecified organism


(2) Community acquired bacterial pneumonia


ICD Code:  J15.9 - Unspecified bacterial pneumonia


(3) UTI (urinary tract infection)


ICD Code:  N39.0 - Urinary tract infection, site not specified


Status:  Acute


(4) Chronic narcotic dependence


ICD Code:  F11.20 - Opioid dependence, uncomplicated


Status:  Acute


Brief History - From Admission


 57-year-old female with PMH of Anxiety, Depression, Renal Cell CA s/p 

Nephrectomy, h/o Gastrectomy on TPN, Narcotic Dependence and h/o CVA who 

presented to the ER w/ complaints of sore throat, cough production times 

several day duration along with subjective fevers.  She states her symptoms 

initially started on April 9, 2018 when she started not feeling well.  She 

initially had dry cough however it became productive on April 11.  She also 

complained of shortness of breath as well as chest pain mostly with coughing.  

Chest x-ray in ED revealed new airspace disease in left lung.  Patient 

consistently asking for narcotics.  She also reported increase urinary 

frequency with burning with urination.  She has been recently diagnosed with 

UTI but 9 days ago.


CBC/BMP:  


4/15/18 0704                                                                   

             4/15/18 0704





Significant Findings





Laboratory Tests








Test


  4/14/18


07:41 4/14/18


08:41 4/14/18


10:50 4/15/18


07:04


 


Red Blood Count


  3.26 MIL/MM3


(4.00-5.30) 


  


  3.32 MIL/MM3


(4.00-5.30)


 


Hemoglobin


  11.0 GM/DL


(11.6-15.3) 


  


  11.1 GM/DL


(11.6-15.3)


 


Hematocrit


  32.7 %


(35.0-46.0) 


  


  32.8 %


(35.0-46.0)


 


Mean Corpuscular Volume


  100.3 FL


(80.0-100.0) 


  


  


 


 


Neutrophils (%) (Auto)


  81.2 %


(16.0-70.0) 


  


  79.5 %


(16.0-70.0)


 


Monocytes (%) (Auto)


  9.4 %


(0.0-8.0) 


  


  8.6 %


(0.0-8.0)


 


Lymphocytes # (Auto)


  0.7 TH/MM3


(1.0-4.8) 


  


  0.7 TH/MM3


(1.0-4.8)


 


Activated Partial


Thromboplast Time 32.1 SEC


(24.3-30.1) 


  


  


 


 


Random Glucose


  135 MG/DL


() 


  


  


 


 


Albumin


  2.8 GM/DL


(3.4-5.0) 


  


  2.6 GM/DL


(3.4-5.0)


 


Aspartate Amino Transf


(AST/SGOT) 216 U/L


(15-37) 


  


  152 U/L


(15-37)


 


Alanine Aminotransferase


(ALT/SGPT) 113 U/L


(10-53) 


  


  103 U/L


(10-53)


 


Estimat Glomerular Filtration


Rate 68 ML/MIN


(>89) 


  


  


 


 


Urine Turbidity  HAZY (CLEAR)   


 


Urine Protein


  


  100 mg/dL


(NEG-TRACE) 


  


 


 


Urine Ketones  10 mg/dL (NEG)   


 


Urine Occult Blood  MOD (NEG)   


 


Urine Leukocyte Esterase  TRACE (NEG)   


 


Urine RBC  14 /hpf (0-3)   


 


Urine Bacteria


  


  FEW /hpf


(NONE) 


  


 


 


Urine Mucus  FEW /lpf (OCC)   


 


Blood Urea Nitrogen    5 MG/DL (7-18) 


 


Potassium Level


  


  


  


  3.0 MEQ/L


(3.5-5.1)


 


Test


  4/16/18


04:50 


  


  


 


 


Blood Gas HCO3


  9 mmol/L


(22-26) 


  


  


 


 


Blood Gas Base Excess


  -20.8 mmol/L


(-2-2) 


  


  


 


 


Arterial Blood pH


  6.90


(7.380-7.420) 


  


  


 


 


Arterial Blood Partial


Pressure CO2 50 mmHg


(38-42) 


  


  


 


 


Arterial Blood Partial


Pressure O2 129 mmHg


() 


  


  


 


 


Blood Gas Hemoglobin


  11.2 G/DL


(12.0-16.0) 


  


  


 








Imaging





Last Impressions








Chest X-Ray 4/14/18 0721 Signed





Impressions: 





 Service Date/Time:  Saturday, April 14, 2018 07:54 - CONCLUSION: New airspace 





 disease leftt lung.      Josh Ellis MD  FACR


 


Abdomen/Pelvis CT 4/14/18 0721 Signed





Impressions: 





 Service Date/Time:  Saturday, April 14, 2018 08:48 - CONCLUSION: Severely 





 limited exam the lateral intravenous contrast.  Status post right nephrectomy 





 and partial gastrectomy Moderate distention of the bladder Stool throughout 

the 





 colon without free air or abscess. The patient remains symptomatic repeat exam 





 with oral and intravenous contrast would be of benefit.      Josh Ellis MD 





  FACR








PE at Discharge


GENERAL: NAD


SKIN: Warm and dry.


HEAD: Normocephalic.


EYES: No scleral icterus. No injection or drainage. 


NECK: Supple, trachea midline. No JVD or lymphadenopathy.


CARDIOVASCULAR: Regular rate and rhythm without murmurs, gallops, or rubs. 


RESPIRATORY: Breath sounds equal bilaterally. No accessory muscle use.


GASTROINTESTINAL: Abdomen soft, non-tender, nondistended. 


MUSCULOSKELETAL: No cyanosis, or edema. 


BACK: Nontender without obvious deformity. No CVA tenderness.





Hospital Course


Prior to expiring, she was treated for:





Sepsis: Temp > 100.9 or < 96.8, Heart rate over 90; Infect source susp/known (

PNA + UTI); Lactic acid wnl


   Status post Rocephin and azithromycin IV 1, Treated with cefepime IV every 

8 hour pending culture report





Community-acquired bacterial pneumonia


   Chest x-ray noted and reviewed by me with finding of new airspace disease in 

left lung


   Status post Rocephin and azithromycin IV 1 in ED; Treated with cefepime 2 g 

IV every 8 hour


   Maintain oxygen saturation above 92%


   DuoNeb as needed





Abnormal UA


   Status post Rocephin IV 1; Treated with cefepime pending urine culture





Chronic narcotics dependence


   Treated with OxyContin 10 mg q. 8





Hypokalemia


   Give potassium 60 mEq 1 now and monitor electrolytes





Transaminitis


   Hepatitis profile negative


   LFTs trending down





DVT prophylaxis: Bilateral SCDs


Pt Condition on Discharge:  Guarded


Discharge Disposition:  Discharge Home


Discharge Time:  > 30 minutes











Mp Tinsley MD Apr 16, 2018 10:44

## 2018-05-27 NOTE — PD
Physical Exam


Date Seen by Provider:  Apr 3, 2017


Time Seen by Provider:  16:09


Narrative


56  YOWF C/O SEVERE ABD PAIN ,N/V. CHRONIC ABD PAIN WORSE 3 DAYS. SEEN [facility] FOR 

SAME. H/O MI AND CVA. H/O BOWEL OBSTRUCTION





MILD INCREASE HR. AWAITING BED PLACEMENT.





Data


Data


Last Documented VS





Vital Signs








  Date Time  Temp Pulse Resp B/P Pulse Ox O2 Delivery O2 Flow Rate FiO2


 


4/3/17 14:49 98.5 106 24 134/85 99 Room Air  











[facility]


Medical Record Reviewed:  Yes


Supervised Visit with DELLA:  Yes








Paul Chaudhry Apr 3, 2017 16:13 1-2 cups/cans per day

## 2024-09-12 NOTE — PD
HPI


Chief Complaint:  Headache


Time Seen by Provider:  19:50


Travel History


International Travel<30 days:  No


Contact w/Intl Traveler<30days:  No


Traveled to known affect area:  No





History of Present Illness


HPI


pt  has  severe headache   paracervical and  point tenderness. Occiput area .. 

  pt  has  many  chronic illness including  partial  gastrectomy    from  

constant reflux  & aspiration,   Migraine and  has  repeat  visits to ER  in  

last 2 months for the same  . Pt has   home  fioricet  Imitrex  with out relief

  , pt  10 /10   and   she  has  multiple allergies  when  I  say  will will 

start  with  "  Toradol IV   benadryl and  reglan,"  she says  Im allergic to 

those .. I review her   med  list  and     po percocet and fioricet to start





PFSH


Past Medical History


Hx Anticoagulant Therapy:  No


Asthma:  No


Blood Disorders:  No


Anxiety:  Yes


Depression:  Yes


Heart Rhythm Problems:  No


Cancer:  Yes (KIDNEY )


Cardiovascular Problems:  Yes (MI )


High Cholesterol:  No


Chemotherapy:  No


Chest Pain:  No


Congestive Heart Failure:  No


COPD:  No


Cerebrovascular Accident:  Yes ()


Diabetes:  No


Diminished Hearing:  No


Endocrine:  No


Gastrointestinal Disorders:  Yes (GASTRECTOMY, CHRONIC ABDOMINAL PAIN )


GERD:  No


Genitourinary:  Yes (OCCASSIONAL UTI)


Headaches:  Yes


Hiatal Hernia:  Yes


Hypertension:  Yes


Immune Disorder:  No


Implanted Vascular Access Dvce:  No


Kidney Stones:  No


Musculoskeletal:  No


Neurologic:  Yes (CVA )


Psychiatric:  No


Reproductive:  No


Respiratory:  No


Immunizations Current:  Yes


Migraines:  Yes


Myocardial Infarction:  Yes ()


Pneumonia:  Yes


Radiation Therapy:  No


Renal Failure:  No


Seizures:  Yes


Sleep Apnea:  No


Thyroid Disease:  No


Ulcer:  No


Tetanus Vaccination:  < 5 Years


Influenza Vaccination:  Yes


Pregnant?:  Not Pregnant


LMP:  menapause


Menopausal:  Yes


:  1


Para:  1


Miscarriage:  0


:  0


Ectopic Pregnancy:  No


Ovarian Cysts:  No


Dilation and Curettage (D&C):  Yes


Tubal Ligation:  Yes





Past Surgical History


Abdominal Surgery:  Yes (total gastrectomy w/pouch , hernia repair)


Appendectomy:  Yes


Cardiac Surgery:  Yes (REPAIRED HOLE IN HEART)


Cholecystectomy:  Yes


Genitourinary Surgery:  Yes (RIGHT NEPHRECTOMY)


Gynecologic Surgery:  Yes


Hysterectomy:  No


Thoracic Surgery:  No


Tonsillectomy:  Yes


Other Surgery:  Yes (GASTRECTOMY, RIGHT KIDNEY REMOVAL, HERNIA REPAIR, 

GALLBLADDER, TONSILS)





Social History


Alcohol Use:  No (PT DENIES)


Tobacco Use:  No (PT DENIES)


Substance Use:  No (PT DENIES)





Allergies-Medications


(Allergen,Severity, Reaction):  


Coded Allergies:  


     MRI PRECAUTION (Verified  Allergy, Severe, MRI contrast allergy, 18)


 anaphylaxis


     Sulfa (Sulfonamide Antibiotics) (Verified  Allergy, Severe, Rash, 18)


     gadobenic acid (Verified  Allergy, Severe, Anaphylaxis, 18)


     gadodiamide (Verified  Allergy, Severe, Anaphylaxis, 18)


     gadoteridol (Verified  Allergy, Severe, Anaphylaxis, 18)


     ketorolac (Verified  Allergy, Severe, Shortness of Breath, 18)


     metoclopramide (Verified  Allergy, Severe, Shortness of Breath, 18)


     morphine (Verified  Allergy, Severe, Hives, 18)


     ondansetron (Verified  Allergy, Severe, Shortness of Breath, 18)


     penicillin G (Verified  Allergy, Severe, Rash, 18)


     prochlorperazine (Verified  Allergy, Severe, Shortness of Breath, 18)


     promethazine (Verified  Allergy, Severe, Shortness of Breath, 18)


     shellfish derived (Verified  Allergy, Severe, Anaphylaxis, 18)


     trimethobenzamide (Verified  Allergy, Severe, Shortness of Breath, 18)


Reported Meds & Prescriptions





Reported Meds & Active Scripts


Active


Imitrex (Sumatriptan Succinate) 100 Mg Tab 100 Mg PO ONCE PRN


     If a satisfactory response has not been obtained at 2 hours, a second


     dose may be administered


Imitrex (Sumatriptan Succinate) 50 Mg Tab 50 Mg PO ONCE PRN


     If a satisfactory response has not been obtained at 2 hours, a second


     dose may be administered


Ambien (Zolpidem Tartrate) 5 Mg Tab 5 Mg PO HS PRN


Fioricet (Butalbital-Acetaminophen-Caffeine) -40 Mg Cap 20 Mg PO Q6H PRN


Reported


Amitriptyline (Amitriptyline HCl) 50 Mg Tab 50 Mg PO HS


Oxycontin (Oxycodone HCl) 10 Mg Tab 10 Mg PO Q8HR


Vitamin E (Vitamin E Mixed) 400 Unit Tablet 400 Units PO DAILY








Physical Exam


Narrative


GENERAL: THIN CACHETIC  KNEES HELD BENT IN BED 


SKIN: Warm and dry.


HEAD: Atraumatic. Normocephalic. 


EYES: Pupils equal and round. No scleral icterus. No injection or drainage. 


ENT: No nasal bleeding or discharge.  Mucous membranes pink and moist.


NECK: Trachea midline. No JVD.  OCCIPUT AREA TENDERNESS WITH PALPATION  RIGHT> 

LEFT 


CARDIOVASCULAR: Regular rate and rhythm.  


RESPIRATORY: No accessory muscle use. Clear to auscultation. Breath sounds 

equal bilaterally. 


GASTROINTESTINAL: Abdomen soft, non-tender, nondistended. Hepatic and splenic 

margins not palpable. 


MUSCULOSKELETAL: Extremities without clubbing, cyanosis, or edema. No obvious 

deformities. 


NEUROLOGICAL: Awake and alert. No obvious cranial nerve deficits.  Motor 

grossly within normal limits. Five out of 5 muscle strength in the arms and 

legs.  Normal speech.


PSYCHIATRIC: Appropriate mood and affect; insight and judgment normal.





Data


Data


Last Documented VS





Vital Signs








  Date Time  Temp Pulse Resp B/P (MAP) Pulse Ox O2 Delivery O2 Flow Rate FiO2


 


18 19:30 99.3 107 16 141/89 (106) 99   








Orders





 Orders


Acet-Butal-Caff 325-50-40 Mg (Fioricet 3 (18 20:00)


Oxycodone-Acetamin 5-325 Mg (Percocet (18 20:00)


Electrocardiogram (18 19:39)


Sumatriptan Succinate (Imitrex) (18 21:00)


Hydromorphone Pf Inj (Dilaudid Pf Inj) (18 21:00)


Ed Discharge Order (18 23:00)








[facility]


Medical Decision Making


Medical Screen Exam Complete:  Yes


Emergency Medical Condition:  Yes


Interpretation(s)


Left bundle branch block at a rate of 93 patient has history of left bundle-

branch block


Differential Diagnosis


Migraine  non retractable  vs  intracranial pathology  ,  pt  has  frequqent 

similiar  so  a feel  CT  not indicated ,


Narrative Course


Patient is given fIRICET AND 2 PERCOCET WITOUT RELIEF  ,  THEN  IM DILAUDID 2 

MG  AND  IMITREX  PO AND  FEELS BETTER WANTS TO GO HOME





Diagnosis





 Primary Impression:  


 Head ache


Patient Instructions:  Acute Headache (ED), General Instructions


Disposition:   DISCHARGE HOME











Miguel Goode MD 2018 19:59 DISCHARGE